# Patient Record
Sex: FEMALE | Race: WHITE
[De-identification: names, ages, dates, MRNs, and addresses within clinical notes are randomized per-mention and may not be internally consistent; named-entity substitution may affect disease eponyms.]

---

## 2017-03-31 ENCOUNTER — HOSPITAL ENCOUNTER (OUTPATIENT)
Dept: HOSPITAL 8 - CFH | Age: 72
Discharge: HOME | End: 2017-03-31
Attending: NURSE PRACTITIONER
Payer: MEDICARE

## 2017-03-31 DIAGNOSIS — Z13.820: ICD-10-CM

## 2017-03-31 DIAGNOSIS — Z12.31: Primary | ICD-10-CM

## 2017-03-31 PROCEDURE — G0202 SCR MAMMO BI INCL CAD: HCPCS

## 2017-04-07 ENCOUNTER — HOSPITAL ENCOUNTER (OUTPATIENT)
Dept: HOSPITAL 8 - CFH | Age: 72
Discharge: HOME | End: 2017-04-07
Attending: NURSE PRACTITIONER
Payer: MEDICARE

## 2017-04-07 DIAGNOSIS — Z13.820: Primary | ICD-10-CM

## 2017-04-07 DIAGNOSIS — M81.0: ICD-10-CM

## 2017-04-07 PROCEDURE — 77080 DXA BONE DENSITY AXIAL: CPT

## 2018-02-27 ENCOUNTER — HOSPITAL ENCOUNTER (OUTPATIENT)
Dept: HOSPITAL 8 - CFH | Age: 73
Discharge: HOME | End: 2018-02-27
Attending: NURSE PRACTITIONER
Payer: MEDICARE

## 2018-02-27 DIAGNOSIS — F34.1: ICD-10-CM

## 2018-02-27 DIAGNOSIS — M81.0: ICD-10-CM

## 2018-02-27 DIAGNOSIS — E78.00: ICD-10-CM

## 2018-02-27 DIAGNOSIS — G44.301: Primary | ICD-10-CM

## 2018-02-27 DIAGNOSIS — M25.511: ICD-10-CM

## 2018-02-27 DIAGNOSIS — N28.9: ICD-10-CM

## 2018-02-27 LAB
ALBUMIN SERPL-MCNC: 3.6 G/DL (ref 3.4–5)
ALP SERPL-CCNC: 65 U/L (ref 45–117)
ALT SERPL-CCNC: 16 U/L (ref 12–78)
ANION GAP SERPL CALC-SCNC: 7 MMOL/L (ref 5–15)
BASOPHILS # BLD AUTO: 0.04 X10^3/UL (ref 0–0.1)
BASOPHILS NFR BLD AUTO: 1 % (ref 0–1)
BILIRUB SERPL-MCNC: 0.7 MG/DL (ref 0.2–1)
CALCIUM SERPL-MCNC: 9 MG/DL (ref 8.5–10.1)
CHLORIDE SERPL-SCNC: 108 MMOL/L (ref 98–107)
CHOL/HDL RATIO: 2.9
CREAT SERPL-MCNC: 0.97 MG/DL (ref 0.55–1.02)
CULTURE INDICATED?: YES
EOSINOPHIL # BLD AUTO: 0.24 X10^3/UL (ref 0–0.4)
EOSINOPHIL NFR BLD AUTO: 3 % (ref 1–7)
ERYTHROCYTE [DISTWIDTH] IN BLOOD BY AUTOMATED COUNT: 14.3 % (ref 9.6–15.2)
HDL CHOL %: 35 % (ref 28–40)
HDL CHOLESTEROL (DIRECT): 70 MG/DL (ref 40–60)
LDL CHOLESTEROL,CALCULATED: 114 MG/DL (ref 54–169)
LDLC/HDLC SERPL: 1.6 {RATIO} (ref 0.5–3)
LYMPHOCYTES # BLD AUTO: 0.9 X10^3/UL (ref 1–3.4)
LYMPHOCYTES NFR BLD AUTO: 12 % (ref 22–44)
MCH RBC QN AUTO: 30.7 PG (ref 27–34.8)
MCHC RBC AUTO-ENTMCNC: 33.5 G/DL (ref 32.4–35.8)
MCV RBC AUTO: 91.6 FL (ref 80–100)
MD: NO
MICROSCOPIC: (no result)
MONOCYTES # BLD AUTO: 0.47 X10^3/UL (ref 0.2–0.8)
MONOCYTES NFR BLD AUTO: 6 % (ref 2–9)
NEUTROPHILS # BLD AUTO: 5.71 X10^3/UL (ref 1.8–6.8)
NEUTROPHILS NFR BLD AUTO: 78 % (ref 42–75)
PLATELET # BLD AUTO: 236 X10^3/UL (ref 130–400)
PMV BLD AUTO: 8.9 FL (ref 7.4–10.4)
PROT SERPL-MCNC: 6.9 G/DL (ref 6.4–8.2)
RBC # BLD AUTO: 4.62 X10^6/UL (ref 3.82–5.3)
TRIGL SERPL-MCNC: 87 MG/DL (ref 50–200)
TSH SERPL-ACNC: 1.51 MIU/L (ref 0.36–3.74)
VLDLC SERPL CALC-MCNC: 17 MG/DL (ref 0–25)

## 2018-02-27 PROCEDURE — 85025 COMPLETE CBC W/AUTO DIFF WBC: CPT

## 2018-02-27 PROCEDURE — 87086 URINE CULTURE/COLONY COUNT: CPT

## 2018-02-27 PROCEDURE — 36415 COLL VENOUS BLD VENIPUNCTURE: CPT

## 2018-02-27 PROCEDURE — 80061 LIPID PANEL: CPT

## 2018-02-27 PROCEDURE — 84443 ASSAY THYROID STIM HORMONE: CPT

## 2018-02-27 PROCEDURE — 80053 COMPREHEN METABOLIC PANEL: CPT

## 2018-02-27 PROCEDURE — 81001 URINALYSIS AUTO W/SCOPE: CPT

## 2018-02-27 PROCEDURE — 70450 CT HEAD/BRAIN W/O DYE: CPT

## 2018-02-27 PROCEDURE — 83721 ASSAY OF BLOOD LIPOPROTEIN: CPT

## 2018-08-06 ENCOUNTER — HOSPITAL ENCOUNTER (OUTPATIENT)
Dept: HOSPITAL 8 - LAB | Age: 73
Discharge: HOME | End: 2018-08-06
Attending: NURSE PRACTITIONER
Payer: MEDICARE

## 2018-08-06 DIAGNOSIS — E78.00: ICD-10-CM

## 2018-08-06 DIAGNOSIS — R53.83: ICD-10-CM

## 2018-08-06 DIAGNOSIS — R26.9: ICD-10-CM

## 2018-08-06 DIAGNOSIS — G20: Primary | ICD-10-CM

## 2018-08-06 DIAGNOSIS — M81.0: ICD-10-CM

## 2018-08-06 LAB
ALBUMIN SERPL-MCNC: 3.7 G/DL (ref 3.4–5)
ALP SERPL-CCNC: 88 U/L (ref 45–117)
ALT SERPL-CCNC: 10 U/L (ref 12–78)
ANION GAP SERPL CALC-SCNC: 9 MMOL/L (ref 5–15)
BASOPHILS # BLD AUTO: 0.01 X10^3/UL (ref 0–0.1)
BASOPHILS NFR BLD AUTO: 0 % (ref 0–1)
BILIRUB SERPL-MCNC: 0.9 MG/DL (ref 0.2–1)
CALCIUM SERPL-MCNC: 8.7 MG/DL (ref 8.5–10.1)
CHLORIDE SERPL-SCNC: 105 MMOL/L (ref 98–107)
CHOL/HDL RATIO: 2.5
CREAT SERPL-MCNC: 1.11 MG/DL (ref 0.55–1.02)
EOSINOPHIL # BLD AUTO: 0.15 X10^3/UL (ref 0–0.4)
EOSINOPHIL NFR BLD AUTO: 2 % (ref 1–7)
ERYTHROCYTE [DISTWIDTH] IN BLOOD BY AUTOMATED COUNT: 14 % (ref 9.6–15.2)
FOLATE SERPL-MCNC: 13.3 NG/ML (ref 3.1–17.5)
HDL CHOL %: 40 % (ref 28–40)
HDL CHOLESTEROL (DIRECT): 75 MG/DL (ref 40–60)
LDL CHOLESTEROL,CALCULATED: 91 MG/DL (ref 54–169)
LDLC/HDLC SERPL: 1.2 {RATIO} (ref 0.5–3)
LYMPHOCYTES # BLD AUTO: 0.63 X10^3/UL (ref 1–3.4)
LYMPHOCYTES NFR BLD AUTO: 7 % (ref 22–44)
MCH RBC QN AUTO: 31.2 PG (ref 27–34.8)
MCHC RBC AUTO-ENTMCNC: 33.8 G/DL (ref 32.4–35.8)
MCV RBC AUTO: 92.3 FL (ref 80–100)
MD: NO
MONOCYTES # BLD AUTO: 0.6 X10^3/UL (ref 0.2–0.8)
MONOCYTES NFR BLD AUTO: 6 % (ref 2–9)
NEUTROPHILS # BLD AUTO: 7.98 X10^3/UL (ref 1.8–6.8)
NEUTROPHILS NFR BLD AUTO: 85 % (ref 42–75)
PLATELET # BLD AUTO: 308 X10^3/UL (ref 130–400)
PMV BLD AUTO: 7.8 FL (ref 7.4–10.4)
PROT SERPL-MCNC: 7.1 G/DL (ref 6.4–8.2)
RBC # BLD AUTO: 4.71 X10^6/UL (ref 3.82–5.3)
TRIGL SERPL-MCNC: 99 MG/DL (ref 50–200)
TSH SERPL-ACNC: 0.69 MIU/L (ref 0.36–3.74)
VLDLC SERPL CALC-MCNC: 20 MG/DL (ref 0–25)

## 2018-08-06 PROCEDURE — 80053 COMPREHEN METABOLIC PANEL: CPT

## 2018-08-06 PROCEDURE — 85025 COMPLETE CBC W/AUTO DIFF WBC: CPT

## 2018-08-06 PROCEDURE — 82607 VITAMIN B-12: CPT

## 2018-08-06 PROCEDURE — 80061 LIPID PANEL: CPT

## 2018-08-06 PROCEDURE — 84443 ASSAY THYROID STIM HORMONE: CPT

## 2018-08-06 PROCEDURE — 82746 ASSAY OF FOLIC ACID SERUM: CPT

## 2018-08-06 PROCEDURE — 36415 COLL VENOUS BLD VENIPUNCTURE: CPT

## 2018-10-15 ENCOUNTER — HOSPITAL ENCOUNTER (INPATIENT)
Dept: HOSPITAL 8 - ED | Age: 73
LOS: 2 days | Discharge: HOME HEALTH | DRG: 185 | End: 2018-10-17
Attending: INTERNAL MEDICINE | Admitting: INTERNAL MEDICINE
Payer: MEDICARE

## 2018-10-15 VITALS — HEIGHT: 67 IN | BODY MASS INDEX: 22.01 KG/M2 | WEIGHT: 140.21 LBS

## 2018-10-15 VITALS — DIASTOLIC BLOOD PRESSURE: 73 MMHG | SYSTOLIC BLOOD PRESSURE: 120 MMHG
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
## 2018-10-15 DIAGNOSIS — S22.42XA: Primary | ICD-10-CM

## 2018-10-15 DIAGNOSIS — F03.90: ICD-10-CM
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
## 2018-10-15 DIAGNOSIS — G20: ICD-10-CM

## 2018-10-15 DIAGNOSIS — J44.9: ICD-10-CM
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
## 2018-10-15 LAB
ALBUMIN SERPL-MCNC: 3.4 G/DL (ref 3.4–5)
ALP SERPL-CCNC: 80 U/L (ref 45–117)
ALT SERPL-CCNC: 16 U/L (ref 12–78)
ANION GAP SERPL CALC-SCNC: 10 MMOL/L (ref 5–15)
BASOPHILS # BLD AUTO: 0.01 X10^3/UL (ref 0–0.1)
BASOPHILS NFR BLD AUTO: 0 % (ref 0–1)
BILIRUB SERPL-MCNC: 0.4 MG/DL (ref 0.2–1)
CALCIUM SERPL-MCNC: 8.6 MG/DL (ref 8.5–10.1)
CHLORIDE SERPL-SCNC: 109 MMOL/L (ref 98–107)
CREAT SERPL-MCNC: 0.95 MG/DL (ref 0.55–1.02)
EOSINOPHIL # BLD AUTO: 0.04 X10^3/UL (ref 0–0.4)
EOSINOPHIL NFR BLD AUTO: 0 % (ref 1–7)
ERYTHROCYTE [DISTWIDTH] IN BLOOD BY AUTOMATED COUNT: 14.1 % (ref 9.6–15.2)
LYMPHOCYTES # BLD AUTO: 0.55 X10^3/UL (ref 1–3.4)
LYMPHOCYTES NFR BLD AUTO: 4 % (ref 22–44)
MCH RBC QN AUTO: 31 PG (ref 27–34.8)
MCHC RBC AUTO-ENTMCNC: 33.6 G/DL (ref 32.4–35.8)
MCV RBC AUTO: 92.2 FL (ref 80–100)
MD: NO
MONOCYTES # BLD AUTO: 0.34 X10^3/UL (ref 0.2–0.8)
MONOCYTES NFR BLD AUTO: 3 % (ref 2–9)
NEUTROPHILS # BLD AUTO: 11.56 X10^3/UL (ref 1.8–6.8)
NEUTROPHILS NFR BLD AUTO: 93 % (ref 42–75)
PLATELET # BLD AUTO: 234 X10^3/UL (ref 130–400)
PMV BLD AUTO: 7.9 FL (ref 7.4–10.4)
PROT SERPL-MCNC: 6.6 G/DL (ref 6.4–8.2)
RBC # BLD AUTO: 4.35 X10^6/UL (ref 3.82–5.3)
TROPONIN I SERPL-MCNC: < 0.015 NG/ML (ref 0–0.04)

## 2018-10-15 PROCEDURE — 96376 TX/PRO/DX INJ SAME DRUG ADON: CPT

## 2018-10-15 PROCEDURE — 70450 CT HEAD/BRAIN W/O DYE: CPT

## 2018-10-15 PROCEDURE — 96375 TX/PRO/DX INJ NEW DRUG ADDON: CPT

## 2018-10-15 PROCEDURE — 80053 COMPREHEN METABOLIC PANEL: CPT

## 2018-10-15 PROCEDURE — 81003 URINALYSIS AUTO W/O SCOPE: CPT

## 2018-10-15 PROCEDURE — 93005 ELECTROCARDIOGRAM TRACING: CPT

## 2018-10-15 PROCEDURE — 36415 COLL VENOUS BLD VENIPUNCTURE: CPT

## 2018-10-15 PROCEDURE — 84484 ASSAY OF TROPONIN QUANT: CPT

## 2018-10-15 PROCEDURE — 96374 THER/PROPH/DIAG INJ IV PUSH: CPT

## 2018-10-15 PROCEDURE — 85025 COMPLETE CBC W/AUTO DIFF WBC: CPT

## 2018-10-15 RX ADMIN — MORPHINE SULFATE PRN MG: 4 INJECTION INTRAVENOUS at 21:30

## 2018-10-15 RX ADMIN — SODIUM CHLORIDE SCH MLS/HR: 0.9 INJECTION, SOLUTION INTRAVENOUS at 22:25

## 2018-10-15 RX ADMIN — MORPHINE SULFATE PRN MG: 4 INJECTION INTRAVENOUS at 20:29

## 2018-10-15 RX ADMIN — KETOROLAC TROMETHAMINE PRN MG: 30 INJECTION, SOLUTION INTRAMUSCULAR at 22:43

## 2018-10-16 VITALS — SYSTOLIC BLOOD PRESSURE: 103 MMHG | DIASTOLIC BLOOD PRESSURE: 65 MMHG

## 2018-10-16 VITALS — SYSTOLIC BLOOD PRESSURE: 111 MMHG | DIASTOLIC BLOOD PRESSURE: 67 MMHG

## 2018-10-16 VITALS — SYSTOLIC BLOOD PRESSURE: 114 MMHG | DIASTOLIC BLOOD PRESSURE: 65 MMHG

## 2018-10-16 VITALS — DIASTOLIC BLOOD PRESSURE: 72 MMHG | SYSTOLIC BLOOD PRESSURE: 129 MMHG

## 2018-10-16 LAB
ALBUMIN SERPL-MCNC: 3.2 G/DL (ref 3.4–5)
ALP SERPL-CCNC: 77 U/L (ref 45–117)
ALT SERPL-CCNC: 25 U/L (ref 12–78)
ANION GAP SERPL CALC-SCNC: 5 MMOL/L (ref 5–15)
BASOPHILS # BLD AUTO: 0.07 X10^3/UL (ref 0–0.1)
BASOPHILS NFR BLD AUTO: 1 % (ref 0–1)
BILIRUB SERPL-MCNC: 0.7 MG/DL (ref 0.2–1)
CALCIUM SERPL-MCNC: 8.5 MG/DL (ref 8.5–10.1)
CHLORIDE SERPL-SCNC: 109 MMOL/L (ref 98–107)
CREAT SERPL-MCNC: 1.03 MG/DL (ref 0.55–1.02)
CULTURE INDICATED?: NO
EOSINOPHIL # BLD AUTO: 0.04 X10^3/UL (ref 0–0.4)
EOSINOPHIL NFR BLD AUTO: 1 % (ref 1–7)
ERYTHROCYTE [DISTWIDTH] IN BLOOD BY AUTOMATED COUNT: 13.9 % (ref 9.6–15.2)
LYMPHOCYTES # BLD AUTO: 0.72 X10^3/UL (ref 1–3.4)
LYMPHOCYTES NFR BLD AUTO: 9 % (ref 22–44)
MCH RBC QN AUTO: 30.6 PG (ref 27–34.8)
MCHC RBC AUTO-ENTMCNC: 33.1 G/DL (ref 32.4–35.8)
MCV RBC AUTO: 92.3 FL (ref 80–100)
MD: NO
MICROSCOPIC: (no result)
MONOCYTES # BLD AUTO: 0.63 X10^3/UL (ref 0.2–0.8)
MONOCYTES NFR BLD AUTO: 8 % (ref 2–9)
NEUTROPHILS # BLD AUTO: 6.7 X10^3/UL (ref 1.8–6.8)
NEUTROPHILS NFR BLD AUTO: 82 % (ref 42–75)
PLATELET # BLD AUTO: 243 X10^3/UL (ref 130–400)
PMV BLD AUTO: 8.1 FL (ref 7.4–10.4)
PROT SERPL-MCNC: 6.4 G/DL (ref 6.4–8.2)
RBC # BLD AUTO: 4.28 X10^6/UL (ref 3.82–5.3)
TROPONIN I SERPL-MCNC: < 0.015 NG/ML (ref 0–0.04)
TROPONIN I SERPL-MCNC: < 0.015 NG/ML (ref 0–0.04)

## 2018-10-16 PROCEDURE — 0T9B70Z DRAINAGE OF BLADDER WITH DRAINAGE DEVICE, VIA NATURAL OR ARTIFICIAL OPENING: ICD-10-PCS | Performed by: INTERNAL MEDICINE

## 2018-10-16 RX ADMIN — SODIUM CHLORIDE SCH MLS/HR: 0.9 INJECTION, SOLUTION INTRAVENOUS at 23:55

## 2018-10-16 RX ADMIN — DOCUSATE SODIUM 50MG AND SENNOSIDES 8.6MG SCH TAB: 8.6; 5 TABLET, FILM COATED ORAL at 09:45

## 2018-10-16 RX ADMIN — KETOROLAC TROMETHAMINE PRN MG: 30 INJECTION, SOLUTION INTRAMUSCULAR at 09:51

## 2018-10-16 RX ADMIN — SODIUM CHLORIDE SCH MLS/HR: 0.9 INJECTION, SOLUTION INTRAVENOUS at 12:48

## 2018-10-16 RX ADMIN — KETOROLAC TROMETHAMINE PRN MG: 30 INJECTION, SOLUTION INTRAMUSCULAR at 17:58

## 2018-10-16 RX ADMIN — KETOROLAC TROMETHAMINE PRN MG: 30 INJECTION, SOLUTION INTRAMUSCULAR at 23:55

## 2018-10-17 VITALS — SYSTOLIC BLOOD PRESSURE: 119 MMHG | DIASTOLIC BLOOD PRESSURE: 74 MMHG

## 2018-10-17 VITALS — SYSTOLIC BLOOD PRESSURE: 123 MMHG | DIASTOLIC BLOOD PRESSURE: 70 MMHG

## 2018-10-17 VITALS — SYSTOLIC BLOOD PRESSURE: 145 MMHG | DIASTOLIC BLOOD PRESSURE: 82 MMHG

## 2018-10-17 RX ADMIN — DOCUSATE SODIUM 50MG AND SENNOSIDES 8.6MG SCH TAB: 8.6; 5 TABLET, FILM COATED ORAL at 07:50

## 2018-10-17 RX ADMIN — KETOROLAC TROMETHAMINE PRN MG: 30 INJECTION, SOLUTION INTRAMUSCULAR at 07:50

## 2019-12-04 ENCOUNTER — APPOINTMENT (OUTPATIENT)
Dept: RADIOLOGY | Facility: IMAGING CENTER | Age: 74
End: 2019-12-04
Attending: PHYSICIAN ASSISTANT
Payer: MEDICARE

## 2019-12-04 ENCOUNTER — OFFICE VISIT (OUTPATIENT)
Dept: URGENT CARE | Facility: PHYSICIAN GROUP | Age: 74
End: 2019-12-04
Payer: MEDICARE

## 2019-12-04 VITALS
TEMPERATURE: 98.1 F | HEIGHT: 68 IN | BODY MASS INDEX: 25.76 KG/M2 | OXYGEN SATURATION: 92 % | SYSTOLIC BLOOD PRESSURE: 110 MMHG | DIASTOLIC BLOOD PRESSURE: 78 MMHG | WEIGHT: 170 LBS | RESPIRATION RATE: 16 BRPM | HEART RATE: 83 BPM

## 2019-12-04 DIAGNOSIS — J06.9 URI WITH COUGH AND CONGESTION: ICD-10-CM

## 2019-12-04 DIAGNOSIS — J40 BRONCHITIS: Primary | ICD-10-CM

## 2019-12-04 PROCEDURE — 99204 OFFICE O/P NEW MOD 45 MIN: CPT | Performed by: PHYSICIAN ASSISTANT

## 2019-12-04 PROCEDURE — 71046 X-RAY EXAM CHEST 2 VIEWS: CPT | Mod: TC | Performed by: PHYSICIAN ASSISTANT

## 2019-12-04 RX ORDER — FLUOXETINE HYDROCHLORIDE 40 MG/1
CAPSULE ORAL
Refills: 2 | COMMUNITY
Start: 2019-11-08

## 2019-12-04 RX ORDER — AZITHROMYCIN 250 MG/1
TABLET, FILM COATED ORAL
Qty: 6 TAB | Refills: 0 | Status: SHIPPED
Start: 2019-12-04 | End: 2020-01-01

## 2019-12-04 RX ORDER — METHYLPREDNISOLONE 4 MG/1
TABLET ORAL
Qty: 21 TAB | Refills: 0 | Status: SHIPPED
Start: 2019-12-04 | End: 2020-01-01

## 2019-12-04 RX ORDER — CHOLECALCIFEROL (VITAMIN D3) 25 MCG
1 CAPSULE ORAL DAILY
Refills: 2 | COMMUNITY
Start: 2019-11-08

## 2019-12-05 NOTE — PROGRESS NOTES
Subjective:      Pt is a 74 y.o. female who presents with Cough (congestion, sore throat, chills, feverish, x3 days )            HPI  This is a new problem. PT presents to  clinic today complaining of sore throat,  pressure in ears, cough, fatigue, runny nose, wheezing and SOB. PT denies CP, NVD, abdominal pain, joint pain. PT states these symptoms began around 3 days ago. PT states the pain is a 7/10 with coughing fits, aching in nature and worse at night. Pt has not taken any RX medications for this condition. The pt's medication list, problem list, and allergies have been evaluated and reviewed during today's visit.    PMH:  Past Medical History:   Diagnosis Date   • Arthritis    • Personal history of venous thrombosis and embolism 2006    left calf       PSH:  Past Surgical History:   Procedure Laterality Date   • SHOULDER DECOMPRESSION ARTHROSCOPIC  7/24/08    Performed by TOAN ROMERO at [facility]   • CLAVICLE DISTAL EXCISION  7/24/08    Performed by TOAN ROMERO at [facility]   • OTHER ORTHOPEDIC SURGERY      bilateral knee menisectomies       Fam Hx:  the patient's family history is not pertinent to their current complaint      Soc HX:  Social History     Socioeconomic History   • Marital status:      Spouse name: Not on file   • Number of children: Not on file   • Years of education: Not on file   • Highest education level: Not on file   Occupational History   • Not on file   Social Needs   • Financial resource strain: Not on file   • Food insecurity:     Worry: Not on file     Inability: Not on file   • Transportation needs:     Medical: Not on file     Non-medical: Not on file   Tobacco Use   • Smoking status: Never Smoker   • Smokeless tobacco: Never Used   Substance and Sexual Activity   • Alcohol use: Not on file   • Drug use: Not on file   • Sexual activity: Not on file   Lifestyle   • Physical activity:     Days per week: Not on file     Minutes per  session: Not on file   • Stress: Not on file   Relationships   • Social connections:     Talks on phone: Not on file     Gets together: Not on file     Attends Protestant service: Not on file     Active member of club or organization: Not on file     Attends meetings of clubs or organizations: Not on file     Relationship status: Not on file   • Intimate partner violence:     Fear of current or ex partner: Not on file     Emotionally abused: Not on file     Physically abused: Not on file     Forced sexual activity: Not on file   Other Topics Concern   • Not on file   Social History Narrative   • Not on file         Medications:    Current Outpatient Medications:   •  fluoxetine (PROZAC) 40 MG capsule, TAKE 1 CAPSULE BY MOUTH ONCE DAILY IN THE MORNING, Disp: , Rfl: 2  •  Cholecalciferol (VITAMIN D-3) 1000 units Cap, Take 1 Cap by mouth., Disp: , Rfl: 2  •  carbidopa-levodopa (SINEMET)  MG Tab, Take 1 Tab by mouth., Disp: , Rfl: 5  •  TYLENOL 325 MG PO TABS, , Disp: , Rfl:   •  CYMBALTA PO, QDAY. , Disp: , Rfl:   •  IBUPROFEN 200 MG PO CAPS, QDAY. Instructed to stop antiinflammatory pre-op, Disp: , Rfl:       Allergies:  Patient has no known allergies.    ROS  Review of Systems   Constitutional: Positive for malaise/fatigue. Negative for fever and diaphoresis.   HENT: Positive for congestion and sore throat. Negative for ear discharge, hearing loss, nosebleeds and tinnitus.    Eyes: Negative for blurred vision, double vision and photophobia.   Respiratory: Positive for cough, sputum production, shortness of breath and wheezing. Negative for hemoptysis.    Cardiovascular: Negative for chest pain and palpitations.   Gastrointestinal: Negative for nausea, vomiting, abdominal pain, diarrhea and constipation.   Genitourinary: Negative for dysuria and flank pain.   Musculoskeletal: Negative for joint pain and myalgias.   Skin: Negative for itching and rash.   Neurological:  Negative for dizziness, tingling and  "weakness.   Endo/Heme/Allergies: Does not bruise/bleed easily.   Psychiatric/Behavioral: Negative for depression. The patient is not nervous/anxious.             Objective:     /78 (BP Location: Left arm, Patient Position: Sitting, BP Cuff Size: Adult)   Pulse 83   Temp 36.7 °C (98.1 °F) (Temporal)   Resp 16   Ht 1.727 m (5' 8\")   Wt 77.1 kg (170 lb)   SpO2 92%   BMI 25.85 kg/m²      Physical Exam      Physical Exam   Constitutional: PT is oriented to person, place, and time. PT appears well-developed and well-nourished. No distress.   HENT:   Head: Normocephalic and atraumatic.   Right Ear: Hearing, tympanic membrane, external ear and ear canal normal.   Left Ear: Hearing, tympanic membrane, external ear and ear canal normal.   Nose: Mucosal edema, rhinorrhea and sinus tenderness present. Right sinus exhibits frontal sinus tenderness. Left sinus exhibits frontal sinus tenderness.   Mouth/Throat: Uvula is midline. Mucous membranes are pale. Posterior oropharyngeal edema and posterior oropharyngeal erythema present. No oropharyngeal exudate.   Eyes: Conjunctivae normal and EOM are normal. Pupils are equal, round, and reactive to light. Right eye exhibits no discharge. Left eye exhibits no discharge.   Neck: Normal range of motion. Neck supple. No thyromegaly present.   Cardiovascular: Normal rate, regular rhythm, normal heart sounds and intact distal pulses.  Exam reveals no gallop and no friction rub.    No murmur heard.  Pulmonary/Chest: Effort normal. No respiratory distress. PT has wheezes. PT has no rales. PT exhibits tenderness.   Abdominal: Soft. Bowel sounds are normal. PT exhibits no distension and no mass. There is no tenderness. There is no rebound and no guarding.   Musculoskeletal: Normal range of motion. PT exhibits no edema and no tenderness.   Lymphadenopathy:     PT has no cervical adenopathy.   Neurological: Pt is alert and oriented to person, place, and time. Pt has normal reflexes. No " cranial nerve deficit.   Skin: Skin is warm and dry. No rash noted. No erythema.   Psychiatric: PT has a normal mood and affect. Pt behavior is normal. Judgment and thought content normal.     RADS:  DX-CHEST-2 VIEWS   Order: [id number]   Status:  Final result   Visible to patient:  No (Not Released)   Next appt:  None   Dx:  URI with cough and congestion   Details     Reading Physician Reading Date Result Priority   Akhil Maritn M.D. 12/4/2019 Urgent Care      Narrative & Impression        12/4/2019 6:27 PM     HISTORY/REASON FOR EXAM:  Cough.        TECHNIQUE/EXAM DESCRIPTION AND NUMBER OF VIEWS:  Two views of the chest.     COMPARISON:  7/17/2008     FINDINGS:     Cardiomediastinal silhouette is normal.     No focal consolidation, pleural effusion, pulmonary edema or pneumothorax. Lungs appear mildly hyperinflated which could represent emphysematous changes versus  Inspiratory effort.     No acute osseous abnormality.     IMPRESSION:     No acute cardiopulmonary abnormality.            Last Resulted: 12/04/19  6:50 PM                    Assessment/Plan:       1. Bronchitis      2. URI with cough and congestion    - DX-CHEST-2 VIEWS; Future    Azithromycin  Medrol pack  Concern for worsening symptoms of URI which shortly could transition to pneumonia and worsening sinus congestion and infection with powerful cough keeping pt up at night as they must sleep upright to avoid coughing fits.  Diff DX: Bronchitis, Sinusitis, Pneumonia, Influenza, Viral URI, Allergies  Rest, fluids encouraged.  OTC decongestant for congestion/cough  AVS with medical info given.  Pt was in full understanding and agreement with the plan.  Differential diagnosis, natural history, supportive care, and indications for immediate follow-up discussed. All questions answered. Patient agrees with the plan of care.  Follow-up as needed if symptoms worsen or fail to improve to PCP, Urgent care or Emergency Room.

## 2019-12-05 NOTE — PATIENT INSTRUCTIONS
Pleurisy  Pleurisy is irritation and swelling (inflammation) of the linings of your lungs (pleura). This can cause pain in your chest, back, or shoulder. It can also cause trouble breathing.  Follow these instructions at home:  Medicines  · Take over-the-counter and prescription medicines only as told by your doctor.  · If you were prescribed antibiotic medicine, take it as told by your doctor. Do not stop taking the antibiotic even if you start to feel better.  Activity  · Rest and return to your normal activities as told by your doctor. Ask your doctor what activities are safe for you.  · Do not drive or use heavy machinery while taking prescription pain medicine.  General instructions  · Watch for any changes in your condition.  · Take deep breaths often, even if it is painful. This can help prevent lung problems.  · When lying down, lie on your painful side. This may help you feel less pain.  · Do not smoke. If you need help quitting, ask your doctor.  · Keep all follow-up visits as told by your doctor. This is important.  Contact a doctor if:  · You have pain that:  ¨ Gets worse.  ¨ Does not get better with medicine.  ¨ Lasts for more than 1 week.  · You have a fever or chills.  · You have a cough that does not get better at home.  · You have trouble breathing that does not get better at home.  · You cough up liquid that looks like pus (purulent secretions).  Get help right away if:  · Your lips, fingernails, or toenails turn dark or turn blue.  · You cough up blood.  · You have trouble breathing that gets worse.  · You are making loud noises when you breathe (wheezing) and this gets worse.  · You have pain that spreads to your neck, arms, or jaw.  · You get a rash.  · You throw up (vomit).  · You pass out (faint).  Summary  · Pleurisy is irritation and swelling (inflammation) of the linings of your lungs (pleura).  · Pleurisy can cause pain and trouble breathing.  · If you have a cough that does not get better  at home, contact your doctor.  · Get help right away if you are having trouble breathing and it is getting worse.  This information is not intended to replace advice given to you by your health care provider. Make sure you discuss any questions you have with your health care provider.  Document Released: 11/30/2009 Document Revised: 09/11/2017 Document Reviewed: 09/11/2017  [employer] Interactive Patient Education © 2017 [employer]

## 2020-01-01 ENCOUNTER — APPOINTMENT (OUTPATIENT)
Dept: RADIOLOGY | Facility: MEDICAL CENTER | Age: 75
DRG: 202 | End: 2020-01-01
Attending: EMERGENCY MEDICINE
Payer: MEDICARE

## 2020-01-01 ENCOUNTER — HOSPITAL ENCOUNTER (INPATIENT)
Facility: MEDICAL CENTER | Age: 75
LOS: 245 days | DRG: 202 | End: 2021-01-26
Attending: EMERGENCY MEDICINE | Admitting: HOSPITALIST
Payer: MEDICARE

## 2020-01-01 ENCOUNTER — APPOINTMENT (OUTPATIENT)
Dept: CARDIOLOGY | Facility: MEDICAL CENTER | Age: 75
DRG: 202 | End: 2020-01-01
Attending: HOSPITALIST
Payer: MEDICARE

## 2020-01-01 ENCOUNTER — APPOINTMENT (OUTPATIENT)
Dept: RADIOLOGY | Facility: MEDICAL CENTER | Age: 75
DRG: 202 | End: 2020-01-01
Attending: INTERNAL MEDICINE
Payer: MEDICARE

## 2020-01-01 ENCOUNTER — OFFICE VISIT (OUTPATIENT)
Dept: URGENT CARE | Facility: PHYSICIAN GROUP | Age: 75
End: 2020-01-01
Payer: MEDICARE

## 2020-01-01 ENCOUNTER — APPOINTMENT (OUTPATIENT)
Dept: RADIOLOGY | Facility: IMAGING CENTER | Age: 75
End: 2020-01-01
Attending: NURSE PRACTITIONER
Payer: MEDICARE

## 2020-01-01 VITALS
DIASTOLIC BLOOD PRESSURE: 60 MMHG | BODY MASS INDEX: 21.22 KG/M2 | HEIGHT: 68 IN | WEIGHT: 140 LBS | OXYGEN SATURATION: 93 % | SYSTOLIC BLOOD PRESSURE: 110 MMHG | RESPIRATION RATE: 14 BRPM | TEMPERATURE: 98.1 F | HEART RATE: 83 BPM

## 2020-01-01 DIAGNOSIS — R07.1 CHEST PAIN ON BREATHING: ICD-10-CM
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
## 2020-01-01 DIAGNOSIS — J20.8 VIRAL BRONCHITIS: ICD-10-CM

## 2020-01-01 DIAGNOSIS — Z51.5 COMFORT MEASURES ONLY STATUS: ICD-10-CM

## 2020-01-01 DIAGNOSIS — F03.90 DEMENTIA WITHOUT BEHAVIORAL DISTURBANCE, UNSPECIFIED DEMENTIA TYPE: ICD-10-CM

## 2020-01-01 DIAGNOSIS — J96.01 ACUTE RESPIRATORY FAILURE WITH HYPOXIA (HCC): ICD-10-CM

## 2020-01-01 DIAGNOSIS — I50.9 ACUTE CONGESTIVE HEART FAILURE, UNSPECIFIED HEART FAILURE TYPE (HCC): ICD-10-CM

## 2020-01-01 DIAGNOSIS — R62.7 FTT (FAILURE TO THRIVE) IN ADULT: ICD-10-CM

## 2020-01-01 LAB
25(OH)D3 SERPL-MCNC: 35 NG/ML (ref 30–100)
ALBUMIN SERPL BCP-MCNC: 2.6 G/DL (ref 3.2–4.9)
ALBUMIN SERPL BCP-MCNC: 3.5 G/DL (ref 3.2–4.9)
ALBUMIN SERPL BCP-MCNC: 3.9 G/DL (ref 3.2–4.9)
ALBUMIN/GLOB SERPL: 1.3 G/DL
ALP SERPL-CCNC: 103 U/L (ref 30–99)
ALP SERPL-CCNC: 111 U/L (ref 30–99)
ALP SERPL-CCNC: 77 U/L (ref 30–99)
ALT SERPL-CCNC: 12 U/L (ref 2–50)
ALT SERPL-CCNC: 12 U/L (ref 2–50)
ALT SERPL-CCNC: 8 U/L (ref 2–50)
ANION GAP SERPL CALC-SCNC: 11 MMOL/L (ref 7–16)
ANION GAP SERPL CALC-SCNC: 12 MMOL/L (ref 7–16)
ANION GAP SERPL CALC-SCNC: 12 MMOL/L (ref 7–16)
ANION GAP SERPL CALC-SCNC: 13 MMOL/L (ref 7–16)
ANION GAP SERPL CALC-SCNC: 13 MMOL/L (ref 7–16)
ANION GAP SERPL CALC-SCNC: 14 MMOL/L (ref 7–16)
ANION GAP SERPL CALC-SCNC: 7 MMOL/L (ref 7–16)
ANION GAP SERPL CALC-SCNC: 9 MMOL/L (ref 7–16)
ANION GAP SERPL CALC-SCNC: 9 MMOL/L (ref 7–16)
APPEARANCE UR: ABNORMAL
APPEARANCE UR: ABNORMAL
APPEARANCE UR: CLEAR
AST SERPL-CCNC: 22 U/L (ref 12–45)
AST SERPL-CCNC: 28 U/L (ref 12–45)
AST SERPL-CCNC: 30 U/L (ref 12–45)
BACTERIA #/AREA URNS HPF: ABNORMAL /HPF
BACTERIA BLD CULT: NORMAL
BACTERIA BLD CULT: NORMAL
BACTERIA UR CULT: ABNORMAL
BACTERIA UR CULT: ABNORMAL
BASOPHILS # BLD AUTO: 0.3 % (ref 0–1.8)
BASOPHILS # BLD AUTO: 0.4 % (ref 0–1.8)
BASOPHILS # BLD AUTO: 0.4 % (ref 0–1.8)
BASOPHILS # BLD AUTO: 0.5 % (ref 0–1.8)
BASOPHILS # BLD AUTO: 0.7 % (ref 0–1.8)
BASOPHILS # BLD AUTO: 0.7 % (ref 0–1.8)
BASOPHILS # BLD: 0.03 K/UL (ref 0–0.12)
BASOPHILS # BLD: 0.04 K/UL (ref 0–0.12)
BASOPHILS # BLD: 0.05 K/UL (ref 0–0.12)
BASOPHILS # BLD: 0.06 K/UL (ref 0–0.12)
BILIRUB SERPL-MCNC: 0.7 MG/DL (ref 0.1–1.5)
BILIRUB SERPL-MCNC: 0.8 MG/DL (ref 0.1–1.5)
BILIRUB SERPL-MCNC: 1 MG/DL (ref 0.1–1.5)
BILIRUB UR QL STRIP.AUTO: ABNORMAL
BILIRUB UR QL STRIP.AUTO: NEGATIVE
BILIRUB UR QL STRIP.AUTO: NEGATIVE
BUN SERPL-MCNC: 14 MG/DL (ref 8–22)
BUN SERPL-MCNC: 15 MG/DL (ref 8–22)
BUN SERPL-MCNC: 16 MG/DL (ref 8–22)
BUN SERPL-MCNC: 17 MG/DL (ref 8–22)
BUN SERPL-MCNC: 18 MG/DL (ref 8–22)
BUN SERPL-MCNC: 19 MG/DL (ref 8–22)
BUN SERPL-MCNC: 22 MG/DL (ref 8–22)
CALCIUM SERPL-MCNC: 6.9 MG/DL (ref 8.5–10.5)
CALCIUM SERPL-MCNC: 8.3 MG/DL (ref 8.5–10.5)
CALCIUM SERPL-MCNC: 8.9 MG/DL (ref 8.5–10.5)
CALCIUM SERPL-MCNC: 9 MG/DL (ref 8.5–10.5)
CALCIUM SERPL-MCNC: 9.2 MG/DL (ref 8.5–10.5)
CALCIUM SERPL-MCNC: 9.3 MG/DL (ref 8.5–10.5)
CALCIUM SERPL-MCNC: 9.6 MG/DL (ref 8.5–10.5)
CALCIUM SERPL-MCNC: 9.6 MG/DL (ref 8.5–10.5)
CALCIUM SERPL-MCNC: 9.9 MG/DL (ref 8.5–10.5)
CHLORIDE SERPL-SCNC: 100 MMOL/L (ref 96–112)
CHLORIDE SERPL-SCNC: 101 MMOL/L (ref 96–112)
CHLORIDE SERPL-SCNC: 102 MMOL/L (ref 96–112)
CHLORIDE SERPL-SCNC: 103 MMOL/L (ref 96–112)
CHLORIDE SERPL-SCNC: 104 MMOL/L (ref 96–112)
CHLORIDE SERPL-SCNC: 110 MMOL/L (ref 96–112)
CHLORIDE SERPL-SCNC: 113 MMOL/L (ref 96–112)
CO2 SERPL-SCNC: 17 MMOL/L (ref 20–33)
CO2 SERPL-SCNC: 19 MMOL/L (ref 20–33)
CO2 SERPL-SCNC: 22 MMOL/L (ref 20–33)
CO2 SERPL-SCNC: 22 MMOL/L (ref 20–33)
CO2 SERPL-SCNC: 23 MMOL/L (ref 20–33)
CO2 SERPL-SCNC: 26 MMOL/L (ref 20–33)
CO2 SERPL-SCNC: 26 MMOL/L (ref 20–33)
COLOR UR: ABNORMAL
COLOR UR: ABNORMAL
COLOR UR: YELLOW
COVID ORDER STATUS COVID19: NORMAL
COVID ORDER STATUS COVID19: NORMAL
CREAT SERPL-MCNC: 0.43 MG/DL (ref 0.5–1.4)
CREAT SERPL-MCNC: 0.67 MG/DL (ref 0.5–1.4)
CREAT SERPL-MCNC: 0.67 MG/DL (ref 0.5–1.4)
CREAT SERPL-MCNC: 0.68 MG/DL (ref 0.5–1.4)
CREAT SERPL-MCNC: 0.69 MG/DL (ref 0.5–1.4)
CREAT SERPL-MCNC: 0.73 MG/DL (ref 0.5–1.4)
CREAT SERPL-MCNC: 0.82 MG/DL (ref 0.5–1.4)
CREAT SERPL-MCNC: 0.83 MG/DL (ref 0.5–1.4)
CREAT SERPL-MCNC: 0.84 MG/DL (ref 0.5–1.4)
D DIMER PPP IA.FEU-MCNC: 1.78 UG/ML (FEU) (ref 0–0.5)
EKG IMPRESSION: NORMAL
EKG IMPRESSION: NORMAL
EOSINOPHIL # BLD AUTO: 0 K/UL (ref 0–0.51)
EOSINOPHIL # BLD AUTO: 0.12 K/UL (ref 0–0.51)
EOSINOPHIL # BLD AUTO: 0.13 K/UL (ref 0–0.51)
EOSINOPHIL # BLD AUTO: 0.17 K/UL (ref 0–0.51)
EOSINOPHIL # BLD AUTO: 0.2 K/UL (ref 0–0.51)
EOSINOPHIL # BLD AUTO: 0.23 K/UL (ref 0–0.51)
EOSINOPHIL NFR BLD: 0 % (ref 0–6.9)
EOSINOPHIL NFR BLD: 1.5 % (ref 0–6.9)
EOSINOPHIL NFR BLD: 1.7 % (ref 0–6.9)
EOSINOPHIL NFR BLD: 2.5 % (ref 0–6.9)
EOSINOPHIL NFR BLD: 2.7 % (ref 0–6.9)
EOSINOPHIL NFR BLD: 4.1 % (ref 0–6.9)
EPI CELLS #/AREA URNS HPF: ABNORMAL /HPF
EPI CELLS #/AREA URNS HPF: ABNORMAL /HPF
EPI CELLS #/AREA URNS HPF: NEGATIVE /HPF
ERYTHROCYTE [DISTWIDTH] IN BLOOD BY AUTOMATED COUNT: 45.7 FL (ref 35.9–50)
ERYTHROCYTE [DISTWIDTH] IN BLOOD BY AUTOMATED COUNT: 45.8 FL (ref 35.9–50)
ERYTHROCYTE [DISTWIDTH] IN BLOOD BY AUTOMATED COUNT: 46.2 FL (ref 35.9–50)
ERYTHROCYTE [DISTWIDTH] IN BLOOD BY AUTOMATED COUNT: 46.5 FL (ref 35.9–50)
ERYTHROCYTE [DISTWIDTH] IN BLOOD BY AUTOMATED COUNT: 47.3 FL (ref 35.9–50)
ERYTHROCYTE [DISTWIDTH] IN BLOOD BY AUTOMATED COUNT: 48.9 FL (ref 35.9–50)
FERRITIN SERPL-MCNC: 172 NG/ML (ref 10–291)
GLOBULIN SER CALC-MCNC: 2 G/DL (ref 1.9–3.5)
GLOBULIN SER CALC-MCNC: 2.7 G/DL (ref 1.9–3.5)
GLOBULIN SER CALC-MCNC: 3.1 G/DL (ref 1.9–3.5)
GLUCOSE BLD-MCNC: 164 MG/DL (ref 65–99)
GLUCOSE BLD-MCNC: 87 MG/DL (ref 65–99)
GLUCOSE SERPL-MCNC: 104 MG/DL (ref 65–99)
GLUCOSE SERPL-MCNC: 114 MG/DL (ref 65–99)
GLUCOSE SERPL-MCNC: 73 MG/DL (ref 65–99)
GLUCOSE SERPL-MCNC: 84 MG/DL (ref 65–99)
GLUCOSE SERPL-MCNC: 91 MG/DL (ref 65–99)
GLUCOSE SERPL-MCNC: 93 MG/DL (ref 65–99)
GLUCOSE SERPL-MCNC: 95 MG/DL (ref 65–99)
GLUCOSE SERPL-MCNC: 96 MG/DL (ref 65–99)
GLUCOSE SERPL-MCNC: 99 MG/DL (ref 65–99)
GLUCOSE UR STRIP.AUTO-MCNC: NEGATIVE MG/DL
HCT VFR BLD AUTO: 40.2 % (ref 37–47)
HCT VFR BLD AUTO: 41.5 % (ref 37–47)
HCT VFR BLD AUTO: 42.4 % (ref 37–47)
HCT VFR BLD AUTO: 42.6 % (ref 37–47)
HCT VFR BLD AUTO: 44.1 % (ref 37–47)
HCT VFR BLD AUTO: 46.2 % (ref 37–47)
HGB BLD-MCNC: 12.5 G/DL (ref 12–16)
HGB BLD-MCNC: 13.4 G/DL (ref 12–16)
HGB BLD-MCNC: 13.8 G/DL (ref 12–16)
HGB BLD-MCNC: 13.9 G/DL (ref 12–16)
HGB BLD-MCNC: 14.4 G/DL (ref 12–16)
HGB BLD-MCNC: 14.6 G/DL (ref 12–16)
HYALINE CASTS #/AREA URNS LPF: ABNORMAL /LPF
HYALINE CASTS #/AREA URNS LPF: ABNORMAL /LPF
IMM GRANULOCYTES # BLD AUTO: 0.01 K/UL (ref 0–0.11)
IMM GRANULOCYTES # BLD AUTO: 0.02 K/UL (ref 0–0.11)
IMM GRANULOCYTES # BLD AUTO: 0.02 K/UL (ref 0–0.11)
IMM GRANULOCYTES # BLD AUTO: 0.03 K/UL (ref 0–0.11)
IMM GRANULOCYTES # BLD AUTO: 0.03 K/UL (ref 0–0.11)
IMM GRANULOCYTES # BLD AUTO: 0.04 K/UL (ref 0–0.11)
IMM GRANULOCYTES NFR BLD AUTO: 0.2 % (ref 0–0.9)
IMM GRANULOCYTES NFR BLD AUTO: 0.3 % (ref 0–0.9)
IMM GRANULOCYTES NFR BLD AUTO: 0.4 % (ref 0–0.9)
IMM GRANULOCYTES NFR BLD AUTO: 0.4 % (ref 0–0.9)
KETONES UR STRIP.AUTO-MCNC: ABNORMAL MG/DL
KETONES UR STRIP.AUTO-MCNC: ABNORMAL MG/DL
KETONES UR STRIP.AUTO-MCNC: NEGATIVE MG/DL
L PNEUMO AG UR QL IA: NEGATIVE
LEUKOCYTE ESTERASE UR QL STRIP.AUTO: ABNORMAL
LV EJECT FRACT MOD 2C 99903: 64.58
LV EJECT FRACT MOD 4C 99902: 76.27
LV EJECT FRACT MOD BP 99901: 71.39
LYMPHOCYTES # BLD AUTO: 0.42 K/UL (ref 1–4.8)
LYMPHOCYTES # BLD AUTO: 0.9 K/UL (ref 1–4.8)
LYMPHOCYTES # BLD AUTO: 0.9 K/UL (ref 1–4.8)
LYMPHOCYTES # BLD AUTO: 0.97 K/UL (ref 1–4.8)
LYMPHOCYTES # BLD AUTO: 1.05 K/UL (ref 1–4.8)
LYMPHOCYTES # BLD AUTO: 1.37 K/UL (ref 1–4.8)
LYMPHOCYTES NFR BLD: 11.4 % (ref 22–41)
LYMPHOCYTES NFR BLD: 13.1 % (ref 22–41)
LYMPHOCYTES NFR BLD: 14.7 % (ref 22–41)
LYMPHOCYTES NFR BLD: 16.2 % (ref 22–41)
LYMPHOCYTES NFR BLD: 18.8 % (ref 22–41)
LYMPHOCYTES NFR BLD: 3.6 % (ref 22–41)
MAGNESIUM SERPL-MCNC: 1.8 MG/DL (ref 1.5–2.5)
MAGNESIUM SERPL-MCNC: 1.9 MG/DL (ref 1.5–2.5)
MCH RBC QN AUTO: 29.6 PG (ref 27–33)
MCH RBC QN AUTO: 29.8 PG (ref 27–33)
MCH RBC QN AUTO: 29.9 PG (ref 27–33)
MCH RBC QN AUTO: 30 PG (ref 27–33)
MCH RBC QN AUTO: 30.2 PG (ref 27–33)
MCH RBC QN AUTO: 30.3 PG (ref 27–33)
MCHC RBC AUTO-ENTMCNC: 31.1 G/DL (ref 33.6–35)
MCHC RBC AUTO-ENTMCNC: 31.6 G/DL (ref 33.6–35)
MCHC RBC AUTO-ENTMCNC: 32.3 G/DL (ref 33.6–35)
MCHC RBC AUTO-ENTMCNC: 32.4 G/DL (ref 33.6–35)
MCHC RBC AUTO-ENTMCNC: 32.7 G/DL (ref 33.6–35)
MCHC RBC AUTO-ENTMCNC: 32.8 G/DL (ref 33.6–35)
MCV RBC AUTO: 92.4 FL (ref 81.4–97.8)
MCV RBC AUTO: 92.5 FL (ref 81.4–97.8)
MCV RBC AUTO: 92.6 FL (ref 81.4–97.8)
MCV RBC AUTO: 92.6 FL (ref 81.4–97.8)
MCV RBC AUTO: 93.7 FL (ref 81.4–97.8)
MCV RBC AUTO: 96.2 FL (ref 81.4–97.8)
MICRO URNS: ABNORMAL
MONOCYTES # BLD AUTO: 0.5 K/UL (ref 0–0.85)
MONOCYTES # BLD AUTO: 0.54 K/UL (ref 0–0.85)
MONOCYTES # BLD AUTO: 0.67 K/UL (ref 0–0.85)
MONOCYTES # BLD AUTO: 0.69 K/UL (ref 0–0.85)
MONOCYTES # BLD AUTO: 0.69 K/UL (ref 0–0.85)
MONOCYTES # BLD AUTO: 0.78 K/UL (ref 0–0.85)
MONOCYTES NFR BLD AUTO: 5.8 % (ref 0–13.4)
MONOCYTES NFR BLD AUTO: 7.9 % (ref 0–13.4)
MONOCYTES NFR BLD AUTO: 9 % (ref 0–13.4)
MONOCYTES NFR BLD AUTO: 9.2 % (ref 0–13.4)
MONOCYTES NFR BLD AUTO: 9.5 % (ref 0–13.4)
MONOCYTES NFR BLD AUTO: 9.7 % (ref 0–13.4)
NEUTROPHILS # BLD AUTO: 10.42 K/UL (ref 2–7.15)
NEUTROPHILS # BLD AUTO: 3.9 K/UL (ref 2–7.15)
NEUTROPHILS # BLD AUTO: 4.97 K/UL (ref 2–7.15)
NEUTROPHILS # BLD AUTO: 5.2 K/UL (ref 2–7.15)
NEUTROPHILS # BLD AUTO: 5.22 K/UL (ref 2–7.15)
NEUTROPHILS # BLD AUTO: 6.53 K/UL (ref 2–7.15)
NEUTROPHILS NFR BLD: 68.2 % (ref 44–72)
NEUTROPHILS NFR BLD: 70 % (ref 44–72)
NEUTROPHILS NFR BLD: 72.9 % (ref 44–72)
NEUTROPHILS NFR BLD: 75.8 % (ref 44–72)
NEUTROPHILS NFR BLD: 76.8 % (ref 44–72)
NEUTROPHILS NFR BLD: 90 % (ref 44–72)
NITRITE UR QL STRIP.AUTO: NEGATIVE
NITRITE UR QL STRIP.AUTO: POSITIVE
NITRITE UR QL STRIP.AUTO: POSITIVE
NRBC # BLD AUTO: 0 K/UL
NRBC BLD-RTO: 0 /100 WBC
NT-PROBNP SERPL IA-MCNC: 168 PG/ML (ref 0–125)
NT-PROBNP SERPL IA-MCNC: 219 PG/ML (ref 0–125)
PARASITE SPEC INSPECT: NORMAL
PH UR STRIP.AUTO: 6 [PH] (ref 5–8)
PH UR STRIP.AUTO: 6 [PH] (ref 5–8)
PH UR STRIP.AUTO: 7.5 [PH] (ref 5–8)
PHOSPHATE SERPL-MCNC: 2.5 MG/DL (ref 2.5–4.5)
PLATELET # BLD AUTO: 217 K/UL (ref 164–446)
PLATELET # BLD AUTO: 274 K/UL (ref 164–446)
PLATELET # BLD AUTO: 318 K/UL (ref 164–446)
PLATELET # BLD AUTO: 320 K/UL (ref 164–446)
PLATELET # BLD AUTO: 337 K/UL (ref 164–446)
PLATELET # BLD AUTO: 346 K/UL (ref 164–446)
PMV BLD AUTO: 10.1 FL (ref 9–12.9)
PMV BLD AUTO: 9.1 FL (ref 9–12.9)
PMV BLD AUTO: 9.1 FL (ref 9–12.9)
PMV BLD AUTO: 9.4 FL (ref 9–12.9)
PMV BLD AUTO: 9.5 FL (ref 9–12.9)
PMV BLD AUTO: 9.7 FL (ref 9–12.9)
POTASSIUM SERPL-SCNC: 3.2 MMOL/L (ref 3.6–5.5)
POTASSIUM SERPL-SCNC: 4 MMOL/L (ref 3.6–5.5)
POTASSIUM SERPL-SCNC: 4 MMOL/L (ref 3.6–5.5)
POTASSIUM SERPL-SCNC: 4.1 MMOL/L (ref 3.6–5.5)
POTASSIUM SERPL-SCNC: 4.1 MMOL/L (ref 3.6–5.5)
POTASSIUM SERPL-SCNC: 4.3 MMOL/L (ref 3.6–5.5)
POTASSIUM SERPL-SCNC: 4.3 MMOL/L (ref 3.6–5.5)
POTASSIUM SERPL-SCNC: 4.4 MMOL/L (ref 3.6–5.5)
POTASSIUM SERPL-SCNC: 4.7 MMOL/L (ref 3.6–5.5)
PROCALCITONIN SERPL-MCNC: <0.05 NG/ML
PROT SERPL-MCNC: 4.6 G/DL (ref 6–8.2)
PROT SERPL-MCNC: 6.2 G/DL (ref 6–8.2)
PROT SERPL-MCNC: 7 G/DL (ref 6–8.2)
PROT UR QL STRIP: 100 MG/DL
RBC # BLD AUTO: 4.18 M/UL (ref 4.2–5.4)
RBC # BLD AUTO: 4.49 M/UL (ref 4.2–5.4)
RBC # BLD AUTO: 4.58 M/UL (ref 4.2–5.4)
RBC # BLD AUTO: 4.6 M/UL (ref 4.2–5.4)
RBC # BLD AUTO: 4.77 M/UL (ref 4.2–5.4)
RBC # BLD AUTO: 4.93 M/UL (ref 4.2–5.4)
RBC # URNS HPF: ABNORMAL /HPF
RBC UR QL AUTO: ABNORMAL
SARS-COV-2 RNA RESP QL NAA+PROBE: NOTDETECTED
SARS-COV-2 RNA RESP QL NAA+PROBE: NOTDETECTED
SIGNIFICANT IND 70042: ABNORMAL
SIGNIFICANT IND 70042: NORMAL
SITE SITE: ABNORMAL
SITE SITE: NORMAL
SODIUM SERPL-SCNC: 135 MMOL/L (ref 135–145)
SODIUM SERPL-SCNC: 135 MMOL/L (ref 135–145)
SODIUM SERPL-SCNC: 136 MMOL/L (ref 135–145)
SODIUM SERPL-SCNC: 136 MMOL/L (ref 135–145)
SODIUM SERPL-SCNC: 137 MMOL/L (ref 135–145)
SODIUM SERPL-SCNC: 138 MMOL/L (ref 135–145)
SODIUM SERPL-SCNC: 138 MMOL/L (ref 135–145)
SODIUM SERPL-SCNC: 139 MMOL/L (ref 135–145)
SODIUM SERPL-SCNC: 142 MMOL/L (ref 135–145)
SOURCE SOURCE: ABNORMAL
SOURCE SOURCE: NORMAL
SP GR UR STRIP.AUTO: 1.02
SPECIMEN SOURCE: NORMAL
SPECIMEN SOURCE: NORMAL
TROPONIN T SERPL-MCNC: 9 NG/L (ref 6–19)
TSH SERPL DL<=0.005 MIU/L-ACNC: 0.88 UIU/ML (ref 0.38–5.33)
UROBILINOGEN UR STRIP.AUTO-MCNC: 0.2 MG/DL
UROBILINOGEN UR STRIP.AUTO-MCNC: 1 MG/DL
UROBILINOGEN UR STRIP.AUTO-MCNC: 4 MG/DL
VIT B12 SERPL-MCNC: 452 PG/ML (ref 211–911)
WBC # BLD AUTO: 11.6 K/UL (ref 4.8–10.8)
WBC # BLD AUTO: 5.6 K/UL (ref 4.8–10.8)
WBC # BLD AUTO: 6.9 K/UL (ref 4.8–10.8)
WBC # BLD AUTO: 7.2 K/UL (ref 4.8–10.8)
WBC # BLD AUTO: 7.3 K/UL (ref 4.8–10.8)
WBC # BLD AUTO: 8.5 K/UL (ref 4.8–10.8)
WBC #/AREA URNS HPF: ABNORMAL /HPF

## 2020-01-01 PROCEDURE — A9270 NON-COVERED ITEM OR SERVICE: HCPCS | Performed by: NURSE PRACTITIONER

## 2020-01-01 PROCEDURE — 84100 ASSAY OF PHOSPHORUS: CPT

## 2020-01-01 PROCEDURE — 97530 THERAPEUTIC ACTIVITIES: CPT

## 2020-01-01 PROCEDURE — 700102 HCHG RX REV CODE 250 W/ 637 OVERRIDE(OP): Performed by: NURSE PRACTITIONER

## 2020-01-01 PROCEDURE — 99231 SBSQ HOSP IP/OBS SF/LOW 25: CPT | Performed by: NURSE PRACTITIONER

## 2020-01-01 PROCEDURE — 700111 HCHG RX REV CODE 636 W/ 250 OVERRIDE (IP): Performed by: FAMILY MEDICINE

## 2020-01-01 PROCEDURE — 770001 HCHG ROOM/CARE - MED/SURG/GYN PRIV*

## 2020-01-01 PROCEDURE — 700111 HCHG RX REV CODE 636 W/ 250 OVERRIDE (IP): Performed by: NURSE PRACTITIONER

## 2020-01-01 PROCEDURE — 700102 HCHG RX REV CODE 250 W/ 637 OVERRIDE(OP): Performed by: HOSPITALIST

## 2020-01-01 PROCEDURE — 99231 SBSQ HOSP IP/OBS SF/LOW 25: CPT | Performed by: INTERNAL MEDICINE

## 2020-01-01 PROCEDURE — 97535 SELF CARE MNGMENT TRAINING: CPT

## 2020-01-01 PROCEDURE — 93306 TTE W/DOPPLER COMPLETE: CPT | Mod: 26 | Performed by: INTERNAL MEDICINE

## 2020-01-01 PROCEDURE — A9270 NON-COVERED ITEM OR SERVICE: HCPCS | Performed by: FAMILY MEDICINE

## 2020-01-01 PROCEDURE — A9270 NON-COVERED ITEM OR SERVICE: HCPCS | Performed by: HOSPITALIST

## 2020-01-01 PROCEDURE — 80053 COMPREHEN METABOLIC PANEL: CPT

## 2020-01-01 PROCEDURE — 92526 ORAL FUNCTION THERAPY: CPT

## 2020-01-01 PROCEDURE — 97112 NEUROMUSCULAR REEDUCATION: CPT | Mod: CQ

## 2020-01-01 PROCEDURE — A9270 NON-COVERED ITEM OR SERVICE: HCPCS | Performed by: INTERNAL MEDICINE

## 2020-01-01 PROCEDURE — 700102 HCHG RX REV CODE 250 W/ 637 OVERRIDE(OP): Performed by: FAMILY MEDICINE

## 2020-01-01 PROCEDURE — 770006 HCHG ROOM/CARE - MED/SURG/GYN SEMI*

## 2020-01-01 PROCEDURE — 97112 NEUROMUSCULAR REEDUCATION: CPT

## 2020-01-01 PROCEDURE — U0004 COV-19 TEST NON-CDC HGH THRU: HCPCS

## 2020-01-01 PROCEDURE — 85025 COMPLETE CBC W/AUTO DIFF WBC: CPT

## 2020-01-01 PROCEDURE — G0378 HOSPITAL OBSERVATION PER HR: HCPCS

## 2020-01-01 PROCEDURE — 97116 GAIT TRAINING THERAPY: CPT

## 2020-01-01 PROCEDURE — 99226 PR SUBSEQUENT OBSERVATION CARE,LEVEL III: CPT | Performed by: FAMILY MEDICINE

## 2020-01-01 PROCEDURE — 71045 X-RAY EXAM CHEST 1 VIEW: CPT

## 2020-01-01 PROCEDURE — 97530 THERAPEUTIC ACTIVITIES: CPT | Mod: CQ

## 2020-01-01 PROCEDURE — 700101 HCHG RX REV CODE 250: Performed by: INTERNAL MEDICINE

## 2020-01-01 PROCEDURE — 87086 URINE CULTURE/COLONY COUNT: CPT

## 2020-01-01 PROCEDURE — 700102 HCHG RX REV CODE 250 W/ 637 OVERRIDE(OP): Performed by: INTERNAL MEDICINE

## 2020-01-01 PROCEDURE — 99224 PR SUBSEQUENT OBSERVATION CARE,LEVEL I: CPT | Performed by: INTERNAL MEDICINE

## 2020-01-01 PROCEDURE — 96365 THER/PROPH/DIAG IV INF INIT: CPT

## 2020-01-01 PROCEDURE — 97535 SELF CARE MNGMENT TRAINING: CPT | Mod: CO

## 2020-01-01 PROCEDURE — 99232 SBSQ HOSP IP/OBS MODERATE 35: CPT | Performed by: INTERNAL MEDICINE

## 2020-01-01 PROCEDURE — 84145 PROCALCITONIN (PCT): CPT | Mod: 91

## 2020-01-01 PROCEDURE — 81001 URINALYSIS AUTO W/SCOPE: CPT

## 2020-01-01 PROCEDURE — G2023 SPECIMEN COLLECT COVID-19: HCPCS | Performed by: HOSPITALIST

## 2020-01-01 PROCEDURE — 71046 X-RAY EXAM CHEST 2 VIEWS: CPT | Mod: TC | Performed by: NURSE PRACTITIONER

## 2020-01-01 PROCEDURE — 36415 COLL VENOUS BLD VENIPUNCTURE: CPT

## 2020-01-01 PROCEDURE — 90662 IIV NO PRSV INCREASED AG IM: CPT | Performed by: NURSE PRACTITIONER

## 2020-01-01 PROCEDURE — 3E02340 INTRODUCTION OF INFLUENZA VACCINE INTO MUSCLE, PERCUTANEOUS APPROACH: ICD-10-PCS | Performed by: INTERNAL MEDICINE

## 2020-01-01 PROCEDURE — 700111 HCHG RX REV CODE 636 W/ 250 OVERRIDE (IP): Performed by: HOSPITALIST

## 2020-01-01 PROCEDURE — 92610 EVALUATE SWALLOWING FUNCTION: CPT

## 2020-01-01 PROCEDURE — 80048 BASIC METABOLIC PNL TOTAL CA: CPT

## 2020-01-01 PROCEDURE — G2023 SPECIMEN COLLECT COVID-19: HCPCS | Performed by: EMERGENCY MEDICINE

## 2020-01-01 PROCEDURE — 302146: Performed by: NURSE PRACTITIONER

## 2020-01-01 PROCEDURE — 85379 FIBRIN DEGRADATION QUANT: CPT

## 2020-01-01 PROCEDURE — 700111 HCHG RX REV CODE 636 W/ 250 OVERRIDE (IP): Performed by: INTERNAL MEDICINE

## 2020-01-01 PROCEDURE — 80048 BASIC METABOLIC PNL TOTAL CA: CPT | Mod: 91

## 2020-01-01 PROCEDURE — 700105 HCHG RX REV CODE 258: Performed by: HOSPITALIST

## 2020-01-01 PROCEDURE — 83735 ASSAY OF MAGNESIUM: CPT

## 2020-01-01 PROCEDURE — 82607 VITAMIN B-12: CPT

## 2020-01-01 PROCEDURE — 302146

## 2020-01-01 PROCEDURE — 97110 THERAPEUTIC EXERCISES: CPT

## 2020-01-01 PROCEDURE — 70450 CT HEAD/BRAIN W/O DYE: CPT

## 2020-01-01 PROCEDURE — 93005 ELECTROCARDIOGRAM TRACING: CPT

## 2020-01-01 PROCEDURE — 90471 IMMUNIZATION ADMIN: CPT

## 2020-01-01 PROCEDURE — 97112 NEUROMUSCULAR REEDUCATION: CPT | Mod: CO

## 2020-01-01 PROCEDURE — 87449 NOS EACH ORGANISM AG IA: CPT

## 2020-01-01 PROCEDURE — 84484 ASSAY OF TROPONIN QUANT: CPT

## 2020-01-01 PROCEDURE — 82306 VITAMIN D 25 HYDROXY: CPT

## 2020-01-01 PROCEDURE — 84443 ASSAY THYROID STIM HORMONE: CPT

## 2020-01-01 PROCEDURE — 700101 HCHG RX REV CODE 250: Performed by: HOSPITALIST

## 2020-01-01 PROCEDURE — 82962 GLUCOSE BLOOD TEST: CPT

## 2020-01-01 PROCEDURE — 93005 ELECTROCARDIOGRAM TRACING: CPT | Performed by: EMERGENCY MEDICINE

## 2020-01-01 PROCEDURE — 73501 X-RAY EXAM HIP UNI 1 VIEW: CPT | Mod: RT

## 2020-01-01 PROCEDURE — 97161 PT EVAL LOW COMPLEX 20 MIN: CPT

## 2020-01-01 PROCEDURE — 99285 EMERGENCY DEPT VISIT HI MDM: CPT

## 2020-01-01 PROCEDURE — C9803 HOPD COVID-19 SPEC COLLECT: HCPCS | Performed by: INTERNAL MEDICINE

## 2020-01-01 PROCEDURE — 97166 OT EVAL MOD COMPLEX 45 MIN: CPT

## 2020-01-01 PROCEDURE — 93306 TTE W/DOPPLER COMPLETE: CPT

## 2020-01-01 PROCEDURE — 93971 EXTREMITY STUDY: CPT | Mod: RT

## 2020-01-01 PROCEDURE — 99225 PR SUBSEQUENT OBSERVATION CARE,LEVEL II: CPT | Performed by: INTERNAL MEDICINE

## 2020-01-01 PROCEDURE — 82728 ASSAY OF FERRITIN: CPT

## 2020-01-01 PROCEDURE — 83880 ASSAY OF NATRIURETIC PEPTIDE: CPT

## 2020-01-01 PROCEDURE — 700105 HCHG RX REV CODE 258: Performed by: INTERNAL MEDICINE

## 2020-01-01 PROCEDURE — 87040 BLOOD CULTURE FOR BACTERIA: CPT | Mod: 91

## 2020-01-01 PROCEDURE — 87169 MACROSCOPIC EXAM PARASITE: CPT

## 2020-01-01 PROCEDURE — 84145 PROCALCITONIN (PCT): CPT

## 2020-01-01 PROCEDURE — 99220 PR INITIAL OBSERVATION CARE,LEVL III: CPT | Mod: CS | Performed by: HOSPITALIST

## 2020-01-01 PROCEDURE — 85025 COMPLETE CBC W/AUTO DIFF WBC: CPT | Mod: 91

## 2020-01-01 PROCEDURE — 93005 ELECTROCARDIOGRAM TRACING: CPT | Performed by: NURSE PRACTITIONER

## 2020-01-01 PROCEDURE — 87077 CULTURE AEROBIC IDENTIFY: CPT | Mod: 91

## 2020-01-01 PROCEDURE — 99225 PR SUBSEQUENT OBSERVATION CARE,LEVEL II: CPT | Mod: CS | Performed by: HOSPITALIST

## 2020-01-01 PROCEDURE — 93010 ELECTROCARDIOGRAM REPORT: CPT | Performed by: INTERNAL MEDICINE

## 2020-01-01 PROCEDURE — 99213 OFFICE O/P EST LOW 20 MIN: CPT | Performed by: NURSE PRACTITIONER

## 2020-01-01 PROCEDURE — 87186 SC STD MICRODIL/AGAR DIL: CPT

## 2020-01-01 RX ORDER — QUETIAPINE FUMARATE 25 MG/1
25 TABLET, FILM COATED ORAL EVERY EVENING
Status: DISCONTINUED | OUTPATIENT
Start: 2020-01-01 | End: 2020-01-01

## 2020-01-01 RX ORDER — ALENDRONATE SODIUM 70 MG/1
70 TABLET ORAL
COMMUNITY

## 2020-01-01 RX ORDER — ONDANSETRON 4 MG/1
4 TABLET, ORALLY DISINTEGRATING ORAL ONCE
Status: ACTIVE | OUTPATIENT
Start: 2020-01-01 | End: 2020-01-01

## 2020-01-01 RX ORDER — ACETAMINOPHEN 325 MG/1
650 TABLET ORAL EVERY 8 HOURS PRN
Status: DISCONTINUED | OUTPATIENT
Start: 2020-01-01 | End: 2020-01-01

## 2020-01-01 RX ORDER — LIDOCAINE 50 MG/G
1 PATCH TOPICAL EVERY 24 HOURS
Status: DISPENSED | OUTPATIENT
Start: 2020-01-01 | End: 2020-01-01

## 2020-01-01 RX ORDER — SODIUM CHLORIDE 9 MG/ML
1000 INJECTION, SOLUTION INTRAVENOUS ONCE
Status: COMPLETED | OUTPATIENT
Start: 2020-01-01 | End: 2020-01-01

## 2020-01-01 RX ORDER — QUETIAPINE FUMARATE 25 MG/1
25 TABLET, FILM COATED ORAL ONCE
Status: COMPLETED | OUTPATIENT
Start: 2020-01-01 | End: 2020-01-01

## 2020-01-01 RX ORDER — HALOPERIDOL 5 MG/ML
2-5 INJECTION INTRAMUSCULAR
Status: DISCONTINUED | OUTPATIENT
Start: 2020-01-01 | End: 2020-01-01

## 2020-01-01 RX ORDER — POTASSIUM CHLORIDE 20 MEQ/1
40 TABLET, EXTENDED RELEASE ORAL ONCE
Status: COMPLETED | OUTPATIENT
Start: 2020-01-01 | End: 2020-01-01

## 2020-01-01 RX ORDER — ACETAMINOPHEN 500 MG
500 TABLET ORAL EVERY 6 HOURS PRN
Status: DISCONTINUED | OUTPATIENT
Start: 2020-01-01 | End: 2020-01-01

## 2020-01-01 RX ORDER — DOXYCYCLINE 100 MG/1
100 TABLET ORAL EVERY 12 HOURS
Status: DISCONTINUED | OUTPATIENT
Start: 2020-01-01 | End: 2020-01-01

## 2020-01-01 RX ORDER — MIDODRINE HYDROCHLORIDE 5 MG/1
5 TABLET ORAL
Status: DISCONTINUED | OUTPATIENT
Start: 2020-01-01 | End: 2021-01-01

## 2020-01-01 RX ORDER — POLYETHYLENE GLYCOL 3350 17 G/17G
1 POWDER, FOR SOLUTION ORAL
Status: DISCONTINUED | OUTPATIENT
Start: 2020-01-01 | End: 2020-01-01

## 2020-01-01 RX ORDER — BISACODYL 10 MG
10 SUPPOSITORY, RECTAL RECTAL
Status: DISCONTINUED | OUTPATIENT
Start: 2020-01-01 | End: 2020-01-01

## 2020-01-01 RX ORDER — HALOPERIDOL 5 MG/ML
1 INJECTION INTRAMUSCULAR ONCE
Status: COMPLETED | OUTPATIENT
Start: 2020-01-01 | End: 2020-01-01

## 2020-01-01 RX ORDER — SODIUM CHLORIDE 9 MG/ML
INJECTION, SOLUTION INTRAVENOUS CONTINUOUS
Status: DISCONTINUED | OUTPATIENT
Start: 2020-01-01 | End: 2020-01-01

## 2020-01-01 RX ORDER — ACETAMINOPHEN 500 MG
1000 TABLET ORAL 2 TIMES DAILY
Status: DISCONTINUED | OUTPATIENT
Start: 2020-01-01 | End: 2020-01-01

## 2020-01-01 RX ORDER — CALCIUM CARBONATE 500 MG/1
500 TABLET, CHEWABLE ORAL DAILY
Status: DISCONTINUED | OUTPATIENT
Start: 2020-01-01 | End: 2020-01-01

## 2020-01-01 RX ORDER — QUETIAPINE FUMARATE 25 MG/1
25 TABLET, FILM COATED ORAL NIGHTLY
Status: DISCONTINUED | OUTPATIENT
Start: 2020-01-01 | End: 2021-01-01 | Stop reason: HOSPADM

## 2020-01-01 RX ORDER — AMOXICILLIN 250 MG
2 CAPSULE ORAL 2 TIMES DAILY
Status: DISCONTINUED | OUTPATIENT
Start: 2020-01-01 | End: 2021-01-01

## 2020-01-01 RX ORDER — QUETIAPINE FUMARATE 25 MG/1
25 TABLET, FILM COATED ORAL 2 TIMES DAILY
Status: DISCONTINUED | OUTPATIENT
Start: 2020-01-01 | End: 2020-01-01

## 2020-01-01 RX ORDER — HALOPERIDOL 1 MG/1
1 TABLET ORAL EVERY 6 HOURS PRN
Status: DISCONTINUED | OUTPATIENT
Start: 2020-01-01 | End: 2020-01-01

## 2020-01-01 RX ORDER — HALOPERIDOL 5 MG/ML
2-5 INJECTION INTRAMUSCULAR
Status: DISCONTINUED | OUTPATIENT
Start: 2020-01-01 | End: 2021-01-01

## 2020-01-01 RX ORDER — QUETIAPINE FUMARATE 25 MG/1
12.5 TABLET, FILM COATED ORAL EVERY MORNING
Status: DISCONTINUED | OUTPATIENT
Start: 2020-01-01 | End: 2021-01-01 | Stop reason: HOSPADM

## 2020-01-01 RX ORDER — HALOPERIDOL 5 MG/ML
2-5 INJECTION INTRAMUSCULAR EVERY 6 HOURS PRN
Status: DISCONTINUED | OUTPATIENT
Start: 2020-01-01 | End: 2020-01-01

## 2020-01-01 RX ORDER — NITROFURANTOIN 25; 75 MG/1; MG/1
100 CAPSULE ORAL 2 TIMES DAILY WITH MEALS
Status: COMPLETED | OUTPATIENT
Start: 2020-01-01 | End: 2020-01-01

## 2020-01-01 RX ORDER — ACETAMINOPHEN 325 MG/1
650 TABLET ORAL EVERY 6 HOURS PRN
Status: DISCONTINUED | OUTPATIENT
Start: 2020-01-01 | End: 2021-01-01

## 2020-01-01 RX ORDER — ACETAMINOPHEN 325 MG/1
650 TABLET ORAL EVERY 6 HOURS PRN
Status: DISCONTINUED | OUTPATIENT
Start: 2020-01-01 | End: 2020-01-01

## 2020-01-01 RX ORDER — ACETAMINOPHEN 500 MG
1000 TABLET ORAL EVERY 8 HOURS PRN
Status: DISCONTINUED | OUTPATIENT
Start: 2020-01-01 | End: 2020-01-01

## 2020-01-01 RX ORDER — FLUOXETINE HYDROCHLORIDE 20 MG/1
40 CAPSULE ORAL DAILY
Status: DISCONTINUED | OUTPATIENT
Start: 2020-01-01 | End: 2021-01-01

## 2020-01-01 RX ADMIN — DOCUSATE SODIUM 50 MG AND SENNOSIDES 8.6 MG 2 TABLET: 8.6; 5 TABLET, FILM COATED ORAL at 08:36

## 2020-01-01 RX ADMIN — QUETIAPINE FUMARATE 25 MG: 25 TABLET ORAL at 05:24

## 2020-01-01 RX ADMIN — DOCUSATE SODIUM 50 MG AND SENNOSIDES 8.6 MG 2 TABLET: 8.6; 5 TABLET, FILM COATED ORAL at 22:33

## 2020-01-01 RX ADMIN — DOCUSATE SODIUM 50 MG AND SENNOSIDES 8.6 MG 2 TABLET: 8.6; 5 TABLET, FILM COATED ORAL at 09:06

## 2020-01-01 RX ADMIN — ENOXAPARIN SODIUM 40 MG: 100 INJECTION SUBCUTANEOUS at 08:43

## 2020-01-01 RX ADMIN — ACETAMINOPHEN 1000 MG: 500 TABLET ORAL at 13:16

## 2020-01-01 RX ADMIN — FLUOXETINE HYDROCHLORIDE 40 MG: 20 CAPSULE ORAL at 08:21

## 2020-01-01 RX ADMIN — FLUOXETINE HYDROCHLORIDE 40 MG: 20 CAPSULE ORAL at 09:18

## 2020-01-01 RX ADMIN — QUETIAPINE FUMARATE 25 MG: 25 TABLET ORAL at 08:36

## 2020-01-01 RX ADMIN — DOCUSATE SODIUM 50 MG AND SENNOSIDES 8.6 MG 2 TABLET: 8.6; 5 TABLET, FILM COATED ORAL at 05:40

## 2020-01-01 RX ADMIN — FLUOXETINE HYDROCHLORIDE 40 MG: 20 CAPSULE ORAL at 05:56

## 2020-01-01 RX ADMIN — CARBIDOPA AND LEVODOPA 1 TABLET: 10; 100 TABLET ORAL at 12:47

## 2020-01-01 RX ADMIN — ACETAMINOPHEN 1000 MG: 500 TABLET ORAL at 12:30

## 2020-01-01 RX ADMIN — POLYETHYLENE GLYCOL 3350 1 PACKET: 17 POWDER, FOR SOLUTION ORAL at 05:53

## 2020-01-01 RX ADMIN — HALOPERIDOL LACTATE 5 MG: 5 INJECTION, SOLUTION INTRAMUSCULAR at 20:57

## 2020-01-01 RX ADMIN — CARBIDOPA AND LEVODOPA 1 TABLET: 10; 100 TABLET ORAL at 04:30

## 2020-01-01 RX ADMIN — ACETAMINOPHEN 1000 MG: 500 TABLET ORAL at 13:20

## 2020-01-01 RX ADMIN — HALOPERIDOL 1 MG: 1 TABLET ORAL at 06:08

## 2020-01-01 RX ADMIN — CARBIDOPA AND LEVODOPA 1 TABLET: 10; 100 TABLET ORAL at 04:40

## 2020-01-01 RX ADMIN — CARBIDOPA AND LEVODOPA 1 TABLET: 10; 100 TABLET ORAL at 19:45

## 2020-01-01 RX ADMIN — CARBIDOPA AND LEVODOPA 1 TABLET: 10; 100 TABLET ORAL at 17:20

## 2020-01-01 RX ADMIN — DOCUSATE SODIUM 50 MG AND SENNOSIDES 8.6 MG 2 TABLET: 8.6; 5 TABLET, FILM COATED ORAL at 17:28

## 2020-01-01 RX ADMIN — FLUOXETINE HYDROCHLORIDE 40 MG: 20 CAPSULE ORAL at 05:14

## 2020-01-01 RX ADMIN — CARBIDOPA AND LEVODOPA 1 TABLET: 10; 100 TABLET ORAL at 05:02

## 2020-01-01 RX ADMIN — FLUOXETINE HYDROCHLORIDE 40 MG: 20 CAPSULE ORAL at 08:38

## 2020-01-01 RX ADMIN — CARBIDOPA AND LEVODOPA 1 TABLET: 10; 100 TABLET ORAL at 15:06

## 2020-01-01 RX ADMIN — CARBIDOPA AND LEVODOPA 1 TABLET: 10; 100 TABLET ORAL at 08:34

## 2020-01-01 RX ADMIN — CARBIDOPA AND LEVODOPA 1 TABLET: 10; 100 TABLET ORAL at 17:14

## 2020-01-01 RX ADMIN — FLUOXETINE HYDROCHLORIDE 40 MG: 20 CAPSULE ORAL at 08:55

## 2020-01-01 RX ADMIN — DOCUSATE SODIUM 50 MG AND SENNOSIDES 8.6 MG 2 TABLET: 8.6; 5 TABLET, FILM COATED ORAL at 05:14

## 2020-01-01 RX ADMIN — CARBIDOPA AND LEVODOPA 1 TABLET: 10; 100 TABLET ORAL at 14:18

## 2020-01-01 RX ADMIN — CARBIDOPA AND LEVODOPA 1 TABLET: 10; 100 TABLET ORAL at 17:59

## 2020-01-01 RX ADMIN — FLUOXETINE HYDROCHLORIDE 40 MG: 20 CAPSULE ORAL at 09:48

## 2020-01-01 RX ADMIN — CARBIDOPA AND LEVODOPA 1 TABLET: 10; 100 TABLET ORAL at 00:15

## 2020-01-01 RX ADMIN — CARBIDOPA AND LEVODOPA 1 TABLET: 10; 100 TABLET ORAL at 14:54

## 2020-01-01 RX ADMIN — CARBIDOPA AND LEVODOPA 1 TABLET: 10; 100 TABLET ORAL at 04:51

## 2020-01-01 RX ADMIN — FLUOXETINE HYDROCHLORIDE 40 MG: 20 CAPSULE ORAL at 08:36

## 2020-01-01 RX ADMIN — MIDODRINE HYDROCHLORIDE 5 MG: 5 TABLET ORAL at 08:39

## 2020-01-01 RX ADMIN — CARBIDOPA AND LEVODOPA 1 TABLET: 10; 100 TABLET ORAL at 21:09

## 2020-01-01 RX ADMIN — QUETIAPINE FUMARATE 25 MG: 25 TABLET ORAL at 10:12

## 2020-01-01 RX ADMIN — QUETIAPINE FUMARATE 25 MG: 25 TABLET ORAL at 19:58

## 2020-01-01 RX ADMIN — ENOXAPARIN SODIUM 40 MG: 100 INJECTION SUBCUTANEOUS at 07:49

## 2020-01-01 RX ADMIN — QUETIAPINE FUMARATE 12.5 MG: 25 TABLET ORAL at 09:52

## 2020-01-01 RX ADMIN — ANTACID TABLETS 500 MG: 500 TABLET, CHEWABLE ORAL at 05:14

## 2020-01-01 RX ADMIN — QUETIAPINE FUMARATE 25 MG: 25 TABLET ORAL at 09:54

## 2020-01-01 RX ADMIN — HALOPERIDOL LACTATE 5 MG: 5 INJECTION, SOLUTION INTRAMUSCULAR at 14:41

## 2020-01-01 RX ADMIN — HALOPERIDOL LACTATE 5 MG: 5 INJECTION, SOLUTION INTRAMUSCULAR at 12:26

## 2020-01-01 RX ADMIN — QUETIAPINE FUMARATE 25 MG: 25 TABLET ORAL at 17:11

## 2020-01-01 RX ADMIN — CARBIDOPA AND LEVODOPA 1 TABLET: 10; 100 TABLET ORAL at 17:42

## 2020-01-01 RX ADMIN — QUETIAPINE FUMARATE 12.5 MG: 25 TABLET ORAL at 05:34

## 2020-01-01 RX ADMIN — NITROFURANTOIN MONOHYDRATE/MACROCRYSTALLINE 100 MG: 25; 75 CAPSULE ORAL at 17:11

## 2020-01-01 RX ADMIN — CARBIDOPA AND LEVODOPA 1 TABLET: 10; 100 TABLET ORAL at 20:17

## 2020-01-01 RX ADMIN — QUETIAPINE FUMARATE 25 MG: 25 TABLET ORAL at 17:14

## 2020-01-01 RX ADMIN — CARBIDOPA AND LEVODOPA 1 TABLET: 10; 100 TABLET ORAL at 22:25

## 2020-01-01 RX ADMIN — CARBIDOPA AND LEVODOPA 1 TABLET: 10; 100 TABLET ORAL at 06:22

## 2020-01-01 RX ADMIN — FLUOXETINE HYDROCHLORIDE 40 MG: 20 CAPSULE ORAL at 09:11

## 2020-01-01 RX ADMIN — FLUOXETINE HYDROCHLORIDE 40 MG: 20 CAPSULE ORAL at 08:50

## 2020-01-01 RX ADMIN — CARBIDOPA AND LEVODOPA 1 TABLET: 10; 100 TABLET ORAL at 04:57

## 2020-01-01 RX ADMIN — QUETIAPINE FUMARATE 25 MG: 25 TABLET ORAL at 08:38

## 2020-01-01 RX ADMIN — CARBIDOPA AND LEVODOPA 1 TABLET: 10; 100 TABLET ORAL at 11:15

## 2020-01-01 RX ADMIN — QUETIAPINE FUMARATE 25 MG: 25 TABLET ORAL at 05:53

## 2020-01-01 RX ADMIN — DOCUSATE SODIUM 50 MG AND SENNOSIDES 8.6 MG 2 TABLET: 8.6; 5 TABLET, FILM COATED ORAL at 09:36

## 2020-01-01 RX ADMIN — CARBIDOPA AND LEVODOPA 1 TABLET: 10; 100 TABLET ORAL at 17:09

## 2020-01-01 RX ADMIN — CARBIDOPA AND LEVODOPA 1 TABLET: 10; 100 TABLET ORAL at 13:19

## 2020-01-01 RX ADMIN — CARBIDOPA AND LEVODOPA 1 TABLET: 10; 100 TABLET ORAL at 06:14

## 2020-01-01 RX ADMIN — QUETIAPINE FUMARATE 25 MG: 25 TABLET ORAL at 17:32

## 2020-01-01 RX ADMIN — FLUOXETINE HYDROCHLORIDE 40 MG: 20 CAPSULE ORAL at 05:34

## 2020-01-01 RX ADMIN — DOCUSATE SODIUM 50 MG AND SENNOSIDES 8.6 MG 2 TABLET: 8.6; 5 TABLET, FILM COATED ORAL at 23:24

## 2020-01-01 RX ADMIN — FLUOXETINE HYDROCHLORIDE 40 MG: 20 CAPSULE ORAL at 09:25

## 2020-01-01 RX ADMIN — ENOXAPARIN SODIUM 40 MG: 100 INJECTION SUBCUTANEOUS at 10:21

## 2020-01-01 RX ADMIN — DOCUSATE SODIUM 50 MG AND SENNOSIDES 8.6 MG 2 TABLET: 8.6; 5 TABLET, FILM COATED ORAL at 09:07

## 2020-01-01 RX ADMIN — FLUOXETINE HYDROCHLORIDE 40 MG: 20 CAPSULE ORAL at 11:08

## 2020-01-01 RX ADMIN — QUETIAPINE FUMARATE 25 MG: 25 TABLET ORAL at 09:08

## 2020-01-01 RX ADMIN — QUETIAPINE FUMARATE 25 MG: 25 TABLET ORAL at 07:57

## 2020-01-01 RX ADMIN — CARBIDOPA AND LEVODOPA 1 TABLET: 10; 100 TABLET ORAL at 13:02

## 2020-01-01 RX ADMIN — CARBIDOPA AND LEVODOPA 1 TABLET: 10; 100 TABLET ORAL at 09:33

## 2020-01-01 RX ADMIN — FLUOXETINE HYDROCHLORIDE 40 MG: 20 CAPSULE ORAL at 09:44

## 2020-01-01 RX ADMIN — DOCUSATE SODIUM 50 MG AND SENNOSIDES 8.6 MG 2 TABLET: 8.6; 5 TABLET, FILM COATED ORAL at 08:39

## 2020-01-01 RX ADMIN — MIDODRINE HYDROCHLORIDE 5 MG: 5 TABLET ORAL at 18:01

## 2020-01-01 RX ADMIN — ENOXAPARIN SODIUM 40 MG: 100 INJECTION SUBCUTANEOUS at 05:42

## 2020-01-01 RX ADMIN — DOCUSATE SODIUM 50 MG AND SENNOSIDES 8.6 MG 2 TABLET: 8.6; 5 TABLET, FILM COATED ORAL at 17:17

## 2020-01-01 RX ADMIN — FLUOXETINE HYDROCHLORIDE 40 MG: 20 CAPSULE ORAL at 09:42

## 2020-01-01 RX ADMIN — FLUOXETINE HYDROCHLORIDE 40 MG: 20 CAPSULE ORAL at 09:34

## 2020-01-01 RX ADMIN — QUETIAPINE FUMARATE 25 MG: 25 TABLET ORAL at 17:07

## 2020-01-01 RX ADMIN — CARBIDOPA AND LEVODOPA 1 TABLET: 10; 100 TABLET ORAL at 22:09

## 2020-01-01 RX ADMIN — DOCUSATE SODIUM 50 MG AND SENNOSIDES 8.6 MG 2 TABLET: 8.6; 5 TABLET, FILM COATED ORAL at 21:08

## 2020-01-01 RX ADMIN — LIDOCAINE 1 PATCH: 50 PATCH TOPICAL at 08:58

## 2020-01-01 RX ADMIN — CARBIDOPA AND LEVODOPA 1 TABLET: 10; 100 TABLET ORAL at 22:38

## 2020-01-01 RX ADMIN — ACETAMINOPHEN 1000 MG: 500 TABLET ORAL at 12:37

## 2020-01-01 RX ADMIN — DOCUSATE SODIUM 50 MG AND SENNOSIDES 8.6 MG 2 TABLET: 8.6; 5 TABLET, FILM COATED ORAL at 05:00

## 2020-01-01 RX ADMIN — DOCUSATE SODIUM 50 MG AND SENNOSIDES 8.6 MG 2 TABLET: 8.6; 5 TABLET, FILM COATED ORAL at 17:55

## 2020-01-01 RX ADMIN — QUETIAPINE FUMARATE 12.5 MG: 25 TABLET ORAL at 05:23

## 2020-01-01 RX ADMIN — CARBIDOPA AND LEVODOPA 1 TABLET: 10; 100 TABLET ORAL at 12:39

## 2020-01-01 RX ADMIN — CARBIDOPA AND LEVODOPA 1 TABLET: 10; 100 TABLET ORAL at 04:39

## 2020-01-01 RX ADMIN — ENOXAPARIN SODIUM 40 MG: 100 INJECTION SUBCUTANEOUS at 05:07

## 2020-01-01 RX ADMIN — CARBIDOPA AND LEVODOPA 1 TABLET: 10; 100 TABLET ORAL at 06:15

## 2020-01-01 RX ADMIN — CARBIDOPA AND LEVODOPA 1 TABLET: 10; 100 TABLET ORAL at 16:43

## 2020-01-01 RX ADMIN — CARBIDOPA AND LEVODOPA 1 TABLET: 10; 100 TABLET ORAL at 19:41

## 2020-01-01 RX ADMIN — FLUOXETINE HYDROCHLORIDE 40 MG: 20 CAPSULE ORAL at 10:01

## 2020-01-01 RX ADMIN — FLUOXETINE HYDROCHLORIDE 40 MG: 20 CAPSULE ORAL at 05:25

## 2020-01-01 RX ADMIN — FLUOXETINE HYDROCHLORIDE 40 MG: 20 CAPSULE ORAL at 05:18

## 2020-01-01 RX ADMIN — CARBIDOPA AND LEVODOPA 1 TABLET: 10; 100 TABLET ORAL at 01:16

## 2020-01-01 RX ADMIN — ENOXAPARIN SODIUM 40 MG: 100 INJECTION SUBCUTANEOUS at 08:38

## 2020-01-01 RX ADMIN — CARBIDOPA AND LEVODOPA 1 TABLET: 10; 100 TABLET ORAL at 12:30

## 2020-01-01 RX ADMIN — CARBIDOPA AND LEVODOPA 1 TABLET: 10; 100 TABLET ORAL at 14:39

## 2020-01-01 RX ADMIN — CARBIDOPA AND LEVODOPA 1 TABLET: 10; 100 TABLET ORAL at 05:43

## 2020-01-01 RX ADMIN — QUETIAPINE FUMARATE 12.5 MG: 25 TABLET ORAL at 08:36

## 2020-01-01 RX ADMIN — QUETIAPINE FUMARATE 25 MG: 25 TABLET ORAL at 16:22

## 2020-01-01 RX ADMIN — DOCUSATE SODIUM 50 MG AND SENNOSIDES 8.6 MG 2 TABLET: 8.6; 5 TABLET, FILM COATED ORAL at 18:15

## 2020-01-01 RX ADMIN — CARBIDOPA AND LEVODOPA 1 TABLET: 10; 100 TABLET ORAL at 13:25

## 2020-01-01 RX ADMIN — ENOXAPARIN SODIUM 40 MG: 100 INJECTION SUBCUTANEOUS at 06:01

## 2020-01-01 RX ADMIN — CARBIDOPA AND LEVODOPA 1 TABLET: 10; 100 TABLET ORAL at 04:41

## 2020-01-01 RX ADMIN — DOCUSATE SODIUM 50 MG AND SENNOSIDES 8.6 MG 2 TABLET: 8.6; 5 TABLET, FILM COATED ORAL at 09:50

## 2020-01-01 RX ADMIN — CARBIDOPA AND LEVODOPA 1 TABLET: 10; 100 TABLET ORAL at 22:04

## 2020-01-01 RX ADMIN — ENOXAPARIN SODIUM 40 MG: 100 INJECTION SUBCUTANEOUS at 05:40

## 2020-01-01 RX ADMIN — CARBIDOPA AND LEVODOPA 1 TABLET: 10; 100 TABLET ORAL at 05:34

## 2020-01-01 RX ADMIN — CARBIDOPA AND LEVODOPA 1 TABLET: 10; 100 TABLET ORAL at 10:20

## 2020-01-01 RX ADMIN — CARBIDOPA AND LEVODOPA 1 TABLET: 10; 100 TABLET ORAL at 13:28

## 2020-01-01 RX ADMIN — ACETAMINOPHEN 1000 MG: 500 TABLET ORAL at 01:14

## 2020-01-01 RX ADMIN — MIDODRINE HYDROCHLORIDE 5 MG: 5 TABLET ORAL at 12:33

## 2020-01-01 RX ADMIN — DOCUSATE SODIUM 50 MG AND SENNOSIDES 8.6 MG 2 TABLET: 8.6; 5 TABLET, FILM COATED ORAL at 17:29

## 2020-01-01 RX ADMIN — Medication 400 MG: at 17:06

## 2020-01-01 RX ADMIN — CARBIDOPA AND LEVODOPA 1 TABLET: 10; 100 TABLET ORAL at 06:01

## 2020-01-01 RX ADMIN — FLUOXETINE HYDROCHLORIDE 40 MG: 20 CAPSULE ORAL at 05:53

## 2020-01-01 RX ADMIN — FLUOXETINE HYDROCHLORIDE 40 MG: 20 CAPSULE ORAL at 09:07

## 2020-01-01 RX ADMIN — ENOXAPARIN SODIUM 40 MG: 40 INJECTION SUBCUTANEOUS at 08:21

## 2020-01-01 RX ADMIN — CARBIDOPA AND LEVODOPA 1 TABLET: 10; 100 TABLET ORAL at 10:54

## 2020-01-01 RX ADMIN — FLUOXETINE HYDROCHLORIDE 40 MG: 20 CAPSULE ORAL at 04:56

## 2020-01-01 RX ADMIN — QUETIAPINE FUMARATE 25 MG: 25 TABLET ORAL at 18:15

## 2020-01-01 RX ADMIN — QUETIAPINE FUMARATE 12.5 MG: 25 TABLET ORAL at 09:36

## 2020-01-01 RX ADMIN — CARBIDOPA AND LEVODOPA 1 TABLET: 10; 100 TABLET ORAL at 12:38

## 2020-01-01 RX ADMIN — POLYETHYLENE GLYCOL 3350 1 PACKET: 17 POWDER, FOR SOLUTION ORAL at 05:04

## 2020-01-01 RX ADMIN — CARBIDOPA AND LEVODOPA 1 TABLET: 10; 100 TABLET ORAL at 23:05

## 2020-01-01 RX ADMIN — HALOPERIDOL LACTATE 5 MG: 5 INJECTION, SOLUTION INTRAMUSCULAR at 19:51

## 2020-01-01 RX ADMIN — ENOXAPARIN SODIUM 40 MG: 40 INJECTION SUBCUTANEOUS at 10:49

## 2020-01-01 RX ADMIN — CARBIDOPA AND LEVODOPA 1 TABLET: 10; 100 TABLET ORAL at 04:14

## 2020-01-01 RX ADMIN — ENOXAPARIN SODIUM 40 MG: 40 INJECTION SUBCUTANEOUS at 10:11

## 2020-01-01 RX ADMIN — CARBIDOPA AND LEVODOPA 1 TABLET: 10; 100 TABLET ORAL at 23:56

## 2020-01-01 RX ADMIN — ENOXAPARIN SODIUM 40 MG: 100 INJECTION SUBCUTANEOUS at 10:29

## 2020-01-01 RX ADMIN — CARBIDOPA AND LEVODOPA 1 TABLET: 10; 100 TABLET ORAL at 13:57

## 2020-01-01 RX ADMIN — CARBIDOPA AND LEVODOPA 1 TABLET: 10; 100 TABLET ORAL at 23:17

## 2020-01-01 RX ADMIN — ENOXAPARIN SODIUM 40 MG: 100 INJECTION SUBCUTANEOUS at 08:31

## 2020-01-01 RX ADMIN — DOCUSATE SODIUM 50 MG AND SENNOSIDES 8.6 MG 2 TABLET: 8.6; 5 TABLET, FILM COATED ORAL at 10:38

## 2020-01-01 RX ADMIN — CARBIDOPA AND LEVODOPA 1 TABLET: 10; 100 TABLET ORAL at 17:44

## 2020-01-01 RX ADMIN — CARBIDOPA AND LEVODOPA 1 TABLET: 10; 100 TABLET ORAL at 09:19

## 2020-01-01 RX ADMIN — FLUOXETINE HYDROCHLORIDE 40 MG: 20 CAPSULE ORAL at 08:56

## 2020-01-01 RX ADMIN — CARBIDOPA AND LEVODOPA 1 TABLET: 10; 100 TABLET ORAL at 05:55

## 2020-01-01 RX ADMIN — DOCUSATE SODIUM 50 MG AND SENNOSIDES 8.6 MG 2 TABLET: 8.6; 5 TABLET, FILM COATED ORAL at 17:24

## 2020-01-01 RX ADMIN — FLUOXETINE HYDROCHLORIDE 40 MG: 20 CAPSULE ORAL at 08:39

## 2020-01-01 RX ADMIN — HALOPERIDOL LACTATE 5 MG: 5 INJECTION, SOLUTION INTRAMUSCULAR at 04:12

## 2020-01-01 RX ADMIN — QUETIAPINE FUMARATE 25 MG: 25 TABLET ORAL at 17:34

## 2020-01-01 RX ADMIN — FLUOXETINE HYDROCHLORIDE 40 MG: 20 CAPSULE ORAL at 08:51

## 2020-01-01 RX ADMIN — QUETIAPINE FUMARATE 25 MG: 25 TABLET ORAL at 21:10

## 2020-01-01 RX ADMIN — CARBIDOPA AND LEVODOPA 1 TABLET: 10; 100 TABLET ORAL at 12:11

## 2020-01-01 RX ADMIN — CARBIDOPA AND LEVODOPA 1 TABLET: 10; 100 TABLET ORAL at 17:13

## 2020-01-01 RX ADMIN — DOCUSATE SODIUM 50 MG AND SENNOSIDES 8.6 MG 2 TABLET: 8.6; 5 TABLET, FILM COATED ORAL at 08:50

## 2020-01-01 RX ADMIN — ENOXAPARIN SODIUM 40 MG: 100 INJECTION SUBCUTANEOUS at 10:12

## 2020-01-01 RX ADMIN — QUETIAPINE FUMARATE 25 MG: 25 TABLET ORAL at 17:24

## 2020-01-01 RX ADMIN — ACETAMINOPHEN 1000 MG: 500 TABLET ORAL at 23:45

## 2020-01-01 RX ADMIN — FLUOXETINE HYDROCHLORIDE 40 MG: 20 CAPSULE ORAL at 04:51

## 2020-01-01 RX ADMIN — CARBIDOPA AND LEVODOPA 1 TABLET: 10; 100 TABLET ORAL at 20:25

## 2020-01-01 RX ADMIN — CARBIDOPA AND LEVODOPA 1 TABLET: 10; 100 TABLET ORAL at 06:47

## 2020-01-01 RX ADMIN — CARBIDOPA AND LEVODOPA 1 TABLET: 10; 100 TABLET ORAL at 17:15

## 2020-01-01 RX ADMIN — CARBIDOPA AND LEVODOPA 1 TABLET: 10; 100 TABLET ORAL at 12:33

## 2020-01-01 RX ADMIN — QUETIAPINE FUMARATE 25 MG: 25 TABLET ORAL at 21:59

## 2020-01-01 RX ADMIN — CARBIDOPA AND LEVODOPA 1 TABLET: 10; 100 TABLET ORAL at 05:19

## 2020-01-01 RX ADMIN — CARBIDOPA AND LEVODOPA 1 TABLET: 10; 100 TABLET ORAL at 16:44

## 2020-01-01 RX ADMIN — DOCUSATE SODIUM 50 MG AND SENNOSIDES 8.6 MG 2 TABLET: 8.6; 5 TABLET, FILM COATED ORAL at 17:07

## 2020-01-01 RX ADMIN — ENOXAPARIN SODIUM 40 MG: 40 INJECTION SUBCUTANEOUS at 08:56

## 2020-01-01 RX ADMIN — CARBIDOPA AND LEVODOPA 1 TABLET: 10; 100 TABLET ORAL at 04:50

## 2020-01-01 RX ADMIN — DOCUSATE SODIUM 50 MG AND SENNOSIDES 8.6 MG 2 TABLET: 8.6; 5 TABLET, FILM COATED ORAL at 09:22

## 2020-01-01 RX ADMIN — CARBIDOPA AND LEVODOPA 1 TABLET: 10; 100 TABLET ORAL at 06:54

## 2020-01-01 RX ADMIN — CARBIDOPA AND LEVODOPA 1 TABLET: 10; 100 TABLET ORAL at 04:59

## 2020-01-01 RX ADMIN — FLUOXETINE HYDROCHLORIDE 40 MG: 20 CAPSULE ORAL at 10:29

## 2020-01-01 RX ADMIN — FLUOXETINE HYDROCHLORIDE 40 MG: 20 CAPSULE ORAL at 04:59

## 2020-01-01 RX ADMIN — CARBIDOPA AND LEVODOPA 1 TABLET: 10; 100 TABLET ORAL at 17:29

## 2020-01-01 RX ADMIN — FLUOXETINE HYDROCHLORIDE 40 MG: 20 CAPSULE ORAL at 04:50

## 2020-01-01 RX ADMIN — QUETIAPINE FUMARATE 25 MG: 25 TABLET ORAL at 21:25

## 2020-01-01 RX ADMIN — CARBIDOPA AND LEVODOPA 1 TABLET: 10; 100 TABLET ORAL at 22:00

## 2020-01-01 RX ADMIN — QUETIAPINE FUMARATE 25 MG: 25 TABLET ORAL at 17:46

## 2020-01-01 RX ADMIN — CARBIDOPA AND LEVODOPA 1 TABLET: 10; 100 TABLET ORAL at 13:01

## 2020-01-01 RX ADMIN — QUETIAPINE FUMARATE 25 MG: 25 TABLET ORAL at 17:13

## 2020-01-01 RX ADMIN — CARBIDOPA AND LEVODOPA 1 TABLET: 10; 100 TABLET ORAL at 19:29

## 2020-01-01 RX ADMIN — CARBIDOPA AND LEVODOPA 1 TABLET: 10; 100 TABLET ORAL at 23:14

## 2020-01-01 RX ADMIN — FLUOXETINE HYDROCHLORIDE 40 MG: 20 CAPSULE ORAL at 11:25

## 2020-01-01 RX ADMIN — QUETIAPINE FUMARATE 25 MG: 25 TABLET ORAL at 17:16

## 2020-01-01 RX ADMIN — CARBIDOPA AND LEVODOPA 1 TABLET: 10; 100 TABLET ORAL at 13:55

## 2020-01-01 RX ADMIN — MIDODRINE HYDROCHLORIDE 5 MG: 5 TABLET ORAL at 11:42

## 2020-01-01 RX ADMIN — ENOXAPARIN SODIUM 40 MG: 100 INJECTION SUBCUTANEOUS at 09:57

## 2020-01-01 RX ADMIN — DOCUSATE SODIUM 50 MG AND SENNOSIDES 8.6 MG 2 TABLET: 8.6; 5 TABLET, FILM COATED ORAL at 09:11

## 2020-01-01 RX ADMIN — CARBIDOPA AND LEVODOPA 1 TABLET: 10; 100 TABLET ORAL at 08:54

## 2020-01-01 RX ADMIN — ACETAMINOPHEN 650 MG: 325 TABLET, FILM COATED ORAL at 12:31

## 2020-01-01 RX ADMIN — CARBIDOPA AND LEVODOPA 1 TABLET: 10; 100 TABLET ORAL at 09:45

## 2020-01-01 RX ADMIN — CARBIDOPA AND LEVODOPA 1 TABLET: 10; 100 TABLET ORAL at 16:59

## 2020-01-01 RX ADMIN — DOCUSATE SODIUM 50 MG AND SENNOSIDES 8.6 MG 2 TABLET: 8.6; 5 TABLET, FILM COATED ORAL at 09:18

## 2020-01-01 RX ADMIN — QUETIAPINE FUMARATE 25 MG: 25 TABLET ORAL at 08:21

## 2020-01-01 RX ADMIN — QUETIAPINE FUMARATE 25 MG: 25 TABLET ORAL at 20:04

## 2020-01-01 RX ADMIN — DOCUSATE SODIUM 50 MG AND SENNOSIDES 8.6 MG 2 TABLET: 8.6; 5 TABLET, FILM COATED ORAL at 09:19

## 2020-01-01 RX ADMIN — QUETIAPINE FUMARATE 25 MG: 25 TABLET ORAL at 17:29

## 2020-01-01 RX ADMIN — CARBIDOPA AND LEVODOPA 1 TABLET: 10; 100 TABLET ORAL at 19:47

## 2020-01-01 RX ADMIN — ENOXAPARIN SODIUM 40 MG: 100 INJECTION SUBCUTANEOUS at 09:15

## 2020-01-01 RX ADMIN — CARBIDOPA AND LEVODOPA 1 TABLET: 10; 100 TABLET ORAL at 21:27

## 2020-01-01 RX ADMIN — FLUOXETINE HYDROCHLORIDE 40 MG: 20 CAPSULE ORAL at 10:39

## 2020-01-01 RX ADMIN — QUETIAPINE FUMARATE 25 MG: 25 TABLET ORAL at 09:45

## 2020-01-01 RX ADMIN — QUETIAPINE FUMARATE 25 MG: 25 TABLET ORAL at 09:53

## 2020-01-01 RX ADMIN — QUETIAPINE FUMARATE 25 MG: 25 TABLET ORAL at 18:06

## 2020-01-01 RX ADMIN — ACETAMINOPHEN 1000 MG: 500 TABLET ORAL at 00:45

## 2020-01-01 RX ADMIN — FLUOXETINE HYDROCHLORIDE 40 MG: 20 CAPSULE ORAL at 07:47

## 2020-01-01 RX ADMIN — FLUOXETINE HYDROCHLORIDE 40 MG: 20 CAPSULE ORAL at 09:16

## 2020-01-01 RX ADMIN — CARBIDOPA AND LEVODOPA 1 TABLET: 10; 100 TABLET ORAL at 11:48

## 2020-01-01 RX ADMIN — CARBIDOPA AND LEVODOPA 1 TABLET: 10; 100 TABLET ORAL at 05:20

## 2020-01-01 RX ADMIN — CARBIDOPA AND LEVODOPA 1 TABLET: 10; 100 TABLET ORAL at 21:07

## 2020-01-01 RX ADMIN — CARBIDOPA AND LEVODOPA 1 TABLET: 10; 100 TABLET ORAL at 17:10

## 2020-01-01 RX ADMIN — DOCUSATE SODIUM 50 MG AND SENNOSIDES 8.6 MG 2 TABLET: 8.6; 5 TABLET, FILM COATED ORAL at 21:48

## 2020-01-01 RX ADMIN — ENOXAPARIN SODIUM 40 MG: 40 INJECTION SUBCUTANEOUS at 08:39

## 2020-01-01 RX ADMIN — CARBIDOPA AND LEVODOPA 1 TABLET: 10; 100 TABLET ORAL at 12:09

## 2020-01-01 RX ADMIN — DOCUSATE SODIUM 50 MG AND SENNOSIDES 8.6 MG 2 TABLET: 8.6; 5 TABLET, FILM COATED ORAL at 19:46

## 2020-01-01 RX ADMIN — ACETAMINOPHEN 1000 MG: 500 TABLET ORAL at 13:28

## 2020-01-01 RX ADMIN — DOCUSATE SODIUM 50 MG AND SENNOSIDES 8.6 MG 2 TABLET: 8.6; 5 TABLET, FILM COATED ORAL at 16:18

## 2020-01-01 RX ADMIN — MIDODRINE HYDROCHLORIDE 5 MG: 5 TABLET ORAL at 12:36

## 2020-01-01 RX ADMIN — CARBIDOPA AND LEVODOPA 1 TABLET: 10; 100 TABLET ORAL at 17:39

## 2020-01-01 RX ADMIN — ENOXAPARIN SODIUM 40 MG: 100 INJECTION SUBCUTANEOUS at 05:04

## 2020-01-01 RX ADMIN — CARBIDOPA AND LEVODOPA 1 TABLET: 10; 100 TABLET ORAL at 20:52

## 2020-01-01 RX ADMIN — QUETIAPINE FUMARATE 25 MG: 25 TABLET ORAL at 08:31

## 2020-01-01 RX ADMIN — ENOXAPARIN SODIUM 40 MG: 100 INJECTION SUBCUTANEOUS at 09:24

## 2020-01-01 RX ADMIN — QUETIAPINE FUMARATE 25 MG: 25 TABLET ORAL at 17:01

## 2020-01-01 RX ADMIN — MIDODRINE HYDROCHLORIDE 5 MG: 5 TABLET ORAL at 16:56

## 2020-01-01 RX ADMIN — DOCUSATE SODIUM 50 MG AND SENNOSIDES 8.6 MG 2 TABLET: 8.6; 5 TABLET, FILM COATED ORAL at 04:57

## 2020-01-01 RX ADMIN — CARBIDOPA AND LEVODOPA 1 TABLET: 10; 100 TABLET ORAL at 20:37

## 2020-01-01 RX ADMIN — CARBIDOPA AND LEVODOPA 1 TABLET: 10; 100 TABLET ORAL at 05:23

## 2020-01-01 RX ADMIN — CARBIDOPA AND LEVODOPA 1 TABLET: 10; 100 TABLET ORAL at 18:06

## 2020-01-01 RX ADMIN — CARBIDOPA AND LEVODOPA 1 TABLET: 10; 100 TABLET ORAL at 04:48

## 2020-01-01 RX ADMIN — ACETAMINOPHEN 1000 MG: 500 TABLET ORAL at 13:10

## 2020-01-01 RX ADMIN — DOCUSATE SODIUM 50 MG AND SENNOSIDES 8.6 MG 2 TABLET: 8.6; 5 TABLET, FILM COATED ORAL at 10:29

## 2020-01-01 RX ADMIN — CARBIDOPA AND LEVODOPA 1 TABLET: 10; 100 TABLET ORAL at 09:17

## 2020-01-01 RX ADMIN — DOCUSATE SODIUM 50 MG AND SENNOSIDES 8.6 MG 2 TABLET: 8.6; 5 TABLET, FILM COATED ORAL at 20:11

## 2020-01-01 RX ADMIN — DOCUSATE SODIUM 50 MG AND SENNOSIDES 8.6 MG 2 TABLET: 8.6; 5 TABLET, FILM COATED ORAL at 17:25

## 2020-01-01 RX ADMIN — ENOXAPARIN SODIUM 40 MG: 100 INJECTION SUBCUTANEOUS at 05:55

## 2020-01-01 RX ADMIN — DOCUSATE SODIUM 50 MG AND SENNOSIDES 8.6 MG 2 TABLET: 8.6; 5 TABLET, FILM COATED ORAL at 20:49

## 2020-01-01 RX ADMIN — QUETIAPINE FUMARATE 12.5 MG: 25 TABLET ORAL at 09:12

## 2020-01-01 RX ADMIN — CARBIDOPA AND LEVODOPA 1 TABLET: 10; 100 TABLET ORAL at 10:07

## 2020-01-01 RX ADMIN — DOCUSATE SODIUM 50 MG AND SENNOSIDES 8.6 MG 2 TABLET: 8.6; 5 TABLET, FILM COATED ORAL at 10:07

## 2020-01-01 RX ADMIN — CARBIDOPA AND LEVODOPA 1 TABLET: 10; 100 TABLET ORAL at 09:42

## 2020-01-01 RX ADMIN — QUETIAPINE FUMARATE 25 MG: 25 TABLET ORAL at 17:18

## 2020-01-01 RX ADMIN — CARBIDOPA AND LEVODOPA 1 TABLET: 10; 100 TABLET ORAL at 21:39

## 2020-01-01 RX ADMIN — CARBIDOPA AND LEVODOPA 1 TABLET: 10; 100 TABLET ORAL at 09:22

## 2020-01-01 RX ADMIN — CARBIDOPA AND LEVODOPA 1 TABLET: 10; 100 TABLET ORAL at 13:21

## 2020-01-01 RX ADMIN — QUETIAPINE FUMARATE 25 MG: 25 TABLET ORAL at 20:52

## 2020-01-01 RX ADMIN — CARBIDOPA AND LEVODOPA 1 TABLET: 10; 100 TABLET ORAL at 22:34

## 2020-01-01 RX ADMIN — QUETIAPINE FUMARATE 25 MG: 25 TABLET ORAL at 05:33

## 2020-01-01 RX ADMIN — FLUOXETINE HYDROCHLORIDE 40 MG: 20 CAPSULE ORAL at 10:19

## 2020-01-01 RX ADMIN — CARBIDOPA AND LEVODOPA 1 TABLET: 10; 100 TABLET ORAL at 11:46

## 2020-01-01 RX ADMIN — CARBIDOPA AND LEVODOPA 1 TABLET: 10; 100 TABLET ORAL at 18:05

## 2020-01-01 RX ADMIN — FLUOXETINE HYDROCHLORIDE 40 MG: 20 CAPSULE ORAL at 08:20

## 2020-01-01 RX ADMIN — CARBIDOPA AND LEVODOPA 1 TABLET: 10; 100 TABLET ORAL at 09:57

## 2020-01-01 RX ADMIN — CARBIDOPA AND LEVODOPA 1 TABLET: 10; 100 TABLET ORAL at 13:48

## 2020-01-01 RX ADMIN — CARBIDOPA AND LEVODOPA 1 TABLET: 10; 100 TABLET ORAL at 21:05

## 2020-01-01 RX ADMIN — DOCUSATE SODIUM 50 MG AND SENNOSIDES 8.6 MG 2 TABLET: 8.6; 5 TABLET, FILM COATED ORAL at 17:14

## 2020-01-01 RX ADMIN — CARBIDOPA AND LEVODOPA 1 TABLET: 10; 100 TABLET ORAL at 14:29

## 2020-01-01 RX ADMIN — FLUOXETINE HYDROCHLORIDE 40 MG: 20 CAPSULE ORAL at 09:17

## 2020-01-01 RX ADMIN — NITROFURANTOIN MONOHYDRATE/MACROCRYSTALLINE 100 MG: 25; 75 CAPSULE ORAL at 17:32

## 2020-01-01 RX ADMIN — DOCUSATE SODIUM 50 MG AND SENNOSIDES 8.6 MG 2 TABLET: 8.6; 5 TABLET, FILM COATED ORAL at 21:06

## 2020-01-01 RX ADMIN — CARBIDOPA AND LEVODOPA 1 TABLET: 10; 100 TABLET ORAL at 11:29

## 2020-01-01 RX ADMIN — CARBIDOPA AND LEVODOPA 1 TABLET: 10; 100 TABLET ORAL at 01:11

## 2020-01-01 RX ADMIN — MIDODRINE HYDROCHLORIDE 5 MG: 5 TABLET ORAL at 10:30

## 2020-01-01 RX ADMIN — ENOXAPARIN SODIUM 40 MG: 100 INJECTION SUBCUTANEOUS at 09:07

## 2020-01-01 RX ADMIN — CARBIDOPA AND LEVODOPA 1 TABLET: 10; 100 TABLET ORAL at 10:24

## 2020-01-01 RX ADMIN — QUETIAPINE FUMARATE 25 MG: 25 TABLET ORAL at 09:22

## 2020-01-01 RX ADMIN — FLUOXETINE HYDROCHLORIDE 40 MG: 20 CAPSULE ORAL at 09:19

## 2020-01-01 RX ADMIN — QUETIAPINE FUMARATE 25 MG: 25 TABLET ORAL at 17:19

## 2020-01-01 RX ADMIN — DOCUSATE SODIUM 50 MG AND SENNOSIDES 8.6 MG 2 TABLET: 8.6; 5 TABLET, FILM COATED ORAL at 04:45

## 2020-01-01 RX ADMIN — DOCUSATE SODIUM 50 MG AND SENNOSIDES 8.6 MG 2 TABLET: 8.6; 5 TABLET, FILM COATED ORAL at 07:57

## 2020-01-01 RX ADMIN — CARBIDOPA AND LEVODOPA 1 TABLET: 10; 100 TABLET ORAL at 05:08

## 2020-01-01 RX ADMIN — FLUOXETINE HYDROCHLORIDE 40 MG: 20 CAPSULE ORAL at 05:10

## 2020-01-01 RX ADMIN — FLUOXETINE HYDROCHLORIDE 40 MG: 20 CAPSULE ORAL at 10:07

## 2020-01-01 RX ADMIN — CARBIDOPA AND LEVODOPA 1 TABLET: 10; 100 TABLET ORAL at 05:01

## 2020-01-01 RX ADMIN — QUETIAPINE FUMARATE 12.5 MG: 25 TABLET ORAL at 09:25

## 2020-01-01 RX ADMIN — DOCUSATE SODIUM 50 MG AND SENNOSIDES 8.6 MG 2 TABLET: 8.6; 5 TABLET, FILM COATED ORAL at 20:51

## 2020-01-01 RX ADMIN — QUETIAPINE FUMARATE 12.5 MG: 25 TABLET ORAL at 08:04

## 2020-01-01 RX ADMIN — CARBIDOPA AND LEVODOPA 1 TABLET: 10; 100 TABLET ORAL at 17:17

## 2020-01-01 RX ADMIN — CARBIDOPA AND LEVODOPA 1 TABLET: 10; 100 TABLET ORAL at 17:06

## 2020-01-01 RX ADMIN — DOCUSATE SODIUM 50 MG AND SENNOSIDES 8.6 MG 2 TABLET: 8.6; 5 TABLET, FILM COATED ORAL at 09:51

## 2020-01-01 RX ADMIN — FLUOXETINE HYDROCHLORIDE 40 MG: 20 CAPSULE ORAL at 09:41

## 2020-01-01 RX ADMIN — CARBIDOPA AND LEVODOPA 1 TABLET: 10; 100 TABLET ORAL at 20:53

## 2020-01-01 RX ADMIN — QUETIAPINE FUMARATE 12.5 MG: 25 TABLET ORAL at 05:43

## 2020-01-01 RX ADMIN — CARBIDOPA AND LEVODOPA 1 TABLET: 10; 100 TABLET ORAL at 20:14

## 2020-01-01 RX ADMIN — CARBIDOPA AND LEVODOPA 1 TABLET: 10; 100 TABLET ORAL at 21:38

## 2020-01-01 RX ADMIN — FLUOXETINE HYDROCHLORIDE 40 MG: 20 CAPSULE ORAL at 10:24

## 2020-01-01 RX ADMIN — QUETIAPINE FUMARATE 25 MG: 25 TABLET ORAL at 17:33

## 2020-01-01 RX ADMIN — QUETIAPINE FUMARATE 25 MG: 25 TABLET ORAL at 17:30

## 2020-01-01 RX ADMIN — CARBIDOPA AND LEVODOPA 1 TABLET: 10; 100 TABLET ORAL at 21:23

## 2020-01-01 RX ADMIN — CARBIDOPA AND LEVODOPA 1 TABLET: 10; 100 TABLET ORAL at 17:57

## 2020-01-01 RX ADMIN — CARBIDOPA AND LEVODOPA 1 TABLET: 10; 100 TABLET ORAL at 05:49

## 2020-01-01 RX ADMIN — QUETIAPINE FUMARATE 25 MG: 25 TABLET ORAL at 19:29

## 2020-01-01 RX ADMIN — CARBIDOPA AND LEVODOPA 1 TABLET: 10; 100 TABLET ORAL at 16:26

## 2020-01-01 RX ADMIN — QUETIAPINE FUMARATE 25 MG: 25 TABLET ORAL at 20:29

## 2020-01-01 RX ADMIN — CARBIDOPA AND LEVODOPA 1 TABLET: 10; 100 TABLET ORAL at 05:40

## 2020-01-01 RX ADMIN — FLUOXETINE HYDROCHLORIDE 40 MG: 20 CAPSULE ORAL at 04:31

## 2020-01-01 RX ADMIN — DOCUSATE SODIUM 50 MG AND SENNOSIDES 8.6 MG 2 TABLET: 8.6; 5 TABLET, FILM COATED ORAL at 10:14

## 2020-01-01 RX ADMIN — QUETIAPINE FUMARATE 25 MG: 25 TABLET ORAL at 08:06

## 2020-01-01 RX ADMIN — MIDODRINE HYDROCHLORIDE 5 MG: 5 TABLET ORAL at 11:56

## 2020-01-01 RX ADMIN — CARBIDOPA AND LEVODOPA 1 TABLET: 10; 100 TABLET ORAL at 17:54

## 2020-01-01 RX ADMIN — CARBIDOPA AND LEVODOPA 1 TABLET: 10; 100 TABLET ORAL at 12:45

## 2020-01-01 RX ADMIN — ACETAMINOPHEN 1000 MG: 500 TABLET ORAL at 12:23

## 2020-01-01 RX ADMIN — QUETIAPINE FUMARATE 25 MG: 25 TABLET ORAL at 09:18

## 2020-01-01 RX ADMIN — ACETAMINOPHEN 1000 MG: 500 TABLET ORAL at 20:01

## 2020-01-01 RX ADMIN — DOCUSATE SODIUM 50 MG AND SENNOSIDES 8.6 MG 2 TABLET: 8.6; 5 TABLET, FILM COATED ORAL at 20:21

## 2020-01-01 RX ADMIN — QUETIAPINE FUMARATE 25 MG: 25 TABLET ORAL at 20:26

## 2020-01-01 RX ADMIN — CARBIDOPA AND LEVODOPA 1 TABLET: 10; 100 TABLET ORAL at 05:45

## 2020-01-01 RX ADMIN — DOCUSATE SODIUM 50 MG AND SENNOSIDES 8.6 MG 2 TABLET: 8.6; 5 TABLET, FILM COATED ORAL at 21:27

## 2020-01-01 RX ADMIN — CARBIDOPA AND LEVODOPA 1 TABLET: 10; 100 TABLET ORAL at 00:10

## 2020-01-01 RX ADMIN — QUETIAPINE FUMARATE 25 MG: 25 TABLET ORAL at 17:35

## 2020-01-01 RX ADMIN — MIDODRINE HYDROCHLORIDE 5 MG: 5 TABLET ORAL at 05:55

## 2020-01-01 RX ADMIN — CARBIDOPA AND LEVODOPA 1 TABLET: 10; 100 TABLET ORAL at 05:41

## 2020-01-01 RX ADMIN — CARBIDOPA AND LEVODOPA 1 TABLET: 10; 100 TABLET ORAL at 01:01

## 2020-01-01 RX ADMIN — CARBIDOPA AND LEVODOPA 1 TABLET: 10; 100 TABLET ORAL at 23:24

## 2020-01-01 RX ADMIN — CARBIDOPA AND LEVODOPA 1 TABLET: 10; 100 TABLET ORAL at 21:00

## 2020-01-01 RX ADMIN — DOCUSATE SODIUM 50 MG AND SENNOSIDES 8.6 MG 2 TABLET: 8.6; 5 TABLET, FILM COATED ORAL at 05:35

## 2020-01-01 RX ADMIN — ACETAMINOPHEN 1000 MG: 500 TABLET ORAL at 12:42

## 2020-01-01 RX ADMIN — QUETIAPINE FUMARATE 25 MG: 25 TABLET ORAL at 06:14

## 2020-01-01 RX ADMIN — CARBIDOPA AND LEVODOPA 1 TABLET: 10; 100 TABLET ORAL at 21:21

## 2020-01-01 RX ADMIN — CARBIDOPA AND LEVODOPA 1 TABLET: 10; 100 TABLET ORAL at 17:46

## 2020-01-01 RX ADMIN — QUETIAPINE FUMARATE 25 MG: 25 TABLET ORAL at 08:41

## 2020-01-01 RX ADMIN — QUETIAPINE FUMARATE 25 MG: 25 TABLET ORAL at 06:22

## 2020-01-01 RX ADMIN — DOCUSATE SODIUM 50 MG AND SENNOSIDES 8.6 MG 2 TABLET: 8.6; 5 TABLET, FILM COATED ORAL at 20:16

## 2020-01-01 RX ADMIN — CARBIDOPA AND LEVODOPA 1 TABLET: 10; 100 TABLET ORAL at 23:40

## 2020-01-01 RX ADMIN — CARBIDOPA AND LEVODOPA 1 TABLET: 10; 100 TABLET ORAL at 19:58

## 2020-01-01 RX ADMIN — DOCUSATE SODIUM 50 MG AND SENNOSIDES 8.6 MG 2 TABLET: 8.6; 5 TABLET, FILM COATED ORAL at 21:53

## 2020-01-01 RX ADMIN — QUETIAPINE FUMARATE 25 MG: 25 TABLET ORAL at 22:07

## 2020-01-01 RX ADMIN — CARBIDOPA AND LEVODOPA 1 TABLET: 10; 100 TABLET ORAL at 05:03

## 2020-01-01 RX ADMIN — DOCUSATE SODIUM 50 MG AND SENNOSIDES 8.6 MG 2 TABLET: 8.6; 5 TABLET, FILM COATED ORAL at 05:58

## 2020-01-01 RX ADMIN — QUETIAPINE FUMARATE 25 MG: 25 TABLET ORAL at 09:16

## 2020-01-01 RX ADMIN — CARBIDOPA AND LEVODOPA 1 TABLET: 10; 100 TABLET ORAL at 16:12

## 2020-01-01 RX ADMIN — CARBIDOPA AND LEVODOPA 1 TABLET: 10; 100 TABLET ORAL at 05:21

## 2020-01-01 RX ADMIN — CARBIDOPA AND LEVODOPA 1 TABLET: 10; 100 TABLET ORAL at 11:00

## 2020-01-01 RX ADMIN — ENOXAPARIN SODIUM 40 MG: 100 INJECTION SUBCUTANEOUS at 05:51

## 2020-01-01 RX ADMIN — QUETIAPINE FUMARATE 25 MG: 25 TABLET ORAL at 09:19

## 2020-01-01 RX ADMIN — MIDODRINE HYDROCHLORIDE 5 MG: 5 TABLET ORAL at 17:17

## 2020-01-01 RX ADMIN — QUETIAPINE FUMARATE 25 MG: 25 TABLET ORAL at 09:44

## 2020-01-01 RX ADMIN — MIDODRINE HYDROCHLORIDE 5 MG: 5 TABLET ORAL at 09:40

## 2020-01-01 RX ADMIN — CARBIDOPA AND LEVODOPA 1 TABLET: 10; 100 TABLET ORAL at 06:23

## 2020-01-01 RX ADMIN — CARBIDOPA AND LEVODOPA 1 TABLET: 10; 100 TABLET ORAL at 16:34

## 2020-01-01 RX ADMIN — FLUOXETINE HYDROCHLORIDE 40 MG: 20 CAPSULE ORAL at 09:22

## 2020-01-01 RX ADMIN — QUETIAPINE FUMARATE 25 MG: 25 TABLET ORAL at 09:33

## 2020-01-01 RX ADMIN — CARBIDOPA AND LEVODOPA 1 TABLET: 10; 100 TABLET ORAL at 09:50

## 2020-01-01 RX ADMIN — CARBIDOPA AND LEVODOPA 1 TABLET: 10; 100 TABLET ORAL at 15:34

## 2020-01-01 RX ADMIN — DOCUSATE SODIUM 50 MG AND SENNOSIDES 8.6 MG 2 TABLET: 8.6; 5 TABLET, FILM COATED ORAL at 20:25

## 2020-01-01 RX ADMIN — HALOPERIDOL LACTATE 5 MG: 5 INJECTION, SOLUTION INTRAMUSCULAR at 11:14

## 2020-01-01 RX ADMIN — CARBIDOPA AND LEVODOPA 1 TABLET: 10; 100 TABLET ORAL at 11:14

## 2020-01-01 RX ADMIN — QUETIAPINE FUMARATE 25 MG: 25 TABLET ORAL at 08:57

## 2020-01-01 RX ADMIN — CARBIDOPA AND LEVODOPA 1 TABLET: 10; 100 TABLET ORAL at 05:18

## 2020-01-01 RX ADMIN — QUETIAPINE FUMARATE 25 MG: 25 TABLET ORAL at 20:17

## 2020-01-01 RX ADMIN — LIDOCAINE 1 PATCH: 50 PATCH TOPICAL at 09:19

## 2020-01-01 RX ADMIN — HALOPERIDOL LACTATE 5 MG: 5 INJECTION, SOLUTION INTRAMUSCULAR at 10:39

## 2020-01-01 RX ADMIN — QUETIAPINE FUMARATE 25 MG: 25 TABLET ORAL at 09:41

## 2020-01-01 RX ADMIN — MIDODRINE HYDROCHLORIDE 5 MG: 5 TABLET ORAL at 09:24

## 2020-01-01 RX ADMIN — DOCUSATE SODIUM 50 MG AND SENNOSIDES 8.6 MG 2 TABLET: 8.6; 5 TABLET, FILM COATED ORAL at 17:36

## 2020-01-01 RX ADMIN — CARBIDOPA AND LEVODOPA 1 TABLET: 10; 100 TABLET ORAL at 19:31

## 2020-01-01 RX ADMIN — CARBIDOPA AND LEVODOPA 1 TABLET: 10; 100 TABLET ORAL at 10:04

## 2020-01-01 RX ADMIN — CARBIDOPA AND LEVODOPA 1 TABLET: 10; 100 TABLET ORAL at 12:52

## 2020-01-01 RX ADMIN — CARBIDOPA AND LEVODOPA 1 TABLET: 10; 100 TABLET ORAL at 23:37

## 2020-01-01 RX ADMIN — MIDODRINE HYDROCHLORIDE 5 MG: 5 TABLET ORAL at 17:00

## 2020-01-01 RX ADMIN — QUETIAPINE FUMARATE 25 MG: 25 TABLET ORAL at 09:05

## 2020-01-01 RX ADMIN — ENOXAPARIN SODIUM 40 MG: 100 INJECTION SUBCUTANEOUS at 04:39

## 2020-01-01 RX ADMIN — FLUOXETINE HYDROCHLORIDE 40 MG: 20 CAPSULE ORAL at 09:05

## 2020-01-01 RX ADMIN — CARBIDOPA AND LEVODOPA 1 TABLET: 10; 100 TABLET ORAL at 06:08

## 2020-01-01 RX ADMIN — CARBIDOPA AND LEVODOPA 1 TABLET: 10; 100 TABLET ORAL at 09:13

## 2020-01-01 RX ADMIN — ENOXAPARIN SODIUM 40 MG: 100 INJECTION SUBCUTANEOUS at 10:17

## 2020-01-01 RX ADMIN — DOXYCYCLINE 100 MG: 100 TABLET, FILM COATED ORAL at 04:45

## 2020-01-01 RX ADMIN — CARBIDOPA AND LEVODOPA 1 TABLET: 10; 100 TABLET ORAL at 12:28

## 2020-01-01 RX ADMIN — CARBIDOPA AND LEVODOPA 1 TABLET: 10; 100 TABLET ORAL at 09:15

## 2020-01-01 RX ADMIN — CARBIDOPA AND LEVODOPA 1 TABLET: 10; 100 TABLET ORAL at 11:06

## 2020-01-01 RX ADMIN — QUETIAPINE FUMARATE 25 MG: 25 TABLET ORAL at 09:07

## 2020-01-01 RX ADMIN — CARBIDOPA AND LEVODOPA 1 TABLET: 10; 100 TABLET ORAL at 23:15

## 2020-01-01 RX ADMIN — Medication 400 MG: at 05:18

## 2020-01-01 RX ADMIN — QUETIAPINE FUMARATE 25 MG: 25 TABLET ORAL at 17:25

## 2020-01-01 RX ADMIN — CARBIDOPA AND LEVODOPA 1 TABLET: 10; 100 TABLET ORAL at 05:42

## 2020-01-01 RX ADMIN — CARBIDOPA AND LEVODOPA 1 TABLET: 10; 100 TABLET ORAL at 17:21

## 2020-01-01 RX ADMIN — QUETIAPINE FUMARATE 25 MG: 25 TABLET ORAL at 10:24

## 2020-01-01 RX ADMIN — MIDODRINE HYDROCHLORIDE 5 MG: 5 TABLET ORAL at 12:25

## 2020-01-01 RX ADMIN — DOCUSATE SODIUM 50 MG AND SENNOSIDES 8.6 MG 2 TABLET: 8.6; 5 TABLET, FILM COATED ORAL at 10:06

## 2020-01-01 RX ADMIN — CARBIDOPA AND LEVODOPA 1 TABLET: 10; 100 TABLET ORAL at 19:59

## 2020-01-01 RX ADMIN — DOCUSATE SODIUM 50 MG AND SENNOSIDES 8.6 MG 2 TABLET: 8.6; 5 TABLET, FILM COATED ORAL at 05:56

## 2020-01-01 RX ADMIN — QUETIAPINE FUMARATE 25 MG: 25 TABLET ORAL at 08:51

## 2020-01-01 RX ADMIN — FLUOXETINE HYDROCHLORIDE 40 MG: 20 CAPSULE ORAL at 05:35

## 2020-01-01 RX ADMIN — CARBIDOPA AND LEVODOPA 1 TABLET: 10; 100 TABLET ORAL at 05:32

## 2020-01-01 RX ADMIN — CARBIDOPA AND LEVODOPA 1 TABLET: 10; 100 TABLET ORAL at 12:58

## 2020-01-01 RX ADMIN — DOCUSATE SODIUM 50 MG AND SENNOSIDES 8.6 MG 2 TABLET: 8.6; 5 TABLET, FILM COATED ORAL at 10:02

## 2020-01-01 RX ADMIN — MIDODRINE HYDROCHLORIDE 5 MG: 5 TABLET ORAL at 11:16

## 2020-01-01 RX ADMIN — QUETIAPINE FUMARATE 25 MG: 25 TABLET ORAL at 20:12

## 2020-01-01 RX ADMIN — DOCUSATE SODIUM 50 MG AND SENNOSIDES 8.6 MG 2 TABLET: 8.6; 5 TABLET, FILM COATED ORAL at 22:10

## 2020-01-01 RX ADMIN — QUETIAPINE FUMARATE 25 MG: 25 TABLET ORAL at 20:10

## 2020-01-01 RX ADMIN — CARBIDOPA AND LEVODOPA 1 TABLET: 10; 100 TABLET ORAL at 21:01

## 2020-01-01 RX ADMIN — DOCUSATE SODIUM 50 MG AND SENNOSIDES 8.6 MG 2 TABLET: 8.6; 5 TABLET, FILM COATED ORAL at 19:47

## 2020-01-01 RX ADMIN — DOCUSATE SODIUM 50 MG AND SENNOSIDES 8.6 MG 2 TABLET: 8.6; 5 TABLET, FILM COATED ORAL at 10:39

## 2020-01-01 RX ADMIN — CARBIDOPA AND LEVODOPA 1 TABLET: 10; 100 TABLET ORAL at 20:49

## 2020-01-01 RX ADMIN — DOCUSATE SODIUM 50 MG AND SENNOSIDES 8.6 MG 2 TABLET: 8.6; 5 TABLET, FILM COATED ORAL at 04:32

## 2020-01-01 RX ADMIN — DOCUSATE SODIUM 50 MG AND SENNOSIDES 8.6 MG 2 TABLET: 8.6; 5 TABLET, FILM COATED ORAL at 08:56

## 2020-01-01 RX ADMIN — ENOXAPARIN SODIUM 40 MG: 100 INJECTION SUBCUTANEOUS at 05:49

## 2020-01-01 RX ADMIN — CARBIDOPA AND LEVODOPA 1 TABLET: 10; 100 TABLET ORAL at 19:46

## 2020-01-01 RX ADMIN — CARBIDOPA AND LEVODOPA 1 TABLET: 10; 100 TABLET ORAL at 05:56

## 2020-01-01 RX ADMIN — DOCUSATE SODIUM 50 MG AND SENNOSIDES 8.6 MG 2 TABLET: 8.6; 5 TABLET, FILM COATED ORAL at 21:18

## 2020-01-01 RX ADMIN — CARBIDOPA AND LEVODOPA 1 TABLET: 10; 100 TABLET ORAL at 18:00

## 2020-01-01 RX ADMIN — DOCUSATE SODIUM 50 MG AND SENNOSIDES 8.6 MG 2 TABLET: 8.6; 5 TABLET, FILM COATED ORAL at 05:10

## 2020-01-01 RX ADMIN — DOCUSATE SODIUM 50 MG AND SENNOSIDES 8.6 MG 2 TABLET: 8.6; 5 TABLET, FILM COATED ORAL at 18:09

## 2020-01-01 RX ADMIN — HALOPERIDOL LACTATE 5 MG: 5 INJECTION, SOLUTION INTRAMUSCULAR at 02:14

## 2020-01-01 RX ADMIN — FLUOXETINE HYDROCHLORIDE 40 MG: 20 CAPSULE ORAL at 09:45

## 2020-01-01 RX ADMIN — FLUOXETINE HYDROCHLORIDE 40 MG: 20 CAPSULE ORAL at 08:35

## 2020-01-01 RX ADMIN — DOCUSATE SODIUM 50 MG AND SENNOSIDES 8.6 MG 2 TABLET: 8.6; 5 TABLET, FILM COATED ORAL at 17:15

## 2020-01-01 RX ADMIN — CARBIDOPA AND LEVODOPA 1 TABLET: 10; 100 TABLET ORAL at 22:43

## 2020-01-01 RX ADMIN — CARBIDOPA AND LEVODOPA 1 TABLET: 10; 100 TABLET ORAL at 20:45

## 2020-01-01 RX ADMIN — CARBIDOPA AND LEVODOPA 1 TABLET: 10; 100 TABLET ORAL at 14:08

## 2020-01-01 RX ADMIN — QUETIAPINE FUMARATE 12.5 MG: 25 TABLET ORAL at 09:53

## 2020-01-01 RX ADMIN — CARBIDOPA AND LEVODOPA 1 TABLET: 10; 100 TABLET ORAL at 21:49

## 2020-01-01 RX ADMIN — CARBIDOPA AND LEVODOPA 1 TABLET: 10; 100 TABLET ORAL at 21:18

## 2020-01-01 RX ADMIN — CARBIDOPA AND LEVODOPA 1 TABLET: 10; 100 TABLET ORAL at 10:01

## 2020-01-01 RX ADMIN — QUETIAPINE FUMARATE 25 MG: 25 TABLET ORAL at 16:43

## 2020-01-01 RX ADMIN — DOCUSATE SODIUM 50 MG AND SENNOSIDES 8.6 MG 2 TABLET: 8.6; 5 TABLET, FILM COATED ORAL at 09:45

## 2020-01-01 RX ADMIN — ENOXAPARIN SODIUM 40 MG: 100 INJECTION SUBCUTANEOUS at 05:15

## 2020-01-01 RX ADMIN — CARBIDOPA AND LEVODOPA 1 TABLET: 10; 100 TABLET ORAL at 06:33

## 2020-01-01 RX ADMIN — ACETAMINOPHEN 1000 MG: 500 TABLET ORAL at 07:48

## 2020-01-01 RX ADMIN — QUETIAPINE FUMARATE 25 MG: 25 TABLET ORAL at 19:30

## 2020-01-01 RX ADMIN — CARBIDOPA AND LEVODOPA 1 TABLET: 10; 100 TABLET ORAL at 14:21

## 2020-01-01 RX ADMIN — DOCUSATE SODIUM 50 MG AND SENNOSIDES 8.6 MG 2 TABLET: 8.6; 5 TABLET, FILM COATED ORAL at 17:51

## 2020-01-01 RX ADMIN — ENOXAPARIN SODIUM 40 MG: 100 INJECTION SUBCUTANEOUS at 05:18

## 2020-01-01 RX ADMIN — QUETIAPINE FUMARATE 25 MG: 25 TABLET ORAL at 21:00

## 2020-01-01 RX ADMIN — DOCUSATE SODIUM 50 MG AND SENNOSIDES 8.6 MG 2 TABLET: 8.6; 5 TABLET, FILM COATED ORAL at 22:12

## 2020-01-01 RX ADMIN — QUETIAPINE FUMARATE 25 MG: 25 TABLET ORAL at 18:33

## 2020-01-01 RX ADMIN — QUETIAPINE FUMARATE 25 MG: 25 TABLET ORAL at 08:07

## 2020-01-01 RX ADMIN — QUETIAPINE FUMARATE 25 MG: 25 TABLET ORAL at 17:41

## 2020-01-01 RX ADMIN — ENOXAPARIN SODIUM 40 MG: 100 INJECTION SUBCUTANEOUS at 06:11

## 2020-01-01 RX ADMIN — DOCUSATE SODIUM 50 MG AND SENNOSIDES 8.6 MG 2 TABLET: 8.6; 5 TABLET, FILM COATED ORAL at 06:14

## 2020-01-01 RX ADMIN — CARBIDOPA AND LEVODOPA 1 TABLET: 10; 100 TABLET ORAL at 11:59

## 2020-01-01 RX ADMIN — FLUOXETINE HYDROCHLORIDE 40 MG: 20 CAPSULE ORAL at 09:53

## 2020-01-01 RX ADMIN — CARBIDOPA AND LEVODOPA 1 TABLET: 10; 100 TABLET ORAL at 12:42

## 2020-01-01 RX ADMIN — CARBIDOPA AND LEVODOPA 1 TABLET: 10; 100 TABLET ORAL at 16:17

## 2020-01-01 RX ADMIN — ACETAMINOPHEN 1000 MG: 500 TABLET ORAL at 01:20

## 2020-01-01 RX ADMIN — QUETIAPINE FUMARATE 25 MG: 25 TABLET ORAL at 20:49

## 2020-01-01 RX ADMIN — ENOXAPARIN SODIUM 40 MG: 100 INJECTION SUBCUTANEOUS at 05:45

## 2020-01-01 RX ADMIN — ACETAMINOPHEN 1000 MG: 500 TABLET ORAL at 13:03

## 2020-01-01 RX ADMIN — FLUOXETINE HYDROCHLORIDE 40 MG: 20 CAPSULE ORAL at 09:40

## 2020-01-01 RX ADMIN — QUETIAPINE FUMARATE 25 MG: 25 TABLET ORAL at 18:00

## 2020-01-01 RX ADMIN — DOCUSATE SODIUM 50 MG AND SENNOSIDES 8.6 MG 2 TABLET: 8.6; 5 TABLET, FILM COATED ORAL at 17:34

## 2020-01-01 RX ADMIN — QUETIAPINE FUMARATE 25 MG: 25 TABLET ORAL at 20:37

## 2020-01-01 RX ADMIN — HALOPERIDOL LACTATE 2 MG: 5 INJECTION, SOLUTION INTRAMUSCULAR at 14:26

## 2020-01-01 RX ADMIN — CARBIDOPA AND LEVODOPA 1 TABLET: 10; 100 TABLET ORAL at 21:25

## 2020-01-01 RX ADMIN — CARBIDOPA AND LEVODOPA 1 TABLET: 10; 100 TABLET ORAL at 00:30

## 2020-01-01 RX ADMIN — HALOPERIDOL LACTATE 5 MG: 5 INJECTION, SOLUTION INTRAMUSCULAR at 13:16

## 2020-01-01 RX ADMIN — CARBIDOPA AND LEVODOPA 1 TABLET: 10; 100 TABLET ORAL at 16:35

## 2020-01-01 RX ADMIN — DOCUSATE SODIUM 50 MG AND SENNOSIDES 8.6 MG 2 TABLET: 8.6; 5 TABLET, FILM COATED ORAL at 19:59

## 2020-01-01 RX ADMIN — QUETIAPINE FUMARATE 12.5 MG: 25 TABLET ORAL at 10:02

## 2020-01-01 RX ADMIN — ACETAMINOPHEN 1000 MG: 500 TABLET ORAL at 01:01

## 2020-01-01 RX ADMIN — ACETAMINOPHEN 1000 MG: 500 TABLET ORAL at 11:39

## 2020-01-01 RX ADMIN — QUETIAPINE FUMARATE 25 MG: 25 TABLET ORAL at 21:03

## 2020-01-01 RX ADMIN — FLUOXETINE HYDROCHLORIDE 40 MG: 20 CAPSULE ORAL at 06:23

## 2020-01-01 RX ADMIN — DOCUSATE SODIUM 50 MG AND SENNOSIDES 8.6 MG 2 TABLET: 8.6; 5 TABLET, FILM COATED ORAL at 11:07

## 2020-01-01 RX ADMIN — ENOXAPARIN SODIUM 40 MG: 100 INJECTION SUBCUTANEOUS at 10:25

## 2020-01-01 RX ADMIN — FLUOXETINE HYDROCHLORIDE 40 MG: 20 CAPSULE ORAL at 09:50

## 2020-01-01 RX ADMIN — FLUOXETINE HYDROCHLORIDE 40 MG: 20 CAPSULE ORAL at 09:12

## 2020-01-01 RX ADMIN — DOCUSATE SODIUM 50 MG AND SENNOSIDES 8.6 MG 2 TABLET: 8.6; 5 TABLET, FILM COATED ORAL at 18:18

## 2020-01-01 RX ADMIN — CARBIDOPA AND LEVODOPA 1 TABLET: 10; 100 TABLET ORAL at 04:54

## 2020-01-01 RX ADMIN — FLUOXETINE HYDROCHLORIDE 40 MG: 20 CAPSULE ORAL at 08:41

## 2020-01-01 RX ADMIN — DOCUSATE SODIUM 50 MG AND SENNOSIDES 8.6 MG 2 TABLET: 8.6; 5 TABLET, FILM COATED ORAL at 09:42

## 2020-01-01 RX ADMIN — DOCUSATE SODIUM 50 MG AND SENNOSIDES 8.6 MG 2 TABLET: 8.6; 5 TABLET, FILM COATED ORAL at 22:38

## 2020-01-01 RX ADMIN — MIDODRINE HYDROCHLORIDE 5 MG: 5 TABLET ORAL at 12:40

## 2020-01-01 RX ADMIN — FLUOXETINE HYDROCHLORIDE 40 MG: 20 CAPSULE ORAL at 06:09

## 2020-01-01 RX ADMIN — ENOXAPARIN SODIUM 40 MG: 100 INJECTION SUBCUTANEOUS at 05:00

## 2020-01-01 RX ADMIN — ENOXAPARIN SODIUM 40 MG: 40 INJECTION SUBCUTANEOUS at 09:36

## 2020-01-01 RX ADMIN — QUETIAPINE FUMARATE 25 MG: 25 TABLET ORAL at 21:30

## 2020-01-01 RX ADMIN — QUETIAPINE FUMARATE 25 MG: 25 TABLET ORAL at 10:48

## 2020-01-01 RX ADMIN — CARBIDOPA AND LEVODOPA 1 TABLET: 10; 100 TABLET ORAL at 05:53

## 2020-01-01 RX ADMIN — DOCUSATE SODIUM 50 MG AND SENNOSIDES 8.6 MG 2 TABLET: 8.6; 5 TABLET, FILM COATED ORAL at 20:06

## 2020-01-01 RX ADMIN — ENOXAPARIN SODIUM 40 MG: 100 INJECTION SUBCUTANEOUS at 05:35

## 2020-01-01 RX ADMIN — MIDODRINE HYDROCHLORIDE 5 MG: 5 TABLET ORAL at 10:11

## 2020-01-01 RX ADMIN — MIDODRINE HYDROCHLORIDE 5 MG: 5 TABLET ORAL at 08:23

## 2020-01-01 RX ADMIN — FLUOXETINE HYDROCHLORIDE 40 MG: 20 CAPSULE ORAL at 05:23

## 2020-01-01 RX ADMIN — CARBIDOPA AND LEVODOPA 1 TABLET: 10; 100 TABLET ORAL at 14:37

## 2020-01-01 RX ADMIN — QUETIAPINE FUMARATE 25 MG: 25 TABLET ORAL at 11:05

## 2020-01-01 RX ADMIN — DOCUSATE SODIUM 50 MG AND SENNOSIDES 8.6 MG 2 TABLET: 8.6; 5 TABLET, FILM COATED ORAL at 20:23

## 2020-01-01 RX ADMIN — CARBIDOPA AND LEVODOPA 1 TABLET: 10; 100 TABLET ORAL at 12:03

## 2020-01-01 RX ADMIN — CARBIDOPA AND LEVODOPA 1 TABLET: 10; 100 TABLET ORAL at 13:27

## 2020-01-01 RX ADMIN — CARBIDOPA AND LEVODOPA 1 TABLET: 10; 100 TABLET ORAL at 13:33

## 2020-01-01 RX ADMIN — CARBIDOPA AND LEVODOPA 1 TABLET: 10; 100 TABLET ORAL at 13:49

## 2020-01-01 RX ADMIN — QUETIAPINE FUMARATE 25 MG: 25 TABLET ORAL at 17:06

## 2020-01-01 RX ADMIN — QUETIAPINE FUMARATE 25 MG: 25 TABLET ORAL at 05:56

## 2020-01-01 RX ADMIN — CARBIDOPA AND LEVODOPA 1 TABLET: 10; 100 TABLET ORAL at 05:04

## 2020-01-01 RX ADMIN — CARBIDOPA AND LEVODOPA 1 TABLET: 10; 100 TABLET ORAL at 05:50

## 2020-01-01 RX ADMIN — CARBIDOPA AND LEVODOPA 1 TABLET: 10; 100 TABLET ORAL at 16:33

## 2020-01-01 RX ADMIN — QUETIAPINE FUMARATE 25 MG: 25 TABLET ORAL at 17:47

## 2020-01-01 RX ADMIN — FLUOXETINE HYDROCHLORIDE 40 MG: 20 CAPSULE ORAL at 08:12

## 2020-01-01 RX ADMIN — NITROFURANTOIN MONOHYDRATE/MACROCRYSTALLINE 100 MG: 25; 75 CAPSULE ORAL at 11:24

## 2020-01-01 RX ADMIN — FLUOXETINE HYDROCHLORIDE 40 MG: 20 CAPSULE ORAL at 09:35

## 2020-01-01 RX ADMIN — FLUOXETINE HYDROCHLORIDE 40 MG: 20 CAPSULE ORAL at 05:51

## 2020-01-01 RX ADMIN — CARBIDOPA AND LEVODOPA 1 TABLET: 10; 100 TABLET ORAL at 00:21

## 2020-01-01 RX ADMIN — CARBIDOPA AND LEVODOPA 1 TABLET: 10; 100 TABLET ORAL at 16:58

## 2020-01-01 RX ADMIN — CARBIDOPA AND LEVODOPA 1 TABLET: 10; 100 TABLET ORAL at 17:11

## 2020-01-01 RX ADMIN — ENOXAPARIN SODIUM 40 MG: 100 INJECTION SUBCUTANEOUS at 06:09

## 2020-01-01 RX ADMIN — QUETIAPINE FUMARATE 12.5 MG: 25 TABLET ORAL at 05:37

## 2020-01-01 RX ADMIN — MIDODRINE HYDROCHLORIDE 5 MG: 5 TABLET ORAL at 08:04

## 2020-01-01 RX ADMIN — CARBIDOPA AND LEVODOPA 1 TABLET: 10; 100 TABLET ORAL at 14:17

## 2020-01-01 RX ADMIN — SODIUM CHLORIDE: 9 INJECTION, SOLUTION INTRAVENOUS at 08:35

## 2020-01-01 RX ADMIN — CARBIDOPA AND LEVODOPA 1 TABLET: 10; 100 TABLET ORAL at 10:49

## 2020-01-01 RX ADMIN — QUETIAPINE FUMARATE 25 MG: 25 TABLET ORAL at 07:48

## 2020-01-01 RX ADMIN — DOCUSATE SODIUM 50 MG AND SENNOSIDES 8.6 MG 2 TABLET: 8.6; 5 TABLET, FILM COATED ORAL at 05:45

## 2020-01-01 RX ADMIN — CARBIDOPA AND LEVODOPA 1 TABLET: 10; 100 TABLET ORAL at 20:11

## 2020-01-01 RX ADMIN — HALOPERIDOL LACTATE 5 MG: 5 INJECTION, SOLUTION INTRAMUSCULAR at 18:03

## 2020-01-01 RX ADMIN — DOCUSATE SODIUM 50 MG AND SENNOSIDES 8.6 MG 2 TABLET: 8.6; 5 TABLET, FILM COATED ORAL at 20:40

## 2020-01-01 RX ADMIN — CARBIDOPA AND LEVODOPA 1 TABLET: 10; 100 TABLET ORAL at 06:18

## 2020-01-01 RX ADMIN — CARBIDOPA AND LEVODOPA 1 TABLET: 10; 100 TABLET ORAL at 20:22

## 2020-01-01 RX ADMIN — ENOXAPARIN SODIUM 40 MG: 100 INJECTION SUBCUTANEOUS at 08:41

## 2020-01-01 RX ADMIN — CARBIDOPA AND LEVODOPA 1 TABLET: 10; 100 TABLET ORAL at 21:41

## 2020-01-01 RX ADMIN — CARBIDOPA AND LEVODOPA 1 TABLET: 10; 100 TABLET ORAL at 21:08

## 2020-01-01 RX ADMIN — CARBIDOPA AND LEVODOPA 1 TABLET: 10; 100 TABLET ORAL at 05:48

## 2020-01-01 RX ADMIN — CARBIDOPA AND LEVODOPA 1 TABLET: 10; 100 TABLET ORAL at 08:40

## 2020-01-01 RX ADMIN — MIDODRINE HYDROCHLORIDE 5 MG: 5 TABLET ORAL at 08:36

## 2020-01-01 RX ADMIN — CARBIDOPA AND LEVODOPA 1 TABLET: 10; 100 TABLET ORAL at 14:52

## 2020-01-01 RX ADMIN — CARBIDOPA AND LEVODOPA 1 TABLET: 10; 100 TABLET ORAL at 00:43

## 2020-01-01 RX ADMIN — QUETIAPINE FUMARATE 25 MG: 25 TABLET ORAL at 05:15

## 2020-01-01 RX ADMIN — QUETIAPINE FUMARATE 25 MG: 25 TABLET ORAL at 17:27

## 2020-01-01 RX ADMIN — CARBIDOPA AND LEVODOPA 1 TABLET: 10; 100 TABLET ORAL at 23:30

## 2020-01-01 RX ADMIN — CARBIDOPA AND LEVODOPA 1 TABLET: 10; 100 TABLET ORAL at 13:10

## 2020-01-01 RX ADMIN — CARBIDOPA AND LEVODOPA 1 TABLET: 10; 100 TABLET ORAL at 05:27

## 2020-01-01 RX ADMIN — DOCUSATE SODIUM 50 MG AND SENNOSIDES 8.6 MG 2 TABLET: 8.6; 5 TABLET, FILM COATED ORAL at 08:54

## 2020-01-01 RX ADMIN — CARBIDOPA AND LEVODOPA 1 TABLET: 10; 100 TABLET ORAL at 23:19

## 2020-01-01 RX ADMIN — DOCUSATE SODIUM 50 MG AND SENNOSIDES 8.6 MG 2 TABLET: 8.6; 5 TABLET, FILM COATED ORAL at 20:07

## 2020-01-01 RX ADMIN — DOCUSATE SODIUM 50 MG AND SENNOSIDES 8.6 MG 2 TABLET: 8.6; 5 TABLET, FILM COATED ORAL at 09:05

## 2020-01-01 RX ADMIN — ENOXAPARIN SODIUM 40 MG: 40 INJECTION SUBCUTANEOUS at 08:31

## 2020-01-01 RX ADMIN — ACETAMINOPHEN 1000 MG: 500 TABLET ORAL at 23:40

## 2020-01-01 RX ADMIN — DOCUSATE SODIUM 50 MG AND SENNOSIDES 8.6 MG 2 TABLET: 8.6; 5 TABLET, FILM COATED ORAL at 09:41

## 2020-01-01 RX ADMIN — ENOXAPARIN SODIUM 40 MG: 100 INJECTION SUBCUTANEOUS at 05:10

## 2020-01-01 RX ADMIN — CARBIDOPA AND LEVODOPA 1 TABLET: 10; 100 TABLET ORAL at 18:09

## 2020-01-01 RX ADMIN — DOCUSATE SODIUM 50 MG AND SENNOSIDES 8.6 MG 2 TABLET: 8.6; 5 TABLET, FILM COATED ORAL at 17:35

## 2020-01-01 RX ADMIN — DOCUSATE SODIUM 50 MG AND SENNOSIDES 8.6 MG 2 TABLET: 8.6; 5 TABLET, FILM COATED ORAL at 10:42

## 2020-01-01 RX ADMIN — CARBIDOPA AND LEVODOPA 1 TABLET: 10; 100 TABLET ORAL at 08:41

## 2020-01-01 RX ADMIN — CARBIDOPA AND LEVODOPA 1 TABLET: 10; 100 TABLET ORAL at 16:37

## 2020-01-01 RX ADMIN — CARBIDOPA AND LEVODOPA 1 TABLET: 10; 100 TABLET ORAL at 20:10

## 2020-01-01 RX ADMIN — QUETIAPINE FUMARATE 25 MG: 25 TABLET ORAL at 17:17

## 2020-01-01 RX ADMIN — QUETIAPINE FUMARATE 25 MG: 25 TABLET ORAL at 06:13

## 2020-01-01 RX ADMIN — DOCUSATE SODIUM 50 MG AND SENNOSIDES 8.6 MG 2 TABLET: 8.6; 5 TABLET, FILM COATED ORAL at 19:49

## 2020-01-01 RX ADMIN — FLUOXETINE HYDROCHLORIDE 40 MG: 20 CAPSULE ORAL at 08:32

## 2020-01-01 RX ADMIN — CARBIDOPA AND LEVODOPA 1 TABLET: 10; 100 TABLET ORAL at 22:37

## 2020-01-01 RX ADMIN — QUETIAPINE FUMARATE 25 MG: 25 TABLET ORAL at 23:25

## 2020-01-01 RX ADMIN — DOCUSATE SODIUM 50 MG AND SENNOSIDES 8.6 MG 2 TABLET: 8.6; 5 TABLET, FILM COATED ORAL at 11:12

## 2020-01-01 RX ADMIN — DOCUSATE SODIUM 50 MG AND SENNOSIDES 8.6 MG 2 TABLET: 8.6; 5 TABLET, FILM COATED ORAL at 08:06

## 2020-01-01 RX ADMIN — CARBIDOPA AND LEVODOPA 1 TABLET: 10; 100 TABLET ORAL at 05:33

## 2020-01-01 RX ADMIN — FLUOXETINE HYDROCHLORIDE 40 MG: 20 CAPSULE ORAL at 05:04

## 2020-01-01 RX ADMIN — CARBIDOPA AND LEVODOPA 1 TABLET: 10; 100 TABLET ORAL at 18:42

## 2020-01-01 RX ADMIN — DOCUSATE SODIUM 50 MG AND SENNOSIDES 8.6 MG 2 TABLET: 8.6; 5 TABLET, FILM COATED ORAL at 21:57

## 2020-01-01 RX ADMIN — CARBIDOPA AND LEVODOPA 1 TABLET: 10; 100 TABLET ORAL at 12:46

## 2020-01-01 RX ADMIN — FLUOXETINE HYDROCHLORIDE 40 MG: 20 CAPSULE ORAL at 05:43

## 2020-01-01 RX ADMIN — QUETIAPINE FUMARATE 25 MG: 25 TABLET ORAL at 18:18

## 2020-01-01 RX ADMIN — QUETIAPINE FUMARATE 25 MG: 25 TABLET ORAL at 16:57

## 2020-01-01 RX ADMIN — QUETIAPINE FUMARATE 25 MG: 25 TABLET ORAL at 21:39

## 2020-01-01 RX ADMIN — QUETIAPINE FUMARATE 25 MG: 25 TABLET ORAL at 17:55

## 2020-01-01 RX ADMIN — MIDODRINE HYDROCHLORIDE 5 MG: 5 TABLET ORAL at 17:11

## 2020-01-01 RX ADMIN — MIDODRINE HYDROCHLORIDE 5 MG: 5 TABLET ORAL at 09:05

## 2020-01-01 RX ADMIN — FLUOXETINE HYDROCHLORIDE 40 MG: 20 CAPSULE ORAL at 09:10

## 2020-01-01 RX ADMIN — CARBIDOPA AND LEVODOPA 1 TABLET: 10; 100 TABLET ORAL at 17:16

## 2020-01-01 RX ADMIN — DOCUSATE SODIUM 50 MG AND SENNOSIDES 8.6 MG 2 TABLET: 8.6; 5 TABLET, FILM COATED ORAL at 09:13

## 2020-01-01 RX ADMIN — QUETIAPINE FUMARATE 25 MG: 25 TABLET ORAL at 05:43

## 2020-01-01 RX ADMIN — MIDODRINE HYDROCHLORIDE 5 MG: 5 TABLET ORAL at 12:00

## 2020-01-01 RX ADMIN — FLUOXETINE HYDROCHLORIDE 40 MG: 20 CAPSULE ORAL at 11:13

## 2020-01-01 RX ADMIN — DOCUSATE SODIUM 50 MG AND SENNOSIDES 8.6 MG 2 TABLET: 8.6; 5 TABLET, FILM COATED ORAL at 17:46

## 2020-01-01 RX ADMIN — FLUOXETINE HYDROCHLORIDE 40 MG: 20 CAPSULE ORAL at 09:02

## 2020-01-01 RX ADMIN — QUETIAPINE FUMARATE 12.5 MG: 25 TABLET ORAL at 05:29

## 2020-01-01 RX ADMIN — MIDODRINE HYDROCHLORIDE 5 MG: 5 TABLET ORAL at 18:06

## 2020-01-01 RX ADMIN — DOCUSATE SODIUM 50 MG AND SENNOSIDES 8.6 MG 2 TABLET: 8.6; 5 TABLET, FILM COATED ORAL at 08:16

## 2020-01-01 RX ADMIN — CARBIDOPA AND LEVODOPA 1 TABLET: 10; 100 TABLET ORAL at 08:04

## 2020-01-01 RX ADMIN — QUETIAPINE FUMARATE 12.5 MG: 25 TABLET ORAL at 08:55

## 2020-01-01 RX ADMIN — CARBIDOPA AND LEVODOPA 1 TABLET: 10; 100 TABLET ORAL at 14:34

## 2020-01-01 RX ADMIN — MIDODRINE HYDROCHLORIDE 5 MG: 5 TABLET ORAL at 13:30

## 2020-01-01 RX ADMIN — CARBIDOPA AND LEVODOPA 1 TABLET: 10; 100 TABLET ORAL at 16:55

## 2020-01-01 RX ADMIN — QUETIAPINE FUMARATE 25 MG: 25 TABLET ORAL at 09:48

## 2020-01-01 RX ADMIN — CARBIDOPA AND LEVODOPA 1 TABLET: 10; 100 TABLET ORAL at 13:45

## 2020-01-01 RX ADMIN — FLUOXETINE HYDROCHLORIDE 40 MG: 20 CAPSULE ORAL at 08:16

## 2020-01-01 RX ADMIN — ACETAMINOPHEN 1000 MG: 500 TABLET ORAL at 11:26

## 2020-01-01 RX ADMIN — DOCUSATE SODIUM 50 MG AND SENNOSIDES 8.6 MG 2 TABLET: 8.6; 5 TABLET, FILM COATED ORAL at 20:38

## 2020-01-01 RX ADMIN — QUETIAPINE FUMARATE 25 MG: 25 TABLET ORAL at 08:52

## 2020-01-01 RX ADMIN — CARBIDOPA AND LEVODOPA 1 TABLET: 10; 100 TABLET ORAL at 05:17

## 2020-01-01 RX ADMIN — QUETIAPINE FUMARATE 25 MG: 25 TABLET ORAL at 06:23

## 2020-01-01 RX ADMIN — ACETAMINOPHEN 1000 MG: 500 TABLET ORAL at 12:51

## 2020-01-01 RX ADMIN — CARBIDOPA AND LEVODOPA 1 TABLET: 10; 100 TABLET ORAL at 05:16

## 2020-01-01 RX ADMIN — CARBIDOPA AND LEVODOPA 1 TABLET: 10; 100 TABLET ORAL at 15:31

## 2020-01-01 RX ADMIN — QUETIAPINE FUMARATE 12.5 MG: 25 TABLET ORAL at 09:05

## 2020-01-01 RX ADMIN — CARBIDOPA AND LEVODOPA 1 TABLET: 10; 100 TABLET ORAL at 21:26

## 2020-01-01 RX ADMIN — CARBIDOPA AND LEVODOPA 1 TABLET: 10; 100 TABLET ORAL at 05:29

## 2020-01-01 RX ADMIN — FLUOXETINE HYDROCHLORIDE 40 MG: 20 CAPSULE ORAL at 04:33

## 2020-01-01 RX ADMIN — ACETAMINOPHEN 1000 MG: 500 TABLET ORAL at 12:03

## 2020-01-01 RX ADMIN — QUETIAPINE FUMARATE 25 MG: 25 TABLET ORAL at 11:13

## 2020-01-01 RX ADMIN — CARBIDOPA AND LEVODOPA 1 TABLET: 10; 100 TABLET ORAL at 18:51

## 2020-01-01 RX ADMIN — QUETIAPINE FUMARATE 25 MG: 25 TABLET ORAL at 17:28

## 2020-01-01 RX ADMIN — CARBIDOPA AND LEVODOPA 1 TABLET: 10; 100 TABLET ORAL at 06:46

## 2020-01-01 RX ADMIN — QUETIAPINE FUMARATE 25 MG: 25 TABLET ORAL at 18:05

## 2020-01-01 RX ADMIN — MIDODRINE HYDROCHLORIDE 5 MG: 5 TABLET ORAL at 10:20

## 2020-01-01 RX ADMIN — ACETAMINOPHEN 1000 MG: 500 TABLET ORAL at 12:21

## 2020-01-01 RX ADMIN — CARBIDOPA AND LEVODOPA 1 TABLET: 10; 100 TABLET ORAL at 08:52

## 2020-01-01 RX ADMIN — DOCUSATE SODIUM 50 MG AND SENNOSIDES 8.6 MG 2 TABLET: 8.6; 5 TABLET, FILM COATED ORAL at 09:15

## 2020-01-01 RX ADMIN — DOCUSATE SODIUM 50 MG AND SENNOSIDES 8.6 MG 2 TABLET: 8.6; 5 TABLET, FILM COATED ORAL at 20:58

## 2020-01-01 RX ADMIN — CARBIDOPA AND LEVODOPA 1 TABLET: 10; 100 TABLET ORAL at 05:24

## 2020-01-01 RX ADMIN — QUETIAPINE FUMARATE 25 MG: 25 TABLET ORAL at 08:43

## 2020-01-01 RX ADMIN — DOCUSATE SODIUM 50 MG AND SENNOSIDES 8.6 MG 2 TABLET: 8.6; 5 TABLET, FILM COATED ORAL at 06:22

## 2020-01-01 RX ADMIN — DOCUSATE SODIUM 50 MG AND SENNOSIDES 8.6 MG 2 TABLET: 8.6; 5 TABLET, FILM COATED ORAL at 20:45

## 2020-01-01 RX ADMIN — CARBIDOPA AND LEVODOPA 1 TABLET: 10; 100 TABLET ORAL at 10:21

## 2020-01-01 RX ADMIN — CARBIDOPA AND LEVODOPA 1 TABLET: 10; 100 TABLET ORAL at 17:30

## 2020-01-01 RX ADMIN — QUETIAPINE FUMARATE 25 MG: 25 TABLET ORAL at 20:14

## 2020-01-01 RX ADMIN — SODIUM CHLORIDE 1000 ML: 9 INJECTION, SOLUTION INTRAVENOUS at 16:10

## 2020-01-01 RX ADMIN — CARBIDOPA AND LEVODOPA 1 TABLET: 10; 100 TABLET ORAL at 16:56

## 2020-01-01 RX ADMIN — FLUOXETINE HYDROCHLORIDE 40 MG: 20 CAPSULE ORAL at 05:45

## 2020-01-01 RX ADMIN — CARBIDOPA AND LEVODOPA 1 TABLET: 10; 100 TABLET ORAL at 13:35

## 2020-01-01 RX ADMIN — DOCUSATE SODIUM 50 MG AND SENNOSIDES 8.6 MG 2 TABLET: 8.6; 5 TABLET, FILM COATED ORAL at 17:54

## 2020-01-01 RX ADMIN — CARBIDOPA AND LEVODOPA 1 TABLET: 10; 100 TABLET ORAL at 11:10

## 2020-01-01 RX ADMIN — ANTACID TABLETS 500 MG: 500 TABLET, CHEWABLE ORAL at 08:53

## 2020-01-01 RX ADMIN — CARBIDOPA AND LEVODOPA 1 TABLET: 10; 100 TABLET ORAL at 19:55

## 2020-01-01 RX ADMIN — DOCUSATE SODIUM 50 MG AND SENNOSIDES 8.6 MG 2 TABLET: 8.6; 5 TABLET, FILM COATED ORAL at 17:08

## 2020-01-01 RX ADMIN — CARBIDOPA AND LEVODOPA 1 TABLET: 10; 100 TABLET ORAL at 17:23

## 2020-01-01 RX ADMIN — DOXYCYCLINE 100 MG: 100 TABLET, FILM COATED ORAL at 20:02

## 2020-01-01 RX ADMIN — QUETIAPINE FUMARATE 25 MG: 25 TABLET ORAL at 10:02

## 2020-01-01 RX ADMIN — ACETAMINOPHEN 1000 MG: 500 TABLET ORAL at 12:26

## 2020-01-01 RX ADMIN — CARBIDOPA AND LEVODOPA 1 TABLET: 10; 100 TABLET ORAL at 13:53

## 2020-01-01 RX ADMIN — QUETIAPINE FUMARATE 25 MG: 25 TABLET ORAL at 19:50

## 2020-01-01 RX ADMIN — CARBIDOPA AND LEVODOPA 1 TABLET: 10; 100 TABLET ORAL at 20:43

## 2020-01-01 RX ADMIN — CARBIDOPA AND LEVODOPA 1 TABLET: 10; 100 TABLET ORAL at 11:52

## 2020-01-01 RX ADMIN — QUETIAPINE FUMARATE 12.5 MG: 25 TABLET ORAL at 09:27

## 2020-01-01 RX ADMIN — FLUOXETINE HYDROCHLORIDE 40 MG: 20 CAPSULE ORAL at 09:43

## 2020-01-01 RX ADMIN — QUETIAPINE FUMARATE 25 MG: 25 TABLET ORAL at 22:04

## 2020-01-01 RX ADMIN — CARBIDOPA AND LEVODOPA 1 TABLET: 10; 100 TABLET ORAL at 16:53

## 2020-01-01 RX ADMIN — CARBIDOPA AND LEVODOPA 1 TABLET: 10; 100 TABLET ORAL at 23:49

## 2020-01-01 RX ADMIN — MIDODRINE HYDROCHLORIDE 5 MG: 5 TABLET ORAL at 13:57

## 2020-01-01 RX ADMIN — DOCUSATE SODIUM 50 MG AND SENNOSIDES 8.6 MG 2 TABLET: 8.6; 5 TABLET, FILM COATED ORAL at 08:21

## 2020-01-01 RX ADMIN — DOCUSATE SODIUM 50 MG AND SENNOSIDES 8.6 MG 2 TABLET: 8.6; 5 TABLET, FILM COATED ORAL at 23:10

## 2020-01-01 RX ADMIN — DOCUSATE SODIUM 50 MG AND SENNOSIDES 8.6 MG 2 TABLET: 8.6; 5 TABLET, FILM COATED ORAL at 17:21

## 2020-01-01 RX ADMIN — CARBIDOPA AND LEVODOPA 1 TABLET: 10; 100 TABLET ORAL at 20:20

## 2020-01-01 RX ADMIN — ENOXAPARIN SODIUM 40 MG: 100 INJECTION SUBCUTANEOUS at 09:50

## 2020-01-01 RX ADMIN — DOCUSATE SODIUM 50 MG AND SENNOSIDES 8.6 MG 2 TABLET: 8.6; 5 TABLET, FILM COATED ORAL at 05:50

## 2020-01-01 RX ADMIN — DOCUSATE SODIUM 50 MG AND SENNOSIDES 8.6 MG 2 TABLET: 8.6; 5 TABLET, FILM COATED ORAL at 19:00

## 2020-01-01 RX ADMIN — MIDODRINE HYDROCHLORIDE 5 MG: 5 TABLET ORAL at 13:00

## 2020-01-01 RX ADMIN — QUETIAPINE FUMARATE 25 MG: 25 TABLET ORAL at 10:42

## 2020-01-01 RX ADMIN — CARBIDOPA AND LEVODOPA 1 TABLET: 10; 100 TABLET ORAL at 09:41

## 2020-01-01 RX ADMIN — CARBIDOPA AND LEVODOPA 1 TABLET: 10; 100 TABLET ORAL at 11:38

## 2020-01-01 RX ADMIN — CARBIDOPA AND LEVODOPA 1 TABLET: 10; 100 TABLET ORAL at 12:05

## 2020-01-01 RX ADMIN — CARBIDOPA AND LEVODOPA 1 TABLET: 10; 100 TABLET ORAL at 15:29

## 2020-01-01 RX ADMIN — CARBIDOPA AND LEVODOPA 1 TABLET: 10; 100 TABLET ORAL at 09:01

## 2020-01-01 RX ADMIN — CARBIDOPA AND LEVODOPA 1 TABLET: 10; 100 TABLET ORAL at 11:42

## 2020-01-01 RX ADMIN — DOCUSATE SODIUM 50 MG AND SENNOSIDES 8.6 MG 2 TABLET: 8.6; 5 TABLET, FILM COATED ORAL at 09:33

## 2020-01-01 RX ADMIN — CARBIDOPA AND LEVODOPA 1 TABLET: 10; 100 TABLET ORAL at 21:11

## 2020-01-01 RX ADMIN — CARBIDOPA AND LEVODOPA 1 TABLET: 10; 100 TABLET ORAL at 04:08

## 2020-01-01 RX ADMIN — DOCUSATE SODIUM 50 MG AND SENNOSIDES 8.6 MG 2 TABLET: 8.6; 5 TABLET, FILM COATED ORAL at 09:46

## 2020-01-01 RX ADMIN — DOCUSATE SODIUM 50 MG AND SENNOSIDES 8.6 MG 2 TABLET: 8.6; 5 TABLET, FILM COATED ORAL at 20:09

## 2020-01-01 RX ADMIN — QUETIAPINE FUMARATE 25 MG: 25 TABLET ORAL at 12:47

## 2020-01-01 RX ADMIN — QUETIAPINE FUMARATE 25 MG: 25 TABLET ORAL at 05:40

## 2020-01-01 RX ADMIN — DOCUSATE SODIUM 50 MG AND SENNOSIDES 8.6 MG 2 TABLET: 8.6; 5 TABLET, FILM COATED ORAL at 08:42

## 2020-01-01 RX ADMIN — CARBIDOPA AND LEVODOPA 1 TABLET: 10; 100 TABLET ORAL at 14:33

## 2020-01-01 RX ADMIN — CARBIDOPA AND LEVODOPA 1 TABLET: 10; 100 TABLET ORAL at 05:36

## 2020-01-01 RX ADMIN — ACETAMINOPHEN 1000 MG: 500 TABLET ORAL at 15:33

## 2020-01-01 RX ADMIN — CARBIDOPA AND LEVODOPA 1 TABLET: 10; 100 TABLET ORAL at 10:17

## 2020-01-01 RX ADMIN — Medication 400 MG: at 17:42

## 2020-01-01 RX ADMIN — QUETIAPINE FUMARATE 25 MG: 25 TABLET ORAL at 21:13

## 2020-01-01 RX ADMIN — CARBIDOPA AND LEVODOPA 1 TABLET: 10; 100 TABLET ORAL at 23:43

## 2020-01-01 RX ADMIN — DOCUSATE SODIUM 50 MG AND SENNOSIDES 8.6 MG 2 TABLET: 8.6; 5 TABLET, FILM COATED ORAL at 09:10

## 2020-01-01 RX ADMIN — MIDODRINE HYDROCHLORIDE 5 MG: 5 TABLET ORAL at 11:55

## 2020-01-01 RX ADMIN — CARBIDOPA AND LEVODOPA 1 TABLET: 10; 100 TABLET ORAL at 12:16

## 2020-01-01 RX ADMIN — DOCUSATE SODIUM 50 MG AND SENNOSIDES 8.6 MG 2 TABLET: 8.6; 5 TABLET, FILM COATED ORAL at 18:05

## 2020-01-01 RX ADMIN — CARBIDOPA AND LEVODOPA 1 TABLET: 10; 100 TABLET ORAL at 09:52

## 2020-01-01 RX ADMIN — FLUOXETINE HYDROCHLORIDE 40 MG: 20 CAPSULE ORAL at 05:07

## 2020-01-01 RX ADMIN — CARBIDOPA AND LEVODOPA 1 TABLET: 10; 100 TABLET ORAL at 22:49

## 2020-01-01 RX ADMIN — CARBIDOPA AND LEVODOPA 1 TABLET: 10; 100 TABLET ORAL at 17:26

## 2020-01-01 RX ADMIN — HALOPERIDOL LACTATE 5 MG: 5 INJECTION, SOLUTION INTRAMUSCULAR at 14:46

## 2020-01-01 RX ADMIN — CARBIDOPA AND LEVODOPA 1 TABLET: 10; 100 TABLET ORAL at 06:20

## 2020-01-01 RX ADMIN — DOCUSATE SODIUM 50 MG AND SENNOSIDES 8.6 MG 2 TABLET: 8.6; 5 TABLET, FILM COATED ORAL at 22:03

## 2020-01-01 RX ADMIN — CARBIDOPA AND LEVODOPA 1 TABLET: 10; 100 TABLET ORAL at 20:40

## 2020-01-01 RX ADMIN — DOCUSATE SODIUM 50 MG AND SENNOSIDES 8.6 MG 2 TABLET: 8.6; 5 TABLET, FILM COATED ORAL at 09:25

## 2020-01-01 RX ADMIN — QUETIAPINE FUMARATE 25 MG: 25 TABLET ORAL at 19:59

## 2020-01-01 RX ADMIN — CARBIDOPA AND LEVODOPA 1 TABLET: 10; 100 TABLET ORAL at 05:14

## 2020-01-01 RX ADMIN — QUETIAPINE FUMARATE 25 MG: 25 TABLET ORAL at 09:10

## 2020-01-01 RX ADMIN — MIDODRINE HYDROCHLORIDE 5 MG: 5 TABLET ORAL at 17:34

## 2020-01-01 RX ADMIN — DOCUSATE SODIUM 50 MG AND SENNOSIDES 8.6 MG 2 TABLET: 8.6; 5 TABLET, FILM COATED ORAL at 08:23

## 2020-01-01 RX ADMIN — CARBIDOPA AND LEVODOPA 1 TABLET: 10; 100 TABLET ORAL at 04:19

## 2020-01-01 RX ADMIN — CARBIDOPA AND LEVODOPA 1 TABLET: 10; 100 TABLET ORAL at 20:06

## 2020-01-01 RX ADMIN — ENOXAPARIN SODIUM 40 MG: 100 INJECTION SUBCUTANEOUS at 05:41

## 2020-01-01 RX ADMIN — FLUOXETINE HYDROCHLORIDE 40 MG: 20 CAPSULE ORAL at 05:26

## 2020-01-01 RX ADMIN — CARBIDOPA AND LEVODOPA 1 TABLET: 10; 100 TABLET ORAL at 00:09

## 2020-01-01 RX ADMIN — HALOPERIDOL 1 MG: 1 TABLET ORAL at 21:08

## 2020-01-01 RX ADMIN — FLUOXETINE HYDROCHLORIDE 40 MG: 20 CAPSULE ORAL at 08:48

## 2020-01-01 RX ADMIN — QUETIAPINE FUMARATE 25 MG: 25 TABLET ORAL at 04:56

## 2020-01-01 RX ADMIN — MIDODRINE HYDROCHLORIDE 5 MG: 5 TABLET ORAL at 09:02

## 2020-01-01 RX ADMIN — DOCUSATE SODIUM 50 MG AND SENNOSIDES 8.6 MG 2 TABLET: 8.6; 5 TABLET, FILM COATED ORAL at 21:25

## 2020-01-01 RX ADMIN — ACETAMINOPHEN 1000 MG: 500 TABLET ORAL at 08:31

## 2020-01-01 RX ADMIN — FLUOXETINE HYDROCHLORIDE 40 MG: 20 CAPSULE ORAL at 04:39

## 2020-01-01 RX ADMIN — CARBIDOPA AND LEVODOPA 1 TABLET: 10; 100 TABLET ORAL at 13:15

## 2020-01-01 RX ADMIN — ACETAMINOPHEN 1000 MG: 500 TABLET ORAL at 00:55

## 2020-01-01 RX ADMIN — DOCUSATE SODIUM 50 MG AND SENNOSIDES 8.6 MG 2 TABLET: 8.6; 5 TABLET, FILM COATED ORAL at 08:35

## 2020-01-01 RX ADMIN — DOCUSATE SODIUM 50 MG AND SENNOSIDES 8.6 MG 2 TABLET: 8.6; 5 TABLET, FILM COATED ORAL at 08:55

## 2020-01-01 RX ADMIN — ENOXAPARIN SODIUM 40 MG: 100 INJECTION SUBCUTANEOUS at 09:04

## 2020-01-01 RX ADMIN — MIDODRINE HYDROCHLORIDE 5 MG: 5 TABLET ORAL at 17:31

## 2020-01-01 RX ADMIN — QUETIAPINE FUMARATE 25 MG: 25 TABLET ORAL at 10:39

## 2020-01-01 RX ADMIN — CARBIDOPA AND LEVODOPA 1 TABLET: 10; 100 TABLET ORAL at 12:54

## 2020-01-01 RX ADMIN — ENOXAPARIN SODIUM 40 MG: 40 INJECTION SUBCUTANEOUS at 09:00

## 2020-01-01 RX ADMIN — QUETIAPINE FUMARATE 25 MG: 25 TABLET ORAL at 10:20

## 2020-01-01 RX ADMIN — CARBIDOPA AND LEVODOPA 1 TABLET: 10; 100 TABLET ORAL at 06:30

## 2020-01-01 RX ADMIN — CARBIDOPA AND LEVODOPA 1 TABLET: 10; 100 TABLET ORAL at 23:12

## 2020-01-01 RX ADMIN — QUETIAPINE FUMARATE 25 MG: 25 TABLET ORAL at 17:36

## 2020-01-01 RX ADMIN — CARBIDOPA AND LEVODOPA 1 TABLET: 10; 100 TABLET ORAL at 10:42

## 2020-01-01 RX ADMIN — CARBIDOPA AND LEVODOPA 1 TABLET: 10; 100 TABLET ORAL at 05:31

## 2020-01-01 RX ADMIN — CARBIDOPA AND LEVODOPA 1 TABLET: 10; 100 TABLET ORAL at 11:44

## 2020-01-01 RX ADMIN — DOCUSATE SODIUM 50 MG AND SENNOSIDES 8.6 MG 2 TABLET: 8.6; 5 TABLET, FILM COATED ORAL at 08:31

## 2020-01-01 RX ADMIN — CARBIDOPA AND LEVODOPA 1 TABLET: 10; 100 TABLET ORAL at 10:53

## 2020-01-01 RX ADMIN — CARBIDOPA AND LEVODOPA 1 TABLET: 10; 100 TABLET ORAL at 18:01

## 2020-01-01 RX ADMIN — QUETIAPINE FUMARATE 25 MG: 25 TABLET ORAL at 20:23

## 2020-01-01 RX ADMIN — CARBIDOPA AND LEVODOPA 1 TABLET: 10; 100 TABLET ORAL at 14:36

## 2020-01-01 RX ADMIN — CARBIDOPA AND LEVODOPA 1 TABLET: 10; 100 TABLET ORAL at 17:24

## 2020-01-01 RX ADMIN — DOCUSATE SODIUM 50 MG AND SENNOSIDES 8.6 MG 2 TABLET: 8.6; 5 TABLET, FILM COATED ORAL at 07:32

## 2020-01-01 RX ADMIN — MIDODRINE HYDROCHLORIDE 5 MG: 5 TABLET ORAL at 17:19

## 2020-01-01 RX ADMIN — FLUOXETINE HYDROCHLORIDE 40 MG: 20 CAPSULE ORAL at 10:42

## 2020-01-01 RX ADMIN — QUETIAPINE FUMARATE 12.5 MG: 25 TABLET ORAL at 04:20

## 2020-01-01 RX ADMIN — ACETAMINOPHEN 1000 MG: 500 TABLET ORAL at 11:44

## 2020-01-01 RX ADMIN — CARBIDOPA AND LEVODOPA 1 TABLET: 10; 100 TABLET ORAL at 18:02

## 2020-01-01 RX ADMIN — ENOXAPARIN SODIUM 40 MG: 100 INJECTION SUBCUTANEOUS at 08:53

## 2020-01-01 RX ADMIN — QUETIAPINE FUMARATE 12.5 MG: 25 TABLET ORAL at 08:23

## 2020-01-01 RX ADMIN — CARBIDOPA AND LEVODOPA 1 TABLET: 10; 100 TABLET ORAL at 17:18

## 2020-01-01 RX ADMIN — QUETIAPINE FUMARATE 25 MG: 25 TABLET ORAL at 18:01

## 2020-01-01 RX ADMIN — FLUOXETINE HYDROCHLORIDE 40 MG: 20 CAPSULE ORAL at 11:19

## 2020-01-01 RX ADMIN — CARBIDOPA AND LEVODOPA 1 TABLET: 10; 100 TABLET ORAL at 16:47

## 2020-01-01 RX ADMIN — CARBIDOPA AND LEVODOPA 1 TABLET: 10; 100 TABLET ORAL at 01:08

## 2020-01-01 RX ADMIN — QUETIAPINE FUMARATE 25 MG: 25 TABLET ORAL at 20:25

## 2020-01-01 RX ADMIN — QUETIAPINE FUMARATE 25 MG: 25 TABLET ORAL at 20:59

## 2020-01-01 RX ADMIN — ACETAMINOPHEN 1000 MG: 500 TABLET ORAL at 12:45

## 2020-01-01 RX ADMIN — CARBIDOPA AND LEVODOPA 1 TABLET: 10; 100 TABLET ORAL at 17:22

## 2020-01-01 RX ADMIN — ENOXAPARIN SODIUM 40 MG: 40 INJECTION SUBCUTANEOUS at 08:55

## 2020-01-01 RX ADMIN — CARBIDOPA AND LEVODOPA 1 TABLET: 10; 100 TABLET ORAL at 10:13

## 2020-01-01 RX ADMIN — ACETAMINOPHEN 1000 MG: 500 TABLET ORAL at 00:04

## 2020-01-01 RX ADMIN — QUETIAPINE FUMARATE 25 MG: 25 TABLET ORAL at 08:58

## 2020-01-01 RX ADMIN — DOCUSATE SODIUM 50 MG AND SENNOSIDES 8.6 MG 2 TABLET: 8.6; 5 TABLET, FILM COATED ORAL at 06:23

## 2020-01-01 RX ADMIN — MIDODRINE HYDROCHLORIDE 5 MG: 5 TABLET ORAL at 17:21

## 2020-01-01 RX ADMIN — CARBIDOPA AND LEVODOPA 1 TABLET: 10; 100 TABLET ORAL at 18:18

## 2020-01-01 RX ADMIN — FLUOXETINE HYDROCHLORIDE 20 MG: 20 CAPSULE ORAL at 05:51

## 2020-01-01 RX ADMIN — ENOXAPARIN SODIUM 40 MG: 100 INJECTION SUBCUTANEOUS at 04:33

## 2020-01-01 RX ADMIN — QUETIAPINE FUMARATE 25 MG: 25 TABLET ORAL at 17:20

## 2020-01-01 RX ADMIN — ENOXAPARIN SODIUM 40 MG: 100 INJECTION SUBCUTANEOUS at 06:13

## 2020-01-01 RX ADMIN — CARBIDOPA AND LEVODOPA 1 TABLET: 10; 100 TABLET ORAL at 20:42

## 2020-01-01 RX ADMIN — CARBIDOPA AND LEVODOPA 1 TABLET: 10; 100 TABLET ORAL at 04:45

## 2020-01-01 RX ADMIN — CARBIDOPA AND LEVODOPA 1 TABLET: 10; 100 TABLET ORAL at 00:22

## 2020-01-01 RX ADMIN — CARBIDOPA AND LEVODOPA 1 TABLET: 10; 100 TABLET ORAL at 16:18

## 2020-01-01 RX ADMIN — QUETIAPINE FUMARATE 25 MG: 25 TABLET ORAL at 17:26

## 2020-01-01 RX ADMIN — FLUOXETINE HYDROCHLORIDE 40 MG: 20 CAPSULE ORAL at 10:15

## 2020-01-01 RX ADMIN — CARBIDOPA AND LEVODOPA 1 TABLET: 10; 100 TABLET ORAL at 10:50

## 2020-01-01 RX ADMIN — CARBIDOPA AND LEVODOPA 1 TABLET: 10; 100 TABLET ORAL at 17:07

## 2020-01-01 RX ADMIN — DOCUSATE SODIUM 50 MG AND SENNOSIDES 8.6 MG 2 TABLET: 8.6; 5 TABLET, FILM COATED ORAL at 21:59

## 2020-01-01 RX ADMIN — DOCUSATE SODIUM 50 MG AND SENNOSIDES 8.6 MG 2 TABLET: 8.6; 5 TABLET, FILM COATED ORAL at 17:42

## 2020-01-01 RX ADMIN — QUETIAPINE FUMARATE 25 MG: 25 TABLET ORAL at 04:32

## 2020-01-01 RX ADMIN — CARBIDOPA AND LEVODOPA 1 TABLET: 10; 100 TABLET ORAL at 09:10

## 2020-01-01 RX ADMIN — MIDODRINE HYDROCHLORIDE 5 MG: 5 TABLET ORAL at 17:06

## 2020-01-01 RX ADMIN — CARBIDOPA AND LEVODOPA 1 TABLET: 10; 100 TABLET ORAL at 15:49

## 2020-01-01 RX ADMIN — CARBIDOPA AND LEVODOPA 1 TABLET: 10; 100 TABLET ORAL at 10:39

## 2020-01-01 RX ADMIN — QUETIAPINE FUMARATE 25 MG: 25 TABLET ORAL at 10:28

## 2020-01-01 RX ADMIN — QUETIAPINE FUMARATE 25 MG: 25 TABLET ORAL at 20:22

## 2020-01-01 RX ADMIN — FLUOXETINE HYDROCHLORIDE 40 MG: 20 CAPSULE ORAL at 08:25

## 2020-01-01 RX ADMIN — CARBIDOPA AND LEVODOPA 1 TABLET: 10; 100 TABLET ORAL at 06:05

## 2020-01-01 RX ADMIN — FLUOXETINE HYDROCHLORIDE 40 MG: 20 CAPSULE ORAL at 07:48

## 2020-01-01 RX ADMIN — QUETIAPINE FUMARATE 25 MG: 25 TABLET ORAL at 08:50

## 2020-01-01 RX ADMIN — MIDODRINE HYDROCHLORIDE 5 MG: 5 TABLET ORAL at 17:41

## 2020-01-01 RX ADMIN — FLUOXETINE HYDROCHLORIDE 40 MG: 20 CAPSULE ORAL at 07:45

## 2020-01-01 RX ADMIN — CARBIDOPA AND LEVODOPA 1 TABLET: 10; 100 TABLET ORAL at 15:18

## 2020-01-01 RX ADMIN — QUETIAPINE FUMARATE 25 MG: 25 TABLET ORAL at 18:08

## 2020-01-01 RX ADMIN — DOCUSATE SODIUM 50 MG AND SENNOSIDES 8.6 MG 2 TABLET: 8.6; 5 TABLET, FILM COATED ORAL at 08:05

## 2020-01-01 RX ADMIN — DOCUSATE SODIUM 50 MG AND SENNOSIDES 8.6 MG 2 TABLET: 8.6; 5 TABLET, FILM COATED ORAL at 21:00

## 2020-01-01 RX ADMIN — DOCUSATE SODIUM 50 MG AND SENNOSIDES 8.6 MG 2 TABLET: 8.6; 5 TABLET, FILM COATED ORAL at 21:29

## 2020-01-01 RX ADMIN — ENOXAPARIN SODIUM 40 MG: 100 INJECTION SUBCUTANEOUS at 04:51

## 2020-01-01 RX ADMIN — CARBIDOPA AND LEVODOPA 1 TABLET: 10; 100 TABLET ORAL at 06:09

## 2020-01-01 RX ADMIN — DOCUSATE SODIUM 50 MG AND SENNOSIDES 8.6 MG 2 TABLET: 8.6; 5 TABLET, FILM COATED ORAL at 21:10

## 2020-01-01 RX ADMIN — CARBIDOPA AND LEVODOPA 1 TABLET: 10; 100 TABLET ORAL at 15:43

## 2020-01-01 RX ADMIN — CARBIDOPA AND LEVODOPA 1 TABLET: 10; 100 TABLET ORAL at 19:49

## 2020-01-01 RX ADMIN — CARBIDOPA AND LEVODOPA 1 TABLET: 10; 100 TABLET ORAL at 09:02

## 2020-01-01 RX ADMIN — QUETIAPINE FUMARATE 25 MG: 25 TABLET ORAL at 17:57

## 2020-01-01 RX ADMIN — CARBIDOPA AND LEVODOPA 1 TABLET: 10; 100 TABLET ORAL at 17:04

## 2020-01-01 RX ADMIN — CARBIDOPA AND LEVODOPA 1 TABLET: 10; 100 TABLET ORAL at 23:59

## 2020-01-01 RX ADMIN — QUETIAPINE FUMARATE 25 MG: 25 TABLET ORAL at 09:50

## 2020-01-01 RX ADMIN — FLUOXETINE HYDROCHLORIDE 40 MG: 20 CAPSULE ORAL at 09:14

## 2020-01-01 RX ADMIN — CARBIDOPA AND LEVODOPA 1 TABLET: 10; 100 TABLET ORAL at 12:26

## 2020-01-01 RX ADMIN — ENOXAPARIN SODIUM 40 MG: 100 INJECTION SUBCUTANEOUS at 05:44

## 2020-01-01 RX ADMIN — MIDODRINE HYDROCHLORIDE 5 MG: 5 TABLET ORAL at 09:27

## 2020-01-01 RX ADMIN — CARBIDOPA AND LEVODOPA 1 TABLET: 10; 100 TABLET ORAL at 17:31

## 2020-01-01 RX ADMIN — DOCUSATE SODIUM 50 MG AND SENNOSIDES 8.6 MG 2 TABLET: 8.6; 5 TABLET, FILM COATED ORAL at 06:15

## 2020-01-01 RX ADMIN — CARBIDOPA AND LEVODOPA 1 TABLET: 10; 100 TABLET ORAL at 05:10

## 2020-01-01 RX ADMIN — QUETIAPINE FUMARATE 25 MG: 25 TABLET ORAL at 18:09

## 2020-01-01 RX ADMIN — CARBIDOPA AND LEVODOPA 1 TABLET: 10; 100 TABLET ORAL at 16:07

## 2020-01-01 RX ADMIN — CARBIDOPA AND LEVODOPA 1 TABLET: 10; 100 TABLET ORAL at 22:12

## 2020-01-01 RX ADMIN — CARBIDOPA AND LEVODOPA 1 TABLET: 10; 100 TABLET ORAL at 06:57

## 2020-01-01 RX ADMIN — QUETIAPINE FUMARATE 25 MG: 25 TABLET ORAL at 23:04

## 2020-01-01 RX ADMIN — CARBIDOPA AND LEVODOPA 1 TABLET: 10; 100 TABLET ORAL at 15:22

## 2020-01-01 RX ADMIN — QUETIAPINE FUMARATE 12.5 MG: 25 TABLET ORAL at 07:57

## 2020-01-01 RX ADMIN — MIDODRINE HYDROCHLORIDE 5 MG: 5 TABLET ORAL at 13:34

## 2020-01-01 RX ADMIN — CARBIDOPA AND LEVODOPA 1 TABLET: 10; 100 TABLET ORAL at 04:26

## 2020-01-01 RX ADMIN — QUETIAPINE FUMARATE 25 MG: 25 TABLET ORAL at 20:43

## 2020-01-01 RX ADMIN — ENOXAPARIN SODIUM 40 MG: 100 INJECTION SUBCUTANEOUS at 08:06

## 2020-01-01 RX ADMIN — QUETIAPINE FUMARATE 25 MG: 25 TABLET ORAL at 16:56

## 2020-01-01 RX ADMIN — FLUOXETINE HYDROCHLORIDE 40 MG: 20 CAPSULE ORAL at 10:38

## 2020-01-01 RX ADMIN — DOCUSATE SODIUM 50 MG AND SENNOSIDES 8.6 MG 2 TABLET: 8.6; 5 TABLET, FILM COATED ORAL at 18:41

## 2020-01-01 RX ADMIN — ENOXAPARIN SODIUM 40 MG: 40 INJECTION SUBCUTANEOUS at 09:17

## 2020-01-01 RX ADMIN — FLUOXETINE HYDROCHLORIDE 40 MG: 20 CAPSULE ORAL at 06:18

## 2020-01-01 RX ADMIN — CARBIDOPA AND LEVODOPA 1 TABLET: 10; 100 TABLET ORAL at 05:52

## 2020-01-01 RX ADMIN — HALOPERIDOL LACTATE 5 MG: 5 INJECTION, SOLUTION INTRAMUSCULAR at 14:36

## 2020-01-01 RX ADMIN — QUETIAPINE FUMARATE 25 MG: 25 TABLET ORAL at 19:32

## 2020-01-01 RX ADMIN — FLUOXETINE HYDROCHLORIDE 40 MG: 20 CAPSULE ORAL at 05:49

## 2020-01-01 RX ADMIN — CARBIDOPA AND LEVODOPA 1 TABLET: 10; 100 TABLET ORAL at 17:08

## 2020-01-01 RX ADMIN — FLUOXETINE HYDROCHLORIDE 40 MG: 20 CAPSULE ORAL at 04:14

## 2020-01-01 RX ADMIN — CARBIDOPA AND LEVODOPA 1 TABLET: 10; 100 TABLET ORAL at 20:51

## 2020-01-01 RX ADMIN — DOCUSATE SODIUM 50 MG AND SENNOSIDES 8.6 MG 2 TABLET: 8.6; 5 TABLET, FILM COATED ORAL at 17:40

## 2020-01-01 RX ADMIN — DOCUSATE SODIUM 50 MG AND SENNOSIDES 8.6 MG 2 TABLET: 8.6; 5 TABLET, FILM COATED ORAL at 06:19

## 2020-01-01 RX ADMIN — DOCUSATE SODIUM 50 MG AND SENNOSIDES 8.6 MG 2 TABLET: 8.6; 5 TABLET, FILM COATED ORAL at 10:51

## 2020-01-01 RX ADMIN — NITROFURANTOIN MONOHYDRATE/MACROCRYSTALLINE 100 MG: 25; 75 CAPSULE ORAL at 09:05

## 2020-01-01 RX ADMIN — CARBIDOPA AND LEVODOPA 1 TABLET: 10; 100 TABLET ORAL at 11:57

## 2020-01-01 RX ADMIN — CARBIDOPA AND LEVODOPA 1 TABLET: 10; 100 TABLET ORAL at 00:45

## 2020-01-01 RX ADMIN — QUETIAPINE FUMARATE 12.5 MG: 25 TABLET ORAL at 10:19

## 2020-01-01 RX ADMIN — HALOPERIDOL LACTATE 2 MG: 5 INJECTION, SOLUTION INTRAMUSCULAR at 11:09

## 2020-01-01 RX ADMIN — FLUOXETINE HYDROCHLORIDE 40 MG: 20 CAPSULE ORAL at 06:22

## 2020-01-01 RX ADMIN — FLUOXETINE HYDROCHLORIDE 40 MG: 20 CAPSULE ORAL at 09:24

## 2020-01-01 RX ADMIN — DOCUSATE SODIUM 50 MG AND SENNOSIDES 8.6 MG 2 TABLET: 8.6; 5 TABLET, FILM COATED ORAL at 04:30

## 2020-01-01 RX ADMIN — DOCUSATE SODIUM 50 MG AND SENNOSIDES 8.6 MG 2 TABLET: 8.6; 5 TABLET, FILM COATED ORAL at 09:03

## 2020-01-01 RX ADMIN — QUETIAPINE FUMARATE 25 MG: 25 TABLET ORAL at 17:22

## 2020-01-01 RX ADMIN — DOCUSATE SODIUM 50 MG AND SENNOSIDES 8.6 MG 2 TABLET: 8.6; 5 TABLET, FILM COATED ORAL at 09:17

## 2020-01-01 RX ADMIN — ENOXAPARIN SODIUM 40 MG: 100 INJECTION SUBCUTANEOUS at 09:16

## 2020-01-01 RX ADMIN — QUETIAPINE FUMARATE 25 MG: 25 TABLET ORAL at 17:15

## 2020-01-01 RX ADMIN — CARBIDOPA AND LEVODOPA 1 TABLET: 10; 100 TABLET ORAL at 23:04

## 2020-01-01 RX ADMIN — CARBIDOPA AND LEVODOPA 1 TABLET: 10; 100 TABLET ORAL at 09:35

## 2020-01-01 RX ADMIN — QUETIAPINE FUMARATE 25 MG: 25 TABLET ORAL at 16:58

## 2020-01-01 RX ADMIN — CARBIDOPA AND LEVODOPA 1 TABLET: 10; 100 TABLET ORAL at 13:22

## 2020-01-01 RX ADMIN — QUETIAPINE FUMARATE 25 MG: 25 TABLET ORAL at 16:26

## 2020-01-01 RX ADMIN — FLUOXETINE HYDROCHLORIDE 40 MG: 20 CAPSULE ORAL at 08:07

## 2020-01-01 RX ADMIN — FLUOXETINE HYDROCHLORIDE 40 MG: 20 CAPSULE ORAL at 10:20

## 2020-01-01 RX ADMIN — ENOXAPARIN SODIUM 40 MG: 100 INJECTION SUBCUTANEOUS at 04:57

## 2020-01-01 RX ADMIN — CARBIDOPA AND LEVODOPA 1 TABLET: 10; 100 TABLET ORAL at 05:26

## 2020-01-01 RX ADMIN — CARBIDOPA AND LEVODOPA 1 TABLET: 10; 100 TABLET ORAL at 12:23

## 2020-01-01 RX ADMIN — QUETIAPINE FUMARATE 25 MG: 25 TABLET ORAL at 19:47

## 2020-01-01 RX ADMIN — MIDODRINE HYDROCHLORIDE 5 MG: 5 TABLET ORAL at 12:26

## 2020-01-01 RX ADMIN — ENOXAPARIN SODIUM 40 MG: 100 INJECTION SUBCUTANEOUS at 08:52

## 2020-01-01 RX ADMIN — ACETAMINOPHEN 1000 MG: 500 TABLET ORAL at 12:28

## 2020-01-01 RX ADMIN — FLUOXETINE HYDROCHLORIDE 40 MG: 20 CAPSULE ORAL at 08:43

## 2020-01-01 RX ADMIN — CARBIDOPA AND LEVODOPA 1 TABLET: 10; 100 TABLET ORAL at 17:05

## 2020-01-01 RX ADMIN — QUETIAPINE FUMARATE 25 MG: 25 TABLET ORAL at 20:54

## 2020-01-01 RX ADMIN — QUETIAPINE FUMARATE 12.5 MG: 25 TABLET ORAL at 04:57

## 2020-01-01 RX ADMIN — DOCUSATE SODIUM 50 MG AND SENNOSIDES 8.6 MG 2 TABLET: 8.6; 5 TABLET, FILM COATED ORAL at 07:47

## 2020-01-01 RX ADMIN — CARBIDOPA AND LEVODOPA 1 TABLET: 10; 100 TABLET ORAL at 23:39

## 2020-01-01 RX ADMIN — DOCUSATE SODIUM 50 MG AND SENNOSIDES 8.6 MG 2 TABLET: 8.6; 5 TABLET, FILM COATED ORAL at 20:29

## 2020-01-01 RX ADMIN — ENOXAPARIN SODIUM 40 MG: 100 INJECTION SUBCUTANEOUS at 09:18

## 2020-01-01 RX ADMIN — CARBIDOPA AND LEVODOPA 1 TABLET: 10; 100 TABLET ORAL at 01:14

## 2020-01-01 RX ADMIN — FLUOXETINE HYDROCHLORIDE 40 MG: 20 CAPSULE ORAL at 09:36

## 2020-01-01 RX ADMIN — DOCUSATE SODIUM 50 MG AND SENNOSIDES 8.6 MG 2 TABLET: 8.6; 5 TABLET, FILM COATED ORAL at 21:01

## 2020-01-01 RX ADMIN — DOCUSATE SODIUM 50 MG AND SENNOSIDES 8.6 MG 2 TABLET: 8.6; 5 TABLET, FILM COATED ORAL at 05:04

## 2020-01-01 RX ADMIN — ACETAMINOPHEN 1000 MG: 500 TABLET ORAL at 00:09

## 2020-01-01 RX ADMIN — CARBIDOPA AND LEVODOPA 1 TABLET: 10; 100 TABLET ORAL at 17:27

## 2020-01-01 RX ADMIN — CARBIDOPA AND LEVODOPA 1 TABLET: 10; 100 TABLET ORAL at 04:35

## 2020-01-01 RX ADMIN — CARBIDOPA AND LEVODOPA 1 TABLET: 10; 100 TABLET ORAL at 10:09

## 2020-01-01 RX ADMIN — CARBIDOPA AND LEVODOPA 1 TABLET: 10; 100 TABLET ORAL at 11:17

## 2020-01-01 RX ADMIN — CARBIDOPA AND LEVODOPA 1 TABLET: 10; 100 TABLET ORAL at 12:04

## 2020-01-01 RX ADMIN — MIDODRINE HYDROCHLORIDE 5 MG: 5 TABLET ORAL at 17:28

## 2020-01-01 RX ADMIN — QUETIAPINE FUMARATE 25 MG: 25 TABLET ORAL at 08:37

## 2020-01-01 RX ADMIN — QUETIAPINE FUMARATE 25 MG: 25 TABLET ORAL at 19:57

## 2020-01-01 RX ADMIN — CARBIDOPA AND LEVODOPA 1 TABLET: 10; 100 TABLET ORAL at 00:05

## 2020-01-01 RX ADMIN — MIDODRINE HYDROCHLORIDE 5 MG: 5 TABLET ORAL at 08:40

## 2020-01-01 RX ADMIN — QUETIAPINE FUMARATE 25 MG: 25 TABLET ORAL at 08:55

## 2020-01-01 RX ADMIN — CARBIDOPA AND LEVODOPA 1 TABLET: 10; 100 TABLET ORAL at 19:32

## 2020-01-01 RX ADMIN — CARBIDOPA AND LEVODOPA 1 TABLET: 10; 100 TABLET ORAL at 17:36

## 2020-01-01 RX ADMIN — ACETAMINOPHEN 1000 MG: 500 TABLET ORAL at 00:21

## 2020-01-01 RX ADMIN — MIDODRINE HYDROCHLORIDE 5 MG: 5 TABLET ORAL at 11:32

## 2020-01-01 RX ADMIN — DOCUSATE SODIUM 50 MG AND SENNOSIDES 8.6 MG 2 TABLET: 8.6; 5 TABLET, FILM COATED ORAL at 20:14

## 2020-01-01 RX ADMIN — ENOXAPARIN SODIUM 40 MG: 100 INJECTION SUBCUTANEOUS at 10:42

## 2020-01-01 RX ADMIN — DOCUSATE SODIUM 50 MG AND SENNOSIDES 8.6 MG 2 TABLET: 8.6; 5 TABLET, FILM COATED ORAL at 09:00

## 2020-01-01 RX ADMIN — CARBIDOPA AND LEVODOPA 1 TABLET: 10; 100 TABLET ORAL at 11:08

## 2020-01-01 RX ADMIN — QUETIAPINE FUMARATE 12.5 MG: 25 TABLET ORAL at 09:09

## 2020-01-01 RX ADMIN — CARBIDOPA AND LEVODOPA 1 TABLET: 10; 100 TABLET ORAL at 09:55

## 2020-01-01 RX ADMIN — DOCUSATE SODIUM 50 MG AND SENNOSIDES 8.6 MG 2 TABLET: 8.6; 5 TABLET, FILM COATED ORAL at 08:12

## 2020-01-01 RX ADMIN — CARBIDOPA AND LEVODOPA 1 TABLET: 10; 100 TABLET ORAL at 14:12

## 2020-01-01 RX ADMIN — DOCUSATE SODIUM 50 MG AND SENNOSIDES 8.6 MG 2 TABLET: 8.6; 5 TABLET, FILM COATED ORAL at 06:09

## 2020-01-01 RX ADMIN — CARBIDOPA AND LEVODOPA 1 TABLET: 10; 100 TABLET ORAL at 14:30

## 2020-01-01 RX ADMIN — CARBIDOPA AND LEVODOPA 1 TABLET: 10; 100 TABLET ORAL at 17:12

## 2020-01-01 RX ADMIN — CARBIDOPA AND LEVODOPA 1 TABLET: 10; 100 TABLET ORAL at 00:01

## 2020-01-01 RX ADMIN — QUETIAPINE FUMARATE 25 MG: 25 TABLET ORAL at 09:42

## 2020-01-01 RX ADMIN — CARBIDOPA AND LEVODOPA 1 TABLET: 10; 100 TABLET ORAL at 06:19

## 2020-01-01 RX ADMIN — ACETAMINOPHEN 1000 MG: 500 TABLET ORAL at 10:13

## 2020-01-01 RX ADMIN — DOCUSATE SODIUM 50 MG AND SENNOSIDES 8.6 MG 2 TABLET: 8.6; 5 TABLET, FILM COATED ORAL at 20:37

## 2020-01-01 RX ADMIN — Medication 400 MG: at 05:35

## 2020-01-01 RX ADMIN — CARBIDOPA AND LEVODOPA 1 TABLET: 10; 100 TABLET ORAL at 17:53

## 2020-01-01 RX ADMIN — CARBIDOPA AND LEVODOPA 1 TABLET: 10; 100 TABLET ORAL at 17:58

## 2020-01-01 RX ADMIN — CARBIDOPA AND LEVODOPA 1 TABLET: 10; 100 TABLET ORAL at 15:41

## 2020-01-01 RX ADMIN — CARBIDOPA AND LEVODOPA 1 TABLET: 10; 100 TABLET ORAL at 12:12

## 2020-01-01 RX ADMIN — DOCUSATE SODIUM 50 MG AND SENNOSIDES 8.6 MG 2 TABLET: 8.6; 5 TABLET, FILM COATED ORAL at 20:54

## 2020-01-01 RX ADMIN — QUETIAPINE FUMARATE 25 MG: 25 TABLET ORAL at 10:08

## 2020-01-01 RX ADMIN — FLUOXETINE HYDROCHLORIDE 40 MG: 20 CAPSULE ORAL at 08:03

## 2020-01-01 RX ADMIN — CARBIDOPA AND LEVODOPA 1 TABLET: 10; 100 TABLET ORAL at 05:09

## 2020-01-01 RX ADMIN — DOCUSATE SODIUM 50 MG AND SENNOSIDES 8.6 MG 2 TABLET: 8.6; 5 TABLET, FILM COATED ORAL at 17:06

## 2020-01-01 RX ADMIN — ACETAMINOPHEN 1000 MG: 500 TABLET ORAL at 23:27

## 2020-01-01 RX ADMIN — MIDODRINE HYDROCHLORIDE 5 MG: 5 TABLET ORAL at 18:04

## 2020-01-01 RX ADMIN — ENOXAPARIN SODIUM 40 MG: 100 INJECTION SUBCUTANEOUS at 10:33

## 2020-01-01 RX ADMIN — CARBIDOPA AND LEVODOPA 1 TABLET: 10; 100 TABLET ORAL at 15:37

## 2020-01-01 RX ADMIN — CARBIDOPA AND LEVODOPA 1 TABLET: 10; 100 TABLET ORAL at 11:51

## 2020-01-01 RX ADMIN — ENOXAPARIN SODIUM 40 MG: 100 INJECTION SUBCUTANEOUS at 12:38

## 2020-01-01 RX ADMIN — FLUOXETINE HYDROCHLORIDE 40 MG: 20 CAPSULE ORAL at 08:06

## 2020-01-01 RX ADMIN — DOCUSATE SODIUM 50 MG AND SENNOSIDES 8.6 MG 2 TABLET: 8.6; 5 TABLET, FILM COATED ORAL at 19:48

## 2020-01-01 RX ADMIN — DOCUSATE SODIUM 50 MG AND SENNOSIDES 8.6 MG 2 TABLET: 8.6; 5 TABLET, FILM COATED ORAL at 06:12

## 2020-01-01 RX ADMIN — DOCUSATE SODIUM 50 MG AND SENNOSIDES 8.6 MG 2 TABLET: 8.6; 5 TABLET, FILM COATED ORAL at 08:00

## 2020-01-01 RX ADMIN — ENOXAPARIN SODIUM 40 MG: 100 INJECTION SUBCUTANEOUS at 08:20

## 2020-01-01 RX ADMIN — MIDODRINE HYDROCHLORIDE 5 MG: 5 TABLET ORAL at 12:03

## 2020-01-01 RX ADMIN — QUETIAPINE FUMARATE 25 MG: 25 TABLET ORAL at 17:51

## 2020-01-01 RX ADMIN — QUETIAPINE FUMARATE 25 MG: 25 TABLET ORAL at 16:32

## 2020-01-01 RX ADMIN — CARBIDOPA AND LEVODOPA 1 TABLET: 10; 100 TABLET ORAL at 13:41

## 2020-01-01 RX ADMIN — FLUOXETINE HYDROCHLORIDE 40 MG: 20 CAPSULE ORAL at 09:03

## 2020-01-01 RX ADMIN — CARBIDOPA AND LEVODOPA 1 TABLET: 10; 100 TABLET ORAL at 05:47

## 2020-01-01 RX ADMIN — CARBIDOPA AND LEVODOPA 1 TABLET: 10; 100 TABLET ORAL at 04:56

## 2020-01-01 RX ADMIN — QUETIAPINE FUMARATE 25 MG: 25 TABLET ORAL at 09:57

## 2020-01-01 RX ADMIN — DOCUSATE SODIUM 50 MG AND SENNOSIDES 8.6 MG 2 TABLET: 8.6; 5 TABLET, FILM COATED ORAL at 08:40

## 2020-01-01 RX ADMIN — DOCUSATE SODIUM 50 MG AND SENNOSIDES 8.6 MG 2 TABLET: 8.6; 5 TABLET, FILM COATED ORAL at 23:04

## 2020-01-01 RX ADMIN — CARBIDOPA AND LEVODOPA 1 TABLET: 10; 100 TABLET ORAL at 10:03

## 2020-01-01 RX ADMIN — DOCUSATE SODIUM 50 MG AND SENNOSIDES 8.6 MG 2 TABLET: 8.6; 5 TABLET, FILM COATED ORAL at 20:35

## 2020-01-01 RX ADMIN — DOCUSATE SODIUM 50 MG AND SENNOSIDES 8.6 MG 2 TABLET: 8.6; 5 TABLET, FILM COATED ORAL at 07:45

## 2020-01-01 RX ADMIN — QUETIAPINE FUMARATE 25 MG: 25 TABLET ORAL at 21:18

## 2020-01-01 RX ADMIN — QUETIAPINE FUMARATE 12.5 MG: 25 TABLET ORAL at 09:01

## 2020-01-01 RX ADMIN — DOCUSATE SODIUM 50 MG AND SENNOSIDES 8.6 MG 2 TABLET: 8.6; 5 TABLET, FILM COATED ORAL at 09:27

## 2020-01-01 RX ADMIN — FLUOXETINE HYDROCHLORIDE 40 MG: 20 CAPSULE ORAL at 09:54

## 2020-01-01 RX ADMIN — FLUOXETINE HYDROCHLORIDE 40 MG: 20 CAPSULE ORAL at 09:47

## 2020-01-01 RX ADMIN — FLUOXETINE HYDROCHLORIDE 40 MG: 20 CAPSULE ORAL at 09:13

## 2020-01-01 RX ADMIN — QUETIAPINE FUMARATE 25 MG: 25 TABLET ORAL at 17:21

## 2020-01-01 RX ADMIN — CARBIDOPA AND LEVODOPA 1 TABLET: 10; 100 TABLET ORAL at 20:47

## 2020-01-01 RX ADMIN — FLUOXETINE HYDROCHLORIDE 40 MG: 20 CAPSULE ORAL at 08:54

## 2020-01-01 RX ADMIN — QUETIAPINE FUMARATE 25 MG: 25 TABLET ORAL at 20:36

## 2020-01-01 RX ADMIN — CARBIDOPA AND LEVODOPA 1 TABLET: 10; 100 TABLET ORAL at 10:55

## 2020-01-01 RX ADMIN — HALOPERIDOL LACTATE 5 MG: 5 INJECTION, SOLUTION INTRAMUSCULAR at 00:47

## 2020-01-01 RX ADMIN — CARBIDOPA AND LEVODOPA 1 TABLET: 10; 100 TABLET ORAL at 21:53

## 2020-01-01 RX ADMIN — QUETIAPINE FUMARATE 25 MG: 25 TABLET ORAL at 22:43

## 2020-01-01 RX ADMIN — QUETIAPINE FUMARATE 12.5 MG: 25 TABLET ORAL at 07:47

## 2020-01-01 RX ADMIN — QUETIAPINE FUMARATE 25 MG: 25 TABLET ORAL at 21:06

## 2020-01-01 RX ADMIN — FLUOXETINE HYDROCHLORIDE 40 MG: 20 CAPSULE ORAL at 05:33

## 2020-01-01 RX ADMIN — CARBIDOPA AND LEVODOPA 1 TABLET: 10; 100 TABLET ORAL at 20:21

## 2020-01-01 RX ADMIN — CARBIDOPA AND LEVODOPA 1 TABLET: 10; 100 TABLET ORAL at 05:06

## 2020-01-01 RX ADMIN — QUETIAPINE FUMARATE 25 MG: 25 TABLET ORAL at 22:09

## 2020-01-01 RX ADMIN — FLUOXETINE HYDROCHLORIDE 40 MG: 20 CAPSULE ORAL at 10:06

## 2020-01-01 RX ADMIN — DOCUSATE SODIUM 50 MG AND SENNOSIDES 8.6 MG 2 TABLET: 8.6; 5 TABLET, FILM COATED ORAL at 07:51

## 2020-01-01 RX ADMIN — CARBIDOPA AND LEVODOPA 1 TABLET: 10; 100 TABLET ORAL at 10:48

## 2020-01-01 RX ADMIN — SODIUM CHLORIDE: 9 INJECTION, SOLUTION INTRAVENOUS at 21:36

## 2020-01-01 RX ADMIN — ACETAMINOPHEN 1000 MG: 500 TABLET ORAL at 00:43

## 2020-01-01 RX ADMIN — CARBIDOPA AND LEVODOPA 1 TABLET: 10; 100 TABLET ORAL at 05:46

## 2020-01-01 RX ADMIN — QUETIAPINE FUMARATE 25 MG: 25 TABLET ORAL at 20:40

## 2020-01-01 RX ADMIN — CARBIDOPA AND LEVODOPA 1 TABLET: 10; 100 TABLET ORAL at 09:46

## 2020-01-01 RX ADMIN — CARBIDOPA AND LEVODOPA 1 TABLET: 10; 100 TABLET ORAL at 21:58

## 2020-01-01 RX ADMIN — QUETIAPINE FUMARATE 12.5 MG: 25 TABLET ORAL at 08:40

## 2020-01-01 RX ADMIN — FLUOXETINE HYDROCHLORIDE 40 MG: 20 CAPSULE ORAL at 10:48

## 2020-01-01 RX ADMIN — QUETIAPINE FUMARATE 12.5 MG: 25 TABLET ORAL at 04:41

## 2020-01-01 RX ADMIN — ENOXAPARIN SODIUM 40 MG: 40 INJECTION SUBCUTANEOUS at 09:05

## 2020-01-01 RX ADMIN — FLUOXETINE HYDROCHLORIDE 40 MG: 20 CAPSULE ORAL at 08:37

## 2020-01-01 RX ADMIN — CARBIDOPA AND LEVODOPA 1 TABLET: 10; 100 TABLET ORAL at 00:03

## 2020-01-01 RX ADMIN — MIDODRINE HYDROCHLORIDE 5 MG: 5 TABLET ORAL at 09:25

## 2020-01-01 RX ADMIN — MIDODRINE HYDROCHLORIDE 5 MG: 5 TABLET ORAL at 07:51

## 2020-01-01 RX ADMIN — QUETIAPINE FUMARATE 12.5 MG: 25 TABLET ORAL at 09:02

## 2020-01-01 RX ADMIN — CARBIDOPA AND LEVODOPA 1 TABLET: 10; 100 TABLET ORAL at 15:20

## 2020-01-01 RX ADMIN — FLUOXETINE HYDROCHLORIDE 40 MG: 20 CAPSULE ORAL at 05:15

## 2020-01-01 RX ADMIN — QUETIAPINE FUMARATE 12.5 MG: 25 TABLET ORAL at 09:04

## 2020-01-01 RX ADMIN — CARBIDOPA AND LEVODOPA 1 TABLET: 10; 100 TABLET ORAL at 12:10

## 2020-01-01 RX ADMIN — INFLUENZA A VIRUS A/MICHIGAN/45/2015 X-275 (H1N1) ANTIGEN (FORMALDEHYDE INACTIVATED), INFLUENZA A VIRUS A/SINGAPORE/INFIMH-16-0019/2016 IVR-186 (H3N2) ANTIGEN (FORMALDEHYDE INACTIVATED), INFLUENZA B VIRUS B/PHUKET/3073/2013 ANTIGEN (FORMALDEHYDE INACTIVATED), AND INFLUENZA B VIRUS B/MARYLAND/15/2016 BX-69A ANTIGEN (FORMALDEHYDE INACTIVATED) 0.7 ML: 60; 60; 60; 60 INJECTION, SUSPENSION INTRAMUSCULAR at 17:30

## 2020-01-01 RX ADMIN — DOCUSATE SODIUM 50 MG AND SENNOSIDES 8.6 MG 2 TABLET: 8.6; 5 TABLET, FILM COATED ORAL at 23:54

## 2020-01-01 RX ADMIN — DOCUSATE SODIUM 50 MG AND SENNOSIDES 8.6 MG 2 TABLET: 8.6; 5 TABLET, FILM COATED ORAL at 20:17

## 2020-01-01 RX ADMIN — CARBIDOPA AND LEVODOPA 1 TABLET: 10; 100 TABLET ORAL at 23:45

## 2020-01-01 RX ADMIN — CARBIDOPA AND LEVODOPA 1 TABLET: 10; 100 TABLET ORAL at 11:31

## 2020-01-01 RX ADMIN — CARBIDOPA AND LEVODOPA 1 TABLET: 10; 100 TABLET ORAL at 07:32

## 2020-01-01 RX ADMIN — CARBIDOPA AND LEVODOPA 1 TABLET: 10; 100 TABLET ORAL at 17:25

## 2020-01-01 RX ADMIN — QUETIAPINE FUMARATE 25 MG: 25 TABLET ORAL at 22:49

## 2020-01-01 RX ADMIN — DOCUSATE SODIUM 50 MG AND SENNOSIDES 8.6 MG 2 TABLET: 8.6; 5 TABLET, FILM COATED ORAL at 20:50

## 2020-01-01 RX ADMIN — QUETIAPINE FUMARATE 25 MG: 25 TABLET ORAL at 16:47

## 2020-01-01 RX ADMIN — CARBIDOPA AND LEVODOPA 1 TABLET: 10; 100 TABLET ORAL at 09:16

## 2020-01-01 RX ADMIN — QUETIAPINE FUMARATE 25 MG: 25 TABLET ORAL at 10:49

## 2020-01-01 RX ADMIN — HALOPERIDOL LACTATE 5 MG: 5 INJECTION, SOLUTION INTRAMUSCULAR at 13:56

## 2020-01-01 RX ADMIN — QUETIAPINE FUMARATE 25 MG: 25 TABLET ORAL at 17:53

## 2020-01-01 RX ADMIN — MIDODRINE HYDROCHLORIDE 5 MG: 5 TABLET ORAL at 17:30

## 2020-01-01 RX ADMIN — ENOXAPARIN SODIUM 40 MG: 100 INJECTION SUBCUTANEOUS at 05:53

## 2020-01-01 RX ADMIN — FLUOXETINE HYDROCHLORIDE 40 MG: 20 CAPSULE ORAL at 11:05

## 2020-01-01 RX ADMIN — CARBIDOPA AND LEVODOPA 1 TABLET: 10; 100 TABLET ORAL at 20:08

## 2020-01-01 RX ADMIN — CARBIDOPA AND LEVODOPA 1 TABLET: 10; 100 TABLET ORAL at 17:28

## 2020-01-01 RX ADMIN — CARBIDOPA AND LEVODOPA 1 TABLET: 10; 100 TABLET ORAL at 15:35

## 2020-01-01 RX ADMIN — FLUOXETINE HYDROCHLORIDE 40 MG: 20 CAPSULE ORAL at 04:26

## 2020-01-01 RX ADMIN — DOCUSATE SODIUM 50 MG AND SENNOSIDES 8.6 MG 2 TABLET: 8.6; 5 TABLET, FILM COATED ORAL at 04:55

## 2020-01-01 RX ADMIN — CARBIDOPA AND LEVODOPA 1 TABLET: 10; 100 TABLET ORAL at 05:51

## 2020-01-01 RX ADMIN — DOCUSATE SODIUM 50 MG AND SENNOSIDES 8.6 MG 2 TABLET: 8.6; 5 TABLET, FILM COATED ORAL at 07:29

## 2020-01-01 RX ADMIN — ENOXAPARIN SODIUM 40 MG: 100 INJECTION SUBCUTANEOUS at 09:54

## 2020-01-01 RX ADMIN — CARBIDOPA AND LEVODOPA 1 TABLET: 10; 100 TABLET ORAL at 17:32

## 2020-01-01 RX ADMIN — CARBIDOPA AND LEVODOPA 1 TABLET: 10; 100 TABLET ORAL at 12:08

## 2020-01-01 RX ADMIN — QUETIAPINE FUMARATE 25 MG: 25 TABLET ORAL at 04:59

## 2020-01-01 RX ADMIN — NITROFURANTOIN MONOHYDRATE/MACROCRYSTALLINE 100 MG: 25; 75 CAPSULE ORAL at 08:25

## 2020-01-01 RX ADMIN — CARBIDOPA AND LEVODOPA 1 TABLET: 10; 100 TABLET ORAL at 06:24

## 2020-01-01 RX ADMIN — CARBIDOPA AND LEVODOPA 1 TABLET: 10; 100 TABLET ORAL at 20:01

## 2020-01-01 RX ADMIN — DOCUSATE SODIUM 50 MG AND SENNOSIDES 8.6 MG 2 TABLET: 8.6; 5 TABLET, FILM COATED ORAL at 09:08

## 2020-01-01 RX ADMIN — DOCUSATE SODIUM 50 MG AND SENNOSIDES 8.6 MG 2 TABLET: 8.6; 5 TABLET, FILM COATED ORAL at 22:09

## 2020-01-01 RX ADMIN — FLUOXETINE HYDROCHLORIDE 40 MG: 20 CAPSULE ORAL at 06:11

## 2020-01-01 RX ADMIN — NITROFURANTOIN MONOHYDRATE/MACROCRYSTALLINE 100 MG: 25; 75 CAPSULE ORAL at 08:50

## 2020-01-01 RX ADMIN — ENOXAPARIN SODIUM 40 MG: 100 INJECTION SUBCUTANEOUS at 06:22

## 2020-01-01 RX ADMIN — ENOXAPARIN SODIUM 40 MG: 40 INJECTION SUBCUTANEOUS at 08:35

## 2020-01-01 RX ADMIN — Medication 400 MG: at 17:08

## 2020-01-01 RX ADMIN — QUETIAPINE FUMARATE 25 MG: 25 TABLET ORAL at 17:00

## 2020-01-01 RX ADMIN — QUETIAPINE FUMARATE 12.5 MG: 25 TABLET ORAL at 10:11

## 2020-01-01 RX ADMIN — ENOXAPARIN SODIUM 40 MG: 100 INJECTION SUBCUTANEOUS at 10:02

## 2020-01-01 RX ADMIN — DOCUSATE SODIUM 50 MG AND SENNOSIDES 8.6 MG 2 TABLET: 8.6; 5 TABLET, FILM COATED ORAL at 04:59

## 2020-01-01 RX ADMIN — QUETIAPINE FUMARATE 25 MG: 25 TABLET ORAL at 20:08

## 2020-01-01 RX ADMIN — QUETIAPINE FUMARATE 25 MG: 25 TABLET ORAL at 16:18

## 2020-01-01 RX ADMIN — CARBIDOPA AND LEVODOPA 1 TABLET: 10; 100 TABLET ORAL at 15:13

## 2020-01-01 RX ADMIN — CARBIDOPA AND LEVODOPA 1 TABLET: 10; 100 TABLET ORAL at 05:15

## 2020-01-01 RX ADMIN — CARBIDOPA AND LEVODOPA 1 TABLET: 10; 100 TABLET ORAL at 10:28

## 2020-01-01 RX ADMIN — CARBIDOPA AND LEVODOPA 1 TABLET: 10; 100 TABLET ORAL at 05:28

## 2020-01-01 RX ADMIN — FLUOXETINE HYDROCHLORIDE 40 MG: 20 CAPSULE ORAL at 09:52

## 2020-01-01 RX ADMIN — QUETIAPINE FUMARATE 12.5 MG: 25 TABLET ORAL at 09:41

## 2020-01-01 RX ADMIN — CARBIDOPA AND LEVODOPA 1 TABLET: 10; 100 TABLET ORAL at 11:13

## 2020-01-01 RX ADMIN — CARBIDOPA AND LEVODOPA 1 TABLET: 10; 100 TABLET ORAL at 23:10

## 2020-01-01 RX ADMIN — DOCUSATE SODIUM 50 MG AND SENNOSIDES 8.6 MG 2 TABLET: 8.6; 5 TABLET, FILM COATED ORAL at 10:24

## 2020-01-01 RX ADMIN — CARBIDOPA AND LEVODOPA 1 TABLET: 10; 100 TABLET ORAL at 15:42

## 2020-01-01 RX ADMIN — MIDODRINE HYDROCHLORIDE 5 MG: 5 TABLET ORAL at 11:59

## 2020-01-01 RX ADMIN — Medication 400 MG: at 05:23

## 2020-01-01 RX ADMIN — HALOPERIDOL LACTATE 5 MG: 5 INJECTION, SOLUTION INTRAMUSCULAR at 14:35

## 2020-01-01 RX ADMIN — DOCUSATE SODIUM 50 MG AND SENNOSIDES 8.6 MG 2 TABLET: 8.6; 5 TABLET, FILM COATED ORAL at 05:39

## 2020-01-01 RX ADMIN — FLUOXETINE HYDROCHLORIDE 40 MG: 20 CAPSULE ORAL at 09:26

## 2020-01-01 RX ADMIN — HALOPERIDOL LACTATE 5 MG: 5 INJECTION, SOLUTION INTRAMUSCULAR at 14:21

## 2020-01-01 RX ADMIN — CARBIDOPA AND LEVODOPA 1 TABLET: 10; 100 TABLET ORAL at 10:27

## 2020-01-01 RX ADMIN — CARBIDOPA AND LEVODOPA 1 TABLET: 10; 100 TABLET ORAL at 09:05

## 2020-01-01 RX ADMIN — CARBIDOPA AND LEVODOPA 1 TABLET: 10; 100 TABLET ORAL at 17:33

## 2020-01-01 RX ADMIN — DOCUSATE SODIUM 50 MG AND SENNOSIDES 8.6 MG 2 TABLET: 8.6; 5 TABLET, FILM COATED ORAL at 21:16

## 2020-01-01 RX ADMIN — CARBIDOPA AND LEVODOPA 1 TABLET: 10; 100 TABLET ORAL at 22:28

## 2020-01-01 RX ADMIN — CARBIDOPA AND LEVODOPA 1 TABLET: 10; 100 TABLET ORAL at 14:02

## 2020-01-01 RX ADMIN — Medication 400 MG: at 17:34

## 2020-01-01 RX ADMIN — DOCUSATE SODIUM 50 MG AND SENNOSIDES 8.6 MG 2 TABLET: 8.6; 5 TABLET, FILM COATED ORAL at 20:03

## 2020-01-01 RX ADMIN — CARBIDOPA AND LEVODOPA 1 TABLET: 10; 100 TABLET ORAL at 12:00

## 2020-01-01 RX ADMIN — MIDODRINE HYDROCHLORIDE 5 MG: 5 TABLET ORAL at 16:57

## 2020-01-01 RX ADMIN — HALOPERIDOL LACTATE 5 MG: 5 INJECTION, SOLUTION INTRAMUSCULAR at 20:08

## 2020-01-01 RX ADMIN — ACETAMINOPHEN 1000 MG: 500 TABLET ORAL at 14:30

## 2020-01-01 RX ADMIN — CARBIDOPA AND LEVODOPA 1 TABLET: 10; 100 TABLET ORAL at 17:43

## 2020-01-01 RX ADMIN — DOCUSATE SODIUM 50 MG AND SENNOSIDES 8.6 MG 2 TABLET: 8.6; 5 TABLET, FILM COATED ORAL at 05:51

## 2020-01-01 RX ADMIN — DOCUSATE SODIUM 50 MG AND SENNOSIDES 8.6 MG 2 TABLET: 8.6; 5 TABLET, FILM COATED ORAL at 22:08

## 2020-01-01 RX ADMIN — QUETIAPINE FUMARATE 12.5 MG: 25 TABLET ORAL at 08:43

## 2020-01-01 RX ADMIN — CARBIDOPA AND LEVODOPA 1 TABLET: 10; 100 TABLET ORAL at 10:02

## 2020-01-01 RX ADMIN — DOCUSATE SODIUM 50 MG AND SENNOSIDES 8.6 MG 2 TABLET: 8.6; 5 TABLET, FILM COATED ORAL at 08:03

## 2020-01-01 RX ADMIN — DOCUSATE SODIUM 50 MG AND SENNOSIDES 8.6 MG 2 TABLET: 8.6; 5 TABLET, FILM COATED ORAL at 05:26

## 2020-01-01 RX ADMIN — DOCUSATE SODIUM 50 MG AND SENNOSIDES 8.6 MG 2 TABLET: 8.6; 5 TABLET, FILM COATED ORAL at 10:17

## 2020-01-01 RX ADMIN — FLUOXETINE HYDROCHLORIDE 40 MG: 20 CAPSULE ORAL at 10:16

## 2020-01-01 RX ADMIN — CARBIDOPA AND LEVODOPA 1 TABLET: 10; 100 TABLET ORAL at 20:38

## 2020-01-01 RX ADMIN — CARBIDOPA AND LEVODOPA 1 TABLET: 10; 100 TABLET ORAL at 13:30

## 2020-01-01 RX ADMIN — FLUOXETINE HYDROCHLORIDE 40 MG: 20 CAPSULE ORAL at 05:40

## 2020-01-01 RX ADMIN — QUETIAPINE FUMARATE 25 MG: 25 TABLET ORAL at 19:42

## 2020-01-01 RX ADMIN — CARBIDOPA AND LEVODOPA 1 TABLET: 10; 100 TABLET ORAL at 16:08

## 2020-01-01 RX ADMIN — DOCUSATE SODIUM 50 MG AND SENNOSIDES 8.6 MG 2 TABLET: 8.6; 5 TABLET, FILM COATED ORAL at 20:10

## 2020-01-01 RX ADMIN — CARBIDOPA AND LEVODOPA 1 TABLET: 10; 100 TABLET ORAL at 05:25

## 2020-01-01 RX ADMIN — ACETAMINOPHEN 1000 MG: 500 TABLET ORAL at 00:29

## 2020-01-01 RX ADMIN — QUETIAPINE FUMARATE 25 MG: 25 TABLET ORAL at 09:35

## 2020-01-01 RX ADMIN — QUETIAPINE FUMARATE 25 MG: 25 TABLET ORAL at 23:32

## 2020-01-01 RX ADMIN — SODIUM CHLORIDE: 9 INJECTION, SOLUTION INTRAVENOUS at 18:23

## 2020-01-01 RX ADMIN — CARBIDOPA AND LEVODOPA 1 TABLET: 10; 100 TABLET ORAL at 12:14

## 2020-01-01 RX ADMIN — CARBIDOPA AND LEVODOPA 1 TABLET: 10; 100 TABLET ORAL at 05:58

## 2020-01-01 RX ADMIN — CARBIDOPA AND LEVODOPA 1 TABLET: 10; 100 TABLET ORAL at 06:29

## 2020-01-01 RX ADMIN — CARBIDOPA AND LEVODOPA 1 TABLET: 10; 100 TABLET ORAL at 23:52

## 2020-01-01 RX ADMIN — ACETAMINOPHEN 1000 MG: 500 TABLET ORAL at 12:20

## 2020-01-01 RX ADMIN — QUETIAPINE FUMARATE 25 MG: 25 TABLET ORAL at 17:42

## 2020-01-01 RX ADMIN — CARBIDOPA AND LEVODOPA 1 TABLET: 10; 100 TABLET ORAL at 04:17

## 2020-01-01 RX ADMIN — FLUOXETINE HYDROCHLORIDE 40 MG: 20 CAPSULE ORAL at 06:14

## 2020-01-01 RX ADMIN — CARBIDOPA AND LEVODOPA 1 TABLET: 10; 100 TABLET ORAL at 13:51

## 2020-01-01 RX ADMIN — QUETIAPINE FUMARATE 25 MG: 25 TABLET ORAL at 04:14

## 2020-01-01 RX ADMIN — DOCUSATE SODIUM 50 MG AND SENNOSIDES 8.6 MG 2 TABLET: 8.6; 5 TABLET, FILM COATED ORAL at 17:16

## 2020-01-01 RX ADMIN — QUETIAPINE FUMARATE 12.5 MG: 25 TABLET ORAL at 10:49

## 2020-01-01 RX ADMIN — CARBIDOPA AND LEVODOPA 1 TABLET: 10; 100 TABLET ORAL at 17:49

## 2020-01-01 RX ADMIN — CARBIDOPA AND LEVODOPA 1 TABLET: 10; 100 TABLET ORAL at 21:14

## 2020-01-01 RX ADMIN — CARBIDOPA AND LEVODOPA 1 TABLET: 10; 100 TABLET ORAL at 10:14

## 2020-01-01 RX ADMIN — ENOXAPARIN SODIUM 40 MG: 100 INJECTION SUBCUTANEOUS at 09:41

## 2020-01-01 RX ADMIN — DOCUSATE SODIUM 50 MG AND SENNOSIDES 8.6 MG 2 TABLET: 8.6; 5 TABLET, FILM COATED ORAL at 08:37

## 2020-01-01 RX ADMIN — FLUOXETINE HYDROCHLORIDE 40 MG: 20 CAPSULE ORAL at 08:52

## 2020-01-01 RX ADMIN — CARBIDOPA AND LEVODOPA 1 TABLET: 10; 100 TABLET ORAL at 11:24

## 2020-01-01 RX ADMIN — NITROFURANTOIN MONOHYDRATE/MACROCRYSTALLINE 100 MG: 25; 75 CAPSULE ORAL at 18:06

## 2020-01-01 RX ADMIN — CARBIDOPA AND LEVODOPA 1 TABLET: 10; 100 TABLET ORAL at 23:38

## 2020-01-01 RX ADMIN — DOCUSATE SODIUM 50 MG AND SENNOSIDES 8.6 MG 2 TABLET: 8.6; 5 TABLET, FILM COATED ORAL at 09:52

## 2020-01-01 RX ADMIN — CARBIDOPA AND LEVODOPA 1 TABLET: 10; 100 TABLET ORAL at 06:59

## 2020-01-01 RX ADMIN — DOCUSATE SODIUM 50 MG AND SENNOSIDES 8.6 MG 2 TABLET: 8.6; 5 TABLET, FILM COATED ORAL at 09:16

## 2020-01-01 RX ADMIN — FLUOXETINE HYDROCHLORIDE 40 MG: 20 CAPSULE ORAL at 10:17

## 2020-01-01 RX ADMIN — DOCUSATE SODIUM 50 MG AND SENNOSIDES 8.6 MG 2 TABLET: 8.6; 5 TABLET, FILM COATED ORAL at 05:48

## 2020-01-01 RX ADMIN — CARBIDOPA AND LEVODOPA 1 TABLET: 10; 100 TABLET ORAL at 10:18

## 2020-01-01 RX ADMIN — QUETIAPINE FUMARATE 25 MG: 25 TABLET ORAL at 16:33

## 2020-01-01 RX ADMIN — CARBIDOPA AND LEVODOPA 1 TABLET: 10; 100 TABLET ORAL at 19:57

## 2020-01-01 RX ADMIN — QUETIAPINE FUMARATE 25 MG: 25 TABLET ORAL at 10:17

## 2020-01-01 RX ADMIN — CARBIDOPA AND LEVODOPA 1 TABLET: 10; 100 TABLET ORAL at 12:51

## 2020-01-01 RX ADMIN — CARBIDOPA AND LEVODOPA 1 TABLET: 10; 100 TABLET ORAL at 09:26

## 2020-01-01 RX ADMIN — HALOPERIDOL LACTATE 5 MG: 5 INJECTION, SOLUTION INTRAMUSCULAR at 10:13

## 2020-01-01 RX ADMIN — FLUOXETINE HYDROCHLORIDE 40 MG: 20 CAPSULE ORAL at 10:13

## 2020-01-01 RX ADMIN — QUETIAPINE FUMARATE 25 MG: 25 TABLET ORAL at 05:04

## 2020-01-01 RX ADMIN — ACETAMINOPHEN 1000 MG: 500 TABLET ORAL at 23:54

## 2020-01-01 RX ADMIN — QUETIAPINE FUMARATE 12.5 MG: 25 TABLET ORAL at 07:51

## 2020-01-01 RX ADMIN — FLUOXETINE HYDROCHLORIDE 40 MG: 20 CAPSULE ORAL at 07:37

## 2020-01-01 RX ADMIN — CARBIDOPA AND LEVODOPA 1 TABLET: 10; 100 TABLET ORAL at 04:52

## 2020-01-01 RX ADMIN — CARBIDOPA AND LEVODOPA 1 TABLET: 10; 100 TABLET ORAL at 04:46

## 2020-01-01 RX ADMIN — CARBIDOPA AND LEVODOPA 1 TABLET: 10; 100 TABLET ORAL at 12:07

## 2020-01-01 RX ADMIN — DOCUSATE SODIUM 50 MG AND SENNOSIDES 8.6 MG 2 TABLET: 8.6; 5 TABLET, FILM COATED ORAL at 08:25

## 2020-01-01 RX ADMIN — CARBIDOPA AND LEVODOPA 1 TABLET: 10; 100 TABLET ORAL at 19:42

## 2020-01-01 RX ADMIN — CARBIDOPA AND LEVODOPA 1 TABLET: 10; 100 TABLET ORAL at 06:31

## 2020-01-01 RX ADMIN — CARBIDOPA AND LEVODOPA 1 TABLET: 10; 100 TABLET ORAL at 11:56

## 2020-01-01 RX ADMIN — QUETIAPINE FUMARATE 25 MG: 25 TABLET ORAL at 09:15

## 2020-01-01 RX ADMIN — QUETIAPINE FUMARATE 25 MG: 25 TABLET ORAL at 17:54

## 2020-01-01 RX ADMIN — ENOXAPARIN SODIUM 40 MG: 100 INJECTION SUBCUTANEOUS at 08:19

## 2020-01-01 RX ADMIN — DOCUSATE SODIUM 50 MG AND SENNOSIDES 8.6 MG 2 TABLET: 8.6; 5 TABLET, FILM COATED ORAL at 17:22

## 2020-01-01 RX ADMIN — CARBIDOPA AND LEVODOPA 1 TABLET: 10; 100 TABLET ORAL at 10:37

## 2020-01-01 RX ADMIN — CARBIDOPA AND LEVODOPA 1 TABLET: 10; 100 TABLET ORAL at 11:45

## 2020-01-01 RX ADMIN — MIDODRINE HYDROCHLORIDE 5 MG: 5 TABLET ORAL at 11:05

## 2020-01-01 RX ADMIN — CARBIDOPA AND LEVODOPA 1 TABLET: 10; 100 TABLET ORAL at 10:12

## 2020-01-01 RX ADMIN — QUETIAPINE FUMARATE 25 MG: 25 TABLET ORAL at 21:08

## 2020-01-01 RX ADMIN — CARBIDOPA AND LEVODOPA 1 TABLET: 10; 100 TABLET ORAL at 11:26

## 2020-01-01 RX ADMIN — CARBIDOPA AND LEVODOPA 1 TABLET: 10; 100 TABLET ORAL at 06:11

## 2020-01-01 RX ADMIN — FLUOXETINE HYDROCHLORIDE 40 MG: 20 CAPSULE ORAL at 09:04

## 2020-01-01 RX ADMIN — NITROFURANTOIN MONOHYDRATE/MACROCRYSTALLINE 100 MG: 25; 75 CAPSULE ORAL at 08:40

## 2020-01-01 RX ADMIN — DOCUSATE SODIUM 50 MG AND SENNOSIDES 8.6 MG 1 TABLET: 8.6; 5 TABLET, FILM COATED ORAL at 05:51

## 2020-01-01 RX ADMIN — CARBIDOPA AND LEVODOPA 1 TABLET: 10; 100 TABLET ORAL at 13:29

## 2020-01-01 RX ADMIN — CARBIDOPA AND LEVODOPA 1 TABLET: 10; 100 TABLET ORAL at 12:36

## 2020-01-01 RX ADMIN — CARBIDOPA AND LEVODOPA 1 TABLET: 10; 100 TABLET ORAL at 22:02

## 2020-01-01 RX ADMIN — CARBIDOPA AND LEVODOPA 1 TABLET: 10; 100 TABLET ORAL at 10:06

## 2020-01-01 RX ADMIN — CARBIDOPA AND LEVODOPA 1 TABLET: 10; 100 TABLET ORAL at 05:07

## 2020-01-01 RX ADMIN — DOCUSATE SODIUM 50 MG AND SENNOSIDES 8.6 MG 2 TABLET: 8.6; 5 TABLET, FILM COATED ORAL at 05:25

## 2020-01-01 RX ADMIN — CARBIDOPA AND LEVODOPA 1 TABLET: 10; 100 TABLET ORAL at 05:13

## 2020-01-01 RX ADMIN — CARBIDOPA AND LEVODOPA 1 TABLET: 10; 100 TABLET ORAL at 10:38

## 2020-01-01 RX ADMIN — DOCUSATE SODIUM 50 MG AND SENNOSIDES 8.6 MG 2 TABLET: 8.6; 5 TABLET, FILM COATED ORAL at 22:43

## 2020-01-01 RX ADMIN — CARBIDOPA AND LEVODOPA 1 TABLET: 10; 100 TABLET ORAL at 18:39

## 2020-01-01 RX ADMIN — MIDODRINE HYDROCHLORIDE 5 MG: 5 TABLET ORAL at 12:18

## 2020-01-01 RX ADMIN — CARBIDOPA AND LEVODOPA 1 TABLET: 10; 100 TABLET ORAL at 05:12

## 2020-01-01 RX ADMIN — CARBIDOPA AND LEVODOPA 1 TABLET: 10; 100 TABLET ORAL at 20:50

## 2020-01-01 RX ADMIN — DOCUSATE SODIUM 50 MG AND SENNOSIDES 8.6 MG 2 TABLET: 8.6; 5 TABLET, FILM COATED ORAL at 10:16

## 2020-01-01 RX ADMIN — FLUOXETINE HYDROCHLORIDE 40 MG: 20 CAPSULE ORAL at 09:20

## 2020-01-01 RX ADMIN — CARBIDOPA AND LEVODOPA 1 TABLET: 10; 100 TABLET ORAL at 09:11

## 2020-01-01 RX ADMIN — CARBIDOPA AND LEVODOPA 1 TABLET: 10; 100 TABLET ORAL at 21:16

## 2020-01-01 RX ADMIN — FLUOXETINE HYDROCHLORIDE 40 MG: 20 CAPSULE ORAL at 07:29

## 2020-01-01 RX ADMIN — ENOXAPARIN SODIUM 40 MG: 100 INJECTION SUBCUTANEOUS at 05:39

## 2020-01-01 RX ADMIN — LIDOCAINE 1 PATCH: 50 PATCH TOPICAL at 08:35

## 2020-01-01 RX ADMIN — ENOXAPARIN SODIUM 40 MG: 100 INJECTION SUBCUTANEOUS at 06:15

## 2020-01-01 RX ADMIN — ACETAMINOPHEN 1000 MG: 500 TABLET ORAL at 12:14

## 2020-01-01 RX ADMIN — MIDODRINE HYDROCHLORIDE 5 MG: 5 TABLET ORAL at 17:01

## 2020-01-01 RX ADMIN — CARBIDOPA AND LEVODOPA 1 TABLET: 10; 100 TABLET ORAL at 06:10

## 2020-01-01 RX ADMIN — QUETIAPINE FUMARATE 25 MG: 25 TABLET ORAL at 21:41

## 2020-01-01 RX ADMIN — QUETIAPINE FUMARATE 12.5 MG: 25 TABLET ORAL at 08:35

## 2020-01-01 RX ADMIN — CARBIDOPA AND LEVODOPA 1 TABLET: 10; 100 TABLET ORAL at 08:50

## 2020-01-01 RX ADMIN — DOCUSATE SODIUM 50 MG AND SENNOSIDES 8.6 MG 2 TABLET: 8.6; 5 TABLET, FILM COATED ORAL at 17:47

## 2020-01-01 RX ADMIN — QUETIAPINE FUMARATE 12.5 MG: 25 TABLET ORAL at 10:29

## 2020-01-01 RX ADMIN — FLUOXETINE HYDROCHLORIDE 40 MG: 20 CAPSULE ORAL at 05:41

## 2020-01-01 RX ADMIN — QUETIAPINE FUMARATE 25 MG: 25 TABLET ORAL at 08:12

## 2020-01-01 RX ADMIN — FLUOXETINE HYDROCHLORIDE 40 MG: 20 CAPSULE ORAL at 10:11

## 2020-01-01 RX ADMIN — QUETIAPINE FUMARATE 25 MG: 25 TABLET ORAL at 18:04

## 2020-01-01 RX ADMIN — DOCUSATE SODIUM 50 MG AND SENNOSIDES 8.6 MG 2 TABLET: 8.6; 5 TABLET, FILM COATED ORAL at 20:53

## 2020-01-01 RX ADMIN — ACETAMINOPHEN 1000 MG: 500 TABLET ORAL at 01:26

## 2020-01-01 RX ADMIN — CARBIDOPA AND LEVODOPA 1 TABLET: 10; 100 TABLET ORAL at 11:34

## 2020-01-01 RX ADMIN — MIDODRINE HYDROCHLORIDE 5 MG: 5 TABLET ORAL at 08:55

## 2020-01-01 RX ADMIN — DOCUSATE SODIUM 50 MG AND SENNOSIDES 8.6 MG 2 TABLET: 8.6; 5 TABLET, FILM COATED ORAL at 19:31

## 2020-01-01 RX ADMIN — CARBIDOPA AND LEVODOPA 1 TABLET: 10; 100 TABLET ORAL at 17:41

## 2020-01-01 RX ADMIN — CARBIDOPA AND LEVODOPA 1 TABLET: 10; 100 TABLET ORAL at 22:14

## 2020-01-01 RX ADMIN — DOCUSATE SODIUM 50 MG AND SENNOSIDES 8.6 MG 2 TABLET: 8.6; 5 TABLET, FILM COATED ORAL at 08:57

## 2020-01-01 RX ADMIN — POLYETHYLENE GLYCOL 3350 1 PACKET: 17 POWDER, FOR SOLUTION ORAL at 06:22

## 2020-01-01 RX ADMIN — CARBIDOPA AND LEVODOPA 1 TABLET: 10; 100 TABLET ORAL at 12:06

## 2020-01-01 RX ADMIN — QUETIAPINE FUMARATE 12.5 MG: 25 TABLET ORAL at 09:26

## 2020-01-01 RX ADMIN — MIDODRINE HYDROCHLORIDE 5 MG: 5 TABLET ORAL at 09:52

## 2020-01-01 RX ADMIN — FLUOXETINE HYDROCHLORIDE 40 MG: 20 CAPSULE ORAL at 10:49

## 2020-01-01 RX ADMIN — ACETAMINOPHEN 1000 MG: 500 TABLET ORAL at 12:47

## 2020-01-01 RX ADMIN — CARBIDOPA AND LEVODOPA 1 TABLET: 10; 100 TABLET ORAL at 05:00

## 2020-01-01 RX ADMIN — FLUOXETINE HYDROCHLORIDE 40 MG: 20 CAPSULE ORAL at 08:01

## 2020-01-01 RX ADMIN — QUETIAPINE FUMARATE 25 MG: 25 TABLET ORAL at 20:47

## 2020-01-01 RX ADMIN — FLUOXETINE HYDROCHLORIDE 40 MG: 20 CAPSULE ORAL at 07:57

## 2020-01-01 RX ADMIN — ENOXAPARIN SODIUM 40 MG: 40 INJECTION SUBCUTANEOUS at 08:03

## 2020-01-01 RX ADMIN — DOCUSATE SODIUM 50 MG AND SENNOSIDES 8.6 MG 2 TABLET: 8.6; 5 TABLET, FILM COATED ORAL at 21:14

## 2020-01-01 RX ADMIN — HALOPERIDOL LACTATE 5 MG: 5 INJECTION, SOLUTION INTRAMUSCULAR at 20:01

## 2020-01-01 RX ADMIN — CARBIDOPA AND LEVODOPA 1 TABLET: 10; 100 TABLET ORAL at 16:32

## 2020-01-01 RX ADMIN — QUETIAPINE FUMARATE 25 MG: 25 TABLET ORAL at 09:46

## 2020-01-01 RX ADMIN — DOCUSATE SODIUM 50 MG AND SENNOSIDES 8.6 MG 2 TABLET: 8.6; 5 TABLET, FILM COATED ORAL at 08:48

## 2020-01-01 RX ADMIN — ENOXAPARIN SODIUM 40 MG: 100 INJECTION SUBCUTANEOUS at 05:14

## 2020-01-01 RX ADMIN — ENOXAPARIN SODIUM 40 MG: 100 INJECTION SUBCUTANEOUS at 08:51

## 2020-01-01 RX ADMIN — FLUOXETINE HYDROCHLORIDE 40 MG: 20 CAPSULE ORAL at 09:28

## 2020-01-01 RX ADMIN — QUETIAPINE FUMARATE 25 MG: 25 TABLET ORAL at 19:55

## 2020-01-01 RX ADMIN — CARBIDOPA AND LEVODOPA 1 TABLET: 10; 100 TABLET ORAL at 21:29

## 2020-01-01 RX ADMIN — QUETIAPINE FUMARATE 12.5 MG: 25 TABLET ORAL at 09:17

## 2020-01-01 RX ADMIN — DOCUSATE SODIUM 50 MG AND SENNOSIDES 8.6 MG 2 TABLET: 8.6; 5 TABLET, FILM COATED ORAL at 20:52

## 2020-01-01 RX ADMIN — DOCUSATE SODIUM 50 MG AND SENNOSIDES 8.6 MG 2 TABLET: 8.6; 5 TABLET, FILM COATED ORAL at 22:02

## 2020-01-01 RX ADMIN — QUETIAPINE FUMARATE 25 MG: 25 TABLET ORAL at 17:05

## 2020-01-01 RX ADMIN — CARBIDOPA AND LEVODOPA 1 TABLET: 10; 100 TABLET ORAL at 09:53

## 2020-01-01 RX ADMIN — CARBIDOPA AND LEVODOPA 1 TABLET: 10; 100 TABLET ORAL at 11:18

## 2020-01-01 RX ADMIN — ACETAMINOPHEN 1000 MG: 500 TABLET ORAL at 23:39

## 2020-01-01 RX ADMIN — ACETAMINOPHEN 1000 MG: 500 TABLET ORAL at 12:50

## 2020-01-01 RX ADMIN — CARBIDOPA AND LEVODOPA 1 TABLET: 10; 100 TABLET ORAL at 09:28

## 2020-01-01 RX ADMIN — CARBIDOPA AND LEVODOPA 1 TABLET: 10; 100 TABLET ORAL at 08:31

## 2020-01-01 RX ADMIN — QUETIAPINE FUMARATE 25 MG: 25 TABLET ORAL at 05:39

## 2020-01-01 RX ADMIN — DOCUSATE SODIUM 50 MG AND SENNOSIDES 8.6 MG 2 TABLET: 8.6; 5 TABLET, FILM COATED ORAL at 08:52

## 2020-01-01 RX ADMIN — QUETIAPINE FUMARATE 25 MG: 25 TABLET ORAL at 08:16

## 2020-01-01 RX ADMIN — CARBIDOPA AND LEVODOPA 1 TABLET: 10; 100 TABLET ORAL at 21:45

## 2020-01-01 RX ADMIN — QUETIAPINE FUMARATE 12.5 MG: 25 TABLET ORAL at 04:51

## 2020-01-01 RX ADMIN — MIDODRINE HYDROCHLORIDE 5 MG: 5 TABLET ORAL at 08:35

## 2020-01-01 RX ADMIN — CARBIDOPA AND LEVODOPA 1 TABLET: 10; 100 TABLET ORAL at 10:34

## 2020-01-01 RX ADMIN — CARBIDOPA AND LEVODOPA 1 TABLET: 10; 100 TABLET ORAL at 23:31

## 2020-01-01 RX ADMIN — FLUOXETINE HYDROCHLORIDE 40 MG: 20 CAPSULE ORAL at 08:42

## 2020-01-01 RX ADMIN — FLUOXETINE HYDROCHLORIDE 40 MG: 20 CAPSULE ORAL at 05:39

## 2020-01-01 RX ADMIN — DOCUSATE SODIUM 50 MG AND SENNOSIDES 8.6 MG 2 TABLET: 8.6; 5 TABLET, FILM COATED ORAL at 20:20

## 2020-01-01 RX ADMIN — DOCUSATE SODIUM 50 MG AND SENNOSIDES 8.6 MG 2 TABLET: 8.6; 5 TABLET, FILM COATED ORAL at 21:30

## 2020-01-01 RX ADMIN — DOCUSATE SODIUM 50 MG AND SENNOSIDES 8.6 MG 2 TABLET: 8.6; 5 TABLET, FILM COATED ORAL at 17:30

## 2020-01-01 RX ADMIN — ACETAMINOPHEN 1000 MG: 500 TABLET ORAL at 00:01

## 2020-01-01 RX ADMIN — DOCUSATE SODIUM 50 MG AND SENNOSIDES 8.6 MG 2 TABLET: 8.6; 5 TABLET, FILM COATED ORAL at 10:28

## 2020-01-01 RX ADMIN — QUETIAPINE FUMARATE 25 MG: 25 TABLET ORAL at 21:57

## 2020-01-01 RX ADMIN — HALOPERIDOL LACTATE 3 MG: 5 INJECTION, SOLUTION INTRAMUSCULAR at 00:56

## 2020-01-01 RX ADMIN — QUETIAPINE FUMARATE 25 MG: 25 TABLET ORAL at 17:37

## 2020-01-01 RX ADMIN — CARBIDOPA AND LEVODOPA 1 TABLET: 10; 100 TABLET ORAL at 12:56

## 2020-01-01 RX ADMIN — DOCUSATE SODIUM 50 MG AND SENNOSIDES 8.6 MG 2 TABLET: 8.6; 5 TABLET, FILM COATED ORAL at 09:57

## 2020-01-01 RX ADMIN — DOCUSATE SODIUM 50 MG AND SENNOSIDES 8.6 MG 2 TABLET: 8.6; 5 TABLET, FILM COATED ORAL at 21:45

## 2020-01-01 RX ADMIN — DOCUSATE SODIUM 50 MG AND SENNOSIDES 8.6 MG 2 TABLET: 8.6; 5 TABLET, FILM COATED ORAL at 07:48

## 2020-01-01 RX ADMIN — CARBIDOPA AND LEVODOPA 1 TABLET: 10; 100 TABLET ORAL at 23:51

## 2020-01-01 RX ADMIN — QUETIAPINE FUMARATE 12.5 MG: 25 TABLET ORAL at 07:32

## 2020-01-01 RX ADMIN — ENOXAPARIN SODIUM 40 MG: 40 INJECTION SUBCUTANEOUS at 09:09

## 2020-01-01 RX ADMIN — CARBIDOPA AND LEVODOPA 1 TABLET: 10; 100 TABLET ORAL at 13:04

## 2020-01-01 RX ADMIN — QUETIAPINE FUMARATE 25 MG: 25 TABLET ORAL at 09:26

## 2020-01-01 RX ADMIN — DOCUSATE SODIUM 50 MG AND SENNOSIDES 8.6 MG 2 TABLET: 8.6; 5 TABLET, FILM COATED ORAL at 21:05

## 2020-01-01 RX ADMIN — DOCUSATE SODIUM 50 MG AND SENNOSIDES 8.6 MG 2 TABLET: 8.6; 5 TABLET, FILM COATED ORAL at 09:02

## 2020-01-01 RX ADMIN — CARBIDOPA AND LEVODOPA 1 TABLET: 10; 100 TABLET ORAL at 18:07

## 2020-01-01 RX ADMIN — DOCUSATE SODIUM 50 MG AND SENNOSIDES 8.6 MG 2 TABLET: 8.6; 5 TABLET, FILM COATED ORAL at 19:41

## 2020-01-01 RX ADMIN — QUETIAPINE FUMARATE 25 MG: 25 TABLET ORAL at 12:04

## 2020-01-01 RX ADMIN — CARBIDOPA AND LEVODOPA 1 TABLET: 10; 100 TABLET ORAL at 17:03

## 2020-01-01 RX ADMIN — QUETIAPINE FUMARATE 25 MG: 25 TABLET ORAL at 19:41

## 2020-01-01 RX ADMIN — CARBIDOPA AND LEVODOPA 1 TABLET: 10; 100 TABLET ORAL at 19:50

## 2020-01-01 RX ADMIN — CARBIDOPA AND LEVODOPA 1 TABLET: 10; 100 TABLET ORAL at 17:34

## 2020-01-01 RX ADMIN — ACETAMINOPHEN 1000 MG: 500 TABLET ORAL at 00:10

## 2020-01-01 RX ADMIN — CARBIDOPA AND LEVODOPA 1 TABLET: 10; 100 TABLET ORAL at 04:21

## 2020-01-01 RX ADMIN — CARBIDOPA AND LEVODOPA 1 TABLET: 10; 100 TABLET ORAL at 22:07

## 2020-01-01 RX ADMIN — QUETIAPINE FUMARATE 25 MG: 25 TABLET ORAL at 05:26

## 2020-01-01 RX ADMIN — MIDODRINE HYDROCHLORIDE 5 MG: 5 TABLET ORAL at 11:33

## 2020-01-01 RX ADMIN — LIDOCAINE 1 PATCH: 50 PATCH TOPICAL at 08:22

## 2020-01-01 RX ADMIN — ENOXAPARIN SODIUM 40 MG: 100 INJECTION SUBCUTANEOUS at 10:38

## 2020-01-01 RX ADMIN — QUETIAPINE FUMARATE 12.5 MG: 25 TABLET ORAL at 09:13

## 2020-01-01 RX ADMIN — DOCUSATE SODIUM 50 MG AND SENNOSIDES 8.6 MG 2 TABLET: 8.6; 5 TABLET, FILM COATED ORAL at 08:41

## 2020-01-01 RX ADMIN — POTASSIUM CHLORIDE 40 MEQ: 1500 TABLET, EXTENDED RELEASE ORAL at 08:53

## 2020-01-01 RX ADMIN — CARBIDOPA AND LEVODOPA 1 TABLET: 10; 100 TABLET ORAL at 20:34

## 2020-01-01 RX ADMIN — CARBIDOPA AND LEVODOPA 1 TABLET: 10; 100 TABLET ORAL at 07:16

## 2020-01-01 RX ADMIN — CARBIDOPA AND LEVODOPA 1 TABLET: 10; 100 TABLET ORAL at 16:22

## 2020-01-01 RX ADMIN — QUETIAPINE FUMARATE 25 MG: 25 TABLET ORAL at 21:27

## 2020-01-01 RX ADMIN — CARBIDOPA AND LEVODOPA 1 TABLET: 10; 100 TABLET ORAL at 17:01

## 2020-01-01 RX ADMIN — QUETIAPINE FUMARATE 12.5 MG: 25 TABLET ORAL at 09:47

## 2020-01-01 RX ADMIN — DOCUSATE SODIUM 50 MG AND SENNOSIDES 8.6 MG 2 TABLET: 8.6; 5 TABLET, FILM COATED ORAL at 20:33

## 2020-01-01 RX ADMIN — DOCUSATE SODIUM 50 MG AND SENNOSIDES 8.6 MG 2 TABLET: 8.6; 5 TABLET, FILM COATED ORAL at 05:33

## 2020-01-01 RX ADMIN — DOCUSATE SODIUM 50 MG AND SENNOSIDES 8.6 MG 2 TABLET: 8.6; 5 TABLET, FILM COATED ORAL at 09:48

## 2020-01-01 RX ADMIN — CARBIDOPA AND LEVODOPA 1 TABLET: 10; 100 TABLET ORAL at 09:07

## 2020-01-01 RX ADMIN — CARBIDOPA AND LEVODOPA 1 TABLET: 10; 100 TABLET ORAL at 05:22

## 2020-01-01 RX ADMIN — CARBIDOPA AND LEVODOPA 1 TABLET: 10; 100 TABLET ORAL at 20:59

## 2020-01-01 RX ADMIN — CARBIDOPA AND LEVODOPA 1 TABLET: 10; 100 TABLET ORAL at 08:35

## 2020-01-01 RX ADMIN — CARBIDOPA AND LEVODOPA 1 TABLET: 10; 100 TABLET ORAL at 20:23

## 2020-01-01 RX ADMIN — CARBIDOPA AND LEVODOPA 1 TABLET: 10; 100 TABLET ORAL at 05:30

## 2020-01-01 RX ADMIN — MIDODRINE HYDROCHLORIDE 5 MG: 5 TABLET ORAL at 12:04

## 2020-01-01 RX ADMIN — CARBIDOPA AND LEVODOPA 1 TABLET: 10; 100 TABLET ORAL at 15:10

## 2020-01-01 RX ADMIN — ENOXAPARIN SODIUM 40 MG: 40 INJECTION SUBCUTANEOUS at 10:02

## 2020-01-01 RX ADMIN — CARBIDOPA AND LEVODOPA 1 TABLET: 10; 100 TABLET ORAL at 21:59

## 2020-01-01 RX ADMIN — CARBIDOPA AND LEVODOPA 1 TABLET: 10; 100 TABLET ORAL at 09:03

## 2020-01-01 RX ADMIN — ENOXAPARIN SODIUM 40 MG: 100 INJECTION SUBCUTANEOUS at 07:48

## 2020-01-01 RX ADMIN — QUETIAPINE FUMARATE 25 MG: 25 TABLET ORAL at 16:53

## 2020-01-01 RX ADMIN — QUETIAPINE FUMARATE 25 MG: 25 TABLET ORAL at 11:18

## 2020-01-01 RX ADMIN — QUETIAPINE FUMARATE 12.5 MG: 25 TABLET ORAL at 08:44

## 2020-01-01 RX ADMIN — CARBIDOPA AND LEVODOPA 1 TABLET: 10; 100 TABLET ORAL at 06:13

## 2020-01-01 RX ADMIN — QUETIAPINE FUMARATE 25 MG: 25 TABLET ORAL at 19:45

## 2020-01-01 RX ADMIN — DOCUSATE SODIUM 50 MG AND SENNOSIDES 8.6 MG 2 TABLET: 8.6; 5 TABLET, FILM COATED ORAL at 07:37

## 2020-01-01 RX ADMIN — DOCUSATE SODIUM 50 MG AND SENNOSIDES 8.6 MG 2 TABLET: 8.6; 5 TABLET, FILM COATED ORAL at 04:50

## 2020-01-01 RX ADMIN — DOCUSATE SODIUM 50 MG AND SENNOSIDES 8.6 MG 2 TABLET: 8.6; 5 TABLET, FILM COATED ORAL at 08:07

## 2020-01-01 RX ADMIN — CARBIDOPA AND LEVODOPA 1 TABLET: 10; 100 TABLET ORAL at 10:00

## 2020-01-01 RX ADMIN — DOCUSATE SODIUM 50 MG AND SENNOSIDES 8.6 MG 2 TABLET: 8.6; 5 TABLET, FILM COATED ORAL at 10:27

## 2020-01-01 RX ADMIN — QUETIAPINE FUMARATE 12.5 MG: 25 TABLET ORAL at 10:50

## 2020-01-01 RX ADMIN — QUETIAPINE FUMARATE 25 MG: 25 TABLET ORAL at 19:49

## 2020-01-01 RX ADMIN — FLUOXETINE HYDROCHLORIDE 40 MG: 20 CAPSULE ORAL at 08:31

## 2020-01-01 RX ADMIN — QUETIAPINE FUMARATE 25 MG: 25 TABLET ORAL at 10:37

## 2020-01-01 RX ADMIN — HALOPERIDOL LACTATE 5 MG: 5 INJECTION, SOLUTION INTRAMUSCULAR at 16:18

## 2020-01-01 RX ADMIN — CARBIDOPA AND LEVODOPA 1 TABLET: 10; 100 TABLET ORAL at 06:06

## 2020-01-01 RX ADMIN — MIDODRINE HYDROCHLORIDE 5 MG: 5 TABLET ORAL at 08:03

## 2020-01-01 RX ADMIN — MIDODRINE HYDROCHLORIDE 5 MG: 5 TABLET ORAL at 08:32

## 2020-01-01 RX ADMIN — CARBIDOPA AND LEVODOPA 1 TABLET: 10; 100 TABLET ORAL at 12:37

## 2020-01-01 RX ADMIN — CARBIDOPA AND LEVODOPA 1 TABLET: 10; 100 TABLET ORAL at 11:54

## 2020-01-01 RX ADMIN — DOCUSATE SODIUM 50 MG AND SENNOSIDES 8.6 MG 2 TABLET: 8.6; 5 TABLET, FILM COATED ORAL at 09:54

## 2020-01-01 RX ADMIN — CARBIDOPA AND LEVODOPA 1 TABLET: 10; 100 TABLET ORAL at 10:19

## 2020-01-01 RX ADMIN — CARBIDOPA AND LEVODOPA 1 TABLET: 10; 100 TABLET ORAL at 17:50

## 2020-01-01 RX ADMIN — CARBIDOPA AND LEVODOPA 1 TABLET: 10; 100 TABLET ORAL at 21:12

## 2020-01-01 RX ADMIN — DOCUSATE SODIUM 50 MG AND SENNOSIDES 8.6 MG 2 TABLET: 8.6; 5 TABLET, FILM COATED ORAL at 18:32

## 2020-01-01 RX ADMIN — QUETIAPINE FUMARATE 25 MG: 25 TABLET ORAL at 07:29

## 2020-01-01 RX ADMIN — QUETIAPINE FUMARATE 12.5 MG: 25 TABLET ORAL at 10:01

## 2020-01-01 RX ADMIN — CARBIDOPA AND LEVODOPA 1 TABLET: 10; 100 TABLET ORAL at 09:56

## 2020-01-01 RX ADMIN — HALOPERIDOL LACTATE 5 MG: 5 INJECTION, SOLUTION INTRAMUSCULAR at 13:08

## 2020-01-01 RX ADMIN — CARBIDOPA AND LEVODOPA 1 TABLET: 10; 100 TABLET ORAL at 11:03

## 2020-01-01 RX ADMIN — FLUOXETINE HYDROCHLORIDE 40 MG: 20 CAPSULE ORAL at 09:01

## 2020-01-01 RX ADMIN — QUETIAPINE FUMARATE 25 MG: 25 TABLET ORAL at 21:02

## 2020-01-01 RX ADMIN — FLUOXETINE HYDROCHLORIDE 40 MG: 20 CAPSULE ORAL at 09:15

## 2020-01-01 RX ADMIN — ACETAMINOPHEN 1000 MG: 500 TABLET ORAL at 11:17

## 2020-01-01 RX ADMIN — ACETAMINOPHEN 1000 MG: 500 TABLET ORAL at 23:37

## 2020-01-01 RX ADMIN — ACETAMINOPHEN 1000 MG: 500 TABLET ORAL at 23:49

## 2020-01-01 RX ADMIN — FLUOXETINE HYDROCHLORIDE 40 MG: 20 CAPSULE ORAL at 11:14

## 2020-01-01 RX ADMIN — FLUOXETINE HYDROCHLORIDE 40 MG: 20 CAPSULE ORAL at 04:57

## 2020-01-01 RX ADMIN — DOCUSATE SODIUM 50 MG AND SENNOSIDES 8.6 MG 2 TABLET: 8.6; 5 TABLET, FILM COATED ORAL at 22:49

## 2020-01-01 RX ADMIN — DOCUSATE SODIUM 50 MG AND SENNOSIDES 8.6 MG 2 TABLET: 8.6; 5 TABLET, FILM COATED ORAL at 20:43

## 2020-01-01 RX ADMIN — CARBIDOPA AND LEVODOPA 1 TABLET: 10; 100 TABLET ORAL at 05:38

## 2020-01-01 RX ADMIN — DOCUSATE SODIUM 50 MG AND SENNOSIDES 8.6 MG 2 TABLET: 8.6; 5 TABLET, FILM COATED ORAL at 21:41

## 2020-01-01 RX ADMIN — QUETIAPINE FUMARATE 25 MG: 25 TABLET ORAL at 09:43

## 2020-01-01 RX ADMIN — MIDODRINE HYDROCHLORIDE 5 MG: 5 TABLET ORAL at 12:06

## 2020-01-01 RX ADMIN — DOCUSATE SODIUM 50 MG AND SENNOSIDES 8.6 MG 2 TABLET: 8.6; 5 TABLET, FILM COATED ORAL at 10:48

## 2020-01-01 RX ADMIN — QUETIAPINE FUMARATE 25 MG: 25 TABLET ORAL at 16:11

## 2020-01-01 RX ADMIN — QUETIAPINE FUMARATE 25 MG: 25 TABLET ORAL at 16:17

## 2020-01-01 RX ADMIN — QUETIAPINE FUMARATE 25 MG: 25 TABLET ORAL at 06:18

## 2020-01-01 RX ADMIN — FLUOXETINE HYDROCHLORIDE 40 MG: 20 CAPSULE ORAL at 05:08

## 2020-01-01 RX ADMIN — CARBIDOPA AND LEVODOPA 1 TABLET: 10; 100 TABLET ORAL at 08:07

## 2020-01-01 RX ADMIN — DOCUSATE SODIUM 50 MG AND SENNOSIDES 8.6 MG 2 TABLET: 8.6; 5 TABLET, FILM COATED ORAL at 19:29

## 2020-01-01 RX ADMIN — QUETIAPINE FUMARATE 12.5 MG: 25 TABLET ORAL at 08:56

## 2020-01-01 RX ADMIN — MIDODRINE HYDROCHLORIDE 5 MG: 5 TABLET ORAL at 11:18

## 2020-01-01 RX ADMIN — FLUOXETINE HYDROCHLORIDE 40 MG: 20 CAPSULE ORAL at 07:32

## 2020-01-01 RX ADMIN — CARBIDOPA AND LEVODOPA 1 TABLET: 10; 100 TABLET ORAL at 17:35

## 2020-01-01 RX ADMIN — QUETIAPINE FUMARATE 25 MG: 25 TABLET ORAL at 09:55

## 2020-01-01 RX ADMIN — QUETIAPINE FUMARATE 25 MG: 25 TABLET ORAL at 20:00

## 2020-01-01 RX ADMIN — CARBIDOPA AND LEVODOPA 1 TABLET: 10; 100 TABLET ORAL at 05:35

## 2020-01-01 RX ADMIN — FLUOXETINE HYDROCHLORIDE 40 MG: 20 CAPSULE ORAL at 09:55

## 2020-01-01 RX ADMIN — FLUOXETINE HYDROCHLORIDE 40 MG: 20 CAPSULE ORAL at 10:27

## 2020-01-01 RX ADMIN — MIDODRINE HYDROCHLORIDE 5 MG: 5 TABLET ORAL at 17:26

## 2020-01-01 RX ADMIN — ENOXAPARIN SODIUM 40 MG: 100 INJECTION SUBCUTANEOUS at 06:14

## 2020-01-01 RX ADMIN — FLUOXETINE HYDROCHLORIDE 40 MG: 20 CAPSULE ORAL at 10:12

## 2020-01-01 RX ADMIN — DOCUSATE SODIUM 50 MG AND SENNOSIDES 8.6 MG 2 TABLET: 8.6; 5 TABLET, FILM COATED ORAL at 05:37

## 2020-01-01 RX ADMIN — FLUOXETINE HYDROCHLORIDE 40 MG: 20 CAPSULE ORAL at 10:51

## 2020-01-01 RX ADMIN — FLUOXETINE HYDROCHLORIDE 40 MG: 20 CAPSULE ORAL at 07:51

## 2020-01-01 RX ADMIN — CARBIDOPA AND LEVODOPA 1 TABLET: 10; 100 TABLET ORAL at 17:56

## 2020-01-01 RX ADMIN — ENOXAPARIN SODIUM 40 MG: 100 INJECTION SUBCUTANEOUS at 09:45

## 2020-01-01 RX ADMIN — DOCUSATE SODIUM 50 MG AND SENNOSIDES 8.6 MG 2 TABLET: 8.6; 5 TABLET, FILM COATED ORAL at 20:26

## 2020-01-01 RX ADMIN — DOCUSATE SODIUM 50 MG AND SENNOSIDES 8.6 MG 2 TABLET: 8.6; 5 TABLET, FILM COATED ORAL at 09:43

## 2020-01-01 RX ADMIN — DOCUSATE SODIUM 50 MG AND SENNOSIDES 8.6 MG 2 TABLET: 8.6; 5 TABLET, FILM COATED ORAL at 19:32

## 2020-01-01 RX ADMIN — CARBIDOPA AND LEVODOPA 1 TABLET: 10; 100 TABLET ORAL at 01:48

## 2020-01-01 RX ADMIN — ACETAMINOPHEN 1000 MG: 500 TABLET ORAL at 11:30

## 2020-01-01 RX ADMIN — HALOPERIDOL LACTATE 5 MG: 5 INJECTION, SOLUTION INTRAMUSCULAR at 13:03

## 2020-01-01 RX ADMIN — CARBIDOPA AND LEVODOPA 1 TABLET: 10; 100 TABLET ORAL at 09:44

## 2020-01-01 RX ADMIN — DOCUSATE SODIUM 50 MG AND SENNOSIDES 8.6 MG 2 TABLET: 8.6; 5 TABLET, FILM COATED ORAL at 09:40

## 2020-01-01 RX ADMIN — HALOPERIDOL 1 MG: 1 TABLET ORAL at 15:41

## 2020-01-01 RX ADMIN — CARBIDOPA AND LEVODOPA 1 TABLET: 10; 100 TABLET ORAL at 06:04

## 2020-01-01 RX ADMIN — Medication 400 MG: at 05:10

## 2020-01-01 RX ADMIN — QUETIAPINE FUMARATE 25 MG: 25 TABLET ORAL at 17:23

## 2020-01-01 RX ADMIN — ENOXAPARIN SODIUM 40 MG: 40 INJECTION SUBCUTANEOUS at 08:36

## 2020-01-01 RX ADMIN — CARBIDOPA AND LEVODOPA 1 TABLET: 10; 100 TABLET ORAL at 00:02

## 2020-01-01 RX ADMIN — HALOPERIDOL LACTATE 1 MG: 5 INJECTION, SOLUTION INTRAMUSCULAR at 11:28

## 2020-01-01 RX ADMIN — MIDODRINE HYDROCHLORIDE 5 MG: 5 TABLET ORAL at 16:47

## 2020-01-01 RX ADMIN — CARBIDOPA AND LEVODOPA 1 TABLET: 10; 100 TABLET ORAL at 17:55

## 2020-01-01 RX ADMIN — CARBIDOPA AND LEVODOPA 1 TABLET: 10; 100 TABLET ORAL at 09:18

## 2020-01-01 RX ADMIN — QUETIAPINE FUMARATE 12.5 MG: 25 TABLET ORAL at 08:32

## 2020-01-01 RX ADMIN — CARBIDOPA AND LEVODOPA 1 TABLET: 10; 100 TABLET ORAL at 17:51

## 2020-01-01 RX ADMIN — MIDODRINE HYDROCHLORIDE 5 MG: 5 TABLET ORAL at 11:11

## 2020-01-01 RX ADMIN — QUETIAPINE FUMARATE 12.5 MG: 25 TABLET ORAL at 07:45

## 2020-01-01 RX ADMIN — QUETIAPINE FUMARATE 25 MG: 25 TABLET ORAL at 16:35

## 2020-01-01 RX ADMIN — QUETIAPINE FUMARATE 25 MG: 25 TABLET ORAL at 17:31

## 2020-01-01 RX ADMIN — CARBIDOPA AND LEVODOPA 1 TABLET: 10; 100 TABLET ORAL at 16:06

## 2020-01-01 RX ADMIN — DOCUSATE SODIUM 50 MG AND SENNOSIDES 8.6 MG 2 TABLET: 8.6; 5 TABLET, FILM COATED ORAL at 05:53

## 2020-01-01 RX ADMIN — MIDODRINE HYDROCHLORIDE 5 MG: 5 TABLET ORAL at 17:05

## 2020-01-01 RX ADMIN — CARBIDOPA AND LEVODOPA 1 TABLET: 10; 100 TABLET ORAL at 15:09

## 2020-01-01 RX ADMIN — FLUOXETINE HYDROCHLORIDE 40 MG: 20 CAPSULE ORAL at 04:55

## 2020-01-01 RX ADMIN — ENOXAPARIN SODIUM 40 MG: 100 INJECTION SUBCUTANEOUS at 05:33

## 2020-01-01 RX ADMIN — QUETIAPINE FUMARATE 12.5 MG: 25 TABLET ORAL at 09:19

## 2020-01-01 RX ADMIN — HALOPERIDOL LACTATE 5 MG: 5 INJECTION, SOLUTION INTRAMUSCULAR at 14:19

## 2020-01-01 RX ADMIN — FLUOXETINE HYDROCHLORIDE 40 MG: 20 CAPSULE ORAL at 06:13

## 2020-01-01 RX ADMIN — CARBIDOPA AND LEVODOPA 1 TABLET: 10; 100 TABLET ORAL at 16:57

## 2020-01-01 RX ADMIN — FLUOXETINE HYDROCHLORIDE 40 MG: 20 CAPSULE ORAL at 06:15

## 2020-01-01 RX ADMIN — QUETIAPINE FUMARATE 25 MG: 25 TABLET ORAL at 09:25

## 2020-01-01 RX ADMIN — QUETIAPINE FUMARATE 25 MG: 25 TABLET ORAL at 20:06

## 2020-01-01 RX ADMIN — DOCUSATE SODIUM 50 MG AND SENNOSIDES 8.6 MG 2 TABLET: 8.6; 5 TABLET, FILM COATED ORAL at 17:13

## 2020-01-01 RX ADMIN — CARBIDOPA AND LEVODOPA 1 TABLET: 10; 100 TABLET ORAL at 11:32

## 2020-01-01 RX ADMIN — DOCUSATE SODIUM 50 MG AND SENNOSIDES 8.6 MG 2 TABLET: 8.6; 5 TABLET, FILM COATED ORAL at 08:43

## 2020-01-01 RX ADMIN — CARBIDOPA AND LEVODOPA 1 TABLET: 10; 100 TABLET ORAL at 17:37

## 2020-01-01 RX ADMIN — CARBIDOPA AND LEVODOPA 1 TABLET: 10; 100 TABLET ORAL at 06:28

## 2020-01-01 RX ADMIN — MIDODRINE HYDROCHLORIDE 5 MG: 5 TABLET ORAL at 09:38

## 2020-01-01 RX ADMIN — QUETIAPINE FUMARATE 12.5 MG: 25 TABLET ORAL at 08:41

## 2020-01-01 RX ADMIN — CEFTRIAXONE SODIUM 2 G: 2 INJECTION, POWDER, FOR SOLUTION INTRAMUSCULAR; INTRAVENOUS at 19:45

## 2020-01-01 RX ADMIN — FLUOXETINE HYDROCHLORIDE 40 MG: 20 CAPSULE ORAL at 08:53

## 2020-01-01 RX ADMIN — CARBIDOPA AND LEVODOPA 1 TABLET: 10; 100 TABLET ORAL at 12:50

## 2020-01-01 RX ADMIN — DOCUSATE SODIUM 50 MG AND SENNOSIDES 8.6 MG 2 TABLET: 8.6; 5 TABLET, FILM COATED ORAL at 21:13

## 2020-01-01 RX ADMIN — CARBIDOPA AND LEVODOPA 1 TABLET: 10; 100 TABLET ORAL at 15:28

## 2020-01-01 RX ADMIN — DOCUSATE SODIUM 50 MG AND SENNOSIDES 8.6 MG 2 TABLET: 8.6; 5 TABLET, FILM COATED ORAL at 10:49

## 2020-01-01 RX ADMIN — CARBIDOPA AND LEVODOPA 1 TABLET: 10; 100 TABLET ORAL at 01:13

## 2020-01-01 RX ADMIN — NITROFURANTOIN MONOHYDRATE/MACROCRYSTALLINE 100 MG: 25; 75 CAPSULE ORAL at 17:07

## 2020-01-01 RX ADMIN — QUETIAPINE FUMARATE 12.5 MG: 25 TABLET ORAL at 08:05

## 2020-01-01 RX ADMIN — CARBIDOPA AND LEVODOPA 1 TABLET: 10; 100 TABLET ORAL at 21:51

## 2020-01-01 RX ADMIN — CARBIDOPA AND LEVODOPA 1 TABLET: 10; 100 TABLET ORAL at 00:18

## 2020-01-01 RX ADMIN — QUETIAPINE FUMARATE 25 MG: 25 TABLET ORAL at 11:08

## 2020-01-01 RX ADMIN — FLUOXETINE HYDROCHLORIDE 40 MG: 20 CAPSULE ORAL at 08:57

## 2020-01-01 RX ADMIN — CARBIDOPA AND LEVODOPA 1 TABLET: 10; 100 TABLET ORAL at 10:29

## 2020-01-01 RX ADMIN — CARBIDOPA AND LEVODOPA 1 TABLET: 10; 100 TABLET ORAL at 06:00

## 2020-01-01 RX ADMIN — Medication 400 MG: at 04:57

## 2020-01-01 RX ADMIN — CARBIDOPA AND LEVODOPA 1 TABLET: 10; 100 TABLET ORAL at 05:57

## 2020-01-01 RX ADMIN — CARBIDOPA AND LEVODOPA 1 TABLET: 10; 100 TABLET ORAL at 22:10

## 2020-01-01 RX ADMIN — CARBIDOPA AND LEVODOPA 1 TABLET: 10; 100 TABLET ORAL at 09:48

## 2020-01-01 RX ADMIN — CARBIDOPA AND LEVODOPA 1 TABLET: 10; 100 TABLET ORAL at 15:12

## 2020-01-01 RX ADMIN — CARBIDOPA AND LEVODOPA 1 TABLET: 10; 100 TABLET ORAL at 01:05

## 2020-01-01 RX ADMIN — FLUOXETINE HYDROCHLORIDE 40 MG: 20 CAPSULE ORAL at 08:04

## 2020-01-01 RX ADMIN — CARBIDOPA AND LEVODOPA 1 TABLET: 10; 100 TABLET ORAL at 08:51

## 2020-01-01 RX ADMIN — CARBIDOPA AND LEVODOPA 1 TABLET: 10; 100 TABLET ORAL at 18:36

## 2020-01-01 RX ADMIN — ENOXAPARIN SODIUM 40 MG: 40 INJECTION SUBCUTANEOUS at 09:12

## 2020-01-01 RX ADMIN — QUETIAPINE FUMARATE 25 MG: 25 TABLET ORAL at 22:28

## 2020-01-01 RX ADMIN — DOCUSATE SODIUM 50 MG AND SENNOSIDES 8.6 MG 2 TABLET: 8.6; 5 TABLET, FILM COATED ORAL at 20:22

## 2020-01-01 RX ADMIN — DOCUSATE SODIUM 50 MG AND SENNOSIDES 8.6 MG 2 TABLET: 8.6; 5 TABLET, FILM COATED ORAL at 10:12

## 2020-01-01 RX ADMIN — MIDODRINE HYDROCHLORIDE 5 MG: 5 TABLET ORAL at 12:56

## 2020-01-01 RX ADMIN — CARBIDOPA AND LEVODOPA 1 TABLET: 10; 100 TABLET ORAL at 04:34

## 2020-01-01 RX ADMIN — MIDODRINE HYDROCHLORIDE 5 MG: 5 TABLET ORAL at 17:51

## 2020-01-01 RX ADMIN — QUETIAPINE FUMARATE 25 MG: 25 TABLET ORAL at 22:12

## 2020-01-01 RX ADMIN — FLUOXETINE HYDROCHLORIDE 40 MG: 20 CAPSULE ORAL at 08:44

## 2020-01-01 RX ADMIN — QUETIAPINE FUMARATE 12.5 MG: 25 TABLET ORAL at 11:14

## 2020-01-01 RX ADMIN — CARBIDOPA AND LEVODOPA 1 TABLET: 10; 100 TABLET ORAL at 20:07

## 2020-01-01 RX ADMIN — CARBIDOPA AND LEVODOPA 1 TABLET: 10; 100 TABLET ORAL at 07:51

## 2020-01-01 RX ADMIN — CARBIDOPA AND LEVODOPA 1 TABLET: 10; 100 TABLET ORAL at 15:23

## 2020-01-01 RX ADMIN — CARBIDOPA AND LEVODOPA 1 TABLET: 10; 100 TABLET ORAL at 12:24

## 2020-01-01 RX ADMIN — CARBIDOPA AND LEVODOPA 1 TABLET: 10; 100 TABLET ORAL at 13:17

## 2020-01-01 RX ADMIN — CARBIDOPA AND LEVODOPA 1 TABLET: 10; 100 TABLET ORAL at 17:40

## 2020-01-01 RX ADMIN — CARBIDOPA AND LEVODOPA 1 TABLET: 10; 100 TABLET ORAL at 10:36

## 2020-01-01 RX ADMIN — HALOPERIDOL LACTATE 5 MG: 5 INJECTION, SOLUTION INTRAMUSCULAR at 14:08

## 2020-01-01 RX ADMIN — CARBIDOPA AND LEVODOPA 1 TABLET: 10; 100 TABLET ORAL at 09:23

## 2020-01-01 RX ADMIN — CARBIDOPA AND LEVODOPA 1 TABLET: 10; 100 TABLET ORAL at 21:48

## 2020-01-01 RX ADMIN — CARBIDOPA AND LEVODOPA 1 TABLET: 10; 100 TABLET ORAL at 01:20

## 2020-01-01 RX ADMIN — DOCUSATE SODIUM 50 MG AND SENNOSIDES 8.6 MG 2 TABLET: 8.6; 5 TABLET, FILM COATED ORAL at 09:01

## 2020-01-01 RX ADMIN — ACETAMINOPHEN 1000 MG: 500 TABLET ORAL at 08:52

## 2020-01-01 RX ADMIN — ACETAMINOPHEN 1000 MG: 500 TABLET ORAL at 12:33

## 2020-01-01 RX ADMIN — QUETIAPINE FUMARATE 25 MG: 25 TABLET ORAL at 08:54

## 2020-01-01 RX ADMIN — ENOXAPARIN SODIUM 40 MG: 40 INJECTION SUBCUTANEOUS at 09:01

## 2020-01-01 RX ADMIN — CARBIDOPA AND LEVODOPA 1 TABLET: 10; 100 TABLET ORAL at 16:27

## 2020-01-01 RX ADMIN — ACETAMINOPHEN 1000 MG: 500 TABLET ORAL at 23:55

## 2020-01-01 RX ADMIN — DOCUSATE SODIUM 50 MG AND SENNOSIDES 8.6 MG 2 TABLET: 8.6; 5 TABLET, FILM COATED ORAL at 17:57

## 2020-01-01 RX ADMIN — CARBIDOPA AND LEVODOPA 1 TABLET: 10; 100 TABLET ORAL at 17:45

## 2020-01-01 RX ADMIN — QUETIAPINE FUMARATE 25 MG: 25 TABLET ORAL at 20:16

## 2020-01-01 RX ADMIN — CARBIDOPA AND LEVODOPA 1 TABLET: 10; 100 TABLET ORAL at 14:16

## 2020-01-01 RX ADMIN — CARBIDOPA AND LEVODOPA 1 TABLET: 10; 100 TABLET ORAL at 21:30

## 2020-01-01 RX ADMIN — DOCUSATE SODIUM 50 MG AND SENNOSIDES 8.6 MG 2 TABLET: 8.6; 5 TABLET, FILM COATED ORAL at 05:34

## 2020-01-01 RX ADMIN — DOCUSATE SODIUM 50 MG AND SENNOSIDES 8.6 MG 2 TABLET: 8.6; 5 TABLET, FILM COATED ORAL at 20:59

## 2020-01-01 RX ADMIN — CARBIDOPA AND LEVODOPA 1 TABLET: 10; 100 TABLET ORAL at 11:39

## 2020-01-01 RX ADMIN — MIDODRINE HYDROCHLORIDE 5 MG: 5 TABLET ORAL at 17:16

## 2020-01-01 RX ADMIN — CARBIDOPA AND LEVODOPA 1 TABLET: 10; 100 TABLET ORAL at 11:05

## 2020-01-01 RX ADMIN — QUETIAPINE FUMARATE 25 MG: 25 TABLET ORAL at 17:39

## 2020-01-01 RX ADMIN — FLUOXETINE HYDROCHLORIDE 40 MG: 20 CAPSULE ORAL at 08:05

## 2020-01-01 RX ADMIN — MIDODRINE HYDROCHLORIDE 5 MG: 5 TABLET ORAL at 07:30

## 2020-01-01 RX ADMIN — ACETAMINOPHEN 1000 MG: 500 TABLET ORAL at 12:00

## 2020-01-01 RX ADMIN — CARBIDOPA AND LEVODOPA 1 TABLET: 10; 100 TABLET ORAL at 13:39

## 2020-01-01 RX ADMIN — QUETIAPINE FUMARATE 12.5 MG: 25 TABLET ORAL at 08:20

## 2020-01-01 RX ADMIN — MIDODRINE HYDROCHLORIDE 5 MG: 5 TABLET ORAL at 12:30

## 2020-01-01 RX ADMIN — ENOXAPARIN SODIUM 40 MG: 100 INJECTION SUBCUTANEOUS at 05:24

## 2020-01-01 RX ADMIN — DOCUSATE SODIUM 50 MG AND SENNOSIDES 8.6 MG 2 TABLET: 8.6; 5 TABLET, FILM COATED ORAL at 09:24

## 2020-01-01 RX ADMIN — QUETIAPINE FUMARATE 12.5 MG: 25 TABLET ORAL at 06:19

## 2020-01-01 RX ADMIN — CARBIDOPA AND LEVODOPA 1 TABLET: 10; 100 TABLET ORAL at 05:44

## 2020-01-01 RX ADMIN — MIDODRINE HYDROCHLORIDE 5 MG: 5 TABLET ORAL at 17:10

## 2020-01-01 RX ADMIN — QUETIAPINE FUMARATE 12.5 MG: 25 TABLET ORAL at 09:18

## 2020-01-01 RX ADMIN — ENOXAPARIN SODIUM 40 MG: 100 INJECTION SUBCUTANEOUS at 04:50

## 2020-01-01 RX ADMIN — ENOXAPARIN SODIUM 40 MG: 40 INJECTION SUBCUTANEOUS at 08:20

## 2020-01-01 RX ADMIN — DOCUSATE SODIUM 50 MG AND SENNOSIDES 8.6 MG 2 TABLET: 8.6; 5 TABLET, FILM COATED ORAL at 21:17

## 2020-01-01 RX ADMIN — ACETAMINOPHEN 1000 MG: 500 TABLET ORAL at 13:02

## 2020-01-01 RX ADMIN — FLUOXETINE HYDROCHLORIDE 40 MG: 20 CAPSULE ORAL at 09:27

## 2020-01-01 RX ADMIN — QUETIAPINE FUMARATE 25 MG: 25 TABLET ORAL at 22:14

## 2020-01-01 RX ADMIN — QUETIAPINE FUMARATE 12.5 MG: 25 TABLET ORAL at 10:15

## 2020-01-01 RX ADMIN — DOCUSATE SODIUM 50 MG AND SENNOSIDES 8.6 MG 2 TABLET: 8.6; 5 TABLET, FILM COATED ORAL at 21:09

## 2020-01-01 RX ADMIN — QUETIAPINE FUMARATE 25 MG: 25 TABLET ORAL at 17:04

## 2020-01-01 RX ADMIN — CARBIDOPA AND LEVODOPA 1 TABLET: 10; 100 TABLET ORAL at 22:40

## 2020-01-01 RX ADMIN — MIDODRINE HYDROCHLORIDE 5 MG: 5 TABLET ORAL at 05:16

## 2020-01-01 RX ADMIN — QUETIAPINE FUMARATE 25 MG: 25 TABLET ORAL at 20:45

## 2020-01-01 RX ADMIN — CARBIDOPA AND LEVODOPA 1 TABLET: 10; 100 TABLET ORAL at 11:11

## 2020-01-01 RX ADMIN — CARBIDOPA AND LEVODOPA 1 TABLET: 10; 100 TABLET ORAL at 11:58

## 2020-01-01 RX ADMIN — DOCUSATE SODIUM 50 MG AND SENNOSIDES 8.6 MG 2 TABLET: 8.6; 5 TABLET, FILM COATED ORAL at 08:20

## 2020-01-01 RX ADMIN — QUETIAPINE FUMARATE 25 MG: 25 TABLET ORAL at 10:18

## 2020-01-01 RX ADMIN — ACETAMINOPHEN 1000 MG: 500 TABLET ORAL at 23:14

## 2020-01-01 RX ADMIN — DOCUSATE SODIUM 50 MG AND SENNOSIDES 8.6 MG 2 TABLET: 8.6; 5 TABLET, FILM COATED ORAL at 05:07

## 2020-01-01 RX ADMIN — CARBIDOPA AND LEVODOPA 1 TABLET: 10; 100 TABLET ORAL at 12:20

## 2020-01-01 RX ADMIN — FLUOXETINE HYDROCHLORIDE 40 MG: 20 CAPSULE ORAL at 10:02

## 2020-01-01 RX ADMIN — CARBIDOPA AND LEVODOPA 1 TABLET: 10; 100 TABLET ORAL at 10:52

## 2020-01-01 RX ADMIN — DOCUSATE SODIUM 50 MG AND SENNOSIDES 8.6 MG 2 TABLET: 8.6; 5 TABLET, FILM COATED ORAL at 09:39

## 2020-01-01 RX ADMIN — DOCUSATE SODIUM 50 MG AND SENNOSIDES 8.6 MG 2 TABLET: 8.6; 5 TABLET, FILM COATED ORAL at 10:13

## 2020-01-01 RX ADMIN — CARBIDOPA AND LEVODOPA 1 TABLET: 10; 100 TABLET ORAL at 13:14

## 2020-01-01 RX ADMIN — CARBIDOPA AND LEVODOPA 1 TABLET: 10; 100 TABLET ORAL at 09:21

## 2020-01-01 RX ADMIN — CARBIDOPA AND LEVODOPA 1 TABLET: 10; 100 TABLET ORAL at 21:02

## 2020-01-01 RX ADMIN — FLUOXETINE HYDROCHLORIDE 40 MG: 20 CAPSULE ORAL at 10:08

## 2020-01-01 RX ADMIN — FLUOXETINE HYDROCHLORIDE 40 MG: 20 CAPSULE ORAL at 05:24

## 2020-01-01 RX ADMIN — DOCUSATE SODIUM 50 MG AND SENNOSIDES 8.6 MG 2 TABLET: 8.6; 5 TABLET, FILM COATED ORAL at 21:03

## 2020-01-01 RX ADMIN — HALOPERIDOL LACTATE 5 MG: 5 INJECTION, SOLUTION INTRAMUSCULAR at 23:54

## 2020-01-01 RX ADMIN — ACETAMINOPHEN 1000 MG: 500 TABLET ORAL at 13:19

## 2020-01-01 RX ADMIN — CARBIDOPA AND LEVODOPA 1 TABLET: 10; 100 TABLET ORAL at 05:39

## 2020-01-01 RX ADMIN — QUETIAPINE FUMARATE 12.5 MG: 25 TABLET ORAL at 08:53

## 2020-01-01 RX ADMIN — QUETIAPINE FUMARATE 25 MG: 25 TABLET ORAL at 09:11

## 2020-01-01 RX ADMIN — DOCUSATE SODIUM 50 MG AND SENNOSIDES 8.6 MG 2 TABLET: 8.6; 5 TABLET, FILM COATED ORAL at 09:44

## 2020-01-01 RX ADMIN — FLUOXETINE HYDROCHLORIDE 40 MG: 20 CAPSULE ORAL at 05:58

## 2020-01-01 RX ADMIN — QUETIAPINE FUMARATE 12.5 MG: 25 TABLET ORAL at 08:48

## 2020-01-01 RX ADMIN — ENOXAPARIN SODIUM 40 MG: 40 INJECTION SUBCUTANEOUS at 08:32

## 2020-01-01 RX ADMIN — MIDODRINE HYDROCHLORIDE 5 MG: 5 TABLET ORAL at 17:20

## 2020-01-01 RX ADMIN — ACETAMINOPHEN 1000 MG: 500 TABLET ORAL at 23:12

## 2020-01-01 RX ADMIN — QUETIAPINE FUMARATE 12.5 MG: 25 TABLET ORAL at 09:15

## 2020-01-01 RX ADMIN — QUETIAPINE FUMARATE 25 MG: 25 TABLET ORAL at 20:02

## 2020-01-01 RX ADMIN — DOCUSATE SODIUM 50 MG AND SENNOSIDES 8.6 MG 2 TABLET: 8.6; 5 TABLET, FILM COATED ORAL at 21:49

## 2020-01-01 RX ADMIN — CARBIDOPA AND LEVODOPA 1 TABLET: 10; 100 TABLET ORAL at 18:15

## 2020-01-01 RX ADMIN — QUETIAPINE FUMARATE 25 MG: 25 TABLET ORAL at 11:25

## 2020-01-01 RX ADMIN — CARBIDOPA AND LEVODOPA 1 TABLET: 10; 100 TABLET ORAL at 11:16

## 2020-01-01 RX ADMIN — CARBIDOPA AND LEVODOPA 1 TABLET: 10; 100 TABLET ORAL at 00:55

## 2020-01-01 RX ADMIN — SODIUM CHLORIDE 1000 ML: 9 INJECTION, SOLUTION INTRAVENOUS at 20:27

## 2020-01-01 RX ADMIN — NITROFURANTOIN MONOHYDRATE/MACROCRYSTALLINE 100 MG: 25; 75 CAPSULE ORAL at 17:16

## 2020-01-01 RX ADMIN — DOCUSATE SODIUM 50 MG AND SENNOSIDES 8.6 MG 2 TABLET: 8.6; 5 TABLET, FILM COATED ORAL at 08:32

## 2020-01-01 RX ADMIN — DOCUSATE SODIUM 50 MG AND SENNOSIDES 8.6 MG 2 TABLET: 8.6; 5 TABLET, FILM COATED ORAL at 21:02

## 2020-01-01 RX ADMIN — ENOXAPARIN SODIUM 40 MG: 40 INJECTION SUBCUTANEOUS at 09:14

## 2020-01-01 RX ADMIN — CARBIDOPA AND LEVODOPA 1 TABLET: 10; 100 TABLET ORAL at 11:33

## 2020-01-01 RX ADMIN — MIDODRINE HYDROCHLORIDE 5 MG: 5 TABLET ORAL at 17:15

## 2020-01-01 RX ADMIN — QUETIAPINE FUMARATE 25 MG: 25 TABLET ORAL at 09:00

## 2020-01-01 RX ADMIN — DOCUSATE SODIUM 50 MG AND SENNOSIDES 8.6 MG 2 TABLET: 8.6; 5 TABLET, FILM COATED ORAL at 20:47

## 2020-01-01 RX ADMIN — HALOPERIDOL LACTATE 5 MG: 5 INJECTION, SOLUTION INTRAMUSCULAR at 03:30

## 2020-01-01 RX ADMIN — DOCUSATE SODIUM 50 MG AND SENNOSIDES 8.6 MG 2 TABLET: 8.6; 5 TABLET, FILM COATED ORAL at 10:10

## 2020-01-01 RX ADMIN — CARBIDOPA AND LEVODOPA 1 TABLET: 10; 100 TABLET ORAL at 03:10

## 2020-01-01 RX ADMIN — ACETAMINOPHEN 1000 MG: 500 TABLET ORAL at 23:24

## 2020-01-01 RX ADMIN — CARBIDOPA AND LEVODOPA 1 TABLET: 10; 100 TABLET ORAL at 18:41

## 2020-01-01 RX ADMIN — ENOXAPARIN SODIUM 40 MG: 40 INJECTION SUBCUTANEOUS at 08:59

## 2020-01-01 RX ADMIN — ACETAMINOPHEN 1000 MG: 500 TABLET ORAL at 01:16

## 2020-01-01 RX ADMIN — CARBIDOPA AND LEVODOPA 1 TABLET: 10; 100 TABLET ORAL at 17:47

## 2020-01-01 RX ADMIN — QUETIAPINE FUMARATE 25 MG: 25 TABLET ORAL at 05:50

## 2020-01-01 RX ADMIN — ENOXAPARIN SODIUM 40 MG: 100 INJECTION SUBCUTANEOUS at 09:53

## 2020-01-01 RX ADMIN — DOCUSATE SODIUM 50 MG AND SENNOSIDES 8.6 MG 2 TABLET: 8.6; 5 TABLET, FILM COATED ORAL at 09:09

## 2020-01-01 RX ADMIN — CARBIDOPA AND LEVODOPA 1 TABLET: 10; 100 TABLET ORAL at 23:11

## 2020-01-01 RX ADMIN — FLUOXETINE HYDROCHLORIDE 40 MG: 20 CAPSULE ORAL at 09:33

## 2020-01-01 RX ADMIN — ENOXAPARIN SODIUM 40 MG: 100 INJECTION SUBCUTANEOUS at 11:11

## 2020-01-01 RX ADMIN — ACETAMINOPHEN 1000 MG: 500 TABLET ORAL at 12:16

## 2020-01-01 RX ADMIN — CARBIDOPA AND LEVODOPA 1 TABLET: 10; 100 TABLET ORAL at 00:14

## 2020-01-01 RX ADMIN — QUETIAPINE FUMARATE 25 MG: 25 TABLET ORAL at 09:24

## 2020-01-01 RX ADMIN — DOCUSATE SODIUM 50 MG AND SENNOSIDES 8.6 MG 2 TABLET: 8.6; 5 TABLET, FILM COATED ORAL at 04:33

## 2020-01-01 RX ADMIN — QUETIAPINE FUMARATE 25 MG: 25 TABLET ORAL at 05:08

## 2020-01-01 RX ADMIN — QUETIAPINE FUMARATE 25 MG: 25 TABLET ORAL at 21:53

## 2020-01-01 RX ADMIN — HALOPERIDOL LACTATE 5 MG: 5 INJECTION, SOLUTION INTRAMUSCULAR at 09:51

## 2020-01-01 RX ADMIN — CARBIDOPA AND LEVODOPA 1 TABLET: 10; 100 TABLET ORAL at 16:09

## 2020-01-01 RX ADMIN — MIDODRINE HYDROCHLORIDE 5 MG: 5 TABLET ORAL at 08:48

## 2020-01-01 RX ADMIN — FLUOXETINE HYDROCHLORIDE 40 MG: 20 CAPSULE ORAL at 09:08

## 2020-01-01 RX ADMIN — HALOPERIDOL LACTATE 5 MG: 5 INJECTION, SOLUTION INTRAMUSCULAR at 15:07

## 2020-01-01 RX ADMIN — CARBIDOPA AND LEVODOPA 1 TABLET: 10; 100 TABLET ORAL at 11:21

## 2020-01-01 RX ADMIN — QUETIAPINE FUMARATE 12.5 MG: 25 TABLET ORAL at 10:08

## 2020-01-01 RX ADMIN — DOCUSATE SODIUM 50 MG AND SENNOSIDES 8.6 MG 2 TABLET: 8.6; 5 TABLET, FILM COATED ORAL at 08:44

## 2020-01-01 RX ADMIN — QUETIAPINE FUMARATE 25 MG: 25 TABLET ORAL at 05:58

## 2020-01-01 RX ADMIN — QUETIAPINE FUMARATE 25 MG: 25 TABLET ORAL at 09:21

## 2020-01-01 RX ADMIN — ACETAMINOPHEN 1000 MG: 500 TABLET ORAL at 12:31

## 2020-01-01 RX ADMIN — DOCUSATE SODIUM 50 MG AND SENNOSIDES 8.6 MG 2 TABLET: 8.6; 5 TABLET, FILM COATED ORAL at 09:35

## 2020-01-01 RX ADMIN — CARBIDOPA AND LEVODOPA 1 TABLET: 10; 100 TABLET ORAL at 13:00

## 2020-01-01 RX ADMIN — HALOPERIDOL LACTATE 5 MG: 5 INJECTION, SOLUTION INTRAMUSCULAR at 19:59

## 2020-01-01 RX ADMIN — MIDODRINE HYDROCHLORIDE 5 MG: 5 TABLET ORAL at 16:58

## 2020-01-01 RX ADMIN — MIDODRINE HYDROCHLORIDE 5 MG: 5 TABLET ORAL at 16:52

## 2020-01-01 RX ADMIN — QUETIAPINE FUMARATE 25 MG: 25 TABLET ORAL at 20:20

## 2020-01-01 RX ADMIN — FLUOXETINE HYDROCHLORIDE 40 MG: 20 CAPSULE ORAL at 04:45

## 2020-01-01 RX ADMIN — FLUOXETINE HYDROCHLORIDE 40 MG: 20 CAPSULE ORAL at 09:09

## 2020-01-01 RX ADMIN — QUETIAPINE FUMARATE 25 MG: 25 TABLET ORAL at 21:23

## 2020-01-01 RX ADMIN — ENOXAPARIN SODIUM 40 MG: 40 INJECTION SUBCUTANEOUS at 09:52

## 2020-01-01 RX ADMIN — ACETAMINOPHEN 1000 MG: 500 TABLET ORAL at 13:21

## 2020-01-01 RX ADMIN — FLUOXETINE HYDROCHLORIDE 40 MG: 20 CAPSULE ORAL at 12:47

## 2020-01-01 RX ADMIN — QUETIAPINE FUMARATE 25 MG: 25 TABLET ORAL at 08:01

## 2020-01-01 RX ADMIN — CARBIDOPA AND LEVODOPA 1 TABLET: 10; 100 TABLET ORAL at 20:33

## 2020-01-01 RX ADMIN — QUETIAPINE FUMARATE 25 MG: 25 TABLET ORAL at 21:26

## 2020-01-01 RX ADMIN — FLUOXETINE HYDROCHLORIDE 40 MG: 20 CAPSULE ORAL at 09:57

## 2020-01-01 RX ADMIN — ACETAMINOPHEN 650 MG: 325 TABLET, FILM COATED ORAL at 10:54

## 2020-01-01 RX ADMIN — CARBIDOPA AND LEVODOPA 1 TABLET: 10; 100 TABLET ORAL at 04:55

## 2020-01-01 RX ADMIN — CARBIDOPA AND LEVODOPA 1 TABLET: 10; 100 TABLET ORAL at 23:27

## 2020-01-01 RX ADMIN — DOCUSATE SODIUM 50 MG AND SENNOSIDES 8.6 MG 2 TABLET: 8.6; 5 TABLET, FILM COATED ORAL at 04:51

## 2020-01-01 RX ADMIN — QUETIAPINE FUMARATE 25 MG: 25 TABLET ORAL at 20:58

## 2020-01-01 RX ADMIN — CARBIDOPA AND LEVODOPA 1 TABLET: 10; 100 TABLET ORAL at 18:32

## 2020-01-01 RX ADMIN — QUETIAPINE FUMARATE 25 MG: 25 TABLET ORAL at 10:27

## 2020-01-01 RX ADMIN — QUETIAPINE FUMARATE 25 MG: 25 TABLET ORAL at 21:17

## 2020-01-01 RX ADMIN — CARBIDOPA AND LEVODOPA 1 TABLET: 10; 100 TABLET ORAL at 12:21

## 2020-01-01 RX ADMIN — DOCUSATE SODIUM 50 MG AND SENNOSIDES 8.6 MG 2 TABLET: 8.6; 5 TABLET, FILM COATED ORAL at 21:39

## 2020-01-01 RX ADMIN — DOCUSATE SODIUM 50 MG AND SENNOSIDES 8.6 MG 2 TABLET: 8.6; 5 TABLET, FILM COATED ORAL at 10:01

## 2020-01-01 RX ADMIN — ENOXAPARIN SODIUM 40 MG: 40 INJECTION SUBCUTANEOUS at 09:26

## 2020-01-01 RX ADMIN — CARBIDOPA AND LEVODOPA 1 TABLET: 10; 100 TABLET ORAL at 15:33

## 2020-01-01 RX ADMIN — CARBIDOPA AND LEVODOPA 1 TABLET: 10; 100 TABLET ORAL at 23:54

## 2020-01-01 RX ADMIN — CARBIDOPA AND LEVODOPA 1 TABLET: 10; 100 TABLET ORAL at 05:59

## 2020-01-01 RX ADMIN — ACETAMINOPHEN 1000 MG: 500 TABLET ORAL at 10:25

## 2020-01-01 RX ADMIN — CARBIDOPA AND LEVODOPA 1 TABLET: 10; 100 TABLET ORAL at 09:43

## 2020-01-01 RX ADMIN — QUETIAPINE FUMARATE 12.5 MG: 25 TABLET ORAL at 08:39

## 2020-01-01 RX ADMIN — QUETIAPINE FUMARATE 25 MG: 25 TABLET ORAL at 20:21

## 2020-01-01 RX ADMIN — QUETIAPINE FUMARATE 25 MG: 25 TABLET ORAL at 17:43

## 2020-01-01 RX ADMIN — ENOXAPARIN SODIUM 40 MG: 100 INJECTION SUBCUTANEOUS at 05:34

## 2020-01-01 RX ADMIN — DOCUSATE SODIUM 50 MG AND SENNOSIDES 8.6 MG 2 TABLET: 8.6; 5 TABLET, FILM COATED ORAL at 19:55

## 2020-01-01 RX ADMIN — HALOPERIDOL LACTATE 5 MG: 5 INJECTION, SOLUTION INTRAMUSCULAR at 15:27

## 2020-01-01 RX ADMIN — QUETIAPINE FUMARATE 25 MG: 25 TABLET ORAL at 05:49

## 2020-01-01 RX ADMIN — Medication 400 MG: at 05:34

## 2020-01-01 RX ADMIN — DOCUSATE SODIUM 50 MG AND SENNOSIDES 8.6 MG 2 TABLET: 8.6; 5 TABLET, FILM COATED ORAL at 21:23

## 2020-01-01 RX ADMIN — ENOXAPARIN SODIUM 40 MG: 100 INJECTION SUBCUTANEOUS at 08:57

## 2020-01-01 RX ADMIN — CARBIDOPA AND LEVODOPA 1 TABLET: 10; 100 TABLET ORAL at 11:55

## 2020-01-01 RX ADMIN — ENOXAPARIN SODIUM 40 MG: 40 INJECTION SUBCUTANEOUS at 07:31

## 2020-01-01 RX ADMIN — DOCUSATE SODIUM 50 MG AND SENNOSIDES 8.6 MG 2 TABLET: 8.6; 5 TABLET, FILM COATED ORAL at 22:41

## 2020-01-01 RX ADMIN — QUETIAPINE FUMARATE 25 MG: 25 TABLET ORAL at 20:51

## 2020-01-01 RX ADMIN — QUETIAPINE FUMARATE 12.5 MG: 25 TABLET ORAL at 04:39

## 2020-01-01 RX ADMIN — QUETIAPINE FUMARATE 25 MG: 25 TABLET ORAL at 17:40

## 2020-01-01 RX ADMIN — DOCUSATE SODIUM 50 MG AND SENNOSIDES 8.6 MG 2 TABLET: 8.6; 5 TABLET, FILM COATED ORAL at 21:38

## 2020-01-01 RX ADMIN — CARBIDOPA AND LEVODOPA 1 TABLET: 10; 100 TABLET ORAL at 09:47

## 2020-01-01 RX ADMIN — CARBIDOPA AND LEVODOPA 1 TABLET: 10; 100 TABLET ORAL at 15:25

## 2020-01-01 RX ADMIN — CARBIDOPA AND LEVODOPA 1 TABLET: 10; 100 TABLET ORAL at 09:25

## 2020-01-01 RX ADMIN — Medication 400 MG: at 17:44

## 2020-01-01 RX ADMIN — HALOPERIDOL LACTATE 5 MG: 5 INJECTION, SOLUTION INTRAMUSCULAR at 21:46

## 2020-01-01 RX ADMIN — CARBIDOPA AND LEVODOPA 1 TABLET: 10; 100 TABLET ORAL at 04:13

## 2020-01-01 RX ADMIN — CARBIDOPA AND LEVODOPA 1 TABLET: 10; 100 TABLET ORAL at 21:06

## 2020-01-01 RX ADMIN — QUETIAPINE FUMARATE 25 MG: 25 TABLET ORAL at 21:51

## 2020-01-01 RX ADMIN — CARBIDOPA AND LEVODOPA 1 TABLET: 10; 100 TABLET ORAL at 07:48

## 2020-01-01 SDOH — HEALTH STABILITY: MENTAL HEALTH: HOW OFTEN DO YOU HAVE A DRINK CONTAINING ALCOHOL?: MONTHLY OR LESS

## 2020-01-01 SDOH — HEALTH STABILITY: MENTAL HEALTH: HOW OFTEN DO YOU HAVE 6 OR MORE DRINKS ON ONE OCCASION?: NEVER

## 2020-01-01 SDOH — HEALTH STABILITY: MENTAL HEALTH: HOW MANY STANDARD DRINKS CONTAINING ALCOHOL DO YOU HAVE ON A TYPICAL DAY?: 1 OR 2

## 2020-01-01 ASSESSMENT — PAIN SCALES - PAIN ASSESSMENT IN ADVANCED DEMENTIA (PAINAD)
BREATHING: NORMAL
CONSOLABILITY: NO NEED TO CONSOLE
TOTALSCORE: 0
BREATHING: NORMAL
FACIALEXPRESSION: SMILING OR INEXPRESSIVE
BODYLANGUAGE: RELAXED
FACIALEXPRESSION: SMILING OR INEXPRESSIVE
BODYLANGUAGE: RELAXED
CONSOLABILITY: NO NEED TO CONSOLE
FACIALEXPRESSION: SMILING OR INEXPRESSIVE
TOTALSCORE: 0
BODYLANGUAGE: RELAXED
BREATHING: NORMAL
FACIALEXPRESSION: SMILING OR INEXPRESSIVE
FACIALEXPRESSION: SMILING OR INEXPRESSIVE
CONSOLABILITY: NO NEED TO CONSOLE
BREATHING: NORMAL
BODYLANGUAGE: RELAXED
FACIALEXPRESSION: SMILING OR INEXPRESSIVE
CONSOLABILITY: NO NEED TO CONSOLE
BREATHING: NORMAL
FACIALEXPRESSION: SMILING OR INEXPRESSIVE
BREATHING: NORMAL
BREATHING: NORMAL
CONSOLABILITY: NO NEED TO CONSOLE
BODYLANGUAGE: RELAXED
BODYLANGUAGE: RELAXED
TOTALSCORE: 0
FACIALEXPRESSION: SMILING OR INEXPRESSIVE
FACIALEXPRESSION: SMILING OR INEXPRESSIVE
BREATHING: NORMAL
BREATHING: NORMAL
CONSOLABILITY: NO NEED TO CONSOLE
CONSOLABILITY: DISTRACTED OR REASSURED BY VOICE/TOUCH
CONSOLABILITY: NO NEED TO CONSOLE
FACIALEXPRESSION: SMILING OR INEXPRESSIVE
BODYLANGUAGE: RELAXED
BODYLANGUAGE: RELAXED
BREATHING: NORMAL
BODYLANGUAGE: RELAXED
CONSOLABILITY: NO NEED TO CONSOLE
BREATHING: NORMAL
CONSOLABILITY: NO NEED TO CONSOLE
BREATHING: NORMAL
BODYLANGUAGE: RELAXED
FACIALEXPRESSION: SMILING OR INEXPRESSIVE
BREATHING: NORMAL
CONSOLABILITY: NO NEED TO CONSOLE
TOTALSCORE: 0
CONSOLABILITY: NO NEED TO CONSOLE
CONSOLABILITY: NO NEED TO CONSOLE
TOTALSCORE: 0
CONSOLABILITY: NO NEED TO CONSOLE
FACIALEXPRESSION: SMILING OR INEXPRESSIVE
TOTALSCORE: 0
FACIALEXPRESSION: SMILING OR INEXPRESSIVE
FACIALEXPRESSION: SMILING OR INEXPRESSIVE
BODYLANGUAGE: RELAXED
BODYLANGUAGE: RELAXED
TOTALSCORE: 0
TOTALSCORE: 0
CONSOLABILITY: NO NEED TO CONSOLE
CONSOLABILITY: NO NEED TO CONSOLE
BREATHING: NORMAL
BODYLANGUAGE: RELAXED
CONSOLABILITY: NO NEED TO CONSOLE
BREATHING: NORMAL
FACIALEXPRESSION: SMILING OR INEXPRESSIVE
BREATHING: NORMAL
TOTALSCORE: 0
FACIALEXPRESSION: SMILING OR INEXPRESSIVE
FACIALEXPRESSION: SMILING OR INEXPRESSIVE
BODYLANGUAGE: RELAXED
BREATHING: NORMAL
CONSOLABILITY: NO NEED TO CONSOLE
FACIALEXPRESSION: SMILING OR INEXPRESSIVE
TOTALSCORE: 0
BREATHING: NORMAL
BODYLANGUAGE: RELAXED
BREATHING: NORMAL
FACIALEXPRESSION: SMILING OR INEXPRESSIVE
CONSOLABILITY: NO NEED TO CONSOLE
BREATHING: NORMAL
BREATHING: NORMAL
BODYLANGUAGE: RELAXED
BODYLANGUAGE: RELAXED
BREATHING: NORMAL
TOTALSCORE: 0
BREATHING: NORMAL
BODYLANGUAGE: RELAXED
CONSOLABILITY: NO NEED TO CONSOLE
BREATHING: NORMAL
BODYLANGUAGE: RELAXED
CONSOLABILITY: NO NEED TO CONSOLE
BODYLANGUAGE: RELAXED
CONSOLABILITY: NO NEED TO CONSOLE
FACIALEXPRESSION: SMILING OR INEXPRESSIVE
BODYLANGUAGE: RELAXED
FACIALEXPRESSION: SMILING OR INEXPRESSIVE
FACIALEXPRESSION: SMILING OR INEXPRESSIVE
TOTALSCORE: 0
BODYLANGUAGE: RELAXED
FACIALEXPRESSION: SMILING OR INEXPRESSIVE
BREATHING: NORMAL
CONSOLABILITY: NO NEED TO CONSOLE
TOTALSCORE: 0
CONSOLABILITY: NO NEED TO CONSOLE
TOTALSCORE: 0
TOTALSCORE: 0
FACIALEXPRESSION: SMILING OR INEXPRESSIVE
TOTALSCORE: 0
CONSOLABILITY: NO NEED TO CONSOLE
TOTALSCORE: 0
TOTALSCORE: 0
BODYLANGUAGE: RELAXED
BREATHING: NORMAL
FACIALEXPRESSION: SMILING OR INEXPRESSIVE
BODYLANGUAGE: RELAXED
CONSOLABILITY: NO NEED TO CONSOLE
BREATHING: NORMAL
TOTALSCORE: 0
BODYLANGUAGE: RELAXED
TOTALSCORE: 0
BREATHING: NORMAL
BODYLANGUAGE: RELAXED
CONSOLABILITY: NO NEED TO CONSOLE
BREATHING: NORMAL
TOTALSCORE: 0
FACIALEXPRESSION: SMILING OR INEXPRESSIVE
FACIALEXPRESSION: SMILING OR INEXPRESSIVE
TOTALSCORE: 0
CONSOLABILITY: NO NEED TO CONSOLE
FACIALEXPRESSION: SMILING OR INEXPRESSIVE
TOTALSCORE: 0
BODYLANGUAGE: RELAXED
TOTALSCORE: 1
FACIALEXPRESSION: SMILING OR INEXPRESSIVE
TOTALSCORE: 0
BODYLANGUAGE: RELAXED
BREATHING: NORMAL
CONSOLABILITY: NO NEED TO CONSOLE
FACIALEXPRESSION: SMILING OR INEXPRESSIVE
BODYLANGUAGE: RELAXED
TOTALSCORE: 2
CONSOLABILITY: NO NEED TO CONSOLE
TOTALSCORE: 0
TOTALSCORE: 0
FACIALEXPRESSION: SMILING OR INEXPRESSIVE
TOTALSCORE: 0
FACIALEXPRESSION: SMILING OR INEXPRESSIVE
BREATHING: NORMAL
FACIALEXPRESSION: SMILING OR INEXPRESSIVE
TOTALSCORE: 0
CONSOLABILITY: NO NEED TO CONSOLE
BREATHING: NORMAL
FACIALEXPRESSION: SAD, FRIGHTENED, FROWN
BODYLANGUAGE: RELAXED
CONSOLABILITY: NO NEED TO CONSOLE
FACIALEXPRESSION: SMILING OR INEXPRESSIVE
TOTALSCORE: 0
CONSOLABILITY: NO NEED TO CONSOLE
BREATHING: NORMAL
BREATHING: NORMAL
BODYLANGUAGE: RELAXED
BODYLANGUAGE: RELAXED
BREATHING: NORMAL
BODYLANGUAGE: RELAXED
TOTALSCORE: 0
BODYLANGUAGE: RELAXED
CONSOLABILITY: NO NEED TO CONSOLE
BODYLANGUAGE: RELAXED
FACIALEXPRESSION: SMILING OR INEXPRESSIVE
TOTALSCORE: 0
BREATHING: NORMAL
BODYLANGUAGE: RELAXED
TOTALSCORE: 0
CONSOLABILITY: NO NEED TO CONSOLE
TOTALSCORE: 0
BREATHING: NORMAL
BODYLANGUAGE: RELAXED
CONSOLABILITY: NO NEED TO CONSOLE
TOTALSCORE: 0
FACIALEXPRESSION: SMILING OR INEXPRESSIVE
TOTALSCORE: 0
CONSOLABILITY: NO NEED TO CONSOLE
TOTALSCORE: 0
CONSOLABILITY: NO NEED TO CONSOLE
TOTALSCORE: 0
BODYLANGUAGE: RELAXED
FACIALEXPRESSION: SMILING OR INEXPRESSIVE
FACIALEXPRESSION: SMILING OR INEXPRESSIVE
BREATHING: NORMAL
FACIALEXPRESSION: SMILING OR INEXPRESSIVE
BREATHING: NORMAL
TOTALSCORE: 0
CONSOLABILITY: NO NEED TO CONSOLE
CONSOLABILITY: NO NEED TO CONSOLE
BODYLANGUAGE: RELAXED
TOTALSCORE: 0
CONSOLABILITY: NO NEED TO CONSOLE
BODYLANGUAGE: RELAXED
TOTALSCORE: 0
BODYLANGUAGE: RELAXED
FACIALEXPRESSION: SMILING OR INEXPRESSIVE
CONSOLABILITY: DISTRACTED OR REASSURED BY VOICE/TOUCH
BREATHING: NORMAL
CONSOLABILITY: NO NEED TO CONSOLE
FACIALEXPRESSION: SMILING OR INEXPRESSIVE
CONSOLABILITY: NO NEED TO CONSOLE
TOTALSCORE: 0
FACIALEXPRESSION: SMILING OR INEXPRESSIVE
FACIALEXPRESSION: SMILING OR INEXPRESSIVE
CONSOLABILITY: NO NEED TO CONSOLE
BODYLANGUAGE: RELAXED
BREATHING: NORMAL
BODYLANGUAGE: RELAXED
CONSOLABILITY: NO NEED TO CONSOLE
FACIALEXPRESSION: SMILING OR INEXPRESSIVE
CONSOLABILITY: NO NEED TO CONSOLE
FACIALEXPRESSION: SMILING OR INEXPRESSIVE
BREATHING: NORMAL
CONSOLABILITY: NO NEED TO CONSOLE
BODYLANGUAGE: RELAXED
TOTALSCORE: 0
TOTALSCORE: 0
CONSOLABILITY: NO NEED TO CONSOLE
BREATHING: NORMAL
FACIALEXPRESSION: SMILING OR INEXPRESSIVE
TOTALSCORE: 0
CONSOLABILITY: NO NEED TO CONSOLE
BODYLANGUAGE: RELAXED
CONSOLABILITY: NO NEED TO CONSOLE
TOTALSCORE: 0
CONSOLABILITY: NO NEED TO CONSOLE
BODYLANGUAGE: RELAXED
FACIALEXPRESSION: SMILING OR INEXPRESSIVE
BREATHING: NORMAL
BREATHING: NORMAL
CONSOLABILITY: NO NEED TO CONSOLE
BREATHING: NORMAL
BREATHING: NORMAL
BODYLANGUAGE: RELAXED
BREATHING: NORMAL
BREATHING: NORMAL
TOTALSCORE: 0
BREATHING: NORMAL
BREATHING: NORMAL
TOTALSCORE: 0
BODYLANGUAGE: RELAXED
CONSOLABILITY: NO NEED TO CONSOLE
BREATHING: NORMAL
FACIALEXPRESSION: SMILING OR INEXPRESSIVE
BODYLANGUAGE: RELAXED

## 2020-01-01 ASSESSMENT — ENCOUNTER SYMPTOMS
BLURRED VISION: 0
NAUSEA: 0
SHORTNESS OF BREATH: 0
SPUTUM PRODUCTION: 0
DIARRHEA: 0
FOCAL WEAKNESS: 0
FALLS: 0
CONSTIPATION: 0
DIAPHORESIS: 0
NAUSEA: 0
NERVOUS/ANXIOUS: 0
MYALGIAS: 0
BACK PAIN: 0
SHORTNESS OF BREATH: 0
NECK PAIN: 0
BLURRED VISION: 0
HEARTBURN: 0
NERVOUS/ANXIOUS: 1
VOMITING: 0
DIARRHEA: 0
HEMOPTYSIS: 0
MEMORY LOSS: 1
DIZZINESS: 0
EYES NEGATIVE: 1
COUGH: 0
CONSTIPATION: 0
ABDOMINAL PAIN: 0
CONSTIPATION: 0
NERVOUS/ANXIOUS: 1
COUGH: 0
VOMITING: 0
SENSORY CHANGE: 0
DIARRHEA: 0
BACK PAIN: 0
BACK PAIN: 0
SHORTNESS OF BREATH: 0
WEAKNESS: 0
MYALGIAS: 0
COUGH: 0
NAUSEA: 0
DEPRESSION: 0
TINGLING: 0
SHORTNESS OF BREATH: 0
CARDIOVASCULAR NEGATIVE: 1
VOMITING: 0
NAUSEA: 0
NAUSEA: 0
NERVOUS/ANXIOUS: 0
DEPRESSION: 0
COUGH: 0
COUGH: 0
FLANK PAIN: 0
BACK PAIN: 0
SENSORY CHANGE: 0
WEAKNESS: 1
NAUSEA: 0
NAUSEA: 0
BACK PAIN: 0
DIZZINESS: 0
PHOTOPHOBIA: 0
VOMITING: 0
SHORTNESS OF BREATH: 0
NECK PAIN: 0
MYALGIAS: 0
DEPRESSION: 0
RESPIRATORY NEGATIVE: 1
HEADACHES: 0
ABDOMINAL PAIN: 0
SENSORY CHANGE: 0
ABDOMINAL PAIN: 0
DIZZINESS: 0
ABDOMINAL PAIN: 0
DIARRHEA: 0
ABDOMINAL PAIN: 0
ABDOMINAL PAIN: 0
HEADACHES: 0
COUGH: 0
NAUSEA: 0
VOMITING: 0
NAUSEA: 0
ABDOMINAL PAIN: 0
DIZZINESS: 0
NAUSEA: 0
BACK PAIN: 0
NERVOUS/ANXIOUS: 1
VOMITING: 0
FEVER: 0
WHEEZING: 0
VOMITING: 0
HEADACHES: 0
GASTROINTESTINAL NEGATIVE: 1
DIZZINESS: 0
SHORTNESS OF BREATH: 0
DIZZINESS: 0
WHEEZING: 0
HEMOPTYSIS: 0
INSOMNIA: 0
DIZZINESS: 0
DOUBLE VISION: 0
FEVER: 0
MEMORY LOSS: 1
MEMORY LOSS: 1
ABDOMINAL PAIN: 0
WHEEZING: 0
SORE THROAT: 0
NAUSEA: 0
CHILLS: 0
TINGLING: 0
COUGH: 0
HALLUCINATIONS: 0
SPEECH CHANGE: 0
HEADACHES: 0
SHORTNESS OF BREATH: 0
HEARTBURN: 0
HEARTBURN: 0
NAUSEA: 0
PALPITATIONS: 0
DIZZINESS: 0
SHORTNESS OF BREATH: 0
DIARRHEA: 0
EYE PAIN: 0
NAUSEA: 0
COUGH: 0
PALPITATIONS: 0
COUGH: 0
MEMORY LOSS: 1
PALPITATIONS: 0
FEVER: 0
WHEEZING: 0
CHILLS: 0
VOMITING: 0
COUGH: 0
NEUROLOGICAL NEGATIVE: 1
NECK PAIN: 0
FOCAL WEAKNESS: 0
SHORTNESS OF BREATH: 0
MUSCULOSKELETAL NEGATIVE: 1
FLANK PAIN: 0
FEVER: 0
STRIDOR: 0
WEAKNESS: 1
ORTHOPNEA: 0
COUGH: 0
SPEECH CHANGE: 0
FEVER: 0
INSOMNIA: 0
SPEECH CHANGE: 0
WEAKNESS: 1
PALPITATIONS: 0
BLURRED VISION: 0
CHILLS: 0
CONSTIPATION: 0
BACK PAIN: 0
DIZZINESS: 0
FOCAL WEAKNESS: 0
MEMORY LOSS: 1
FOCAL WEAKNESS: 0
ORTHOPNEA: 0
NAUSEA: 0
VOMITING: 0
COUGH: 0
SHORTNESS OF BREATH: 0
FEVER: 0
HEADACHES: 0
PALPITATIONS: 0
WEAKNESS: 1
COUGH: 0
ABDOMINAL PAIN: 0
NAUSEA: 0
NAUSEA: 0
BLOOD IN STOOL: 0
NERVOUS/ANXIOUS: 0
FLANK PAIN: 0
HEMOPTYSIS: 0
VOMITING: 0
NAUSEA: 0
MYALGIAS: 1
SHORTNESS OF BREATH: 0
SENSORY CHANGE: 0
ABDOMINAL PAIN: 0
HEADACHES: 0
DIAPHORESIS: 0
MYALGIAS: 0
DIZZINESS: 0
VOMITING: 0
ABDOMINAL PAIN: 0
MYALGIAS: 0
VOMITING: 0
BLURRED VISION: 0
COUGH: 0
BACK PAIN: 0
COUGH: 0
MUSCULOSKELETAL NEGATIVE: 1
FOCAL WEAKNESS: 1
DIARRHEA: 0
VOMITING: 0
HEARTBURN: 0
VOMITING: 0
SPUTUM PRODUCTION: 0
TREMORS: 0
FEVER: 0
CONSTIPATION: 0
SENSORY CHANGE: 0
PALPITATIONS: 0
SHORTNESS OF BREATH: 1
POLYDIPSIA: 0
VOMITING: 0
ABDOMINAL PAIN: 0
FLANK PAIN: 0
SORE THROAT: 0
PALPITATIONS: 0
SPEECH CHANGE: 0
NERVOUS/ANXIOUS: 1
COUGH: 0
PALPITATIONS: 0
SHORTNESS OF BREATH: 0
DIARRHEA: 0
CHILLS: 0
DIARRHEA: 0
BACK PAIN: 0
WEAKNESS: 1
MEMORY LOSS: 1
MUSCULOSKELETAL NEGATIVE: 1
NAUSEA: 0
NERVOUS/ANXIOUS: 1
FOCAL WEAKNESS: 0
DIZZINESS: 0
MYALGIAS: 0
SPEECH CHANGE: 0
ABDOMINAL PAIN: 0
DIZZINESS: 0
CHILLS: 0
COUGH: 0
COUGH: 0
BRUISES/BLEEDS EASILY: 0
INSOMNIA: 0
DIZZINESS: 0
COUGH: 0
FOCAL WEAKNESS: 0
WEAKNESS: 0
HEARTBURN: 0
NAUSEA: 0
NAUSEA: 0
MUSCULOSKELETAL NEGATIVE: 1
SINUS PAIN: 0
SHORTNESS OF BREATH: 0
COUGH: 0
PSYCHIATRIC NEGATIVE: 1
MEMORY LOSS: 1
NECK PAIN: 0
DIAPHORESIS: 0
CLAUDICATION: 0
FOCAL WEAKNESS: 0
NECK PAIN: 0
ABDOMINAL PAIN: 0
ABDOMINAL PAIN: 0
MEMORY LOSS: 1
HEADACHES: 0
WHEEZING: 0
SENSORY CHANGE: 0
HEARTBURN: 0
SORE THROAT: 0
HEADACHES: 0
SHORTNESS OF BREATH: 0
SHORTNESS OF BREATH: 0
WEAKNESS: 1
ABDOMINAL PAIN: 0
PND: 0
ABDOMINAL PAIN: 0
DIAPHORESIS: 0
DEPRESSION: 0
HEMOPTYSIS: 0
CONSTITUTIONAL NEGATIVE: 1
FEVER: 0
SORE THROAT: 0
ABDOMINAL PAIN: 0
MYALGIAS: 0
MEMORY LOSS: 1
FOCAL WEAKNESS: 1
FLANK PAIN: 0
VOMITING: 0

## 2020-01-01 ASSESSMENT — PATIENT HEALTH QUESTIONNAIRE - PHQ9
1. LITTLE INTEREST OR PLEASURE IN DOING THINGS: NOT AT ALL
SUM OF ALL RESPONSES TO PHQ9 QUESTIONS 1 AND 2: 0
1. LITTLE INTEREST OR PLEASURE IN DOING THINGS: NOT AT ALL
1. LITTLE INTEREST OR PLEASURE IN DOING THINGS: NOT AT ALL
2. FEELING DOWN, DEPRESSED, IRRITABLE, OR HOPELESS: NOT AT ALL
2. FEELING DOWN, DEPRESSED, IRRITABLE, OR HOPELESS: NOT AT ALL
1. LITTLE INTEREST OR PLEASURE IN DOING THINGS: NOT AT ALL
SUM OF ALL RESPONSES TO PHQ9 QUESTIONS 1 AND 2: 0
2. FEELING DOWN, DEPRESSED, IRRITABLE, OR HOPELESS: NOT AT ALL
SUM OF ALL RESPONSES TO PHQ9 QUESTIONS 1 AND 2: 0
2. FEELING DOWN, DEPRESSED, IRRITABLE, OR HOPELESS: NOT AT ALL
SUM OF ALL RESPONSES TO PHQ9 QUESTIONS 1 AND 2: 0
2. FEELING DOWN, DEPRESSED, IRRITABLE, OR HOPELESS: NOT AT ALL
1. LITTLE INTEREST OR PLEASURE IN DOING THINGS: NOT AT ALL
SUM OF ALL RESPONSES TO PHQ9 QUESTIONS 1 AND 2: 0
SUM OF ALL RESPONSES TO PHQ9 QUESTIONS 1 AND 2: 0
1. LITTLE INTEREST OR PLEASURE IN DOING THINGS: NOT AT ALL
SUM OF ALL RESPONSES TO PHQ9 QUESTIONS 1 AND 2: 0
1. LITTLE INTEREST OR PLEASURE IN DOING THINGS: NOT AT ALL

## 2020-01-01 ASSESSMENT — COGNITIVE AND FUNCTIONAL STATUS - GENERAL
EATING MEALS: TOTAL
TURNING FROM BACK TO SIDE WHILE IN FLAT BAD: A LOT
SUGGESTED CMS G CODE MODIFIER MOBILITY: CM
SUGGESTED CMS G CODE MODIFIER MOBILITY: CM
MOVING TO AND FROM BED TO CHAIR: UNABLE
MOVING TO AND FROM BED TO CHAIR: UNABLE
STANDING UP FROM CHAIR USING ARMS: A LOT
STANDING UP FROM CHAIR USING ARMS: A LOT
DRESSING REGULAR LOWER BODY CLOTHING: A LOT
MOVING FROM LYING ON BACK TO SITTING ON SIDE OF FLAT BED: UNABLE
PERSONAL GROOMING: A LITTLE
DRESSING REGULAR UPPER BODY CLOTHING: A LOT
MOVING TO AND FROM BED TO CHAIR: UNABLE
WALKING IN HOSPITAL ROOM: TOTAL
SUGGESTED CMS G CODE MODIFIER DAILY ACTIVITY: CL
CLIMB 3 TO 5 STEPS WITH RAILING: TOTAL
MOBILITY SCORE: 8
EATING MEALS: A LOT
MOVING TO AND FROM BED TO CHAIR: UNABLE
TOILETING: A LOT
SUGGESTED CMS G CODE MODIFIER MOBILITY: CM
SUGGESTED CMS G CODE MODIFIER DAILY ACTIVITY: CK
DRESSING REGULAR UPPER BODY CLOTHING: A LITTLE
DRESSING REGULAR UPPER BODY CLOTHING: A LITTLE
TURNING FROM BACK TO SIDE WHILE IN FLAT BAD: UNABLE
MOBILITY SCORE: 8
CLIMB 3 TO 5 STEPS WITH RAILING: TOTAL
DAILY ACTIVITIY SCORE: 12
EATING MEALS: A LITTLE
SUGGESTED CMS G CODE MODIFIER DAILY ACTIVITY: CK
DRESSING REGULAR LOWER BODY CLOTHING: A LOT
CLIMB 3 TO 5 STEPS WITH RAILING: TOTAL
MOVING FROM LYING ON BACK TO SITTING ON SIDE OF FLAT BED: UNABLE
PERSONAL GROOMING: A LOT
MOVING TO AND FROM BED TO CHAIR: UNABLE
STANDING UP FROM CHAIR USING ARMS: A LOT
STANDING UP FROM CHAIR USING ARMS: A LOT
MOVING FROM LYING ON BACK TO SITTING ON SIDE OF FLAT BED: UNABLE
TURNING FROM BACK TO SIDE WHILE IN FLAT BAD: UNABLE
SUGGESTED CMS G CODE MODIFIER MOBILITY: CL
MOVING FROM LYING ON BACK TO SITTING ON SIDE OF FLAT BED: UNABLE
MOVING FROM LYING ON BACK TO SITTING ON SIDE OF FLAT BED: UNABLE
TURNING FROM BACK TO SIDE WHILE IN FLAT BAD: UNABLE
SUGGESTED CMS G CODE MODIFIER MOBILITY: CM
CLIMB 3 TO 5 STEPS WITH RAILING: A LOT
EATING MEALS: A LITTLE
SUGGESTED CMS G CODE MODIFIER MOBILITY: CM
MOVING FROM LYING ON BACK TO SITTING ON SIDE OF FLAT BED: A LITTLE
CLIMB 3 TO 5 STEPS WITH RAILING: TOTAL
STANDING UP FROM CHAIR USING ARMS: A LOT
MOVING TO AND FROM BED TO CHAIR: UNABLE
DAILY ACTIVITIY SCORE: 12
HELP NEEDED FOR BATHING: A LOT
MOVING TO AND FROM BED TO CHAIR: UNABLE
MOVING TO AND FROM BED TO CHAIR: UNABLE
DAILY ACTIVITIY SCORE: 14
DRESSING REGULAR UPPER BODY CLOTHING: A LOT
DRESSING REGULAR UPPER BODY CLOTHING: A LOT
DRESSING REGULAR LOWER BODY CLOTHING: A LOT
TURNING FROM BACK TO SIDE WHILE IN FLAT BAD: UNABLE
HELP NEEDED FOR BATHING: A LOT
MOVING FROM LYING ON BACK TO SITTING ON SIDE OF FLAT BED: UNABLE
SUGGESTED CMS G CODE MODIFIER DAILY ACTIVITY: CK
SUGGESTED CMS G CODE MODIFIER MOBILITY: CM
SUGGESTED CMS G CODE MODIFIER DAILY ACTIVITY: CL
WALKING IN HOSPITAL ROOM: A LOT
SUGGESTED CMS G CODE MODIFIER MOBILITY: CN
EATING MEALS: A LITTLE
TOILETING: A LOT
MOVING FROM LYING ON BACK TO SITTING ON SIDE OF FLAT BED: UNABLE
PERSONAL GROOMING: A LOT
DAILY ACTIVITIY SCORE: 15
DRESSING REGULAR LOWER BODY CLOTHING: A LOT
PERSONAL GROOMING: A LITTLE
MOVING FROM LYING ON BACK TO SITTING ON SIDE OF FLAT BED: UNABLE
STANDING UP FROM CHAIR USING ARMS: TOTAL
MOBILITY SCORE: 7
TOILETING: A LOT
CLIMB 3 TO 5 STEPS WITH RAILING: TOTAL
CLIMB 3 TO 5 STEPS WITH RAILING: TOTAL
SUGGESTED CMS G CODE MODIFIER MOBILITY: CM
DAILY ACTIVITIY SCORE: 14
DRESSING REGULAR LOWER BODY CLOTHING: A LOT
STANDING UP FROM CHAIR USING ARMS: A LOT
HELP NEEDED FOR BATHING: A LOT
CLIMB 3 TO 5 STEPS WITH RAILING: TOTAL
MOBILITY SCORE: 8
MOVING FROM LYING ON BACK TO SITTING ON SIDE OF FLAT BED: UNABLE
DAILY ACTIVITIY SCORE: 15
TURNING FROM BACK TO SIDE WHILE IN FLAT BAD: UNABLE
STANDING UP FROM CHAIR USING ARMS: A LOT
WALKING IN HOSPITAL ROOM: A LOT
MOVING FROM LYING ON BACK TO SITTING ON SIDE OF FLAT BED: UNABLE
DRESSING REGULAR UPPER BODY CLOTHING: A LOT
DRESSING REGULAR UPPER BODY CLOTHING: A LOT
DAILY ACTIVITIY SCORE: 6
MOBILITY SCORE: 7
PERSONAL GROOMING: A LITTLE
MOVING TO AND FROM BED TO CHAIR: A LITTLE
PERSONAL GROOMING: A LITTLE
SUGGESTED CMS G CODE MODIFIER DAILY ACTIVITY: CL
SUGGESTED CMS G CODE MODIFIER DAILY ACTIVITY: CK
TOILETING: TOTAL
CLIMB 3 TO 5 STEPS WITH RAILING: TOTAL
MOBILITY SCORE: 7
HELP NEEDED FOR BATHING: A LOT
STANDING UP FROM CHAIR USING ARMS: TOTAL
STANDING UP FROM CHAIR USING ARMS: TOTAL
WALKING IN HOSPITAL ROOM: A LOT
CLIMB 3 TO 5 STEPS WITH RAILING: TOTAL
TURNING FROM BACK TO SIDE WHILE IN FLAT BAD: UNABLE
MOVING TO AND FROM BED TO CHAIR: UNABLE
MOVING FROM LYING ON BACK TO SITTING ON SIDE OF FLAT BED: UNABLE
SUGGESTED CMS G CODE MODIFIER DAILY ACTIVITY: CL
DAILY ACTIVITIY SCORE: 11
MOVING TO AND FROM BED TO CHAIR: UNABLE
WALKING IN HOSPITAL ROOM: TOTAL
DRESSING REGULAR LOWER BODY CLOTHING: A LOT
DAILY ACTIVITIY SCORE: 14
CLIMB 3 TO 5 STEPS WITH RAILING: TOTAL
TURNING FROM BACK TO SIDE WHILE IN FLAT BAD: UNABLE
HELP NEEDED FOR BATHING: A LOT
EATING MEALS: A LITTLE
WALKING IN HOSPITAL ROOM: TOTAL
MOBILITY SCORE: 7
MOVING FROM LYING ON BACK TO SITTING ON SIDE OF FLAT BED: UNABLE
MOVING TO AND FROM BED TO CHAIR: UNABLE
MOVING FROM LYING ON BACK TO SITTING ON SIDE OF FLAT BED: UNABLE
CLIMB 3 TO 5 STEPS WITH RAILING: TOTAL
STANDING UP FROM CHAIR USING ARMS: A LOT
PERSONAL GROOMING: A LITTLE
TOILETING: TOTAL
MOBILITY SCORE: 7
WALKING IN HOSPITAL ROOM: A LOT
TURNING FROM BACK TO SIDE WHILE IN FLAT BAD: UNABLE
SUGGESTED CMS G CODE MODIFIER DAILY ACTIVITY: CK
WALKING IN HOSPITAL ROOM: TOTAL
SUGGESTED CMS G CODE MODIFIER DAILY ACTIVITY: CK
TOILETING: A LOT
MOVING FROM LYING ON BACK TO SITTING ON SIDE OF FLAT BED: UNABLE
MOVING TO AND FROM BED TO CHAIR: UNABLE
MOVING FROM LYING ON BACK TO SITTING ON SIDE OF FLAT BED: UNABLE
TURNING FROM BACK TO SIDE WHILE IN FLAT BAD: UNABLE
MOVING TO AND FROM BED TO CHAIR: UNABLE
SUGGESTED CMS G CODE MODIFIER MOBILITY: CM
CLIMB 3 TO 5 STEPS WITH RAILING: TOTAL
SUGGESTED CMS G CODE MODIFIER DAILY ACTIVITY: CK
DRESSING REGULAR UPPER BODY CLOTHING: A LOT
STANDING UP FROM CHAIR USING ARMS: A LOT
WALKING IN HOSPITAL ROOM: TOTAL
HELP NEEDED FOR BATHING: A LOT
TOILETING: A LITTLE
MOBILITY SCORE: 10
WALKING IN HOSPITAL ROOM: A LOT
WALKING IN HOSPITAL ROOM: TOTAL
SUGGESTED CMS G CODE MODIFIER MOBILITY: CM
SUGGESTED CMS G CODE MODIFIER MOBILITY: CM
TURNING FROM BACK TO SIDE WHILE IN FLAT BAD: UNABLE
EATING MEALS: A LOT
SUGGESTED CMS G CODE MODIFIER MOBILITY: CM
EATING MEALS: A LOT
TOILETING: A LOT
MOVING TO AND FROM BED TO CHAIR: UNABLE
MOVING TO AND FROM BED TO CHAIR: UNABLE
MOBILITY SCORE: 6
MOBILITY SCORE: 8
WALKING IN HOSPITAL ROOM: TOTAL
STANDING UP FROM CHAIR USING ARMS: A LOT
DRESSING REGULAR UPPER BODY CLOTHING: TOTAL
TURNING FROM BACK TO SIDE WHILE IN FLAT BAD: UNABLE
WALKING IN HOSPITAL ROOM: A LOT
MOBILITY SCORE: 8
WALKING IN HOSPITAL ROOM: A LOT
DRESSING REGULAR LOWER BODY CLOTHING: A LOT
SUGGESTED CMS G CODE MODIFIER DAILY ACTIVITY: CK
DRESSING REGULAR LOWER BODY CLOTHING: A LOT
SUGGESTED CMS G CODE MODIFIER MOBILITY: CN
STANDING UP FROM CHAIR USING ARMS: A LOT
HELP NEEDED FOR BATHING: A LITTLE
DRESSING REGULAR LOWER BODY CLOTHING: A LOT
TURNING FROM BACK TO SIDE WHILE IN FLAT BAD: UNABLE
MOBILITY SCORE: 7
DRESSING REGULAR UPPER BODY CLOTHING: A LOT
TURNING FROM BACK TO SIDE WHILE IN FLAT BAD: A LITTLE
TOILETING: A LOT
PERSONAL GROOMING: A LOT
DAILY ACTIVITIY SCORE: 14
TURNING FROM BACK TO SIDE WHILE IN FLAT BAD: UNABLE
MOVING FROM LYING ON BACK TO SITTING ON SIDE OF FLAT BED: A LOT
MOVING FROM LYING ON BACK TO SITTING ON SIDE OF FLAT BED: UNABLE
PERSONAL GROOMING: A LITTLE
WALKING IN HOSPITAL ROOM: TOTAL
DAILY ACTIVITIY SCORE: 14
STANDING UP FROM CHAIR USING ARMS: A LOT
TURNING FROM BACK TO SIDE WHILE IN FLAT BAD: UNABLE
TURNING FROM BACK TO SIDE WHILE IN FLAT BAD: UNABLE
PERSONAL GROOMING: A LITTLE
SUGGESTED CMS G CODE MODIFIER DAILY ACTIVITY: CK
WALKING IN HOSPITAL ROOM: A LOT
SUGGESTED CMS G CODE MODIFIER DAILY ACTIVITY: CN
PERSONAL GROOMING: A LOT
CLIMB 3 TO 5 STEPS WITH RAILING: TOTAL
MOBILITY SCORE: 16
STANDING UP FROM CHAIR USING ARMS: A LITTLE
STANDING UP FROM CHAIR USING ARMS: A LOT
MOBILITY SCORE: 6
MOVING TO AND FROM BED TO CHAIR: UNABLE
WALKING IN HOSPITAL ROOM: TOTAL
DRESSING REGULAR UPPER BODY CLOTHING: A LOT
TOILETING: A LOT
MOBILITY SCORE: 12
SUGGESTED CMS G CODE MODIFIER MOBILITY: CM
HELP NEEDED FOR BATHING: A LOT
EATING MEALS: A LITTLE
MOBILITY SCORE: 8
DRESSING REGULAR LOWER BODY CLOTHING: TOTAL
WALKING IN HOSPITAL ROOM: TOTAL
EATING MEALS: A LOT
DAILY ACTIVITIY SCORE: 11
SUGGESTED CMS G CODE MODIFIER MOBILITY: CK
EATING MEALS: A LITTLE
TOILETING: TOTAL
DRESSING REGULAR LOWER BODY CLOTHING: A LOT
STANDING UP FROM CHAIR USING ARMS: A LOT
MOVING FROM LYING ON BACK TO SITTING ON SIDE OF FLAT BED: UNABLE
DRESSING REGULAR UPPER BODY CLOTHING: A LITTLE
HELP NEEDED FOR BATHING: A LOT
DAILY ACTIVITIY SCORE: 14
SUGGESTED CMS G CODE MODIFIER MOBILITY: CL
TURNING FROM BACK TO SIDE WHILE IN FLAT BAD: UNABLE
CLIMB 3 TO 5 STEPS WITH RAILING: TOTAL
EATING MEALS: A LOT
CLIMB 3 TO 5 STEPS WITH RAILING: TOTAL
DRESSING REGULAR LOWER BODY CLOTHING: A LOT
TOILETING: A LOT
PERSONAL GROOMING: TOTAL
SUGGESTED CMS G CODE MODIFIER MOBILITY: CM
CLIMB 3 TO 5 STEPS WITH RAILING: TOTAL
MOVING TO AND FROM BED TO CHAIR: UNABLE
DRESSING REGULAR LOWER BODY CLOTHING: A LOT
MOVING FROM LYING ON BACK TO SITTING ON SIDE OF FLAT BED: UNABLE
TOILETING: TOTAL
WALKING IN HOSPITAL ROOM: TOTAL
PERSONAL GROOMING: A LOT
DAILY ACTIVITIY SCORE: 14
STANDING UP FROM CHAIR USING ARMS: A LOT
MOBILITY SCORE: 9
MOBILITY SCORE: 7
CLIMB 3 TO 5 STEPS WITH RAILING: TOTAL
HELP NEEDED FOR BATHING: TOTAL
WALKING IN HOSPITAL ROOM: TOTAL
HELP NEEDED FOR BATHING: A LOT
PERSONAL GROOMING: A LITTLE
EATING MEALS: A LITTLE
STANDING UP FROM CHAIR USING ARMS: A LOT
WALKING IN HOSPITAL ROOM: TOTAL
SUGGESTED CMS G CODE MODIFIER MOBILITY: CM
SUGGESTED CMS G CODE MODIFIER MOBILITY: CN
HELP NEEDED FOR BATHING: A LOT
DRESSING REGULAR LOWER BODY CLOTHING: A LOT
SUGGESTED CMS G CODE MODIFIER MOBILITY: CM
HELP NEEDED FOR BATHING: A LOT
MOBILITY SCORE: 8
CLIMB 3 TO 5 STEPS WITH RAILING: TOTAL
MOVING TO AND FROM BED TO CHAIR: UNABLE
DRESSING REGULAR UPPER BODY CLOTHING: A LOT
EATING MEALS: A LITTLE
CLIMB 3 TO 5 STEPS WITH RAILING: A LOT
TOILETING: TOTAL
MOVING FROM LYING ON BACK TO SITTING ON SIDE OF FLAT BED: UNABLE
TURNING FROM BACK TO SIDE WHILE IN FLAT BAD: UNABLE
DRESSING REGULAR UPPER BODY CLOTHING: A LITTLE
CLIMB 3 TO 5 STEPS WITH RAILING: TOTAL
MOVING TO AND FROM BED TO CHAIR: UNABLE
STANDING UP FROM CHAIR USING ARMS: A LOT
TURNING FROM BACK TO SIDE WHILE IN FLAT BAD: A LOT
HELP NEEDED FOR BATHING: A LOT
MOBILITY SCORE: 6

## 2020-01-01 ASSESSMENT — LIFESTYLE VARIABLES
EVER_SMOKED: NEVER
HOW MANY TIMES IN THE PAST YEAR HAVE YOU HAD 5 OR MORE DRINKS IN A DAY: 0
CONSUMPTION TOTAL: NEGATIVE
SUBSTANCE_ABUSE: 0
EVER FELT BAD OR GUILTY ABOUT YOUR DRINKING: NO
TOTAL SCORE: 0
AVERAGE NUMBER OF DAYS PER WEEK YOU HAVE A DRINK CONTAINING ALCOHOL: 0
EVER HAD A DRINK FIRST THING IN THE MORNING TO STEADY YOUR NERVES TO GET RID OF A HANGOVER: NO
HAVE PEOPLE ANNOYED YOU BY CRITICIZING YOUR DRINKING: NO
ON A TYPICAL DAY WHEN YOU DRINK ALCOHOL HOW MANY DRINKS DO YOU HAVE: 0
HAVE YOU EVER FELT YOU SHOULD CUT DOWN ON YOUR DRINKING: NO
ALCOHOL_USE: YES
DOES PATIENT WANT TO STOP DRINKING: NO
EVER_SMOKED: NEVER
TOTAL SCORE: 0
TOTAL SCORE: 0

## 2020-01-01 ASSESSMENT — PAIN DESCRIPTION - PAIN TYPE
TYPE: ACUTE PAIN
TYPE: CHRONIC PAIN
TYPE: ACUTE PAIN
TYPE: CHRONIC PAIN
TYPE: CHRONIC PAIN
TYPE: ACUTE PAIN
TYPE: CHRONIC PAIN
TYPE: ACUTE PAIN
TYPE: CHRONIC PAIN
TYPE: CHRONIC PAIN
TYPE: ACUTE PAIN
TYPE: CHRONIC PAIN
TYPE: ACUTE PAIN
TYPE: CHRONIC PAIN
TYPE: ACUTE PAIN
TYPE: CHRONIC PAIN

## 2020-01-01 ASSESSMENT — COPD QUESTIONNAIRES
COPD SCREENING SCORE: 2
COPD SCREENING SCORE: 2
DURING THE PAST 4 WEEKS HOW MUCH DID YOU FEEL SHORT OF BREATH: NONE/LITTLE OF THE TIME
IN THE PAST 12 MONTHS DO YOU DO LESS THAN YOU USED TO BECAUSE OF YOUR BREATHING PROBLEMS: DISAGREE/UNSURE
DO YOU EVER COUGH UP ANY MUCUS OR PHLEGM?: NO/ONLY WITH OCCASIONAL COLDS OR INFECTIONS
HAVE YOU SMOKED AT LEAST 100 CIGARETTES IN YOUR ENTIRE LIFE: NO/DON'T KNOW
DURING THE PAST 4 WEEKS HOW MUCH DID YOU FEEL SHORT OF BREATH: NONE/LITTLE OF THE TIME
HAVE YOU SMOKED AT LEAST 100 CIGARETTES IN YOUR ENTIRE LIFE: NO/DON'T KNOW
DO YOU EVER COUGH UP ANY MUCUS OR PHLEGM?: NO/ONLY WITH OCCASIONAL COLDS OR INFECTIONS

## 2020-01-01 ASSESSMENT — GAIT ASSESSMENTS
GAIT LEVEL OF ASSIST: UNABLE TO PARTICIPATE
GAIT LEVEL OF ASSIST: UNABLE TO PARTICIPATE
GAIT LEVEL OF ASSIST: MAXIMAL ASSIST
DISTANCE (FEET): 5
ASSISTIVE DEVICE: FRONT WHEEL WALKER
DISTANCE (FEET): 5
GAIT LEVEL OF ASSIST: UNABLE TO PARTICIPATE
GAIT LEVEL OF ASSIST: UNABLE TO PARTICIPATE
GAIT LEVEL OF ASSIST: MODERATE ASSIST
DISTANCE (FEET): 20
GAIT LEVEL OF ASSIST: UNABLE TO PARTICIPATE
ASSISTIVE DEVICE: FRONT WHEEL WALKER
GAIT LEVEL OF ASSIST: UNABLE TO PARTICIPATE
ASSISTIVE DEVICE: FRONT WHEEL WALKER
DEVIATION: OTHER (COMMENT)
GAIT LEVEL OF ASSIST: MAXIMAL ASSIST
ASSISTIVE DEVICE: WHEELCHAIR PUSH
GAIT LEVEL OF ASSIST: UNABLE TO PARTICIPATE
ASSISTIVE DEVICE: FRONT WHEEL WALKER

## 2020-01-01 ASSESSMENT — FIBROSIS 4 INDEX
FIB4 SCORE: 2.9
FIB4 SCORE: 1.38
FIB4 SCORE: 2.79
FIB4 SCORE: 2.9
FIB4 SCORE: 3.67
FIB4 SCORE: 2.9
FIB4 SCORE: 2.9

## 2020-01-01 ASSESSMENT — PAIN SCALES - WONG BAKER
WONGBAKER_NUMERICALRESPONSE: HURTS JUST A LITTLE BIT

## 2020-01-01 ASSESSMENT — ACTIVITIES OF DAILY LIVING (ADL): TOILETING: UNABLE TO DETERMINE AT THIS TIME

## 2020-02-28 NOTE — PROGRESS NOTES
Subjective:      Carmenza Bennett is a 74 y.o. female who presents with Other (she states that she is having pain whe she breaths, very persistnat pain, x1)            HPI  C/o acute pain with deep breathing at her epigastric/base of sternum.  present. Denies fever, cough, recent illness, fall, SOB or chest tightness, denies cardiac chest pain/pressure or palpitations. Denies GERD. Admits to being sedentary most of the day either sitting in a chair or laying down. H/o Parkinson's. Decreased mobility with extremity weakness, fear of falling as she has done this in the past. Seen by PCP in the last 2 months. No abnormal findings at visit. Does not participate in PT for strength training or prevention of falling. Denies n/v/d/c or dysuria.     PMH:  has a past medical history of Arthritis and Personal history of venous thrombosis and embolism (2006).  MEDS:   Current Outpatient Medications:   •  fluoxetine (PROZAC) 40 MG capsule, TAKE 1 CAPSULE BY MOUTH ONCE DAILY IN THE MORNING, Disp: , Rfl: 2  •  Cholecalciferol (VITAMIN D-3) 1000 units Cap, Take 1 Cap by mouth., Disp: , Rfl: 2  •  carbidopa-levodopa (SINEMET)  MG Tab, Take 1 Tab by mouth., Disp: , Rfl: 5  •  azithromycin (ZITHROMAX) 250 MG Tab, Z-pack: use as directed, Disp: 6 Tab, Rfl: 0  •  methylPREDNISolone (MEDROL DOSEPAK) 4 MG Tablet Therapy Pack, Follow schedule on package instructions., Disp: 21 Tab, Rfl: 0  •  TYLENOL 325 MG PO TABS, , Disp: , Rfl:   •  CYMBALTA PO, QDAY. , Disp: , Rfl:   •  IBUPROFEN 200 MG PO CAPS, QDAY. Instructed to stop antiinflammatory pre-op, Disp: , Rfl:   ALLERGIES: No Known Allergies  SURGHX:   Past Surgical History:   Procedure Laterality Date   • SHOULDER DECOMPRESSION ARTHROSCOPIC  7/24/08    Performed by TOAN ROMERO at [facility]   • CLAVICLE DISTAL EXCISION  7/24/08    Performed by TOAN ROMERO at [facility]   • OTHER ORTHOPEDIC SURGERY      bilateral knee menisectomies     SOCHX:   "reports that she has never smoked. She has never used smokeless tobacco.  FH: Family history was reviewed, no pertinent findings to report    Review of Systems   Constitutional: Negative for chills, fever and malaise/fatigue.   HENT: Negative for congestion.    Respiratory: Negative for cough, sputum production, shortness of breath and wheezing.    Cardiovascular: Positive for chest pain. Negative for palpitations, orthopnea and leg swelling.   Gastrointestinal: Negative for abdominal pain, constipation, diarrhea, nausea and vomiting.   Genitourinary: Negative for dysuria, flank pain, frequency, hematuria and urgency.   Musculoskeletal: Negative for myalgias.   Neurological: Negative for dizziness, tingling, sensory change, focal weakness, weakness and headaches.   Psychiatric/Behavioral: The patient is nervous/anxious.    All other systems reviewed and are negative.         Objective:     /60 (BP Location: Right arm, Patient Position: Sitting, BP Cuff Size: Adult)   Pulse 83   Temp 36.7 °C (98.1 °F) (Temporal)   Resp 14   Ht 1.727 m (5' 8\")   Wt 63.5 kg (140 lb)   SpO2 93%   BMI 21.29 kg/m²      Physical Exam  Vitals signs reviewed.   Constitutional:       General: She is awake. She is not in acute distress.     Appearance: Normal appearance. She is well-developed. She is not ill-appearing, toxic-appearing or diaphoretic.      Comments: Wheel Chair bound due to Parkinson's/ weakness from this condition.   HENT:      Head: Normocephalic.      Right Ear: Tympanic membrane, ear canal and external ear normal.      Left Ear: Tympanic membrane, ear canal and external ear normal.      Nose: Mucosal edema and rhinorrhea present.      Mouth/Throat:      Pharynx: Posterior oropharyngeal erythema present.   Eyes:      General:         Right eye: No discharge.         Left eye: No discharge.      Conjunctiva/sclera: Conjunctivae normal.      Pupils: Pupils are equal, round, and reactive to light.   Neck:      " Musculoskeletal: Normal range of motion.   Cardiovascular:      Rate and Rhythm: Normal rate and regular rhythm.      Pulses: Normal pulses.           Radial pulses are 2+ on the right side and 2+ on the left side.        Posterior tibial pulses are 2+ on the right side and 2+ on the left side.      Heart sounds: Normal heart sounds, S1 normal and S2 normal.   Pulmonary:      Effort: Pulmonary effort is normal. No accessory muscle usage or respiratory distress.      Breath sounds: Normal breath sounds and air entry. No stridor, decreased air movement or transmitted upper airway sounds. No decreased breath sounds, wheezing, rhonchi or rales.   Abdominal:      General: Bowel sounds are normal. There is no distension.      Palpations: Abdomen is soft.      Tenderness: There is no abdominal tenderness. There is no guarding or rebound.   Musculoskeletal: Normal range of motion.   Lymphadenopathy:      Cervical: No cervical adenopathy.   Skin:     General: Skin is warm and dry.   Neurological:      Mental Status: She is alert and oriented to person, place, and time.      GCS: GCS eye subscore is 4. GCS verbal subscore is 5. GCS motor subscore is 6.      Cranial Nerves: Cranial nerves are intact.      Sensory: Sensation is intact.      Motor: Weakness and tremor present.   Psychiatric:         Attention and Perception: Attention and perception normal.         Mood and Affect: Affect normal. Mood is anxious.         Speech: Speech normal.         Behavior: Behavior normal. Behavior is cooperative.         Thought Content: Thought content normal.         Cognition and Memory: Cognition and memory normal.         Judgment: Judgment normal.      Comments: Patient expresses frustration with weakness, increased immobility.             CXR:The cardiomediastinal silhouette is stable. No focal consolidation, pleural effusion or pneumothorax is identified. Costophrenic angles are clear. Remote left-sided rib fractures are seen.  There is linear left basilar atelectasis. Degenerative changes   are seen in the spine. Surgical clips are seen in the upper abdomen.   -spoke to Radiologist regarding any new findings in regard to remote left-sided rib fractures- states this is not new     Assessment/Plan:       1. Chest pain on breathing    - EKG - Clinic Performed: NSR, HR 86  -CXR- 2 view (patient able to stand for this with assistance)    -Discussed not cardiac or lung abnormalities seen at this time  -Discussed unable to determine if discomfort is cardiac related, would need to go to ER for this,  and patient understand this  -Recommend to f/u PCP regarding possibility for PT for strengthing and fall prevention that may help with immobility, both agree to contact PCP regarding this  -Monitor for continued pain, any changes in symptoms- must go to ER

## 2020-05-14 NOTE — ED NOTES
Med rec complete per pharmacy. Pt or emergency contact unreachable by phone. Allergies reviewed. No ABX filled in last 14 days per pharmacy.

## 2020-05-14 NOTE — ED NOTES
covid swab collected and sent to lab.   Pt cleaned and linen changed as pt is incontinent of urine.

## 2020-05-14 NOTE — ED PROVIDER NOTES
ED Provider Note    CHIEF COMPLAINT  Chief Complaint   Patient presents with   • Fall   • Shortness of Breath   • Chest Pain       HPI  Carmenza Bennett is a 75 y.o. female who presents with 5 days of shortness of breath.  She fell yesterday as well.  Her  is her caregiver and is overwhelmed.  He told paramedics he cannot take her home without assistance.  Patient has history of dementia oriented to self at baseline.  Patient denies everything now except for cough onset today.  She denies fever shortness of breath asthma heart failure tobacco use diabetes immune compromise, asthma, rhinorrhea, sore throat, headache or body aches.  History is unreliable.    REVIEW OF SYSTEMS  Pertinent positives include: Cough, shortness of breath, fall.  Pertinent negatives include: Head injury according to patient, vomiting, diarrhea, bleeding, dysuria.  Review of systems not reliable given apparent severe dementia    PAST MEDICAL HISTORY  Past Medical History:   Diagnosis Date   • Arthritis    • Personal history of venous thrombosis and embolism 2006    left calf   Dementia    SOCIAL HISTORY  Social History     Tobacco Use   • Smoking status: Never Smoker   • Smokeless tobacco: Never Used   Substance Use Topics   • Alcohol use: Not on file   • Drug use: Not on file     Social History     Substance and Sexual Activity   Drug Use Not on file       SURGICAL HISTORY  Past Surgical History:   Procedure Laterality Date   • SHOULDER DECOMPRESSION ARTHROSCOPIC  7/24/08    Performed by TOAN ROMERO at SURGERY [facility] ORS   • CLAVICLE DISTAL EXCISION  7/24/08    Performed by TOAN ROMERO at [facility] ORS   • OTHER ORTHOPEDIC SURGERY      bilateral knee menisectomies       CURRENT MEDICATIONS  No current facility-administered medications for this encounter.      Current Outpatient Medications   Medication Sig Dispense Refill   • fluoxetine (PROZAC) 40 MG capsule TAKE 1 CAPSULE BY MOUTH ONCE DAILY IN THE MORNING  2  "  • Cholecalciferol (VITAMIN D-3) 1000 units Cap Take 1 Cap by mouth.  2   • carbidopa-levodopa (SINEMET)  MG Tab Take 1 Tab by mouth.  5   • azithromycin (ZITHROMAX) 250 MG Tab Z-pack: use as directed 6 Tab 0   • methylPREDNISolone (MEDROL DOSEPAK) 4 MG Tablet Therapy Pack Follow schedule on package instructions. 21 Tab 0   • TYLENOL 325 MG PO TABS      • CYMBALTA PO QDAY.      • IBUPROFEN 200 MG PO CAPS QDAY. Instructed to stop antiinflammatory pre-op         ALLERGIES  No Known Allergies    PHYSICAL EXAM  VITAL SIGNS: /84   Pulse 98   Temp 36.8 °C (98.2 °F) (Tympanic)   Resp 20   Ht 1.676 m (5' 6\")   Wt 68 kg (150 lb)   SpO2 96%   BMI 24.21 kg/m²   Reviewed and hypertensive, no hypoxia room air  Constitutional: Well developed, Well nourished, well-appearing.  HENT: Normocephalic, atraumatic, bilateral external ears normal, oropharynx moist, No exudates or erythema.   Eyes: PERRLA, conjunctiva pink, no scleral icterus.   Cardiovascular: Regular pulse, no tachycardia, bipedal edema no calf tenderness  Respiratory: Unlabored respirations.  Gastrointestinal: Left, nontender, nondistended.  Skin: No erythema, no rash.   Genitourinary:  No costovertebral angle tenderness.   Neurologic: Alert & oriented to self, cranial nerves 2-12 intact by passive exam.  No focal deficit noted.  Psychiatric: Affect normal, Judgment normal, Mood normal.     DIFFERENTIAL DIAGNOSIS:  Pneumonia, pneumothorax, PE, heart failure, COVID pneumonia, COPD failure to thrive.      RADIOLOGY/PROCEDURES  EC-ECHOCARDIOGRAM COMPLETE W/O CONT   Final Result      CT-HEAD W/O   Final Result      1.  No acute intracranial findings.      2.  Diffuse atrophy and periventricular white matter changes, consistent with chronic small vessel disease.         DX-CHEST-PORTABLE (1 VIEW)   Final Result      Perihilar interstitial markings are increased and suggestive of mild pulmonary edema.          LABORATORY:  CBC with Differential   Result " Value Ref Range    WBC 11.6 (H) 4.8 - 10.8 K/uL    RBC 4.58 4.20 - 5.40 M/uL    Hemoglobin 13.9 12.0 - 16.0 g/dL    Hematocrit 42.4 37.0 - 47.0 %    MCV 92.6 81.4 - 97.8 fL    MCH 30.3 27.0 - 33.0 pg    MCHC 32.8 (L) 33.6 - 35.0 g/dL    RDW 45.7 35.9 - 50.0 fL    Platelet Count 346 164 - 446 K/uL    MPV 9.7 9.0 - 12.9 fL    Neutrophils-Polys 90.00 (H) 44.00 - 72.00 %    Lymphocytes 3.60 (L) 22.00 - 41.00 %    Monocytes 5.80 0.00 - 13.40 %    Eosinophils 0.00 0.00 - 6.90 %    Basophils 0.30 0.00 - 1.80 %    Immature Granulocytes 0.30 0.00 - 0.90 %    Nucleated RBC 0.00 /100 WBC    Neutrophils (Absolute) 10.42 (H) 2.00 - 7.15 K/uL    Lymphs (Absolute) 0.42 (L) 1.00 - 4.80 K/uL    Monos (Absolute) 0.67 0.00 - 0.85 K/uL    Eos (Absolute) 0.00 0.00 - 0.51 K/uL    Baso (Absolute) 0.04 0.00 - 0.12 K/uL    Immature Granulocytes (abs) 0.04 0.00 - 0.11 K/uL    NRBC (Absolute) 0.00 K/uL   Complete Metabolic Panel (CMP)   Result Value Ref Range    Sodium 138 135 - 145 mmol/L    Potassium 4.7 3.6 - 5.5 mmol/L    Chloride 101 96 - 112 mmol/L    Co2 23 20 - 33 mmol/L    Anion Gap 14.0 7.0 - 16.0    Glucose 114 (H) 65 - 99 mg/dL    Bun 22 8 - 22 mg/dL    Creatinine 0.83 0.50 - 1.40 mg/dL    Calcium 9.9 8.5 - 10.5 mg/dL    AST(SGOT) 22 12 - 45 U/L    ALT(SGPT) 12 2 - 50 U/L    Alkaline Phosphatase 111 (H) 30 - 99 U/L    Total Bilirubin 0.8 0.1 - 1.5 mg/dL    Albumin 3.9 3.2 - 4.9 g/dL    Total Protein 7.0 6.0 - 8.2 g/dL    Globulin 3.1 1.9 - 3.5 g/dL    A-G Ratio 1.3 g/dL   Troponin   Result Value Ref Range    Troponin T 9 6 - 19 ng/L   D-DIMER   Result Value Ref Range    D-Dimer Screen 1.78 (H) 0.00 - 0.50 ug/mL (FEU)   FERRITIN   Result Value Ref Range    Ferritin 172.0 10.0 - 291.0 ng/mL   SARS-CoV-2, PCR (In-House)   Result Value Ref Range    SARS-CoV-2 Source NP Swab     SARS-CoV-2 by PCR NotDetected NotDetected   proBrain Natriuretic Peptide, NT   Result Value Ref Range    NT-proBNP 219 (H) 0 - 125 pg/mL        INTERVENTIONS:  Case discussed with the hospitalist Dr. Molina who will admit the patient    COURSE & MEDICAL DECISION MAKING  This patient presents with reported shortness of breath, falls and failure to thrive.  I suspected she had CHF based on chest x-ray findings but brain natruretic peptide which resulted after admission suggested she does not have pulmonary edema.  There is no pneumonia or pneumothorax.  COVID testing is negative but it still possible the patient has COVID pneumonia with a false negative test result.  Since her  cannot care for her she will be admitted for supportive care and placement if needed.  Patient has a history of falling with unknown injury to her head.  There is no evidence of skull fracture or intracranial hemorrhage.    This patient has borderline or elevated blood pressure as recorded above and was instructed to followup with primary physician for comprehensive blood pressure evaluation and yearly fasting cholesterol assessment according to to CMS protocol.    PLAN:  As above    CONDITION: Fair.    FINAL IMPRESSION  1. Shortness of breath    2. Fall, initial encounter    3. Dementia without behavioral disturbance, unspecified dementia type (HCC)          Electronically signed by: Ernst Ames M.D., 5/14/2020 2:18 PM

## 2020-05-14 NOTE — ED NOTES
Pt arrived via ems for sob that started 5 days ago and pt fell out of wheel chair last night. Per ems pts has dementia and alert to baseline, pt fell out of wheel chair last night , no loc, pt denies hitting head. Started having a  Dry cough and sob 5 days ago, unsure about fever,  states he takes care of her and needs assistance taking care of her. U/a pt sitting up caox3 speaking in full sentences no distress, +sob, + cp, no abd pain,. Pt states started having substernal cp this am.

## 2020-05-15 PROBLEM — E83.51 HYPOCALCEMIA: Status: ACTIVE | Noted: 2020-01-01

## 2020-05-15 PROBLEM — N39.0 UTI (URINARY TRACT INFECTION): Status: ACTIVE | Noted: 2020-01-01

## 2020-05-15 PROBLEM — J20.8 VIRAL BRONCHITIS: Status: ACTIVE | Noted: 2020-01-01

## 2020-05-15 PROBLEM — J96.01 ACUTE RESPIRATORY FAILURE WITH HYPOXIA (HCC): Status: ACTIVE | Noted: 2020-01-01

## 2020-05-15 PROBLEM — F03.90 DEMENTIA (HCC): Status: ACTIVE | Noted: 2020-01-01

## 2020-05-15 PROBLEM — E87.6 HYPOKALEMIA: Status: ACTIVE | Noted: 2020-01-01

## 2020-05-15 NOTE — PROGRESS NOTES
2 RN skin check complete with Valente.   Devices in place: PIV.  Skin assessed under devices : yes.  Confirmed pressure ulcers found on NA.  New potential pressure ulcers noted on left buttock. Wound consult placed : yes.    The following skin issues noted: buttocks and sacrum are red and slow blanching. Barrier cream applied, mepilex applied. Groin area is red. Kristy care provided. Barrier cream applied. Right upper thigh has red and slow blanching  jessica due to the brief. Brief removed. Barrier cream applied. Elbows are red and blanching. Mepilex applied. All the toes are purplish red and slow blanching. Bilateral heels are red and blanching. Mepilex applied.   The following interventions in place: Q 2 turn, jass cream applied. Mepilex in place Frequent check for incontinence. Keep pt dry and clean. 2 RN skin check in place.     Picture taken. However, picture cannot be uploaded due to camera issues.

## 2020-05-15 NOTE — PROGRESS NOTES
Pt transported off unit with Talya 6 RN's. All belongings accounted for. Vitals stable. Pt recently cleaned due to incontinence. Care handed off.

## 2020-05-15 NOTE — DISCHARGE PLANNING
Care Transition Team Assessment    Attempted to call spouse but [phone] not working, [phone] not working and [phone] is patients and goes to . Chart reviewed. Lives with spouse in [facility]. PCP Dr. Pond. Uses W/C. Unknown needs @ present. Spoke with Mahesh ELIAS and when  calls will get a good number and update CM.     Information Source  Orientation : Disoriented to Person  Information Given By: Other (Comments)(Chart reviewed)    Readmission Evaluation  Is this a readmission?: No    Interdisciplinary Discharge Planning  Does Admitting Nurse Feel This Could be a Complex Discharge?: No  Primary Care Physician: OLGA POND  Lives with - Patient's Self Care Capacity: Spouse  Patient or legal guardian wants to designate a caregiver (see row info): No  Support Systems: Spouse / Significant Other  Housing / Facility: 1 Story House  Able to Return to Previous ADL's: No  Mobility Issues: Yes  Assistance Needed: Unknown at this Time  Durable Medical Equipment: Other - Specify(W/C.)    Discharge Preparedness  What are your discharge supports?: Spouse  Prior Functional Level: Uses Wheelchair    Functional Assesment  Prior Functional Level: Uses Wheelchair    Finances  Prescription Coverage: Yes    Discharge Risks or Barriers  Patient risk factors: Vulnerable adult    Anticipated Discharge Information  Anticipated discharge disposition: Home  Discharge Contact Phone Number: (Unknown at present time.)

## 2020-05-15 NOTE — PROGRESS NOTES
Assumed care of pt at shift change. A/Ox1. Reoriented to place, time and situation, discussed plan of care. Pt on room air. Denied pain.      All needs met at this time. Bed in lowest position, treaded socks on, personal belongings and call light within reach, instructed to call for any assistance, hourly rounding in place.

## 2020-05-15 NOTE — PROGRESS NOTES
Called report to Jonathon Holt at [place] [phone]. All questions answered. Pt will be transported via nursing assistants. Will monitor.

## 2020-05-15 NOTE — PROGRESS NOTES
Pt is refusing secondary Covid swab. Spoke with MD Morales regarding this. No new orders at this time. Per MD, pt ok with x1 negative Covid test. Will monitor.

## 2020-05-15 NOTE — DISCHARGE PLANNING
Spoke with patients spouse Gee. Patient has been unable to do anything for self for about a year. Confined to a W/C. Spouse has been caregiver and is unable to further carefore patient due to his own health issues. Spouse states that they have been denied Medicaid because they make too much. Spouse unable to continue care of patient. Mahesh ELIAS update.

## 2020-05-15 NOTE — PROGRESS NOTES
Spoke with MD Morales. Orders placed for oral potassium replacement and oral calcium replacement.

## 2020-05-15 NOTE — PROGRESS NOTES
MD Morales paged regarding critical calcium level and low potassium level. Awaiting call back from MD.

## 2020-05-15 NOTE — PROGRESS NOTES
[facility] Medicine Daily Progress Note    Date of Service  5/15/2020    Chief Complaint  75 y.o. female admitted 5/14/2020 with shortness of breath and failure to thrive    Hospital Course    Patient is a 75 year old female who presented 5/14/2020 with past medical history of arthritis, DVT, dementia comes into the emergency room with complaints of shortness of breath for 5 days.  Patient reported a nonproductive cough, fevers and chills.  She is confused at baseline and was sent by her  since he was unable to care for her.       Interval Problem Update  Her  is currently hospitalized here on telemetry, he is unable to care for her currently  She is oriented to person and place only, very poor memory  Calm but refusing any treatment  No cough  Now on room air  Ros otherwise negative    Consultants/Specialty      Code Status  Full code    Disposition  tbd    Review of Systems  Review of Systems   Unable to perform ROS: Dementia        Physical Exam  Temp:  [36.4 °C (97.6 °F)-37.3 °C (99.2 °F)] 37.3 °C (99.2 °F)  Pulse:  [] 88  Resp:  [16-23] 18  BP: (110-140)/(62-71) 140/71  SpO2:  [91 %-98 %] 94 %    Physical Exam  Vitals signs and nursing note reviewed. Exam conducted with a chaperone present.   Constitutional:       General: She is not in acute distress.     Appearance: Normal appearance. She is not diaphoretic.   HENT:      Head: Normocephalic.      Nose: Nose normal.      Mouth/Throat:      Mouth: Mucous membranes are moist.   Eyes:      Pupils: Pupils are equal, round, and reactive to light.   Cardiovascular:      Rate and Rhythm: Normal rate and regular rhythm.      Pulses: Normal pulses.      Heart sounds: Normal heart sounds.   Pulmonary:      Effort: Pulmonary effort is normal.      Breath sounds: Normal breath sounds.   Abdominal:      General: Abdomen is flat. Bowel sounds are normal.      Palpations: Abdomen is soft.   Musculoskeletal: Normal range of motion.         General: No  swelling or deformity.   Skin:     General: Skin is warm and dry.      Capillary Refill: Capillary refill takes less than 2 seconds.   Neurological:      General: No focal deficit present.      Mental Status: She is alert.      Cranial Nerves: No cranial nerve deficit.   Psychiatric:         Mood and Affect: Mood normal.         Behavior: Behavior normal.         Cognition and Memory: Cognition is impaired. Memory is impaired. She exhibits impaired recent memory and impaired remote memory.         Fluids  No intake or output data in the 24 hours ending 05/15/20 1347    Laboratory  Recent Labs     05/14/20  1330 05/15/20  0500   WBC 11.6* 7.2   RBC 4.58 4.49   HEMOGLOBIN 13.9 13.4   HEMATOCRIT 42.4 41.5   MCV 92.6 92.4   MCH 30.3 29.8   MCHC 32.8* 32.3*   RDW 45.7 46.5   PLATELETCT 346 217   MPV 9.7 10.1     Recent Labs     05/14/20  1330 05/15/20  0500   SODIUM 138 142   POTASSIUM 4.7 3.2*   CHLORIDE 101 113*   CO2 23 17*   GLUCOSE 114* 73   BUN 22 14   CREATININE 0.83 0.43*   CALCIUM 9.9 6.9*                   Imaging  EC-ECHOCARDIOGRAM COMPLETE W/O CONT   Final Result      CT-HEAD W/O   Final Result      1.  No acute intracranial findings.      2.  Diffuse atrophy and periventricular white matter changes, consistent with chronic small vessel disease.         DX-CHEST-PORTABLE (1 VIEW)   Final Result      Perihilar interstitial markings are increased and suggestive of mild pulmonary edema.           Assessment/Plan  * Viral bronchitis  Assessment & Plan  Presented with bilateral interstitial infiltrates, lymphopenia, negative procalcitonin and symptoms of viral bronchitis.   See above  Repeat COVID pending        Hypocalcemia  Assessment & Plan  Replacing  following    UTI (urinary tract infection)  Assessment & Plan  On ceftriaxone  following    Hypokalemia  Assessment & Plan  Replacing  Will check mag  Following    Acute respiratory failure with hypoxia (HCC)  Assessment & Plan  Remains 2 L O2 above  baseline  Continue to wean off as necessary  Was on empiric doxy and ceftriaxone, query atypical pneumonia?  No respiratory symptoms at this time will stop abx and monitor  COVID test negative, no known contact with recent positive individual - no need to stay in isolation  Continue supportive care  Echo without significant changes      Dementia (HCC)  Assessment & Plan  Continue frequent reorientation  Continue home Sinemet and Prozac       VTE prophylaxis: lovenox

## 2020-05-15 NOTE — PROGRESS NOTES
Pt confused and stating she needs to leave. Pt has removed multiple pulse oximetry monitors. Pt refusing vitals and will not let anyone touch her to replace the pulse oximetry monitor. Bed alarm on. Mahesh ELIAS notified.

## 2020-05-15 NOTE — H&P
Hospital Medicine History & Physical Note    Date of Service  5/14/2020    Primary Care Physician  Paige Charles M.D.    Code Status  Full Code    Chief Complaint  Chief Complaint   Patient presents with   • Fall   • Shortness of Breath   • Chest Pain       History of Presenting Illness  75 y.o. female who presented 5/14/2020 with past medical history of arthritis, DVT, dementia comes into the emergency room with complaints of shortness of breath of the 5 days.  Patient states associated with nonproductive cough, fevers and chills.  Patient denies any abdominal pain, nausea, vomiting, changes in urination, diarrhea.  Patient is confused at baseline and was sent by her  since he was unable to care for her.  Patient arrived to the hospital with normal vital signs  X-ray interpreted by me found bilateral interstitial infiltrates  EKG interpreted by me found normal sinus rhythm, low voltage  COVID was ruled out in the ER, based on patient's presentation, infiltrates, lymphopenia, negative procalcitonin suggest to reduce chest the patient  Review of Systems  Review of Systems   Unable to perform ROS: Dementia   Constitutional: Negative for chills, diaphoresis, fever and malaise/fatigue.   HENT: Negative for congestion, ear discharge, ear pain, hearing loss, nosebleeds, sinus pain, sore throat and tinnitus.    Eyes: Negative for blurred vision, double vision, photophobia and pain.   Respiratory: Negative for cough, hemoptysis, sputum production, shortness of breath, wheezing and stridor.    Cardiovascular: Negative for chest pain, palpitations, orthopnea, claudication, leg swelling and PND.   Gastrointestinal: Negative for abdominal pain, blood in stool, constipation, diarrhea, heartburn, melena, nausea and vomiting.   Genitourinary: Negative for dysuria, flank pain, frequency, hematuria and urgency.   Musculoskeletal: Negative for back pain, falls, joint pain, myalgias and neck pain.   Skin: Negative for  itching and rash.   Neurological: Negative for dizziness, tingling, tremors, weakness and headaches.   Endo/Heme/Allergies: Negative for environmental allergies and polydipsia. Does not bruise/bleed easily.   Psychiatric/Behavioral: Negative for depression, hallucinations, substance abuse and suicidal ideas.       Past Medical History   has a past medical history of Arthritis and Personal history of venous thrombosis and embolism (2006).    Surgical History   has a past surgical history that includes other orthopedic surgery; shoulder decompression arthroscopic (7/24/08); and clavicle distal excision (7/24/08).     Family History  Family history reviewed and not pertinent    Social History   reports that she has never smoked. She has never used smokeless tobacco. She reports current alcohol use.    Allergies  No Known Allergies    Medications  Prior to Admission Medications   Prescriptions Last Dose Informant Patient Reported? Taking?   Cholecalciferol (VITAMIN D-3) 1000 units Cap UNK at [facility] Patient's Home Pharmacy Yes No   Sig: Take 1 Cap by mouth every day.   alendronate (FOSAMAX) 70 MG Tab UNK at [facility] Patient's Home Pharmacy Yes Yes   Sig: Take 70 mg by mouth every 7 days.   carbidopa-levodopa (SINEMET)  MG Tab UNK at [facility] Patient's Home Pharmacy Yes No   Sig: Take 1 Tab by mouth 4 times a day.   fluoxetine (PROZAC) 40 MG capsule UNK at [facility] Patient's Home Pharmacy Yes No   Sig: TAKE 1 CAPSULE BY MOUTH ONCE DAILY IN THE MORNING      Facility-Administered Medications: None       Physical Exam  Temp:  [36.8 °C (98.2 °F)-37.2 °C (99 °F)] 37.2 °C (99 °F)  Pulse:  [] 83  Resp:  [16-23] 16  BP: (110-145)/(62-84) 110/64  SpO2:  [92 %-98 %] 95 %    Physical Exam  Vitals signs and nursing note reviewed.   Constitutional:       General: She is not in acute distress.     Appearance: Normal appearance. She is not ill-appearing, toxic-appearing or diaphoretic.   HENT:      Head: Normocephalic and atraumatic.       Nose: No congestion or rhinorrhea.      Mouth/Throat:      Pharynx: No oropharyngeal exudate or posterior oropharyngeal erythema.   Eyes:      General: No scleral icterus.  Neck:      Musculoskeletal: No neck rigidity or muscular tenderness.      Vascular: No carotid bruit.   Cardiovascular:      Rate and Rhythm: Normal rate and regular rhythm.      Pulses: Normal pulses.      Heart sounds: Normal heart sounds. No murmur. No friction rub. No gallop.    Pulmonary:      Effort: Pulmonary effort is normal. No respiratory distress.      Breath sounds: No stridor. Rales present. No wheezing or rhonchi.   Abdominal:      General: Abdomen is flat. There is no distension.      Palpations: There is no mass.      Tenderness: There is no abdominal tenderness. There is no left CVA tenderness, guarding or rebound.      Hernia: No hernia is present.   Musculoskeletal: Normal range of motion.         General: No swelling.      Right lower leg: Edema present.      Left lower leg: Edema present.   Lymphadenopathy:      Cervical: No cervical adenopathy.   Skin:     General: Skin is warm and dry.      Capillary Refill: Capillary refill takes more than 3 seconds.      Coloration: Skin is not jaundiced or pale.      Findings: No bruising or erythema.   Neurological:      Mental Status: She is alert.         Laboratory:  Recent Labs     05/14/20  1330   WBC 11.6*   RBC 4.58   HEMOGLOBIN 13.9   HEMATOCRIT 42.4   MCV 92.6   MCH 30.3   MCHC 32.8*   RDW 45.7   PLATELETCT 346   MPV 9.7     Recent Labs     05/14/20  1330   SODIUM 138   POTASSIUM 4.7   CHLORIDE 101   CO2 23   GLUCOSE 114*   BUN 22   CREATININE 0.83   CALCIUM 9.9     Recent Labs     05/14/20  1330   ALTSGPT 12   ASTSGOT 22   ALKPHOSPHAT 111*   TBILIRUBIN 0.8   GLUCOSE 114*         Recent Labs     05/14/20  1330   NTPROBNP 219*         Recent Labs     05/14/20  1330   TROPONINT 9       Imaging:  CT-HEAD W/O   Final Result      1.  No acute intracranial findings.      2.  Diffuse  atrophy and periventricular white matter changes, consistent with chronic small vessel disease.         DX-CHEST-PORTABLE (1 VIEW)   Final Result      Perihilar interstitial markings are increased and suggestive of mild pulmonary edema.      EC-ECHOCARDIOGRAM COMPLETE W/ CONT    (Results Pending)         Assessment/Plan:    * Viral bronchitis  Assessment & Plan  Presented with bilateral interstitial infiltrates, lymphopenia, negative procalcitonin and symptoms of viral bronchitis.  COVID was ruled out in ER and I suggested retest this patient  Started on ceftriaxone and doxycycline de-escalate as necessary  Follow-up sputum cultures      Acute respiratory failure with hypoxia (HCC)  Assessment & Plan  2 L O2 above baseline  Continue to wean off as necessary  Follow-up cardiac echo since her BNP was elevated    Dementia (HCC)  Assessment & Plan  Frequent reorientation  Continue home Sinemet and Prozac

## 2020-05-15 NOTE — CARE PLAN
Problem: Safety  Goal: Will remain free from falls  Outcome: PROGRESSING AS EXPECTED  Intervention: Implement fall precautions  Note: Bed alarm in place. Bed in lowest position, treaded socks on, personal belongings and call light within reach, instructed to call for any assistance, hourly rounding in place.      Problem: Venous Thromboembolism (VTW)/Deep Vein Thrombosis (DVT) Prevention:  Goal: Patient will participate in Venous Thrombosis (VTE)/Deep Vein Thrombosis (DVT)Prevention Measures  Outcome: PROGRESSING AS EXPECTED  Intervention: Ensure patient wears graduated elastic stockings (HOLA hose) and/or SCDs, if ordered, when in bed or chair (Remove at least once per shift for skin check)  Note: Educated pt about SCDS. SCDS applied

## 2020-05-16 PROBLEM — E83.42 HYPOMAGNESEMIA: Status: ACTIVE | Noted: 2020-01-01

## 2020-05-16 PROBLEM — G20.A1 PARKINSON'S DISEASE (HCC): Status: ACTIVE | Noted: 2020-01-01

## 2020-05-16 PROBLEM — M79.605 LOWER EXTREMITY PAIN, BILATERAL: Status: ACTIVE | Noted: 2020-01-01

## 2020-05-16 PROBLEM — M79.604 LOWER EXTREMITY PAIN, BILATERAL: Status: ACTIVE | Noted: 2020-01-01

## 2020-05-16 NOTE — PROGRESS NOTES
Pt is A&Ox1 to self; room air. Reorientated pt. Pt denies any pain at moment and shows no signs of distress. Pt bed bound; incontinent. All needs met at this moment. POC discussed with pt; all questions answered at this time. Fall precautions in place.  Call light within reach. Pt educated regarding fall prevention and states understanding. Hourly rounding in place. Assessment complete.

## 2020-05-16 NOTE — PROGRESS NOTES
Hospital Medicine Daily Progress Note    Date of Service  5/16/2020    Chief Complaint  75 y.o. female admitted 5/14/2020 with Bronchitis    Hospital Course  Admitted with Bronchitis    Interval Problem Update  Bronchitis - cough has resolved  Resp failure - off O2  Leg pain - c/o today  Parkinson's - records shows wheelchair bound  Low Magnesium    Consultants/Specialty  Palliative care    Code Status  Full    Disposition  TBD    Review of Systems  Review of Systems   Constitutional: Positive for malaise/fatigue. Negative for chills, diaphoresis and fever.   HENT: Negative for congestion, hearing loss and sore throat.    Eyes: Negative for blurred vision.   Respiratory: Positive for shortness of breath. Negative for cough and wheezing.    Cardiovascular: Negative for chest pain, palpitations and leg swelling.   Gastrointestinal: Negative for abdominal pain, diarrhea, heartburn, nausea and vomiting.   Genitourinary: Negative for dysuria, flank pain and hematuria.   Musculoskeletal: Negative for back pain, joint pain, myalgias and neck pain.        Leg pains   Skin: Negative for rash.   Neurological: Positive for weakness. Negative for dizziness, sensory change, speech change, focal weakness and headaches.   Psychiatric/Behavioral: The patient is nervous/anxious.         Physical Exam  Temp:  [36.3 °C (97.4 °F)-37.3 °C (99.2 °F)] 36.7 °C (98 °F)  Pulse:  [77-96] 81  Resp:  [16-18] 18  BP: (101-140)/(67-88) 121/88  SpO2:  [91 %-94 %] 93 %    Physical Exam  HENT:      Head: Normocephalic and atraumatic.      Nose: No congestion.      Mouth/Throat:      Mouth: Mucous membranes are moist.   Eyes:      Conjunctiva/sclera: Conjunctivae normal.      Pupils: Pupils are equal, round, and reactive to light.   Neck:      Musculoskeletal: No muscular tenderness.   Cardiovascular:      Rate and Rhythm: Normal rate and regular rhythm.   Pulmonary:      Effort: Pulmonary effort is normal.      Breath sounds: Normal breath sounds.    Abdominal:      General: Bowel sounds are normal. There is no distension.      Palpations: Abdomen is soft.      Tenderness: There is no abdominal tenderness. There is no guarding or rebound.   Musculoskeletal:      Right lower leg: No edema.      Left lower leg: No edema.   Lymphadenopathy:      Cervical: No cervical adenopathy.   Skin:     General: Skin is warm and dry.   Neurological:      General: No focal deficit present.      Mental Status: She is alert.      Cranial Nerves: No cranial nerve deficit.      Comments: Oriented to person   Psychiatric:         Attention and Perception: Attention normal.         Mood and Affect: Mood is anxious.         Speech: Speech is tangential.         Cognition and Memory: Cognition is impaired. Memory is impaired.         Fluids    Intake/Output Summary (Last 24 hours) at 5/16/2020 1217  Last data filed at 5/15/2020 2000  Gross per 24 hour   Intake 320 ml   Output --   Net 320 ml       Laboratory  Recent Labs     05/14/20  1330 05/15/20  0500 05/16/20  0944   WBC 11.6* 7.2 8.5   RBC 4.58 4.49 4.77   HEMOGLOBIN 13.9 13.4 14.4   HEMATOCRIT 42.4 41.5 44.1   MCV 92.6 92.4 92.5   MCH 30.3 29.8 30.2   MCHC 32.8* 32.3* 32.7*   RDW 45.7 46.5 45.8   PLATELETCT 346 217 318   MPV 9.7 10.1 9.4     Recent Labs     05/14/20  1330 05/15/20  0500 05/16/20  0231   SODIUM 138 142 136   POTASSIUM 4.7 3.2* 4.0   CHLORIDE 101 113* 101   CO2 23 17* 22   GLUCOSE 114* 73 93   BUN 22 14 16   CREATININE 0.83 0.43* 0.69   CALCIUM 9.9 6.9* 8.9                   Imaging  EC-ECHOCARDIOGRAM COMPLETE W/O CONT   Final Result      CT-HEAD W/O   Final Result      1.  No acute intracranial findings.      2.  Diffuse atrophy and periventricular white matter changes, consistent with chronic small vessel disease.         DX-CHEST-PORTABLE (1 VIEW)   Final Result      Perihilar interstitial markings are increased and suggestive of mild pulmonary edema.           Assessment/Plan  * Viral bronchitis- (present on  admission)  Assessment & Plan  COVID-19 negative    UTI (urinary tract infection)- (present on admission)  Assessment & Plan  Finished course of IV Ceftriaxone    Dementia (HCC)- (present on admission)  Assessment & Plan  Avoid benzodiazepines and anticholinergics  Frequent orientation  Avoid early morning labs  Avoid vital signs during sleep  Ambulate if possible  Check b12 and TSH  Consult Palliative care    Hypomagnesemia- (present on admission)  Assessment & Plan  Start oral Mg    Lower extremity pain, bilateral- (present on admission)  Assessment & Plan  Scheduled Tylenol    Parkinson's disease (HCC)- (present on admission)  Assessment & Plan  Sinemet    Hypocalcemia- (present on admission)  Assessment & Plan  Stop oral Ca, follow bmp    Hypokalemia- (present on admission)  Assessment & Plan  Follow bmp    Acute respiratory failure with hypoxia (HCC)- (present on admission)  Assessment & Plan  RT protocol         VTE prophylaxis: Lovenox

## 2020-05-16 NOTE — CARE PLAN
Problem: Safety  Goal: Will remain free from injury  Outcome: PROGRESSING AS EXPECTED  Note: Fall precautions in place. Bed in lowest position. Non-skid socks in place. Personal possessions within reach. Mobility sign on door. Bed-alarm on. Call light within reach. Pt educated regarding fall prevention and states understanding.      Problem: Knowledge Deficit  Goal: Knowledge of disease process/condition, treatment plan, diagnostic tests, and medications will improve  Outcome: PROGRESSING AS EXPECTED  Note: Pt educated regarding plan of care and medications. All questions answered.

## 2020-05-16 NOTE — WOUND TEAM
Wound consult received for left buttock. Patient seen in S615-1. Pt initially turned to right side. Pt noted to be incontinent of urine. Cleansed sacrococcygea area w/ warm wash cloth. New pads changed. Left buttock blanching when palpated. No open wounds noted. Pt then rolled over to left side. Pulled old pads out. Cleansed sacrococcygeal area w/ warm wash cloth. Right buttock assessed, pt blanching when palpated. No open wounds noted. Barrier paste applied. No advanced wound care needs at this time.

## 2020-05-16 NOTE — PROGRESS NOTES
Report received by maite RN. Assumed care of pt. Assessment complete. Pt A&Ox1 to self, VSS and on RA. Pt in no apparent signs of distress, denies pain. Pt bed bound, and incontinent. No other needs at this time. Call light within reach, bed locked and low, and hourly rounding in place.

## 2020-05-16 NOTE — CARE PLAN
Problem: Safety  Goal: Will remain free from injury  Outcome: PROGRESSING AS EXPECTED   Fall precautions in place. Treaded socks on pt. Bedrails up. Bed in lowest position and locked.  Call light and phone within reach. Bed alarm on.    Problem: Knowledge Deficit  Goal: Knowledge of disease process/condition, treatment plan, diagnostic tests, and medications will improve  Outcome: PROGRESSING AS EXPECTED   Patient educated about POC.  All questions answered in regards to disease process, treatment, medications and diet.  Patient verbalized understanding and voiced no further questions at this time.

## 2020-05-16 NOTE — PROGRESS NOTES
Pt complain of 9/10 left leg pain. Currently has only tylenol for pain; will give to pt.     Deep breathing, hot pack, blanket, emotional support, stretching leg was done to help assist with managing pain. Therapeutic communication used.      MD Kiran was made aware of pain and that only pain med is tylenol at this moment.

## 2020-05-16 NOTE — PROGRESS NOTES
2 RN skin check complete with CURT Appiah.  Devices in place: PIV.  Skin assessed under devices : yes, skin intact.  Confirmed pressure ulcers found on: NA.  New potential pressure ulcers noted on: L buttock. Wound consult placed: yes.   Interventions in place: [id number], pillows for repositioning, waffle overlay, heel float boots, barrier cream, and incontinence care provided with linen changes.       Skin Assessment:  -buttocks and sacrum are red and slow to obed. Barrier cream applied.- Groin/ kristy area is red and excoriated. Kristy care provided, along with barrier cream.   -Elbows red and blanching. Protective mepilex dressing on.   -Toes/feet are purplish, red and slow to obed.   -Bilateral heels are red and blanching with proetective mepilex dressing on.

## 2020-05-16 NOTE — RESPIRATORY CARE
COPD EDUCATION by COPD CLINICAL EDUCATOR  5/16/2020 at 7:04 AM by Gisele Ham, RRT     Patient reviewed by COPD education team. Patient does not have a history or diagnosis of COPD and is a non-smoker, therefore does not qualify for the COPD program.

## 2020-05-17 NOTE — PROGRESS NOTES
"Hospital Medicine Daily Progress Note    Date of Service  5/17/2020    Chief Complaint  75 y.o. female admitted 5/14/2020 with Bronchitis    Hospital Course  Admitted with Bronchitis    Interval Problem Update  Bronchitis - resolved  Resp failure - off O2  Leg pain - pain better controlled  Parkinson's - records shows wheelchair bound, but pt insists \"I can walk.\"    Consultants/Specialty  Palliative care    Code Status  Full    Disposition  TBD    Review of Systems  Review of Systems   Constitutional: Positive for malaise/fatigue. Negative for chills, diaphoresis and fever.   HENT: Negative for congestion, hearing loss and sore throat.    Eyes: Negative for blurred vision.   Respiratory: Negative for cough, shortness of breath and wheezing.    Cardiovascular: Negative for chest pain, palpitations and leg swelling.   Gastrointestinal: Negative for abdominal pain, diarrhea, heartburn, nausea and vomiting.   Genitourinary: Negative for dysuria, flank pain and hematuria.   Musculoskeletal: Negative for back pain, joint pain, myalgias and neck pain.        Leg pains   Skin: Negative for rash.   Neurological: Positive for weakness. Negative for dizziness, sensory change, speech change, focal weakness and headaches.   Psychiatric/Behavioral: The patient is nervous/anxious.         Physical Exam  Temp:  [36.3 °C (97.3 °F)-36.8 °C (98.3 °F)] 36.8 °C (98.3 °F)  Pulse:  [77-88] 81  Resp:  [16-18] 17  BP: (100-108)/(55-66) 104/63  SpO2:  [91 %-93 %] 91 %    Physical Exam  HENT:      Head: Normocephalic and atraumatic.      Nose: No congestion.      Mouth/Throat:      Mouth: Mucous membranes are moist.   Eyes:      Conjunctiva/sclera: Conjunctivae normal.      Pupils: Pupils are equal, round, and reactive to light.   Neck:      Musculoskeletal: No muscular tenderness.   Cardiovascular:      Rate and Rhythm: Normal rate and regular rhythm.   Pulmonary:      Effort: Pulmonary effort is normal.      Breath sounds: Normal breath " sounds.   Abdominal:      General: Bowel sounds are normal. There is no distension.      Palpations: Abdomen is soft.      Tenderness: There is no abdominal tenderness. There is no guarding or rebound.   Musculoskeletal:      Right lower leg: Edema present.      Left lower leg: Edema present.   Lymphadenopathy:      Cervical: No cervical adenopathy.   Skin:     General: Skin is warm and dry.   Neurological:      General: No focal deficit present.      Mental Status: She is alert.      Cranial Nerves: No cranial nerve deficit.      Comments: Oriented to person   Psychiatric:         Attention and Perception: Attention normal.         Mood and Affect: Mood is anxious.         Speech: Speech is tangential.         Cognition and Memory: Cognition is impaired. Memory is impaired.         Fluids    Intake/Output Summary (Last 24 hours) at 5/17/2020 1136  Last data filed at 5/16/2020 2000  Gross per 24 hour   Intake 610 ml   Output --   Net 610 ml       Laboratory  Recent Labs     05/14/20  1330 05/15/20  0500 05/16/20  0944   WBC 11.6* 7.2 8.5   RBC 4.58 4.49 4.77   HEMOGLOBIN 13.9 13.4 14.4   HEMATOCRIT 42.4 41.5 44.1   MCV 92.6 92.4 92.5   MCH 30.3 29.8 30.2   MCHC 32.8* 32.3* 32.7*   RDW 45.7 46.5 45.8   PLATELETCT 346 217 318   MPV 9.7 10.1 9.4     Recent Labs     05/14/20  1330 05/15/20  0500 05/16/20  0231   SODIUM 138 142 136   POTASSIUM 4.7 3.2* 4.0   CHLORIDE 101 113* 101   CO2 23 17* 22   GLUCOSE 114* 73 93   BUN 22 14 16   CREATININE 0.83 0.43* 0.69   CALCIUM 9.9 6.9* 8.9                   Imaging  EC-ECHOCARDIOGRAM COMPLETE W/O CONT   Final Result      CT-HEAD W/O   Final Result      1.  No acute intracranial findings.      2.  Diffuse atrophy and periventricular white matter changes, consistent with chronic small vessel disease.         DX-CHEST-PORTABLE (1 VIEW)   Final Result      Perihilar interstitial markings are increased and suggestive of mild pulmonary edema.           Assessment/Plan  * Viral  bronchitis- (present on admission)  Assessment & Plan  COVID-19 negative    UTI (urinary tract infection)- (present on admission)  Assessment & Plan  Finished course of IV Ceftriaxone    Dementia ([facility])- (present on admission)  Assessment & Plan  Avoid benzodiazepines and anticholinergics  Frequent orientation  Avoid early morning labs  Avoid vital signs during sleep  Ambulate if possible  Check b12 and TSH on next lab draw  Consulted Palliative care    Hypomagnesemia- (present on admission)  Assessment & Plan  oral Mg    Lower extremity pain, bilateral- (present on admission)  Assessment & Plan  Scheduled Tylenol    Parkinson's disease (HCC)- (present on admission)  Assessment & Plan  Sinemet  PT and OT evaluations    Hypocalcemia- (present on admission)  Assessment & Plan  follow bmp    Hypokalemia- (present on admission)  Assessment & Plan  Follow bmp    Acute respiratory failure with hypoxia ([facility])- (present on admission)  Assessment & Plan  RT protocol         VTE prophylaxis: Lovenox

## 2020-05-17 NOTE — PROGRESS NOTES
2 RN skin check complete with CURT Mcdonnell.  Devices in place: PIV.  Skin assessed under devices : yes.  Confirmed pressure ulcers found on: n/a  New potential pressure ulcers noted on: n/a  Wound consult placed:n/a    Interventions in place: [id number], pillows for repositioning, heel float boots, barrier cream, and incontinence care provided as needed.         Skin Assessment:  -buttocks and sacrum are red/obed; barrier cream applied.  - Groin/ orlin area is red; orlin care provided  -Elbows red/blanching; protective mepilex dressing on.   -Bilateral heels are red/blanching; proetective mepilex dressing on.

## 2020-05-17 NOTE — PROGRESS NOTES
Bedside report received, pt care assumed. Pt A&Ox1 to self, VSS. No complaints of pain at this time. Pt's linens changed as pt was incontinent of urine. No other needs at this time. POC discussed. Bed locked and in lowest position, bed alarm on, pt educated on fall risk and verbalized understanding, call light within reach. Hourly rounding in place.

## 2020-05-17 NOTE — PROGRESS NOTES
Assessment complete. Pt A&O 1. Room air. Pt denied any pain . Pt denied any SOB. Pt refused evening medication. Pt refused help with dinner from CNA. Pt kick CNA out of room.Regular diet. Call bell within reach. Bed low and locked.

## 2020-05-17 NOTE — CARE PLAN
Problem: Safety  Goal: Will remain free from injury  Outcome: PROGRESSING AS EXPECTED  Note: Pt remains injury free

## 2020-05-17 NOTE — PROGRESS NOTES
2 RN skin check complete with CURT Ling.  Devices in place: PIV.  Skin assessed under devices : yes, skin intact.  Confirmed pressure ulcers found on: NA.  New potential pressure ulcers noted on: L buttock. Wound consult placed: Wound is currently following.   Interventions in place: [id number], pillows for repositioning, waffle overlay, heel float boots, barrier cream, and incontinence care provided with linen changes.         Skin Assessment:  -buttocks and sacrum are red and slow to obed. Barrier cream applied.  - Groin/ kristy area is red and excoriated. Kristy care provided, along with barrier cream.   -Elbows red and blanching. Protective mepilex dressing on.   -Toes/feet are purplish, red and slow to obed.   -Bilateral heels are red and blanching with proetective mepilex dressing on.

## 2020-05-17 NOTE — CARE PLAN
Problem: Safety  Goal: Will remain free from injury  Outcome: PROGRESSING AS EXPECTED   Fall precautions in place. Treaded socks on pt. Bedrails up. Bed in lowest position and locked.  Call light and phone within reach. Bed alarm on.     Problem: Venous Thromboembolism (VTW)/Deep Vein Thrombosis (DVT) Prevention:  Goal: Patient will participate in Venous Thrombosis (VTE)/Deep Vein Thrombosis (DVT)Prevention Measures  Outcome: PROGRESSING AS EXPECTED   Lovenox administered for DVT prophylaxis.

## 2020-05-18 NOTE — PROGRESS NOTES
Report received by dayshift RN. Assumed care of pt. Assessment complete. Pt A&Ox1 to self, confusion at baseline and agitated, refusing care. VSS and on RA. Pt removed IV and is refusing for another insertion. Pt denies any pain at this time. Pt bed bound and incontinent. No other needs at this time. Safety precautions in place. Call light within reach, bed locked and in lowest position. Hourly rounding in place.

## 2020-05-18 NOTE — CONSULTS
Reason for PC Consult: Advance Care Planning    Consulted by: Dr Morales    Assessment:  General: 75 yr old female admitted on 5/14/2020 for Pneumonia.     Dyspnea: No- resp rate 20, 92% room air   Last BM: 05/18/20-    Pain: No- currently denies  Depression: No-    Dementia: Yes;  6, D    Spiritual:  Is Baptism or spirituality important for coping with this illness? Yes-    Has a  or spiritual provider visit been requested? No    Palliative Performance Scale: 50%    Advance Directive: None on file.   DPOA: No, NOK Gee Bennett [phone], spouse  POLST: None on file.     Code Status:Full, pts spouse stated that her wishes are to be a  DNR/DNI. MD and bedside RN updated.     Outcome:  Met with the pt at the bedside, pt oriented to self only. Resting comfortably in bed, declined television, asked for table to be repositioned.     Attempted to call spouse,  Gee, number that was listed disconnected. Called their son, Jasbir, who called back. Jasbir lives in [place] even though he has a [employer]-phone number. Jasbir provided cell phone number for his father, [phone], face sheet updated. Provided Jasbir with brief update on his mother. Jasbir provided update that his father is older than his mother and in his 90s.     Called and spoke with pts spouse, Gee. Introduced self and spoke about his wife's current admission. Gee verbalized feeling guilty that he felt unable to care for his wife any longer. Gee spoke about having to go to his neighbors several times in the past to get assistance to life his wife when she has fallen in the past and he feels unsafe any longer. Validated Gee's thoughts and feelings, support him in his desire to his wife safely placed in a GH if possible.   Asked Gee if he and his wife had ever discussed End of Life wishes or completed AD. Gee stated that they have both completed ADs and that he is his wife's DPOA. Asked Gee what their wishes for end of  life are. Gee stated that they both feel they would not want to be kept alive on machines.    Explained Full code, CPR, Intubation vs DNR and DNI. Gee stated that he really felt that they would wish for her to be a DNR/DNI. Let him know I will update the MD and the RN.     Spoke about goal of  for support for Carmenza. Spoke about how difficult caring for her has been on him and that physically he feels he is unable to safely take care of her any longer. Updated that the [facility] was working with his wife's County worker from [facility] Disability. Let Gee know that [facility] may be calling him to discuss particulars for placement.     Discussed POLST for DC to . Gee provided e-mail in order to attempt to coordinate POLST for DC later. Agreed to work on POLST and provided Gee with Palliative Team contact number for any needs.   [email or web address]    Updated: Dr Kiran, BS RN Brian, [facility].     Plan: Hope for DC to      Thank you for allowing Palliative Care to participate in this patient's care. Please feel free to call [phone] with any questions or concerns.

## 2020-05-18 NOTE — PROGRESS NOTES
Hospital Medicine Daily Progress Note    Date of Service  5/18/2020    Chief Complaint  75 y.o. female admitted 5/14/2020 with Bronchitis    Hospital Course  Admitted with Bronchitis    Interval Problem Update  Bronchitis - resolved  Resp failure - off O2  Leg pain - pain seems controlled  Parkinson's - SW confirmed with spouse pt is wheelchair-bound, needs help with transfers    Discussed case with Palliative care, spouse clarified CODE status - DNR/DNI    Consultants/Specialty  Palliative care    Code Status  Full    Disposition  TBD    Review of Systems  Review of Systems   Constitutional: Positive for malaise/fatigue. Negative for chills, diaphoresis and fever.   HENT: Negative for congestion, hearing loss and sore throat.    Eyes: Negative for blurred vision.   Respiratory: Negative for cough, shortness of breath and wheezing.    Cardiovascular: Negative for chest pain, palpitations and leg swelling.   Gastrointestinal: Negative for abdominal pain, diarrhea, heartburn, nausea and vomiting.   Genitourinary: Negative for dysuria, flank pain and hematuria.   Musculoskeletal: Negative for back pain, joint pain, myalgias and neck pain.        Leg pains   Skin: Negative for rash.   Neurological: Positive for weakness. Negative for dizziness, sensory change, speech change, focal weakness and headaches.   Psychiatric/Behavioral: The patient is nervous/anxious.         Physical Exam  Temp:  [36.4 °C (97.5 °F)-37.1 °C (98.8 °F)] 36.4 °C (97.5 °F)  Pulse:  [] 77  Resp:  [16-20] 20  BP: (104-133)/(69-80) 104/69  SpO2:  [91 %-95 %] 92 %    Physical Exam  HENT:      Head: Normocephalic and atraumatic.      Nose: No congestion.      Mouth/Throat:      Mouth: Mucous membranes are moist.   Eyes:      Conjunctiva/sclera: Conjunctivae normal.      Pupils: Pupils are equal, round, and reactive to light.   Neck:      Musculoskeletal: No muscular tenderness.   Cardiovascular:      Rate and Rhythm: Normal rate and regular  rhythm.   Pulmonary:      Effort: Pulmonary effort is normal.      Breath sounds: Normal breath sounds.   Abdominal:      General: Bowel sounds are normal. There is no distension.      Palpations: Abdomen is soft.      Tenderness: There is no abdominal tenderness. There is no guarding or rebound.   Musculoskeletal:      Right lower leg: Edema present.      Left lower leg: Edema present.   Lymphadenopathy:      Cervical: No cervical adenopathy.   Skin:     General: Skin is warm and dry.   Neurological:      General: No focal deficit present.      Mental Status: She is alert.      Cranial Nerves: No cranial nerve deficit.      Comments: Oriented to person   Psychiatric:         Attention and Perception: She is inattentive.         Mood and Affect: Mood is anxious.         Speech: Speech is rapid and pressured and tangential.         Behavior: Behavior is agitated and aggressive.         Cognition and Memory: Cognition is impaired. Memory is impaired.         Fluids    Intake/Output Summary (Last 24 hours) at 5/18/2020 1331  Last data filed at 5/18/2020 0941  Gross per 24 hour   Intake 620 ml   Output --   Net 620 ml       Laboratory  Recent Labs     05/16/20  0944   WBC 8.5   RBC 4.77   HEMOGLOBIN 14.4   HEMATOCRIT 44.1   MCV 92.5   MCH 30.2   MCHC 32.7*   RDW 45.8   PLATELETCT 318   MPV 9.4     Recent Labs     05/16/20  0231 05/18/20  0803   SODIUM 136 135   POTASSIUM 4.0 4.3   CHLORIDE 101 101   CO2 22 23   GLUCOSE 93 96   BUN 16 19   CREATININE 0.69 0.67   CALCIUM 8.9 9.6                   Imaging  EC-ECHOCARDIOGRAM COMPLETE W/O CONT   Final Result      CT-HEAD W/O   Final Result      1.  No acute intracranial findings.      2.  Diffuse atrophy and periventricular white matter changes, consistent with chronic small vessel disease.         DX-CHEST-PORTABLE (1 VIEW)   Final Result      Perihilar interstitial markings are increased and suggestive of mild pulmonary edema.           Assessment/Plan  * Viral bronchitis-  (present on admission)  Assessment & Plan  COVID-19 negative    UTI (urinary tract infection)- (present on admission)  Assessment & Plan  Finished course of IV Ceftriaxone    Dementia (HCC)- (present on admission)  Assessment & Plan  Avoid benzodiazepines and anticholinergics  Frequent orientation  Avoid early morning labs  Avoid vital signs during sleep  Ambulate if possible  Check b12 and TSH   Palliative care    Hypomagnesemia- (present on admission)  Assessment & Plan  oral Mg    Lower extremity pain, bilateral- (present on admission)  Assessment & Plan  Scheduled Tylenol    Parkinson's disease (HCC)- (present on admission)  Assessment & Plan  Sinemet  PT and OT evaluations    Hypocalcemia- (present on admission)  Assessment & Plan  follow bmp    Hypokalemia- (present on admission)  Assessment & Plan  Follow bmp    Acute respiratory failure with hypoxia (HCC)- (present on admission)  Assessment & Plan  RT protocol         VTE prophylaxis: Lovenox

## 2020-05-18 NOTE — THERAPY
Physical Therapy   Initial Evaluation     Patient Name: Carmenza Bennett  Age:  75 y.o., Sex:  female  Medical Record #: [id number]  Today's Date: 5/18/2020     Precautions  Precautions: Fall Risk  Comments: hx parkinsons and dementia    Subjective    Pt found sitting at EOB trying to get up on her own. Pt agreeable to PT. Oriented to self and place.     Objective       05/18/20 1557   Prior Living Situation   Prior Services Unable To Determine At This Time   Housing / Facility Unable To Determine At This Time   Lives with - Patient's Self Care Capacity Spouse   Comments pt unreliable historian   Prior Level of Functional Mobility   Bed Mobility Unable To Determine At This Time   Transfer Status Unable To Determine At This Time   Ambulation Unable To Determine At This Time   Assistive Devices Used Unable to Determine At This Time   Stairs Unable To Determine At This Time   History of Falls   History of Falls   (unable to determine)   Cognition    Speech/ Communication Delayed Responses;Word Finding Impairment   Orientation Level Not Oriented to Time;Not Oriented to Day;Not Oriented to Month;Not Oriented to Year   Level of Consciousness Alert   Ability To Follow Commands Unable to Follow 1 Step Commands   Safety Awareness Impaired;Impulsive   New Learning Impaired   Attention Impaired   Sequencing Impaired   Comments pt pleasantly confused   Passive ROM Lower Body   Passive ROM Lower Body WDL   Active ROM Lower Body    Active ROM Lower Body  WDL   Strength Lower Body   Comments B hip flexion 2/5 rest of B LEs grossly 4-/5 t/o.   Neurological Concerns   Neurological Concerns Yes   Comments cognition, tremors   Coordination Lower Body    Coordination Lower Body  X   Comments cues needed for simple sequencing   Balance Assessment   Standing Balance (Static) Poor   Standing Balance (Dynamic) Poor   Weight Shift Sitting Fair   Comments with FWW   Gait Analysis   Gait Level Of Assist Unable to Participate   Bed Mobility    Supine  to Sit Supervised   Sit to Supine Minimal Assist  (for B LEs)   Scooting Moderate Assist   Skilled Intervention Verbal Cuing;Tactile Cuing;Sequencing;Compensatory Strategies   Functional Mobility   Sit to Stand Moderate Assist   Bed, Chair, Wheelchair Transfer Moderate Assist   Transfer Method Stand Pivot   Mobility bed<>chair with FWW   Skilled Intervention Verbal Cuing;Tactile Cuing;Sequencing   Patient / Family Goals    Patient / Family Goal #1 to get her legs stronger   Short Term Goals    Short Term Goal # 1 Pt will be SPV for supine<>sit in 6 txs to improve bed mobility.   Short Term Goal # 2 Pt will be Lashonda for transfers with FWW in 6 txs to improve OOB activity.   Short Term Goal # 3 Pt will be Lashonda for gait with FWW x 50' to improve mobility.   Education Group   Education Provided Role of Physical Therapist   Role of Physical Therapist Patient Response Patient;Acceptance;Demonstration;Explanation;Reinforcement Needed   Problem List    Problems Impaired Bed Mobility;Impaired Transfers;Impaired Ambulation;Functional Strength Deficit;Impaired Balance;Impaired Coordination;Decreased Activity Tolerance;Safety Awareness Deficits / Cognition;Motor Planning / Sequencing   Anticipated Discharge Equipment   DC Equipment Unable To Determine At This Time       Assessment  Patient is 75 y.o. female with a diagnosis of PNA hx of PD and dementia. Per EMR, spouse admits unable to care for his spouse anymore and pt needs higher level of care at discharge.  Pt is pleasantly confused and able to follow simple commands 50% of the time. Pt requires ModA for STS from bed and chair with cues for B UE/LE placement and poor standing balance with posterior lean. Pt able to correct posterior lean with cues to lean onto the walker. Pt is ModA for stand-pivot transfers bed<>chair with FWW with extra time needed and cues for weight shifting and LE sequencing. Anticipate pt's transfers will improve with PT tx focusing on repetition and  simple cues. PT will see pt during acute stay. Recommend group home for 24/7 CG assist with home health PT services to assist in establishing routine and for CG training.     Plan    Recommend Physical Therapy 2 times per week until therapy goals are met for the following treatments:  Bed Mobility, Equipment, Gait Training, Neuro Re-Education / Balance, Therapeutic Activities and Therapeutic Exercises    Discharge recommendations:  Recommend discharge to group home with 24/7 CG assist. Recommend home health transitional care for continued physical therapy services.

## 2020-05-18 NOTE — PROGRESS NOTES
2 RN skin check complete with CURT Ling.  Devices in place: N/A.  Skin assessed under devices : yes, skin intact.  Confirmed pressure ulcers found on: NA.  New potential pressure ulcers noted on: L buttock. Wound consult placed: Wound is currently following.   Interventions in place: [id number], pillows for repositioning, waffle overlay, heel float boots, barrier cream, and incontinence care provided with linen changes.         Skin Assessment:  -buttocks and sacrum are red and slow to obed. Barrier cream applied.  - Groin/ kristy area is red and excoriated. Kristy care provided, along with barrier cream.   -Elbows red and blanching. Protective mepilex dressing on.   -Toes/feet are purplish, red and slow to obed.   -Bilateral heels are red and blanching with proetective mepilex dressing on.

## 2020-05-18 NOTE — THERAPY
"Occupational Therapy   Initial Evaluation     Patient Name: Carmenza Bennett  Age:  75 y.o., Sex:  female  Medical Record #: [id number]  Today's Date: 5/18/2020     Precautions  Precautions: Fall Risk  Comments: hx parkinsons and dementia    Subjective    \"I smell.\" (pt was provided wash cloths to cleanse)     Objective       05/18/20 1631   Prior Living Situation   Prior Services Unable To Determine At This Time   Housing / Facility Unable To Determine At This Time   Lives with - Patient's Self Care Capacity Spouse  (per chart)   Comments pt unable to provide reliable prior living   Prior Level of ADL Function   Comments pt unreliable historian, unable to determine   Precautions   Precautions Fall Risk   Comments hx parkinsons and dementia   Cognition    Cognition / Consciousness X   Level of Consciousness Confused   Ability To Follow Commands 1 Step   Safety Awareness Impaired;Impulsive   New Learning Impaired   Attention Impaired   Initiation Impaired   Balance Assessment   Weight Shift Sitting Fair   Weight Shift Standing Poor   Comments posterior lean in standing   ADL Assessment   Eating Supervision   Grooming Seated;Minimal Assist   Bathing Minimal Assist  (light sponge bath with cloth)   Upper Body Dressing Minimal Assist   Lower Body Dressing Moderate Assist   Toileting   (incontinent of urine in bed)   Functional Mobility   Sit to Stand Moderate Assist   Bed, Chair, Wheelchair Transfer Moderate Assist   Transfer Method Stand Pivot   Mobility with direct cues and ModA    Comments limited by posterior lean   Short Term Goals   Short Term Goal # 1 pt will demo functional transfer with Lashonda   Short Term Goal # 2 pt will sequence seated oral care without cues   Problem List   Problem List Decreased Active Daily Living Skills;Decreased Homemaking Skills;Decreased Functional Mobility;Decreased Activity Tolerance;Safety Awareness Deficits / Cognition;Impaired Cognitive Function;Impaired Postural Control / Balance "       Assessment  Patient is 75 y.o. female with a diagnosis of PNA, pmhx includes PD and dementia.  Additional factors influencing patient status / progress: chronic conditions that impact her cognition and independence in ADLs. Pt receptive to direct cues for sit>stand and stand-pivot transfer to bedside chair, requires Min-ModA for functional transfers. Pt requires assist for initiating, sequencing and terminating self-care tasks and transitional movements. Poor carryover of taught strategies due to baseline memory impairment.     Plan    Recommend Occupational Therapy 2 times per week until therapy goals are met for the following treatments:  Cognitive Skill Development, Self Care/Activities of Daily Living, Therapeutic Activities and Therapeutic Exercises.    Discharge recommendations:  24hr supervision/assist due to baseline cognitive deficits.
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
## 2020-05-18 NOTE — THERAPY
Missed Therapy     Patient Name: Carmenza Bennett  Age:  75 y.o., Sex:  female  Medical Record #: [id number]  Today's Date: 5/18/2020    Discussed pt with RN, per RN pt is on hold for PT eval, as pt was given haldol and is too tired to participate with PT today.  PT will attempt to see pt tomorrow as able.

## 2020-05-19 PROBLEM — E83.42 HYPOMAGNESEMIA: Status: RESOLVED | Noted: 2020-01-01 | Resolved: 2020-01-01

## 2020-05-19 PROBLEM — E87.6 HYPOKALEMIA: Status: RESOLVED | Noted: 2020-01-01 | Resolved: 2020-01-01

## 2020-05-19 PROBLEM — E83.51 HYPOCALCEMIA: Status: RESOLVED | Noted: 2020-01-01 | Resolved: 2020-01-01

## 2020-05-19 PROBLEM — J96.01 ACUTE RESPIRATORY FAILURE WITH HYPOXIA (HCC): Status: RESOLVED | Noted: 2020-01-01 | Resolved: 2020-01-01

## 2020-05-19 NOTE — DISCHARGE PLANNING
This  spoke with Satnam from [facility], [phone], she asked about the discharge plan for patient.     Patient was evaluated by both OT/PT yesterday, the recommendation is for a group home with 24/7 care and home health services for PT/OT therapy.     CM following for group home placement and home health services    Toma Reinoso RN,

## 2020-05-19 NOTE — PROGRESS NOTES
Pt is A&Ox3-4; disoriented to situation at times. Pt is resting in bed, no signs of labored breathing or pain. Pt on RA. Call light & personal belongings within reach, bed in lowest position & locked, and bed alarm is on. Pt updated on plan of care for the day. Pt declines any additional needs at this time. Will continue to monitor.

## 2020-05-19 NOTE — PROGRESS NOTES
Hospital Medicine Daily Progress Note    Date of Service  5/19/2020    Chief Complaint  75 y.o. female admitted 5/14/2020 with SOB.    Hospital Course    PMH dementia, Parkinson's, DVT who presented with SOB and cough. CXR showed perihilar interstitial markings and she was admitted for viral bronchitis with hypoxia. COVID PCR was negative. She improved and weaned off O2. PTOT recommended 24/7 supervision.        Interval Problem Update  She denies SOB or cough, says she feels well.    Consultants/Specialty  Pall care    Code Status  DNAR/DNI      Disposition  Needs 24/7 supervision  Group home and Medicaid pending    Review of Systems  Review of Systems   Unable to perform ROS: Dementia   Constitutional: Negative for malaise/fatigue.   Respiratory: Negative for cough and shortness of breath.    Cardiovascular: Negative for chest pain.   Gastrointestinal: Negative for abdominal pain and nausea.   Neurological: Negative for dizziness.        Physical Exam  Temp:  [36.2 °C (97.2 °F)-36.7 °C (98.1 °F)] 36.7 °C (98.1 °F)  Pulse:  [73-88] 76  Resp:  [14-18] 14  BP: (107-136)/(62-70) 107/70  SpO2:  [93 %-96 %] 96 %    Physical Exam  Vitals signs and nursing note reviewed.   Constitutional:       General: She is not in acute distress.     Appearance: She is not toxic-appearing or diaphoretic.      Comments: frail   HENT:      Head: Normocephalic.      Mouth/Throat:      Mouth: Mucous membranes are moist.   Eyes:      General:         Right eye: No discharge.         Left eye: No discharge.   Neck:      Musculoskeletal: Neck supple.   Cardiovascular:      Rate and Rhythm: Normal rate and regular rhythm.   Pulmonary:      Effort: Pulmonary effort is normal.      Breath sounds: No wheezing or rales.   Abdominal:      Palpations: Abdomen is soft.      Tenderness: There is no abdominal tenderness. There is no guarding or rebound.   Musculoskeletal:      Right lower leg: Edema present.      Left lower leg: Edema present.    Skin:     General: Skin is warm and dry.   Neurological:      Mental Status: She is alert.      Comments: Oriented to self, disoriented to hospital/city/year. Follows commands   Psychiatric:      Comments: pleasant         Fluids    Intake/Output Summary (Last 24 hours) at 5/19/2020 1340  Last data filed at 5/19/2020 1000  Gross per 24 hour   Intake 400 ml   Output --   Net 400 ml       Laboratory      Recent Labs     05/18/20  0803   SODIUM 135   POTASSIUM 4.3   CHLORIDE 101   CO2 23   GLUCOSE 96   BUN 19   CREATININE 0.67   CALCIUM 9.6                   Imaging  EC-ECHOCARDIOGRAM COMPLETE W/O CONT   Final Result      CT-HEAD W/O   Final Result      1.  No acute intracranial findings.      2.  Diffuse atrophy and periventricular white matter changes, consistent with chronic small vessel disease.         DX-CHEST-PORTABLE (1 VIEW)   Final Result      Perihilar interstitial markings are increased and suggestive of mild pulmonary edema.           Assessment/Plan  * Viral bronchitis- (present on admission)  Assessment & Plan  COVID-19 negative  Resolved with supportive care    UTI (urinary tract infection)- (present on admission)  Assessment & Plan  Finished course of IV Ceftriaxone    Dementia (HCC)- (present on admission)  Assessment & Plan  Avoid benzodiazepines and anticholinergics  Frequent orientation  Avoid early morning labs  Avoid vital signs during sleep  Ambulate if possible  TSH and vit B12 normal  Palliative care  Needs 24/7 supervision, placement pending    Lower extremity pain, bilateral- (present on admission)  Assessment & Plan  Scheduled Tylenol    Parkinson's disease (HCC)- (present on admission)  Assessment & Plan  Sinemet  PT and OT evaluations       VTE prophylaxis: lovenox
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
## 2020-05-19 NOTE — PROGRESS NOTES
Patient is calmed but irritable,room air,pain med given with relief,call light and personal belongings within reach and bed in low position,bed alarm on.

## 2020-05-20 NOTE — PROGRESS NOTES
Pt is A&Ox4. Pt is resting in bed, no signs of labored breathing or pain. Pt on RA. Call light & personal belongings within reach, bed in lowest position & locked, and bed alarm is on. Pt updated on plan of care for the day. Pt declines any additional needs at this time. Will continue to monitor. Pt continues to refuse 2RN skin check, education provided.

## 2020-05-20 NOTE — CARE PLAN
Problem: Communication  Goal: The ability to communicate needs accurately and effectively will improve  Outcome: PROGRESSING AS EXPECTED  Intervention: [place] patient and significant other/support system to call light to alert staff of needs  Flowsheets (Taken 5/20/2020 1029)  Oriented to:: All of the Following : Location of Bathroom, Visiting Policy, Unit Routine, Call Light and Bedside Controls, Bedside Rail Policy, Smoking Policy, Rights and Responsibilities, Bedside Report, and Patient Education Notebook     Problem: Safety  Goal: Will remain free from injury  Outcome: PROGRESSING AS EXPECTED  Intervention: Provide assistance with mobility  Flowsheets (Taken 5/20/2020 0600 by Audra Sethi, C.N.A.)  Assistance: Assistance of One  Ambulation Tolerance: General Weakness  Goal: Will remain free from falls  Outcome: PROGRESSING AS EXPECTED  Intervention: Implement fall precautions  Flowsheets (Taken 5/20/2020 0815)  Environmental Precautions:   Treaded Slipper Socks on Patient   Personal Belongings, Wastebasket, Call Bell etc. in Easy Reach   Transferred to Stronger Side   Report Given to Other Health Care Providers Regarding Fall Risk   Bed in Low Position   Communication Sign for Patients & Families   Mobility Assessed & Appropriate Sign Placed

## 2020-05-20 NOTE — PROGRESS NOTES
Hospital Medicine Daily Progress Note    Date of Service  5/20/2020    Chief Complaint  75 y.o. female admitted 5/14/2020 with SOB.    Hospital Course    PMH dementia, Parkinson's, DVT who presented with SOB and cough. CXR showed perihilar interstitial markings and she was admitted for viral bronchitis with hypoxia. COVID PCR was negative. She improved and weaned off O2. PTOT recommended 24/7 supervision.        Interval Problem Update  No acute overnight events. She feels well, denies cough or SOB.    Consultants/Specialty  Pall care    Code Status  DNAR/DNI      Disposition  Needs 24/7 supervision  Group home and Medicaid pending    Review of Systems  Review of Systems   Unable to perform ROS: Dementia   Constitutional: Negative for malaise/fatigue.   Respiratory: Negative for cough and shortness of breath.    Cardiovascular: Negative for chest pain.   Gastrointestinal: Negative for abdominal pain.   Neurological: Negative for dizziness.        Physical Exam  Temp:  [36.1 °C (97 °F)-36.5 °C (97.7 °F)] 36.1 °C (97 °F)  Pulse:  [74-99] 74  Resp:  [16-18] 18  BP: (107-127)/(61-72) 111/67  SpO2:  [91 %-97 %] 94 %    Physical Exam  Vitals signs and nursing note reviewed.   Constitutional:       General: She is not in acute distress.     Appearance: She is not toxic-appearing or diaphoretic.      Comments: frail   HENT:      Head: Normocephalic.      Mouth/Throat:      Mouth: Mucous membranes are moist.   Eyes:      General:         Right eye: No discharge.         Left eye: No discharge.   Neck:      Musculoskeletal: Neck supple.   Cardiovascular:      Rate and Rhythm: Normal rate and regular rhythm.   Pulmonary:      Effort: Pulmonary effort is normal.      Breath sounds: No wheezing or rales.   Abdominal:      Palpations: Abdomen is soft.      Tenderness: There is no abdominal tenderness.   Musculoskeletal:      Right lower leg: Edema present.      Left lower leg: Edema present.   Skin:     General: Skin is warm and  dry.   Neurological:      Mental Status: She is alert.      Comments: Oriented to self, disoriented to hospital/city/year. Follows commands   Psychiatric:      Comments: pleasant         Fluids    Intake/Output Summary (Last 24 hours) at 5/20/2020 1108  Last data filed at 5/20/2020 0800  Gross per 24 hour   Intake 880 ml   Output --   Net 880 ml       Laboratory      Recent Labs     05/18/20  0803   SODIUM 135   POTASSIUM 4.3   CHLORIDE 101   CO2 23   GLUCOSE 96   BUN 19   CREATININE 0.67   CALCIUM 9.6                   Imaging  EC-ECHOCARDIOGRAM COMPLETE W/O CONT   Final Result      CT-HEAD W/O   Final Result      1.  No acute intracranial findings.      2.  Diffuse atrophy and periventricular white matter changes, consistent with chronic small vessel disease.         DX-CHEST-PORTABLE (1 VIEW)   Final Result      Perihilar interstitial markings are increased and suggestive of mild pulmonary edema.           Assessment/Plan  * Viral bronchitis- (present on admission)  Assessment & Plan  COVID-19 negative  Resolved with supportive care    UTI (urinary tract infection)- (present on admission)  Assessment & Plan  Finished course of IV Ceftriaxone    Dementia (HCC)- (present on admission)  Assessment & Plan  Avoid benzodiazepines and anticholinergics  Frequent orientation  Avoid early morning labs  Avoid vital signs during sleep  Ambulate if possible  TSH and vit B12 normal  Palliative care  Needs 24/7 supervision, placement pending    Lower extremity pain, bilateral- (present on admission)  Assessment & Plan  Scheduled Tylenol    Parkinson's disease (HCC)- (present on admission)  Assessment & Plan  Sinemet  PT and OT evaluations       VTE prophylaxis: lovenox

## 2020-05-21 NOTE — PROGRESS NOTES
Received report and assumed care of patient. Assessment complete on RA . Pt A&Ox2, oriented to self and place. Pt reports no pain in her legs, but discomfort with movement. Pt mood improved after talking on the phone with a friend. Pt is comfortable in bed. All patient needs met at this time. Safety precautions and hourly rounding in place.

## 2020-05-21 NOTE — DISCHARGE PLANNING
PT/OT recommending group home with 24/7 care for patient with home health services. Aging and Disability services is aware that patient needs placement in a group home. This  will make referrals on Friday for placement to group homes. Patient makes 600.00 in Social Security income. Spouse cannot care for patient at home, he has his own health problems.      CM following    Toma Reinoso RN,

## 2020-05-21 NOTE — PROGRESS NOTES
Report received from day shift nurse. Assumed care @ 1900.     Patient is alert and oriented x 1-2, pleasantly confused at this time. Assessment partially done. Refused skin assessment; refused 2 RN skin check. Denies any pain and discomfort at this time. Patient educated regarding plan of care. Bed alarm in place and functioning. Room near nursing station.    Bed locked, in lowest position, treaded socks on. Needs attended. No further needs at this time. Communication board updated. Call light and personal belongings within reach.

## 2020-05-21 NOTE — CARE PLAN
Problem: Communication  Goal: The ability to communicate needs accurately and effectively will improve  Outcome: PROGRESSING AS EXPECTED  Intervention: Reorient patient to environment as needed  Flowsheets (Taken 5/20/2020 1029 by Jackelyn Grande R.N.)  Oriented to:: All of the Following : Location of Bathroom, Visiting Policy, Unit Routine, Call Light and Bedside Controls, Bedside Rail Policy, Smoking Policy, Rights and Responsibilities, Bedside Report, and Patient Education Notebook     Problem: Safety  Goal: Will remain free from injury  Outcome: PROGRESSING AS EXPECTED  Intervention: Provide assistance with mobility  Flowsheets  Taken 5/21/2020 0930 by Mirtha Fall, C.N.A.  Assistance: No Assistance Required  Taken 5/21/2020 0836 by Sofia Holliday, Student  Ambulation Tolerance: Tolerates Well (Pended)

## 2020-05-21 NOTE — CARE PLAN
Problem: Safety  Goal: Will remain free from falls  Outcome: PROGRESSING AS EXPECTED    Safety precautions in place. Non skid socks on. Bed alarm in place and functioning. Call light within reach. Room near nursing station. Hourly rounding in place.      Problem: Pain Management  Goal: Pain level will decrease to patient's comfort goal  Outcome: PROGRESSING AS EXPECTED

## 2020-05-21 NOTE — PROGRESS NOTES
Hospital Medicine Daily Progress Note    Date of Service  5/21/2020    Chief Complaint  75 y.o. female admitted 5/14/2020 with SOB.    Hospital Course    PMH dementia, Parkinson's, DVT who presented with SOB and cough. CXR showed perihilar interstitial markings and she was admitted for viral bronchitis with hypoxia. COVID PCR was negative. She improved and weaned off O2. PTOT recommended 24/7 supervision.        Interval Problem Update  She says her right hip is hurting    Consultants/Specialty  Pall care    Code Status  DNAR/DNI      Disposition  Needs 24/7 supervision  Group home and Medicaid pending    Review of Systems  Review of Systems   Unable to perform ROS: Dementia   Constitutional: Negative for malaise/fatigue.   Respiratory: Negative for cough and shortness of breath.    Cardiovascular: Negative for chest pain.   Gastrointestinal: Negative for abdominal pain.   Musculoskeletal: Positive for joint pain.   Neurological: Negative for dizziness.        Physical Exam  Temp:  [36.2 °C (97.2 °F)-36.9 °C (98.5 °F)] 36.2 °C (97.2 °F)  Pulse:  [78-85] 83  Resp:  [16-19] 18  BP: (107-118)/(56-66) 116/61  SpO2:  [91 %-95 %] 91 %    Physical Exam  Vitals signs and nursing note reviewed.   Constitutional:       General: She is not in acute distress.     Appearance: She is not toxic-appearing or diaphoretic.      Comments: frail   HENT:      Head: Normocephalic.      Mouth/Throat:      Mouth: Mucous membranes are moist.   Eyes:      General:         Right eye: No discharge.         Left eye: No discharge.   Neck:      Musculoskeletal: Neck supple.   Cardiovascular:      Rate and Rhythm: Normal rate and regular rhythm.   Pulmonary:      Effort: Pulmonary effort is normal.      Breath sounds: No wheezing or rales.   Abdominal:      Palpations: Abdomen is soft.      Tenderness: There is no abdominal tenderness.   Musculoskeletal:      Right lower leg: Edema present.      Left lower leg: Edema present.      Comments: Right  hip ROM limited by pain   Skin:     General: Skin is warm and dry.   Neurological:      Mental Status: She is alert.      Comments: Oriented to self, disoriented to hospital/city/year. Follows commands   Psychiatric:      Comments: pleasant         Fluids    Intake/Output Summary (Last 24 hours) at 5/21/2020 1125  Last data filed at 5/20/2020 2030  Gross per 24 hour   Intake 120 ml   Output --   Net 120 ml       Laboratory                        Imaging  EC-ECHOCARDIOGRAM COMPLETE W/O CONT   Final Result      CT-HEAD W/O   Final Result      1.  No acute intracranial findings.      2.  Diffuse atrophy and periventricular white matter changes, consistent with chronic small vessel disease.         DX-CHEST-PORTABLE (1 VIEW)   Final Result      Perihilar interstitial markings are increased and suggestive of mild pulmonary edema.           Assessment/Plan  * Viral bronchitis- (present on admission)  Assessment & Plan  COVID-19 negative  Resolved with supportive care    UTI (urinary tract infection)- (present on admission)  Assessment & Plan  Finished course of IV Ceftriaxone    Dementia (HCC)- (present on admission)  Assessment & Plan  Avoid benzodiazepines and anticholinergics  Frequent orientation  Avoid early morning labs  Avoid vital signs during sleep  Ambulate if possible  TSH and vit B12 normal  Palliative care  Needs 24/7 supervision, placement pending    Lower extremity pain, bilateral- (present on admission)  Assessment & Plan  Scheduled Tylenol  Increased right hip pain. Ordered for XR and doppler given her edema    Parkinson's disease (HCC)- (present on admission)  Assessment & Plan  Sinemet  PT and OT evaluations       VTE prophylaxis: lovenox

## 2020-05-22 NOTE — PROGRESS NOTES
2 RN skin check complete with Rose Mary ELIAS  Devices in place none.  Skin assessed under devices none.  Confirmed pressure ulcers found on none.  New potential pressure ulcers noted on none. Wound consult placed N/A.  The following interventions in place mepilex to heels, pillows in place for positioning, moisturizer.         Skin assessment: General skin, clean dry and intact.

## 2020-05-22 NOTE — CARE PLAN
Problem: Communication  Goal: The ability to communicate needs accurately and effectively will improve  Outcome: PROGRESSING AS EXPECTED  Intervention: Educate patient and significant other/support system about the plan of care, procedures, treatments, medications and allow for questions  Note: Pt spoke on the phone with her son today, updated son on situation.      Problem: Safety  Goal: Will remain free from injury  Outcome: PROGRESSING AS EXPECTED  Intervention: Educate patient and significant other/support system about adaptive mobility strategies and safe transfers  Note: Pt educated to call and how to call when needing assistance with mobilization.

## 2020-05-22 NOTE — THERAPY
Occupational Therapy  Daily Treatment     Patient Name: Carmenza Bennett  Age:  75 y.o., Sex:  female  Medical Record #: [id number]  Today's Date: 5/22/2020     Precautions  Precautions: Fall Risk  Comments: hx PD with cog deficits      Assessment    Pt making progress; functional mobility progress slower than expected but ADLs improving well. Pt limited by cognitive deficits.     Plan    Continue current treatment plan.    Discharge recommendations:  Recommend post-acute placement for additional occupational therapy services prior to discharge home.     05/22/20 1107   Cognition    Cognition / Consciousness X   Speech/ Communication Delayed Responses   Orientation Level Not Oriented to Age;Not Oriented to Address;Not Oriented to Year;Not Oriented to Month;Not Oriented to Day;Not Oriented to Time;Not Oriented to Place;Not Oriented to Reason   Level of Consciousness Confused   Ability To Follow Commands 1 Step   Safety Awareness Impaired   New Learning Impaired   Attention Impaired   Sequencing Impaired   Initiation Impaired   Comments Pleasant and cooperative; intermittent anxiety   Balance   Sitting Balance (Static) Fair   Sitting Balance (Dynamic) Fair -   Standing Balance (Static) Poor -   Standing Balance (Dynamic) Poor -   Weight Shift Sitting Fair   Weight Shift Standing Poor   Skilled Intervention Verbal Cuing;Tactile Cuing;Postural Facilitation;Compensatory Strategies   Comments using FWW; leans back in standing; anxiety with standing   Activities of Daily Living   Eating Moderate Assist  (assist to cut food and open containers; then able to eat)   Grooming Seated;Stand by Assist   Upper Body Dressing Minimal Assist   Lower Body Dressing Maximal Assist   Toileting Maximal Assist  (incontinent)   Skilled Intervention Verbal Cuing;Tactile Cuing;Postural Facilitation;Compensatory Strategies   Functional Mobility   Sit to Stand Moderate Assist   Bed, Chair, Wheelchair Transfer Moderate Assist   Transfer Method Stand  Pivot   Skilled Intervention Verbal Cuing;Tactile Cuing;Postural Facilitation;Compensatory Strategies  (using FWW)   Short Term Goals   Short Term Goal # 1 pt will demo functional transfer with Lashonda   Goal Outcome # 1 Progressing slower than expected   Short Term Goal # 2 pt will sequence seated oral care without cues   Goal Outcome # 2 Goal met   Anticipated Discharge Equipment   DC Equipment Unable To Determine At This Time

## 2020-05-22 NOTE — PROGRESS NOTES
2 RN Skin Check    2 RN skin check complete with Brynn ELIAS.   Devices in place: Mepilex to heels.  Skin assessed under devices: yes, flaky skin and blanchable redness to b/l heels.  Confirmed pressure ulcers found on: N/A.  New potential pressure ulcers noted on: N/A   Wound consult placed: N/A   The following interventions in place: Protective Mepilex to b/l heels, pillows used to turn and reposition, barrier cream to sacrum.    Pt with generalized dry, flaky skin. Generalized bruising noted. Blanchable redness to b/l heels and elbows. Sacrum is red but blanchable. Will continue to monitor changes in skin

## 2020-05-22 NOTE — THERAPY
Physical Therapy   Daily Treatment     Patient Name: Carmenza Bennett  Age:  75 y.o., Sex:  female  Medical Record #: [id number]  Today's Date: 5/21/2020     Precautions: Fall Risk    Subjective    Pt confused but cooperative, agrees to PT. Pt has lunch food all over her bed.     Objective       05/21/20 1501   Cognition    Speech/ Communication Delayed Responses;Word Finding Impairment   Orientation Level Not Oriented to Place;Not Oriented to Time;Not Oriented to Day;Not Oriented to Month;Not Oriented to Year;Not Oriented to Address   Level of Consciousness Confused   Ability To Follow Commands 1 Step   Safety Awareness Impaired   New Learning Impaired   Attention Impaired   Sequencing Impaired   Initiation Impaired   Comments cooperative, anxious with upright   Balance   Standing Balance (Static) Poor -   Standing Balance (Dynamic) Poor -   Weight Shift Standing Poor   Skilled Intervention Verbal Cuing;Tactile Cuing;Sequencing;Postural Facilitation;Facilitation   Comments with FWW at bedside, strong posterior lean   Gait Analysis   Gait Level Of Assist Unable to Participate   Bed Mobility    Supine to Sit Minimal Assist   Scooting Minimal Assist   Skilled Intervention Verbal Cuing;Tactile Cuing;Sequencing   Functional Mobility   Sit to Stand Moderate Assist   Bed, Chair, Wheelchair Transfer Moderate Assist   Transfer Method Stand Pivot   Mobility bed>chair   Skilled Intervention Verbal Cuing;Tactile Cuing;Sequencing;Facilitation;Postural Facilitation   Activity Tolerance   Sitting Edge of Bed 10 min   Standing 1 min x 4   Comments with FWW   Short Term Goals    Short Term Goal # 1 Pt will be SPV for supine<>sit in 6 txs to improve bed mobility.   Goal Outcome # 1 goal not met   Short Term Goal # 2 Pt will be Lashonda for transfers with FWW in 6 txs to improve OOB activity.   Goal Outcome # 2 Goal not met   Short Term Goal # 3 Pt will be Lashonda for gait with FWW x 50' to improve mobility.   Goal Outcome # 3 Goal not met    Anticipated Discharge Equipment   DC Equipment Unable To Determine At This Time       Assessment    Pt making slow progress with PT. Strong posterior lean with standing, pt not ready for gait, able to perform stand-pivot bed>chair with ModA for balance and constant cues for sequencing and LEs movement.    Plan    Continue current treatment plan.    Discharge recommendations:  Recommend post-acute placement for continued physical therapy services prior to discharge home.

## 2020-05-22 NOTE — PROGRESS NOTES
Pt is A&Ox1, oriented to self. Pt is resting in bed, no signs of labored breathing or pain. Pt on RA. Call light & personal belongings within reach, bed in lowest position & locked, and bed alarm is on. Fall precautions in place and education provided on how to use call light.Pt updated on plan of care for the day. Pt declines any additional needs at this time. Will continue to monitor.

## 2020-05-22 NOTE — DISCHARGE PLANNING
Anticipated Discharge Disposition: group home with home health    Action: Reviewed chart, this  called [facility], spoke with [facility] who stated he cannot take patient at this time, also that I will have the same situation with other group homes. Medicaid application cannot be processed because of the state freeze with the pandemic.     Barriers to Discharge: Funding for placement/this is a difficult case. Patient only makes 600.00 monthly, group homes will not accept at this time without Medicaid funding. Medicaid is backed up for at least 8 months because of COVID. (they are on a Freeze)    Plan: Unfortunately, patient will be a long term patient at [facility] until we can get Medicaid funding for her, the application process is on a freeze because of the COVID pandemic. Patient's  is elderly and cannot take care of her at home any longer.     CM following    Toma Reinoso RN,

## 2020-05-22 NOTE — CARE PLAN
Problem: Safety  Goal: Will remain free from falls  Outcome: PROGRESSING AS EXPECTED  - Safety precautions reviewed with pt. Bed locked and in low position, call bell in reach and reinforced call bell education to pt. Bed alarm also on for pt safety.      Problem: Psychosocial Needs:  Goal: Level of anxiety will decrease  Outcome: PROGRESSING AS EXPECTED  - At change of shift pt very anxious with staff, demanding that staff are keeping her here. Therapeutic communication used and pt's questions answered. Pt calm at this point and remains pleasantly confused throughout shift.

## 2020-05-22 NOTE — PROGRESS NOTES
Hospital Medicine Daily Progress Note    Date of Service  5/22/2020    Chief Complaint  75 y.o. female admitted 5/14/2020 with SOB.    Hospital Course    PMH dementia, Parkinson's, DVT who presented with SOB and cough. CXR showed perihilar interstitial markings and she was admitted for viral bronchitis with hypoxia. COVID PCR was negative. She improved and weaned off O2. PTOT recommended 24/7 supervision.        Interval Problem Update  Denies hip pain today. Says she feels well.    Consultants/Specialty  Pall care    Code Status  DNAR/DNI      Disposition  Needs 24/7 supervision  Group home and Medicaid pending    Review of Systems  Review of Systems   Unable to perform ROS: Dementia   Constitutional: Negative for malaise/fatigue.   Respiratory: Negative for cough and shortness of breath.    Cardiovascular: Negative for chest pain.   Gastrointestinal: Negative for abdominal pain.   Musculoskeletal: Negative for joint pain.   Neurological: Negative for dizziness.        Physical Exam  Temp:  [36.1 °C (97 °F)-36.4 °C (97.6 °F)] 36.1 °C (97 °F)  Pulse:  [60-94] 75  Resp:  [16-18] 16  BP: ()/(58-82) 127/58  SpO2:  [92 %-95 %] 93 %    Physical Exam  Vitals signs and nursing note reviewed.   Constitutional:       General: She is not in acute distress.     Appearance: She is not toxic-appearing or diaphoretic.      Comments: frail   HENT:      Head: Normocephalic.      Mouth/Throat:      Mouth: Mucous membranes are moist.   Eyes:      General:         Right eye: No discharge.         Left eye: No discharge.   Neck:      Musculoskeletal: Neck supple.   Cardiovascular:      Rate and Rhythm: Normal rate and regular rhythm.   Pulmonary:      Effort: Pulmonary effort is normal.      Breath sounds: No wheezing or rales.   Abdominal:      Palpations: Abdomen is soft.      Tenderness: There is no abdominal tenderness.   Musculoskeletal:      Right lower leg: Edema present.      Left lower leg: Edema present.      Comments:  Right hip ROM limited by pain   Skin:     General: Skin is warm and dry.   Neurological:      Mental Status: She is alert.      Comments: Oriented to self, disoriented to hospital/city/year. Follows commands   Psychiatric:      Comments: pleasant         Fluids    Intake/Output Summary (Last 24 hours) at 5/22/2020 1028  Last data filed at 5/21/2020 2013  Gross per 24 hour   Intake 240 ml   Output --   Net 240 ml       Laboratory                        Imaging  US-EXTREMITY VENOUS LOWER UNILAT RIGHT   Final Result      DX-HIP-UNILATERAL-WITH PELVIS-1 VIEW RIGHT   Final Result      1.  Bony pelvis and the hip joint appear normal      2.  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  No acute intracranial findings.      2.  Diffuse atrophy and periventricular white matter changes, consistent with chronic small vessel disease.         DX-CHEST-PORTABLE (1 VIEW)   Final Result      Perihilar interstitial markings are increased and suggestive of mild pulmonary edema.           Assessment/Plan  * Viral bronchitis- (present on admission)  Assessment & Plan  COVID-19 negative  Resolved with supportive care    UTI (urinary tract infection)- (present on admission)  Assessment & Plan  Finished course of IV Ceftriaxone    Dementia (HCC)- (present on admission)  Assessment & Plan  Avoid benzodiazepines and anticholinergics  Frequent orientation  Avoid early morning labs  Avoid vital signs during sleep  Ambulate if possible  TSH and vit B12 normal  Palliative care  Needs 24/7 supervision, placement pending    Lower extremity pain, bilateral- (present on admission)  Assessment & Plan  Scheduled Tylenol  Doppler did not show DVT  XR showed osteoarthritis of hip  PTOT    Parkinson's disease (HCC)- (present on admission)  Assessment & Plan  Sinemet  PT and OT evaluations       VTE prophylaxis: lovenox

## 2020-05-22 NOTE — PROGRESS NOTES
Pt care assumed at 1900. Pt resting in room overnight, Alert to self and event. Pt asking appropriate questions about course of stay. Re- orienting pt throughout night. Denies pain. Skin check completed with Cecy ELIAS. All skin intact and pt does well turning/ moving in bed. Bed is locked and in low position. Call bell in reach and re-educated pt. Bed alarm on for pt. Safety. Pt can be impulsive at times.

## 2020-05-22 NOTE — PROGRESS NOTES
2 RN skin check complete with Jillian RN  Devices in place: Mepilex to bilat  heels.  Skin assessed under devices yes, red and blanching.  Confirmed pressure ulcers found on N/A.  New potential pressure ulcers noted on N/A. Wound consult placed N/A.  The following interventions in place Mepilex to bilat heels, waffle cushion over mattress, pt turns self in bed, Q2 turns, moisturizer.     Skin is clean, dry, warm and intact. Small bruise on right leg.

## 2020-05-23 NOTE — PROGRESS NOTES
Hospital Medicine Daily Progress Note    Date of Service  5/23/2020    Chief Complaint  75 y.o. female admitted 5/14/2020 with SOB.    Hospital Course    PMH dementia, Parkinson's, DVT who presented with SOB and cough. CXR showed perihilar interstitial markings and she was admitted for viral bronchitis with hypoxia. COVID PCR was negative. She improved and weaned off O2. PTOT recommended SNF and 24/7 supervision. She has difficulty placement since she will eventually need a group home.       Interval Problem Update  Denies hip pain today. Says she feels well.    Consultants/Specialty  Pall care    Code Status  DNAR/DNI      Disposition  PTOT - SNF  Needs 24/7 supervision and group home  Difficult placement, case management assisting    Patient is medically cleared for discharge.   No Acute issues at this time. Hospital medicine will be rounding twice weekly unless any acute changes.   Do not hesitate to notify us of any ongoing concerns or questions regarding the care of this patient.    Marcin Jose M.D.      Review of Systems  Review of Systems   Unable to perform ROS: Dementia   Constitutional: Negative for malaise/fatigue.   Respiratory: Negative for cough and shortness of breath.    Cardiovascular: Negative for chest pain.   Gastrointestinal: Negative for abdominal pain.   Musculoskeletal: Negative for joint pain.   Neurological: Negative for dizziness.        Physical Exam  Temp:  [36.2 °C (97.1 °F)-37.6 °C (99.7 °F)] 36.6 °C (97.9 °F)  Pulse:  [71-82] 79  Resp:  [16-18] 17  BP: ()/(62-71) 112/66  SpO2:  [90 %-94 %] 90 %    Physical Exam  Vitals signs and nursing note reviewed.   Constitutional:       General: She is not in acute distress.     Appearance: She is not toxic-appearing or diaphoretic.      Comments: frail   HENT:      Head: Normocephalic.      Mouth/Throat:      Mouth: Mucous membranes are moist.   Eyes:      General:         Right eye: No discharge.         Left eye: No discharge.   Neck:       Musculoskeletal: Neck supple.   Cardiovascular:      Rate and Rhythm: Normal rate and regular rhythm.   Pulmonary:      Effort: Pulmonary effort is normal.      Breath sounds: No wheezing or rales.   Abdominal:      Palpations: Abdomen is soft.      Tenderness: There is no abdominal tenderness.   Musculoskeletal:      Right lower leg: Edema present.      Left lower leg: Edema present.   Skin:     General: Skin is warm and dry.   Neurological:      Mental Status: She is alert.      Comments: Oriented to self, disoriented to hospital/city/year. Follows commands   Psychiatric:      Comments: pleasant         Fluids  No intake or output data in the 24 hours ending 05/23/20 1406    Laboratory                        Imaging  US-EXTREMITY VENOUS LOWER UNILAT RIGHT   Final Result      DX-HIP-UNILATERAL-WITH PELVIS-1 VIEW RIGHT   Final Result      1.  Bony pelvis and the hip joint appear normal      2.  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  No acute intracranial findings.      2.  Diffuse atrophy and periventricular white matter changes, consistent with chronic small vessel disease.         DX-CHEST-PORTABLE (1 VIEW)   Final Result      Perihilar interstitial markings are increased and suggestive of mild pulmonary edema.           Assessment/Plan  * Viral bronchitis- (present on admission)  Assessment & Plan  COVID-19 negative  Resolved with supportive care    UTI (urinary tract infection)- (present on admission)  Assessment & Plan  Finished course of IV Ceftriaxone    Dementia (HCC)- (present on admission)  Assessment & Plan  Avoid benzodiazepines and anticholinergics  Frequent orientation  Avoid early morning labs  Avoid vital signs during sleep  Ambulate if possible  TSH and vit B12 normal  Palliative care  SNF placement, then needs 24/7 supervision with group home, placement pending    Lower  extremity pain, bilateral- (present on admission)  Assessment & Plan  Scheduled Tylenol  Doppler did not show DVT  XR showed osteoarthritis of hip  PTOT    Parkinson's disease (HCC)- (present on admission)  Assessment & Plan  Sinemet  PT and OT evaluations       VTE prophylaxis: lovenox

## 2020-05-23 NOTE — PROGRESS NOTES
Pt care assumed at 1900. Pt only alert to self overnight. Pt denies pain. Skin check completed with Brynn ELIAS. Pt with increased redness to her groin/ sacrum. Pt is now a Q2 turn and reposition. No complaints overnight. Bed locked and in low position. Call bell in reach and bed alarm on. Will continue to monitor.

## 2020-05-23 NOTE — CARE PLAN
Problem: Safety  Goal: Will remain free from falls  Outcome: PROGRESSING AS EXPECTED  - Safety care plan reviewed with pt. Bed alarm on. Pt is not impulsive and dose not try to get OOB. Bed locked and in low position, call bell in reach.      Problem: Skin Integrity  Goal: Risk for impaired skin integrity will decrease  Outcome: PROGRESSING AS EXPECTED  - Skin care plan reviewed with pt. 2 RN skin check in place. Q2 hour turns beginning this AM. Pt with increased redness to groin and sacrum.

## 2020-05-23 NOTE — PROGRESS NOTES
AAOx1, self. Pleasant this AM. Denies presence of pain at this time. -N/V. -N/T. Denies new onset of chest pain/SOB. +BS in all 4 quadrants, last BM yesterday. Q2 turns. POC discussed, denies further needs at this time. Bed alarm on, call light within reach & hourly rounding in place.

## 2020-05-23 NOTE — PROGRESS NOTES
2 RN Skin Check    2 RN skin check complete with Brynn ELIAS.   Devices in place: Mepilex to heels.  Skin assessed under devices: Yes, blanchable redness.  Confirmed pressure ulcers found on: N/A  New potential pressure ulcers noted on: N/A.   Wound consult placed: N/A .  The following interventions in place: Protective mepilex to b/l heels and elbows. Waffle cushion, Q2 hour turning and repositioning with pillows, barrier cream to perineum and sacrum, 2 RN skin check, offering frequent toileting.    B/l heels and elbows reddened, but blanchable. Protective mepilex's in place. Bright redness to groin and sacrum. Areas blanchable. Barrier cream applied and Q2 hour turning and repositioning in place. Generalized bruising.

## 2020-05-24 NOTE — PROGRESS NOTES
"Patient in room attempting to get out of bed stating, \"I need to get out of here\" explained to patient that' she was not steady enough to get out of bed on her own and she continued to yell and get out of bed. This RN attempted to put patients legs back in the bed and patient began kicking and trying to pinch this nurse. This nurse was able to put patients legs back in the bed. Spoke with Dr. Jose about patient behavior and patient will be monitored by sitter and prn haldol ordered.  "

## 2020-05-24 NOTE — PROGRESS NOTES
Hospital Medicine Daily Progress Note    Date of Service  5/24/2020    Chief Complaint  75 y.o. female admitted 5/14/2020 with SOB.    Hospital Course    PMH dementia, Parkinson's, DVT who presented with SOB and cough. CXR showed perihilar interstitial markings and she was admitted for viral bronchitis with hypoxia. COVID PCR was negative. She improved and weaned off O2. PTOT recommended SNF and 24/7 supervision. She has difficulty placement since she will eventually need a group home.       Interval Problem Update  Patient remains confused, more agitated today and combative, trying to get out of bed. She has not received any additional medications since yesterday.  When asked why she's upset, she's unable to give any clear answer. Yelling that she's not being treated right and to call the police    Consultants/Specialty  Pall care    Code Status  DNAR/DNI      Disposition  PTOT - SNF  Needs 24/7 supervision and group home  Difficult placement, case management assisting    Review of Systems  Review of Systems   Unable to perform ROS: Dementia   Psychiatric/Behavioral: Positive for memory loss. The patient is nervous/anxious.         Physical Exam  Temp:  [36.1 °C (97 °F)-36.8 °C (98.3 °F)] 36.4 °C (97.5 °F)  Pulse:  [67-83] 80  Resp:  [16-17] 17  BP: (103-120)/(53-68) 115/68  SpO2:  [92 %-94 %] 93 %    Physical Exam  Vitals signs and nursing note reviewed.   Constitutional:       Appearance: She is not toxic-appearing or diaphoretic.      Comments: Frail, upset and yelling   HENT:      Head: Normocephalic.      Mouth/Throat:      Mouth: Mucous membranes are moist.   Eyes:      General:         Right eye: No discharge.         Left eye: No discharge.   Neck:      Musculoskeletal: Neck supple.   Pulmonary:      Effort: No respiratory distress.   Abdominal:      General: There is no distension.   Musculoskeletal:      Right lower leg: Edema present.      Left lower leg: Edema present.   Skin:     General: Skin is warm  and dry.   Neurological:      Mental Status: She is alert.      Comments: Oriented to self, disoriented to hospital/city/year. Moving all extremities   Psychiatric:         Mood and Affect: Affect is angry.         Speech: Speech is tangential.         Behavior: Behavior is combative.         Fluids    Intake/Output Summary (Last 24 hours) at 5/24/2020 1050  Last data filed at 5/24/2020 0900  Gross per 24 hour   Intake 118 ml   Output --   Net 118 ml       Laboratory                        Imaging  US-EXTREMITY VENOUS LOWER UNILAT RIGHT   Final Result      DX-HIP-UNILATERAL-WITH PELVIS-1 VIEW RIGHT   Final Result      1.  Bony pelvis and the hip joint appear normal      2.  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  No acute intracranial findings.      2.  Diffuse atrophy and periventricular white matter changes, consistent with chronic small vessel disease.         DX-CHEST-PORTABLE (1 VIEW)   Final Result      Perihilar interstitial markings are increased and suggestive of mild pulmonary edema.           Assessment/Plan  * Viral bronchitis- (present on admission)  Assessment & Plan  COVID-19 negative  Resolved with supportive care    UTI (urinary tract infection)- (present on admission)  Assessment & Plan  Finished course of IV Ceftriaxone    Dementia (HCC)- (present on admission)  Assessment & Plan  Avoid benzodiazepines and anticholinergics  Frequent orientation  Avoid early morning labs  Avoid vital signs during sleep  Ambulate if possible  TSH and vit B12 normal  Palliative care  SNF placement, then needs 24/7 supervision with group home, placement pending    More agitated today and at risk of falling out of bed.  Ordered for bedside sitter  Seroquel qhs and haldol PRN  Check BMP and CBC    Lower extremity pain, bilateral- (present on admission)  Assessment & Plan  Scheduled  Tylenol  Doppler did not show DVT  XR showed osteoarthritis of hip  PTOT    Parkinson's disease (HCC)- (present on admission)  Assessment & Plan  Sinemet  PT and OT evaluations       VTE prophylaxis: lovenox

## 2020-05-24 NOTE — PROGRESS NOTES
Patient alert to self only, BB, and on RA. Pt complains of pain in R hip, medicated per MAR with scheduled Tylenol. Denies SOB/chest pain, n/v/d. Pt has no PIV, MD aware and ok with this. Pt pleasant and cooperative with staff. Bed in low and locked position. Hourly rounding in place. No signs of acute distress.

## 2020-05-24 NOTE — PROGRESS NOTES
2 RN skin check complete with Vanna ELIAS.   Devices in place: N/A.  Skin assessed under devices: N/A.  Confirmed pressure ulcers found: N/A.  New potential pressure ulcers noted: N/A. Wound consult placed: N/A.  The following interventions in place: float heels, podus boots (patient refused podus boots). Q2 turns. Checking frequently for incontinence. Barrier cream noted.       Heels & elbows red and blanching bilaterally. Sacrum pink/blanching. Right leg bruising. Some redness noted in orlin area.

## 2020-05-24 NOTE — PROGRESS NOTES
2 RN skin check complete w/ Chanelle ELIAS.   Devices in place: N/A.  Skin assessed under devices: N/A.  Confirmed pressure ulcers found: N/A.  New potential pressure ulcers noted: N/A. Wound consult placed: N/A.  The following interventions in place: Waffle mattress overlay in place. Q2 turns. Checking frequently for incontinence. Barrier cream. Mepilex in place to heels & elbows bilaterally - pt takes off periodically throughout the day despite education. Heel float boots applied. Sacral mepilex in place.     Heels & elbows red and blanching bilaterally. Sacrum pink/blanching. Generalized bruising.

## 2020-05-25 NOTE — THERAPY
"Occupational Therapy  Daily Treatment     Patient Name: Carmenza Bennett  Age:  75 y.o., Sex:  female  Medical Record #: [id number]  Today's Date: 5/25/2020     Precautions  Precautions: Fall Risk  Comments: hx of Parkinson's & Dementia      Assessment    Pt seen for OT tx today.  Pt had been given Haldol & Serequil earlier today due to agitation.  Pt appeared more lethargic & was not willing to participate in any ADL's & refused to attempt to stand.  Pt was Max A for supine to sit EOB.  Any attempt at standing pt would push post.  Pt appears capable to being more functional however her cognitive impairments are a strong limiting factor.    Plan    Continue current treatment plan.    Discharge recommendations:  Recommend post-acute placement for additional occupational therapy services prior to discharge home      Subjective    \"Why are we doing this?\"     Objective       05/25/20 1436   Short Term Goals   Short Term Goal # 1 pt will demo functional transfer with [place]   Goal Outcome # 1 Progressing slower than expected   Short Term Goal # 2 pt will sequence seated oral care without cues   Goal Outcome # 2 Goal not met     .    "

## 2020-05-25 NOTE — PROGRESS NOTES
Received call back from Dr. Jose who stated that she would put in orders for fluids, UA, blood cultures, covid swab and labs.

## 2020-05-25 NOTE — PROGRESS NOTES
Paged Dr. Jose in regards to patient having a temp of 100.3f and bp 87/49. Awaiting return phone call.

## 2020-05-25 NOTE — PROGRESS NOTES
"Patient alert to self, BB, and on RA. No complaints of pain, n/v/d/, nor SOB/chest pain. Pt at beginning of shift refused 2 RN skin check and began yelling at young male PSA and calling him names. Pt purposefully knocked her dinner tray and drinks to the floor in a tantrum. Day RN and NOC RN attempting to calm pt down. Pt calling NOC RN \"stupid\" and PSA \"dumb little boy.\" Situation brought to CRN attention, male PSA reassigned and new female PSA took his place. Pt still yelling, calling staff names, and refusing to let go of tubing to BP machine that CNA was using. After several failed attempts at deescalating the situation, security was called and PRN IM Haldol given. Pt eventually calmed down enough and even became a little tearful with no other outbursts. Pt able to sleep throughout the night. PSA sitter remains at bedside. Bed in low and locked position with alarm active and audible. No signs of acute distress.   "

## 2020-05-25 NOTE — PROGRESS NOTES
Hospital Medicine Daily Progress Note    Date of Service  5/25/2020    Chief Complaint  75 y.o. female admitted 5/14/2020 with SOB.    Hospital Course    PMH dementia, Parkinson's, DVT who presented with SOB and cough. CXR showed perihilar interstitial markings and she was admitted for viral bronchitis with hypoxia. COVID PCR was negative. She improved and weaned off O2. PTOT recommended SNF and 24/7 supervision. She has difficulty placement since she will eventually need a group home.       Interval Problem Update  Patient yelling at staff overnight. Did receive seroquel and haldol.  She has no complaints this morning. Did cooperate with exam.    Consultants/Specialty  Pall care    Code Status  DNAR/DNI      Disposition  PTOT - SNF  Needs 24/7 supervision and group home  Difficult placement, case management assisting    Review of Systems  Review of Systems   Unable to perform ROS: Dementia   Constitutional: Negative for malaise/fatigue.   Respiratory: Negative for cough and shortness of breath.    Cardiovascular: Negative for chest pain.   Gastrointestinal: Negative for nausea.   Musculoskeletal: Negative for joint pain.   Psychiatric/Behavioral: Positive for memory loss.        Physical Exam  Temp:  [36.6 °C (97.8 °F)-36.9 °C (98.5 °F)] 36.9 °C (98.5 °F)  Pulse:  [67-93] 81  Resp:  [16-18] 16  BP: (100-146)/(54-89) 100/54  SpO2:  [92 %-95 %] 93 %    Physical Exam  Vitals signs and nursing note reviewed.   Constitutional:       Appearance: She is not toxic-appearing or diaphoretic.      Comments: Frail   HENT:      Head: Normocephalic.      Mouth/Throat:      Mouth: Mucous membranes are moist.   Eyes:      General:         Right eye: No discharge.         Left eye: No discharge.   Neck:      Musculoskeletal: Neck supple.   Cardiovascular:      Rate and Rhythm: Normal rate and regular rhythm.   Pulmonary:      Effort: No respiratory distress.      Breath sounds: No wheezing or rales.   Abdominal:      Palpations:  Abdomen is soft.      Tenderness: There is no abdominal tenderness.   Musculoskeletal:      Right lower leg: Edema present.      Left lower leg: Edema present.   Skin:     General: Skin is warm and dry.   Neurological:      Mental Status: She is alert.      Comments: Oriented to self, disoriented to hospital/city/year. Moving all extremities   Psychiatric:      Comments: pleasant         Fluids    Intake/Output Summary (Last 24 hours) at 5/25/2020 1030  Last data filed at 5/25/2020 0930  Gross per 24 hour   Intake 600 ml   Output --   Net 600 ml       Laboratory  Recent Labs     05/25/20  0141   WBC 7.3   RBC 4.60   HEMOGLOBIN 13.8   HEMATOCRIT 42.6   MCV 92.6   MCH 30.0   MCHC 32.4*   RDW 46.2   PLATELETCT 320   MPV 9.1     Recent Labs     05/25/20  0141   SODIUM 138   POTASSIUM 4.1   CHLORIDE 104   CO2 22   GLUCOSE 99   BUN 17   CREATININE 0.82   CALCIUM 9.3                   Imaging  US-EXTREMITY VENOUS LOWER UNILAT RIGHT   Final Result      DX-HIP-UNILATERAL-WITH PELVIS-1 VIEW RIGHT   Final Result      1.  Bony pelvis and the hip joint appear normal      2.  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  No acute intracranial findings.      2.  Diffuse atrophy and periventricular white matter changes, consistent with chronic small vessel disease.         DX-CHEST-PORTABLE (1 VIEW)   Final Result      Perihilar interstitial markings are increased and suggestive of mild pulmonary edema.           Assessment/Plan  * Viral bronchitis- (present on admission)  Assessment & Plan  COVID-19 negative  Resolved with supportive care    UTI (urinary tract infection)- (present on admission)  Assessment & Plan  Finished course of IV Ceftriaxone    Dementia (HCC)- (present on admission)  Assessment & Plan  Avoid benzodiazepines and anticholinergics  Frequent orientation  Avoid early morning labs  Avoid vital  signs during sleep  Ambulate if possible  TSH and vit B12 normal  Palliative care  SNF placement, then needs 24/7 supervision with group home, placement pending    Intermittent agitated, aggressive towards staff and at risk of falling out of bed.  Ordered for bedside sitter  Seroquel BID and haldol PRN  Improving    Lower extremity pain, bilateral- (present on admission)  Assessment & Plan  Scheduled Tylenol, lidocaine patch  Doppler did not show DVT  XR showed osteoarthritis of hip  PTOT    Parkinson's disease (HCC)- (present on admission)  Assessment & Plan  Sinemet  PT and OT evaluations       VTE prophylaxis: lovenox

## 2020-05-25 NOTE — CARE PLAN
Problem: Safety  Goal: Will remain free from falls  Outcome: PROGRESSING AS EXPECTED  Intervention: Implement fall precautions  Note: Patient will have no falls/injuries during shift. All appropriate fall precautions are in place. Bed in low and locked position, alarm active and audible. PSA sitter at bedside.     Problem: Urinary Elimination:  Goal: Ability to reestablish a normal urinary elimination pattern will improve  Outcome: PROGRESSING AS EXPECTED  Intervention: Encourage scheduled voiding  Note: Patient is incontinent at baseline.

## 2020-05-25 NOTE — PROGRESS NOTES
Assumed care of pt at shift change. A/Ox1, Pt on room air. Patient attempted to get out of bed during shift and was attempting to kick staff and pinch. Dr. Pope notified. ALAN Eli ordered and sitter.      All needs met at this time. call light within reach, instructed to call for any assistance, hourly rounding in place.

## 2020-05-25 NOTE — CARE PLAN
Problem: Safety  Goal: Will remain free from injury  Outcome: PROGRESSING SLOWER THAN EXPECTED     Problem: Knowledge Deficit  Goal: Knowledge of disease process/condition, treatment plan, diagnostic tests, and medications will improve  Outcome: PROGRESSING SLOWER THAN EXPECTED

## 2020-05-25 NOTE — DISCHARGE PLANNING
Anticipated Discharge Disposition: Group Home    Action: Discussed patient's needs during IDT rounds.  Patient appears to be having sundowner behaviors, verbally and physically aggressive in the late afternoon/evening.  Patient getting out of bed, attempting to kick staff, yelling and calling staff members names.  ALAN Eli given, 5/25/2020.     Barriers to Discharge: behaviors/placement cost of care    Plan: follow up with Aging/Disabilty worker on Medicaid application being submitted

## 2020-05-26 NOTE — PROGRESS NOTES
At beginning of shift pt BP was 83/43 and HR 90. Pt also had low BP earlier in the day and had received a liter bolus of NS. NOC RN paged Dr. العراقي at 1929 and obtained an order for another liter bolus of NS. When bolus completes pt is to remain on IVF of NS infusing at 100 mL/hr. Pt quiet and fatigued during shift. CNA sitter remains in room for safety. Bed in low and locked position. Bed alarm active and audible. No signs of acute distress.

## 2020-05-26 NOTE — PROGRESS NOTES
Hospital Medicine Daily Progress Note    Date of Service  5/26/2020    Chief Complaint  75 y.o. female admitted 5/14/2020 with SOB.    Hospital Course    PMH dementia, Parkinson's, DVT who presented with SOB and cough. CXR showed perihilar interstitial markings and she was admitted for viral bronchitis with hypoxia. COVID PCR was negative. She improved and weaned off O2. PTOT recommended SNF and 24/7 supervision. She has difficulty placement since she will eventually need a group home.       Interval Problem Update  The patient is eating breakfast.  Denies any cough, difficulty breathing.  Her vital was reviewed and she has low-grade temperature of 100.3 overnight.  I saw and examined the patient today.    Consultants/Specialty  Pall care    Code Status  DNAR/DNI      Disposition  PTOT - SNF  Needs 24/7 supervision and group home  Difficult placement, case management assisting    Review of Systems  Review of Systems   Unable to perform ROS: Dementia   Constitutional: Negative for malaise/fatigue.   Respiratory: Negative for cough.    Cardiovascular: Negative for chest pain.   Gastrointestinal: Negative for nausea.   Musculoskeletal: Negative for joint pain.   Psychiatric/Behavioral: Positive for memory loss.        Physical Exam  Temp:  [36.5 °C (97.7 °F)-37.9 °C (100.3 °F)] 36.5 °C (97.7 °F)  Pulse:  [66-90] 74  Resp:  [15-18] 16  BP: ()/(43-65) 108/64  SpO2:  [90 %-92 %] 92 %    Physical Exam  Vitals signs and nursing note reviewed.   Constitutional:       Appearance: She is not toxic-appearing or diaphoretic.      Comments: Frail   HENT:      Head: Normocephalic.      Mouth/Throat:      Mouth: Mucous membranes are moist.   Eyes:      General:         Right eye: No discharge.   Neck:      Musculoskeletal: Neck supple.   Cardiovascular:      Rate and Rhythm: Normal rate.   Pulmonary:      Effort: No respiratory distress.      Breath sounds: No rales.   Abdominal:      Palpations: Abdomen is soft.       Tenderness: There is no abdominal tenderness.   Musculoskeletal:      Right lower leg: Edema present.      Left lower leg: Edema present.   Skin:     General: Skin is warm and dry.   Neurological:      Mental Status: She is alert.      Comments: Oriented to self, disoriented to hospital/city/year. Moving all extremities   Psychiatric:      Comments: pleasant         Fluids    Intake/Output Summary (Last 24 hours) at 5/26/2020 0835  Last data filed at 5/25/2020 2134  Gross per 24 hour   Intake 1360 ml   Output --   Net 1360 ml       Laboratory  Recent Labs     05/25/20  0141 05/25/20  1537 05/26/20  0124   WBC 7.3 6.9 5.6   RBC 4.60 4.93 4.18*   HEMOGLOBIN 13.8 14.6 12.5   HEMATOCRIT 42.6 46.2 40.2   MCV 92.6 93.7 96.2   MCH 30.0 29.6 29.9   MCHC 32.4* 31.6* 31.1*   RDW 46.2 47.3 48.9   PLATELETCT 320 337 274   MPV 9.1 9.1 9.5     Recent Labs     05/25/20  0141 05/25/20  1537 05/26/20  0124   SODIUM 138 139 136   POTASSIUM 4.1 4.4 4.0   CHLORIDE 104 103 110   CO2 22 23 19*   GLUCOSE 99 104* 84   BUN 17 18 17   CREATININE 0.82 0.84 0.67   CALCIUM 9.3 9.6 8.3*                   Imaging  DX-CHEST-PORTABLE (1 VIEW)   Final Result      No acute cardiopulmonary abnormality.      US-EXTREMITY VENOUS LOWER UNILAT RIGHT   Final Result      DX-HIP-UNILATERAL-WITH PELVIS-1 VIEW RIGHT   Final Result      1.  Bony pelvis and the hip joint appear normal      2.  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  No acute intracranial findings.      2.  Diffuse atrophy and periventricular white matter changes, consistent with chronic small vessel disease.         DX-CHEST-PORTABLE (1 VIEW)   Final Result      Perihilar interstitial markings are increased and suggestive of mild pulmonary edema.           Assessment/Plan  * Viral bronchitis- (present on admission)  Assessment & Plan  COVID-19 negative  Resolved with  supportive care    UTI (urinary tract infection)- (present on admission)  Assessment & Plan  Finished course of IV Ceftriaxone    Dementia (HCC)- (present on admission)  Assessment & Plan  Avoid benzodiazepines and anticholinergics  Frequent orientation  Avoid early morning labs  Avoid vital signs during sleep  Ambulate if possible  TSH and vit B12 normal  Palliative care  SNF placement, then needs 24/7 supervision with group home, placement pending    Intermittent agitated, aggressive towards staff and at risk of falling out of bed.  Ordered for bedside sitter  Seroquel BID and haldol PRN  Improving    Lower extremity pain, bilateral- (present on admission)  Assessment & Plan  Scheduled Tylenol, lidocaine patch  Doppler did not show DVT  XR showed osteoarthritis of hip  PTOT    Parkinson's disease (HCC)- (present on admission)  Assessment & Plan  Sinemet  PT and OT evaluations       VTE prophylaxis: lovenox

## 2020-05-26 NOTE — PROGRESS NOTES
2 RN skin check complete with Jonathon Holt RN.   Devices in place: PIV  Skin assessed under devices: yes  Confirmed pressure ulcers found: N/A.  New potential pressure ulcers noted: N/A. Wound consult placed: N/A.  The following interventions in place: float heels, Q2 turns. Checking frequently for incontinence. Barrier cream noted.       Heels & elbows pink and blanching bilaterally. Sacrum pink/blanching. Right leg bruising. Some redness noted in orlin area.

## 2020-05-26 NOTE — PROGRESS NOTES
Assumed care of pt at shift change. A/Ox1, Pt on room air. NS IV fluids continue.      All needs met at this time. call light within reach, instructed to call for any assistance, hourly rounding in place.

## 2020-05-26 NOTE — DISCHARGE PLANNING
Medical Social Work  PC from [facility]; has not received the Medicaid application from the spouse.  Has left several messages, [facility]una stated if she does not hear from him by Thursday.  Will do a drive by his home, will provide update to [facility].

## 2020-05-26 NOTE — PROGRESS NOTES
2 RN skin check complete with Jonathon Holt RN.   Devices in place: N/A.  Skin assessed under devices: N/A.  Confirmed pressure ulcers found: N/A.  New potential pressure ulcers noted: N/A. Wound consult placed: N/A.  The following interventions in place: float heels, Q2 turns. Checking frequently for incontinence. Barrier cream noted.       Heels & elbows pink and blanching bilaterally. Sacrum pink/blanching. Right leg bruising. Some redness noted in orlin area. Labia redness as a result of straight cath patient.

## 2020-05-26 NOTE — CARE PLAN
Problem: Safety  Goal: Will remain free from injury  Outcome: PROGRESSING AS EXPECTED     Problem: Respiratory:  Goal: Respiratory status will improve  Description: Pt here with PNA. Pt was on IV/PO abx. Pt has been placed on room air. Pt not in any respiratory distress. Pt with clear lung sounds, but diminished to the bases. Will continue to monitor for changes.   Outcome: PROGRESSING AS EXPECTED     Problem: Skin Integrity  Goal: Risk for impaired skin integrity will decrease  Outcome: PROGRESSING AS EXPECTED

## 2020-05-26 NOTE — PROGRESS NOTES
Assumed care of pt at shift change. A/Ox1, Pt on room air. Patient had a fever mid-day and sbp in low 80s, Dr. Jose notified and ordered UA, Chest xray, Covid swab, blood cultures, labs and bolus IV fluids. Had to straight cath patient for urine sample which took multiple attempts by several nurses. IV inserted in left forearm for IV fluids.      All needs met at this time. call light within reach, instructed to call for any assistance, hourly rounding in place.

## 2020-05-26 NOTE — CARE PLAN
Problem: Safety  Goal: Will remain free from falls  Outcome: PROGRESSING AS EXPECTED  Intervention: Assess risk factors for falls  Note: Patient will have no falls during shift. She is considered a high fall risk and has all appropriate precautions in place. Safety sitter remains at bedside. Adequate lighting provided.     Problem: Fluid Volume:  Goal: Will maintain balanced intake and output  Outcome: PROGRESSING AS EXPECTED  Intervention: Monitor, educate, and encourage compliance with therapeutic intake of liquids  Note: Patient BP low and pt appears dry. IVF started infusing at 100 mL/hr. Monitor number of incontinent episodes.

## 2020-05-26 NOTE — CARE PLAN
Problem: Safety  Goal: Will remain free from injury  Outcome: PROGRESSING AS EXPECTED     Problem: Pain Management  Goal: Pain level will decrease to patient's comfort goal  Outcome: PROGRESSING AS EXPECTED     Problem: Skin Integrity  Goal: Risk for impaired skin integrity will decrease  Outcome: PROGRESSING AS EXPECTED

## 2020-05-27 NOTE — THERAPY
"Physical Therapy   Daily Treatment     Patient Name: Carmenza Bennett  Age:  75 y.o., Sex:  female  Medical Record #: [id number]  Today's Date: 5/27/2020     Precautions: Fall Risk    Assessment    Patient required significant motivation to get out of bed, however once sitting up patient very compliant and motivated.  Patient demonstrates significant posterior lean in standing, indicating potential extensor tone - however this was not assessed this visit - Patient demonstrates ability to improve lumbar/thoracic extension and was able to maintain position in sitting with some effort.  Patient did repeatedly state \"I'm just so anxious\" however patient states she is not nervous about falling or exercising. Focused today's session on sitting balance, core strength and endurance.  Patient very fatigued this session, however stated \"I feel better after doing that\" to end session.       05/27/20 0401   Cognition    Cognition / Consciousness X   Speech/ Communication Delayed Responses   Level of Consciousness Alert   Ability To Follow Commands 1 Step   Safety Awareness Impaired   New Learning Impaired   Attention Impaired   Sequencing Impaired   Initiation Impaired   Comments required motivation, willing to participate   Sitting Lower Body Exercises   Sitting Lower Body Exercises Yes   Long Arc Quad 2 sets of 10   Sit to Stand   (3 x 1 )   Other Exercises Posterior trunk lean, crossbody reaches, reaching beyond SKYE, sitting to elbow prop   Standing Lower Body Exercises   Standing Lower Body Exercises Yes   Other Exercises standing - 3 x 15 seconds   Neurological Concerns   Neurological Concerns Yes   Comments PMHx, cognition   Balance   Sitting Balance (Static) Good   Sitting Balance (Dynamic) Fair +   Standing Balance (Static) Poor   Standing Balance (Dynamic) Poor -   Comments posterior lean in standing   Gait Analysis   Gait Level Of Assist Unable to Participate   Bed Mobility    Supine to Sit Moderate Assist   Sit to Supine " Moderate Assist   Scooting Maximal Assist   Functional Mobility   Sit to Stand Maximal Assist   How much difficulty does the patient currently have...   Turning over in bed (including adjusting bedclothes, sheets and blankets)? 1   Sitting down on and standing up from a chair with arms (e.g., wheelchair, bedside commode, etc.) 1   Moving from lying on back to sitting on the side of the bed? 1   How much help from another person does the patient currently need...   Moving to and from a bed to a chair (including a wheelchair)? 2   Need to walk in a hospital room? 1   Climbing 3-5 steps with a railing? 1   6 clicks Mobility Score 7   Short Term Goals    Short Term Goal # 1 Pt will be SPV for supine<>sit in 6 txs to improve bed mobility.   Goal Outcome # 1 goal not met   Short Term Goal # 2 Pt will be [place] for transfers with FWW in 6 txs to improve OOB activity.   Goal Outcome # 2 Goal not met   Short Term Goal # 3 Pt will be [place] for gait with FWW x 50' to improve mobility.   Goal Outcome # 3 Goal not met   Education Group   Education Provided Role of Physical Therapist   Role of Physical Therapist Patient Response Patient;Acceptance;Explanation;Demonstration;Reinforcement Needed;No Learning Evidence     Plan    Treatment plan modified to 3 times per week until therapy goals are met for the following treatments:  Bed Mobility, Community Re-integration, Gait Training, Neuro Re-Education / Balance, Self Care/Home Evaluation, Stair Training, Therapeutic Activities and Therapeutic Exercises.    Discharge recommendations:  Post Acute Placement

## 2020-05-27 NOTE — PROGRESS NOTES
2 RN skin check completed with CURT Fernández.   Devices in place: n/a.  Skin assessed under devices: n/a.  Confirmed pressure ulcers found on: n/a.  New potential pressure ulcers noted on:   n/a.    The following interventions in place: barrier paste to bottom, incontinent monitoring and linen changes as needed, mepliex on heels for preventive measures,  [id number], 2 RN skin check, pillows for positioning and to float heels.     Heels: red and blanching; mepliex in place for preventive measures.  BLE swelling.  Sacral area: red/light purple and very slow to obed; barrier paste applied.  Groin area: red and blanching; barrier paste applied.  Elbows: pink/red and blanching.

## 2020-05-27 NOTE — CARE PLAN
Problem: Safety  Goal: Will remain free from falls  Outcome: PROGRESSING AS EXPECTED  Intervention: Assess risk factors for falls  Note: Patient will have no falls during shift. She is a high fall risk and has all appropriate precautions in place. Bed in low and locked position. CNA sitter remains at bedside.     Problem: Urinary Elimination:  Goal: Ability to reestablish a normal urinary elimination pattern will improve  Outcome: PROGRESSING AS EXPECTED  Intervention: Implement bladder training program  Note: Patient continues to be incontinent as this is her new baseline.

## 2020-05-27 NOTE — CARE PLAN
Problem: Communication  Goal: The ability to communicate needs accurately and effectively will improve  Outcome: PROGRESSING AS EXPECTED  Note: Encouraged pt to express feelings and ask questions. Pt is A&Ox1 to self. Will reorient pt as needed.      Problem: Safety  Goal: Will remain free from injury  Outcome: PROGRESSING AS EXPECTED  Note: Fall precautions in place. Bed in lowest position. Non-skid socks in place. Mobility sign on door. Pt educated regarding fall prevention and states understanding. Pt has CNA sitter for safety.

## 2020-05-27 NOTE — PROGRESS NOTES
"Received report from night shift nurse. Pt ripped off IV early morning stating pt no longer needed IV per night shift nurse. Pt was receiving fluids through IV due to dehydration and low bps. Bp was within normal range last night and this morning bp was 122/67. Pt said \"I promise to keep hydrated. I dont want another IV.\" Last KING was made aware of situation. Gave verbal approval that no IV is needed to be reinserted as long as pt states hydrated.   "

## 2020-05-27 NOTE — PROGRESS NOTES
Patient alert to self, BB, and on RA. No complaints of pain, SOB/chest pains, n/v/d. Patient mainly pleasant and cooperative with staff with intermittent periods of irritability. At one point pt removed her PIV and refused RN to place another.

## 2020-05-27 NOTE — PROGRESS NOTES
Hospital Medicine Daily Progress Note    Date of Service  5/27/2020    Chief Complaint  75 y.o. female admitted 5/14/2020 with SOB.    Hospital Course    PMH dementia, Parkinson's, DVT who presented with SOB and cough. CXR showed perihilar interstitial markings and she was admitted for viral bronchitis with hypoxia. COVID PCR was negative. She improved and weaned off O2. PTOT recommended SNF and 24/7 supervision. She has difficulty placement since she will eventually need a group home.       Interval Problem Update  No acute issue overnight.  Patient vital is stable afebrile.  I saw and examined the patient today.    Consultants/Specialty  Pall care    Code Status  DNAR/DNI      Disposition  PTOT - SNF  Needs 24/7 supervision and group home  Difficult placement, case management assisting    Review of Systems  Review of Systems   Unable to perform ROS: Dementia   Constitutional: Negative for malaise/fatigue.   Respiratory: Negative for cough.    Cardiovascular: Negative for chest pain.   Gastrointestinal: Negative for nausea.   Musculoskeletal: Negative for joint pain.   Psychiatric/Behavioral: Positive for memory loss.        Physical Exam  Temp:  [36.3 °C (97.4 °F)-36.4 °C (97.6 °F)] 36.4 °C (97.5 °F)  Pulse:  [72-85] 75  Resp:  [16-18] 16  BP: (100-122)/(55-75) 122/67  SpO2:  [90 %-92 %] 90 %    Physical Exam  Vitals signs and nursing note reviewed.   Constitutional:       Appearance: She is not toxic-appearing or diaphoretic.      Comments: Frail   HENT:      Head: Normocephalic.      Mouth/Throat:      Mouth: Mucous membranes are moist.   Eyes:      General:         Right eye: No discharge.   Neck:      Musculoskeletal: Neck supple.   Cardiovascular:      Rate and Rhythm: Normal rate.   Pulmonary:      Effort: No respiratory distress.      Breath sounds: No rales.   Abdominal:      Palpations: Abdomen is soft.      Tenderness: There is no abdominal tenderness.   Musculoskeletal:      Right lower leg: Edema  present.      Left lower leg: Edema present.   Skin:     General: Skin is warm and dry.   Neurological:      Mental Status: She is alert.      Comments: Oriented to self, disoriented to hospital/city/year. Moving all extremities   Psychiatric:      Comments: pleasant         Fluids    Intake/Output Summary (Last 24 hours) at 5/27/2020 0836  Last data filed at 5/26/2020 1941  Gross per 24 hour   Intake 720 ml   Output --   Net 720 ml       Laboratory  Recent Labs     05/25/20  0141 05/25/20  1537 05/26/20  0124   WBC 7.3 6.9 5.6   RBC 4.60 4.93 4.18*   HEMOGLOBIN 13.8 14.6 12.5   HEMATOCRIT 42.6 46.2 40.2   MCV 92.6 93.7 96.2   MCH 30.0 29.6 29.9   MCHC 32.4* 31.6* 31.1*   RDW 46.2 47.3 48.9   PLATELETCT 320 337 274   MPV 9.1 9.1 9.5     Recent Labs     05/25/20  0141 05/25/20  1537 05/26/20  0124   SODIUM 138 139 136   POTASSIUM 4.1 4.4 4.0   CHLORIDE 104 103 110   CO2 22 23 19*   GLUCOSE 99 104* 84   BUN 17 18 17   CREATININE 0.82 0.84 0.67   CALCIUM 9.3 9.6 8.3*                   Imaging  DX-CHEST-PORTABLE (1 VIEW)   Final Result      No acute cardiopulmonary abnormality.      US-EXTREMITY VENOUS LOWER UNILAT RIGHT   Final Result      DX-HIP-UNILATERAL-WITH PELVIS-1 VIEW RIGHT   Final Result      1.  Bony pelvis and the hip joint appear normal      2.  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  No acute intracranial findings.      2.  Diffuse atrophy and periventricular white matter changes, consistent with chronic small vessel disease.         DX-CHEST-PORTABLE (1 VIEW)   Final Result      Perihilar interstitial markings are increased and suggestive of mild pulmonary edema.           Assessment/Plan  * Viral bronchitis- (present on admission)  Assessment & Plan  COVID-19 negative  Resolved with supportive care  Monitor closely    UTI (urinary tract infection)- (present on  admission)  Assessment & Plan  Finished course of IV Ceftriaxone    Dementia (HCC)- (present on admission)  Assessment & Plan  Avoid benzodiazepines and anticholinergics  Palliative care  SNF placement, then needs 24/7 supervision with group home, placement pending    Intermittent agitated, aggressive towards staff and at risk of falling out of bed.  Ordered for bedside sitter  Seroquel BID and haldol PRN  Improving    Lower extremity pain, bilateral- (present on admission)  Assessment & Plan  Scheduled Tylenol, lidocaine patch  Doppler did not show DVT  XR showed osteoarthritis of hip  PTOT    Parkinson's disease (HCC)- (present on admission)  Assessment & Plan  Sinemet  PT and OT evaluations       VTE prophylaxis: lovenox

## 2020-05-27 NOTE — PROGRESS NOTES
"Pt is A&Ox1 to self but is able to state correct month. Denies any pain at this moment and refused morning lidocaine patch stating \" I don't need it.\" Pt is on RA. VSS. Assessment complete. Skin check complete.  POC discussed with pt; all questions answered at this time. Fall precautions in place. Pt has CNA sitter for safety. Pt educated regarding fall prevention and states understanding.   "

## 2020-05-27 NOTE — PROGRESS NOTES
2 RN skin check completed with CURT Mosher.   Devices in place: none.  Skin assessed under devices: yes.  Confirmed pressure ulcers found on: none.  New potential pressure ulcers noted on: sacral area.  The following interventions in place: waffle mattress overlay, barrier paste, incontinent monitoring and linen changes as needed, [id number], 2 RN skin check qshift, pillows to for positioning and to float heels.    Heels: light red and blanching  RLE: bruising noted  Sacral area: red/light purple (barrier paste thickly applied)  Groin area: slightly red and blanching (barrier paste applied)  Elbows: pink and blanching    All skin noted to be intact

## 2020-05-28 NOTE — CARE PLAN
Problem: Safety  Goal: Will remain free from injury  Outcome: PROGRESSING AS EXPECTED   Bed alarm on. Call light and belongings within reach      Problem: Mobility  Goal: Risk for activity intolerance will decrease  Outcome: PROGRESSING AS EXPECTED

## 2020-05-28 NOTE — PROGRESS NOTES
2 RN skin check completed with CURT Becerra.   Devices in place: n/a.  Skin assessed under devices: n/a.  Confirmed pressure ulcers found on: n/a.  New potential pressure ulcers noted on: n/a.        BLE swelling  Sacral area: red/dark purple/very slow to obed; barrier paste applied.  Groin area: red and blanching; barrier paste applied.  Elbows: pink/red and blanching.  Heels: red/boggy/blanching; mepliex in place for preventive measures.    The following interventions in place: barrier paste to bottom, incontinent monitoring and linen changes as needed, mepliex on heels for preventive measures,  [id number], 2 RN skin check, pillows for positioning and to float heels.

## 2020-05-28 NOTE — CARE PLAN
Problem: Communication  Goal: The ability to communicate needs accurately and effectively will improve  Outcome: PROGRESSING AS EXPECTED  Note: Encouraged pt to express feelings and ask questions. Pt was a bit agitated this morning while doing assessment.     Problem: Safety  Goal: Will remain free from injury  Outcome: PROGRESSING AS EXPECTED  Note: Fall precautions in place. Bed in lowest position. Personal possessions within reach. Mobility sign on door. Bed-alarm on. Call light within reach. Pt educated regarding fall prevention and states understanding.
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
## 2020-05-28 NOTE — PROGRESS NOTES
"Pt is A&Ox2 to self and place. On room air and tolerating well. Denies any pain at this moment. Pt was a bit agitated during assessment and repositioning this morning and refused getting morning medication. \"I want to eat. I don't want any medications right now.\" Pt settled down once set up for breakfast. POC discussed with pt; all questions answered at this time. Fall precautions in place. Bed in lowest position. Mobility sign on door. Bed-alarm on. Call light within reach. Pt educated regarding fall prevention and states understanding. Hourly rounding in place.   "

## 2020-05-28 NOTE — PROGRESS NOTES
2 RN skin check completed with CURT [place]ag[place].   Devices in place: n/a.  Skin assessed under devices: n/a.  Confirmed pressure ulcers found on: n/a.  New potential pressure ulcers noted on:   n/a.     The following interventions in place: barrier paste to bottom, incontinent monitoring and linen changes as needed, mepliex on heels for preventive measures,  [id number], 2 RN skin check, pillows for positioning and to float heels.     Heels: red/boggy and blanching; mepliex in place for preventive measures.  Trace of BLE swelling.  Sacral area: red/light purple and very slow to obed; barrier paste applied.  Groin area: red and blanching; barrier paste applied.  Elbows: pink/red and blanching.

## 2020-05-28 NOTE — PROGRESS NOTES
Hospital Medicine Daily Progress Note    Date of Service  5/28/2020    Chief Complaint  75 y.o. female admitted 5/14/2020 with SOB.    Hospital Course    PMH dementia, Parkinson's, DVT who presented with SOB and cough. CXR showed perihilar interstitial markings and she was admitted for viral bronchitis with hypoxia. COVID PCR was negative. She improved and weaned off O2. PTOT recommended SNF and 24/7 supervision. She has difficulty placement since she will eventually need a group home.       Interval Problem Update  No acute issue overnight.  Eating breakfast.  Patient vital is stable afebrile.  I saw and examined the patient today.    Consultants/Specialty  Pall care    Code Status  DNAR/DNI      Disposition  PTOT - SNF  Needs 24/7 supervision and group home  Difficult placement, case management assisting    Review of Systems  Review of Systems   Unable to perform ROS: Dementia   Constitutional: Negative for malaise/fatigue.   Respiratory: Negative for cough.    Gastrointestinal: Negative for nausea and vomiting.   Musculoskeletal: Negative for joint pain.   Psychiatric/Behavioral: Positive for memory loss.        Physical Exam  Temp:  [36.6 °C (97.9 °F)-37.5 °C (99.5 °F)] 36.6 °C (97.9 °F)  Pulse:  [76-91] 76  Resp:  [16-18] 17  BP: (115-130)/(63-87) 122/66  SpO2:  [93 %-95 %] 94 %    Physical Exam  Vitals signs and nursing note reviewed.   Constitutional:       Comments: Frail   HENT:      Head: Normocephalic.      Mouth/Throat:      Mouth: Mucous membranes are moist.   Neck:      Musculoskeletal: Neck supple.   Cardiovascular:      Rate and Rhythm: Normal rate.   Pulmonary:      Effort: No respiratory distress.      Breath sounds: No rales.   Abdominal:      Palpations: Abdomen is soft.   Musculoskeletal:      Right lower leg: Edema present.      Left lower leg: Edema present.   Skin:     General: Skin is warm and dry.   Neurological:      Mental Status: She is alert.      Comments: Oriented to self, disoriented  to hospital/city/year. Moving all extremities   Psychiatric:      Comments: pleasant         Fluids    Intake/Output Summary (Last 24 hours) at 5/28/2020 0856  Last data filed at 5/27/2020 2052  Gross per 24 hour   Intake 720 ml   Output --   Net 720 ml       Laboratory  Recent Labs     05/25/20  1537 05/26/20  0124   WBC 6.9 5.6   RBC 4.93 4.18*   HEMOGLOBIN 14.6 12.5   HEMATOCRIT 46.2 40.2   MCV 93.7 96.2   MCH 29.6 29.9   MCHC 31.6* 31.1*   RDW 47.3 48.9   PLATELETCT 337 274   MPV 9.1 9.5     Recent Labs     05/25/20  1537 05/26/20  0124   SODIUM 139 136   POTASSIUM 4.4 4.0   CHLORIDE 103 110   CO2 23 19*   GLUCOSE 104* 84   BUN 18 17   CREATININE 0.84 0.67   CALCIUM 9.6 8.3*                   Imaging  DX-CHEST-PORTABLE (1 VIEW)   Final Result      No acute cardiopulmonary abnormality.      US-EXTREMITY VENOUS LOWER UNILAT RIGHT   Final Result      DX-HIP-UNILATERAL-WITH PELVIS-1 VIEW RIGHT   Final Result      1.  Bony pelvis and the hip joint appear normal      2.  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  No acute intracranial findings.      2.  Diffuse atrophy and periventricular white matter changes, consistent with chronic small vessel disease.         DX-CHEST-PORTABLE (1 VIEW)   Final Result      Perihilar interstitial markings are increased and suggestive of mild pulmonary edema.           Assessment/Plan  * Viral bronchitis- (present on admission)  Assessment & Plan  COVID-19 negative  Resolved with supportive care  Monitor closely    UTI (urinary tract infection)- (present on admission)  Assessment & Plan  Finished course of IV Ceftriaxone    Dementia (HCC)- (present on admission)  Assessment & Plan  Avoid benzodiazepines and anticholinergics  Palliative care  SNF placement, then needs 24/7 supervision with group home, placement pending    Intermittent agitated, aggressive towards  staff and at risk of falling out of bed.  Ordered for bedside sitter  Seroquel BID and haldol PRN  Improving    Lower extremity pain, bilateral- (present on admission)  Assessment & Plan  Scheduled Tylenol, lidocaine patch  Doppler did not show DVT  XR showed osteoarthritis of hip  PTOT    Parkinson's disease (HCC)- (present on admission)  Assessment & Plan  Sinemet  PT and OT evaluations  Fall precautions       VTE prophylaxis: lovenox

## 2020-05-28 NOTE — DISCHARGE PLANNING
Medical Social Work  Voice message from [facility], talked to spouse and he will be mailing over the requested information.  Will contact SW when she receives it and submits it to [employer].

## 2020-05-29 NOTE — THERAPY
"Occupational Therapy  Daily Treatment     Patient Name: Carmenza Bennett  Age:  75 y.o., Sex:  female  Medical Record #: [id number]  Today's Date: 5/29/2020     Precautions  Precautions: Fall Risk  Comments: PD and dementia    Subjective    \"They don't let me do anything around here.\"     Objective       05/29/20 1458   Precautions   Precautions Fall Risk   Comments PD and dementia   Cognition    Cognition / Consciousness X   Level of Consciousness Confused   Ability To Follow Commands 1 Step   Safety Awareness Impaired   New Learning Impaired   Attention Impaired   Sequencing Impaired   Initiation Impaired   Comments Requires a lot of cuing for sequencing, improper use of tools   Balance   Sitting Balance (Static) Fair -   Sitting Balance (Dynamic) Poor +   Standing Balance (Static) Poor +   Standing Balance (Dynamic) Poor   Weight Shift Sitting Fair   Weight Shift Standing Poor   Skilled Intervention Compensatory Strategies;Facilitation;Postural Facilitation;Sequencing;Tactile Cuing;Verbal Cuing   Comments Posterior lean sitting and standing, unable to orient to midline   Bed Mobility    Supine to Sit Maximal Assist   Scooting Maximal Assist   Skilled Intervention Compensatory Strategies;Facilitation;Sequencing;Tactile Cuing;Verbal Cuing   Activities of Daily Living   Grooming Seated;Moderate Assist   Lower Body Dressing Maximal Assist   Skilled Intervention Compensatory Strategies;Facilitation;Sequencing;Tactile Cuing;Verbal Cuing   Comments Pt able to pull up L sock once donned over toes, but unable to reach R foot. Pt with difficulty sequencing tooth brushing task.   Functional Mobility   Sit to Stand Moderate Assist   Bed, Chair, Wheelchair Transfer Maximal Assist   Transfer Method Stand Pivot  (With anterior support)   Mobility sup>sit, STS   Skilled Intervention Facilitation;Postural Facilitation;Sequencing;Tactile Cuing;Verbal Cuing   Short Term Goals   Short Term Goal # 1 pt will demo functional transfer with " Lashonda   Goal Outcome # 1 Goal not met   Short Term Goal # 2 pt will sequence seated oral care without cues   Goal Outcome # 2 Progressing slower than expected       Assessment    Pt seen for OT treatment. Limited by RLE pain, poor balance sitting and standing, and impaired cognition. Pt is very fearful of falling, demos significant posterior lean, which impairs ability to transfer without physical assistance. Pt had difficulty sequencing tooth brushing task, and demos inappropriate use of tools, requires frequent cuing. Will continue to follow.     Plan    Continue current treatment plan.    Discharge recommendations:  Recommend post-acute placement for additional occupational therapy services prior to discharge home.

## 2020-05-29 NOTE — CARE PLAN
Problem: Skin Integrity  Goal: Risk for impaired skin integrity will decrease  Outcome: PROGRESSING AS EXPECTED  Intervention: Implement precautions to protect skin integrity in collaboration with the interdisciplinary team  Flowsheets  Taken 5/29/2020 0200 by LISA LuzNJENARO  Skin Preventative Measures: Pillows in Use for Support / Positioning  Taken 5/28/2020 2040 by Bijal Crsos R.N.  Bed Types: Pressure Redistribution Mattress (Atmosair)  Friction Interventions: Draw Sheet / Pad Used for Repositioning  PT / OT Involved in Care:   Physical Therapy Involved   Occupational Therapy Involved  Moisturizers: Barrier Paste  Taken 5/29/2020 0225 by Bijal Cross R.N.  Protocols: Incontinence Associated Dermatitis Protocol in Place  Note: Waffle overlay in place, incontinence checks and care, barrier paste applied to sacrum, q2 turns in place, pillows in place for repositioning.      Problem: Mobility  Goal: Risk for activity intolerance will decrease  Outcome: PROGRESSING SLOWER THAN EXPECTED  Intervention: Encourage patient to increase activity level in collaboration with Interdisciplinary Team  Note: PT and OT on board, however patient does not always work with them. Q2 turns in place. 1-2 person assist to turn patient.

## 2020-05-29 NOTE — CARE PLAN
"  Problem: Communication  Goal: The ability to communicate needs accurately and effectively will improve  Outcome: PROGRESSING AS EXPECTED  Note: Pt is currently A&Ox1 to self. Encouraged pt to express feelings and ask questions as possible. Will reorient pt as needed.     Problem: Pain Management  Goal: Pain level will decrease to patient's comfort goal  Outcome: PROGRESSING AS EXPECTED  Note: Pt assessed for pain regularly and medicated PRN per MAR. Pt denies any pain at this moment. Refused lidocaine patch due to lack of pain. \" I dont need it. I dont have pain. Im okay\" per pt.      "

## 2020-05-29 NOTE — PROGRESS NOTES
Pt A/O x 2 to person and time, disoriented to event and place. Oriented patient during shift appropriately. Patient tearful at times during shift, but pleasant. Pt is cooperative and scheduled medications administered per MAR. Pt tolerates medications whole, one at a time. Pt needs 24/7 supervision, looking for [facility] placement. Needs cared for at this time. POC discussed, pt verbalized understanding. Questions and concerns answered at bedside. Bed in lowest position and locked. Call light and belongings within reach. Q2 turns in place and pillows in place for repositioning. Bed alarm on and working.

## 2020-05-29 NOTE — PROGRESS NOTES
"Pt is A&Ox1 to self. On room air and tolerating well. Denies any pain at this moment. Refused lidocaine patch this morning; \" I dont need it. I don't have pain. Im okay\" per pt.  POC discussed with pt; all questions answered at this time. Fall precautions in place. Bed in lowest position. Mobility sign on door. Bed-alarm on. Call light within reach. Pt educated regarding fall prevention and states understanding. Hourly rounding in place.           "

## 2020-05-29 NOTE — PROGRESS NOTES
2 RN skin check completed with CURT Bazzi.   Devices in place:removed purewick.  Skin assessed under devices N/A.  Confirmed pressure ulcers found on N/A.  New potential pressure ulcers noted on N/A. Wound consult placed N/a.  The following interventions in place: q2h turns, Pillows in place for repositioning, incontinence checks and care, barrier cream applied to sacrum and buttocks, placed mepilex on L elbow, and bilateral heels, educated pt on heel float boots but patient refuses to wear them, floating heels on pillows at this time, waffle overlay in place.    Skin Assessment: L elbow slow to obed and red, groin is pink and blanching. Sacrum red and blanching in some areas, and slow to obed in some areas, barrier cream applied no mepilex in place because incontinent. Scattered bruising on all four extremities. Heels are red and boggy but blanching, mepilexes placed on both heels, floated with a pillow.

## 2020-05-29 NOTE — PROGRESS NOTES
Hospital Medicine Daily Progress Note    Date of Service  5/29/2020    Chief Complaint  75 y.o. female admitted 5/14/2020 with SOB.    Hospital Course    PMH dementia, Parkinson's, DVT who presented with SOB and cough. CXR showed perihilar interstitial markings and she was admitted for viral bronchitis with hypoxia. COVID PCR was negative. She improved and weaned off O2. PTOT recommended SNF and 24/7 supervision. She has difficulty placement since she will eventually need a group home.       Interval Problem Update  I saw and examined the patient today.  Patient is sleeping comfortably on the bed.    Consultants/Specialty  Pall care    Code Status  DNAR/DNI      Disposition  PTOT - SNF  Needs 24/7 supervision and group home  Difficult placement, case management assisting    Review of Systems  Review of Systems   Unable to perform ROS: Dementia   Constitutional: Positive for malaise/fatigue.   Respiratory: Negative for cough.    Gastrointestinal: Negative for nausea and vomiting.   Musculoskeletal: Negative for back pain and joint pain.   Psychiatric/Behavioral: Positive for memory loss.        Physical Exam  Temp:  [36.5 °C (97.7 °F)-36.7 °C (98 °F)] 36.7 °C (98 °F)  Pulse:  [72-88] 78  Resp:  [18] 18  BP: ()/(57-64) 113/64  SpO2:  [93 %-98 %] 95 %    Physical Exam  Vitals signs and nursing note reviewed.   Constitutional:       Comments: Frail   HENT:      Head: Normocephalic.      Mouth/Throat:      Mouth: Mucous membranes are moist.   Neck:      Musculoskeletal: Neck supple.   Cardiovascular:      Rate and Rhythm: Normal rate and regular rhythm.   Pulmonary:      Effort: No respiratory distress.   Musculoskeletal:      Right lower leg: Edema present.      Left lower leg: Edema present.   Skin:     General: Skin is warm and dry.   Neurological:      Mental Status: She is alert.      Comments: Oriented to self, disoriented to hospital/city/year. Moving all extremities   Psychiatric:      Comments: pleasant          Fluids    Intake/Output Summary (Last 24 hours) at 5/29/2020 0837  Last data filed at 5/28/2020 2226  Gross per 24 hour   Intake 358 ml   Output --   Net 358 ml       Laboratory                        Imaging  DX-CHEST-PORTABLE (1 VIEW)   Final Result      No acute cardiopulmonary abnormality.      US-EXTREMITY VENOUS LOWER UNILAT RIGHT   Final Result      DX-HIP-UNILATERAL-WITH PELVIS-1 VIEW RIGHT   Final Result      1.  Bony pelvis and the hip joint appear normal      2.  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  No acute intracranial findings.      2.  Diffuse atrophy and periventricular white matter changes, consistent with chronic small vessel disease.         DX-CHEST-PORTABLE (1 VIEW)   Final Result      Perihilar interstitial markings are increased and suggestive of mild pulmonary edema.           Assessment/Plan  * Viral bronchitis- (present on admission)  Assessment & Plan  COVID-19 negative  Resolved with supportive care  Monitor closely    UTI (urinary tract infection)- (present on admission)  Assessment & Plan  Finished course of IV Ceftriaxone    Dementia (HCC)- (present on admission)  Assessment & Plan  Avoid benzodiazepines and anticholinergics  Intermittent agitated, aggressive towards staff and at risk of falling out of bed.  Ordered for bedside sitter  Seroquel BID and haldol PRN  Improving    Lower extremity pain, bilateral- (present on admission)  Assessment & Plan  Scheduled Tylenol, lidocaine patch  Doppler did not show DVT  XR showed osteoarthritis of hip  PTOT    Parkinson's disease (HCC)- (present on admission)  Assessment & Plan  Sinemet  PT and OT evaluations  Fall precautions  SNF       VTE prophylaxis: lovenox

## 2020-05-30 NOTE — PROGRESS NOTES
Assessment complete. Pt A&Ox1 Room air. Regular diet. Pills whole one at a time. Pt is a max assist to turn, and incontinent of both bowel and bladder. Call light within reach.  Bed locked and in lowest position.

## 2020-05-30 NOTE — THERAPY
Physical Therapy   Daily Treatment     Patient Name: Carmenza Bennett  Age:  75 y.o., Sex:  female  Medical Record #: [id number]  Today's Date: 5/29/2020     Precautions: Fall Risk (PD and dementia)    Assessment  Patient continues to be primarily limited by medical comorbidities. She required max A for all mobility and in sitting demonstrated L lateral and posterior lean. She was able to correct somewhat to center with verbal and tactile cueing. Provided instruction for seated LE exercise after attempted stand with max A and patient unable to achieve full extension. Patient also reported fear and anxiety regarding standing attempt and possible fall. Significant rigidity of BLE observed with bed mobility.    Plan  Continue current treatment plan.  Discharge recommendations:  As before, anticipate patient will require long term care.    Subjective  Agreeable     Objective   05/29/20 8816   Cognition    Cognition / Consciousness X   Level of Consciousness Confused   Ability To Follow Commands 1 Step   Safety Awareness Impaired   New Learning Impaired   Attention Impaired   Sequencing Impaired   Initiation Impaired   Comments pleasantly confused   Balance   Sitting Balance (Static) Fair -   Sitting Balance (Dynamic) Poor +   Standing Balance (Static) Trace +   Weight Shift Sitting Poor   Weight Shift Standing Poor   Comments posterior lean. minimal ability to find midline with verbal and tactile cues   Gait Analysis   Gait Level Of Assist Unable to Participate   Bed Mobility    Supine to Sit Maximal Assist   Sit to Supine Maximal Assist   Scooting Maximal Assist   Functional Mobility   Sit to Stand Maximal Assist  (unable to achieve full stand)   Patient / Family Goals    Patient / Family Goal #1 to get her legs stronger   Goal #1 Outcome Goal not met   Short Term Goals    Short Term Goal # 1 Pt will be SPV for supine<>sit in 6 txs to improve bed mobility.   Goal Outcome # 1 goal not met   Short Term Goal # 2 Pt will be Lashonda  for transfers with FWW in 6 txs to improve OOB activity.   Goal Outcome # 2 Goal not met   Short Term Goal # 3 Pt will be Lashonda for gait with FWW x 50' to improve mobility.   Goal Outcome # 3 Goal not met

## 2020-05-30 NOTE — PROGRESS NOTES
2 RN skin check completed with CRUT [place].   Devices in place: n/a.  Skin assessed under devices: n/a.  Confirmed pressure ulcers found on: n/a.  New potential pressure ulcers noted on:   n/a.  The following interventions in place: Q2hr turns, pillows for repositioning, barrier paste to bottom, mepliex on heels for preventive measures, incontinence checks as well as linen changes, and heel float boots.    Skin Assessment:  Heels: red/boggy and blanching; mepliex in place for preventive measures.  Trace of BLE swelling.  Sacral area: red/light purple and blanching throughout just slow to obed in some areas; barrier paste applied.  Groin area: very red and blanching; barrier paste applied.  Elbows: red and blanching.

## 2020-05-30 NOTE — PROGRESS NOTES
2 RN skin check completed with Narciso ELIAS.   No devices in place.  Skin assessed under devices: n/a.  No confirmed pressure ulcers found.  No new potential pressure ulcers noted.   The following interventions in place: Q2hr turns, pillows for repositioning, barrier paste to bottom, mepliex on heels for preventive measures, incontinence checks as well as linen changes, and heel float boots.     Skin Assessment:  Heels: red/boggy and blanching; mepliex in place for preventive measures.  Trace of BLE swelling.  Sacral area: red/light purple and blanching throughout just slow to obed in some areas; barrier paste applied.  Groin area: very red and blanching; barrier paste applied.  Elbows: red and blanching.

## 2020-05-30 NOTE — PROGRESS NOTES
2 RN skin check completed with CURT [place].   Devices in place: n/a.  Skin assessed under devices: n/a.  Confirmed pressure ulcers found on: n/a.  New potential pressure ulcers noted on:   n/a.     The following interventions in place: barrier paste to bottom, incontinent monitoring and linen changes as needed, mepliex on heels for preventive measures,  [id number], 2 RN skin check, pillows for positioning and to float heels.     Heels: red/boggy and blanching; mepliex in place for preventive measures.  Trace of BLE swelling.  Sacral area: red/light purple and blanching throughout just slow to obed in some areas; barrier paste applied.  Groin area: very red and blanching; barrier paste applied.  Elbows: red and blanching.

## 2020-05-30 NOTE — PROGRESS NOTES
Report received by dayshift RN. Assumed care of pt. Assessment complete. Pt A&Ox1 to self, VSS and on RA. Pt in no apparent signs of distress, denies pain at this time. Pt is a max assist to turn, and incontinent of both bowel and bladder. Swallows pills with no issues, and with thin liquids. No other needs at this time. Plan of care discussed. Call light within reach, bed locked and in lowest position, and hourly rounding in place.

## 2020-05-30 NOTE — PROGRESS NOTES
Hospital Medicine Daily Progress Note    Date of Service  5/30/2020    Chief Complaint  75 y.o. female admitted 5/14/2020 with SOB.    Hospital Course    PMH dementia, Parkinson's, DVT who presented with SOB and cough. CXR showed perihilar interstitial markings and she was admitted for viral bronchitis with hypoxia. COVID PCR was negative. She improved and weaned off O2. PTOT recommended SNF and 24/7 supervision. She has difficulty placement since she will eventually need a group home.       Interval Problem Update  No acute issue overnight.  I saw and examined the patient today.  Patient is sleeping comfortably on the bed.    Consultants/Specialty  Pall care    Code Status  DNAR/DNI      Disposition  PTOT - SNF  Needs 24/7 supervision and group home  Difficult placement, case management assisting    Review of Systems  Review of Systems   Unable to perform ROS: Dementia   Constitutional: Positive for malaise/fatigue.   Respiratory: Negative for cough and hemoptysis.    Gastrointestinal: Negative for heartburn, nausea and vomiting.   Musculoskeletal: Negative for back pain, joint pain and neck pain.   Psychiatric/Behavioral: Positive for memory loss.        Physical Exam  Temp:  [36.2 °C (97.2 °F)-36.8 °C (98.3 °F)] 36.6 °C (97.8 °F)  Pulse:  [66-92] 85  Resp:  [16-20] 17  BP: ()/(59-68) 106/68  SpO2:  [92 %-97 %] 92 %    Physical Exam  Vitals signs and nursing note reviewed.   Constitutional:       Appearance: Normal appearance.      Comments: Frail   HENT:      Head: Normocephalic.      Mouth/Throat:      Mouth: Mucous membranes are moist.   Neck:      Musculoskeletal: Normal range of motion and neck supple.   Cardiovascular:      Rate and Rhythm: Normal rate and regular rhythm.   Pulmonary:      Effort: No respiratory distress.      Breath sounds: Normal breath sounds.   Musculoskeletal:      Right lower leg: Edema present.      Left lower leg: Edema present.   Skin:     General: Skin is warm and dry.    Neurological:      Mental Status: She is alert.      Comments: Oriented to self, disoriented to hospital/city/year. Moving all extremities   Psychiatric:      Comments: pleasant         Fluids    Intake/Output Summary (Last 24 hours) at 5/30/2020 0847  Last data filed at 5/30/2020 0003  Gross per 24 hour   Intake 908 ml   Output --   Net 908 ml       Laboratory                        Imaging  DX-CHEST-PORTABLE (1 VIEW)   Final Result      No acute cardiopulmonary abnormality.      US-EXTREMITY VENOUS LOWER UNILAT RIGHT   Final Result      DX-HIP-UNILATERAL-WITH PELVIS-1 VIEW RIGHT   Final Result      1.  Bony pelvis and the hip joint appear normal      2.  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  No acute intracranial findings.      2.  Diffuse atrophy and periventricular white matter changes, consistent with chronic small vessel disease.         DX-CHEST-PORTABLE (1 VIEW)   Final Result      Perihilar interstitial markings are increased and suggestive of mild pulmonary edema.           Assessment/Plan  * Viral bronchitis- (present on admission)  Assessment & Plan  COVID-19 negative  Resolved with supportive care  Monitor closely    UTI (urinary tract infection)- (present on admission)  Assessment & Plan  Finished course of IV Ceftriaxone    Dementia (HCC)- (present on admission)  Assessment & Plan  Avoid benzodiazepines and anticholinergics  Ordered for bedside sitter  Seroquel BID and haldol PRN  Improving    Lower extremity pain, bilateral- (present on admission)  Assessment & Plan  Scheduled Tylenol, lidocaine patch  Doppler did not show DVT  XR showed osteoarthritis of hip  PTOT    Parkinson's disease (HCC)- (present on admission)  Assessment & Plan  Sinemet  PT and OT evaluations  Fall precautions  SNF       VTE prophylaxis: lovenox

## 2020-05-31 NOTE — PROGRESS NOTES
2 RN skin check completed with CURT Caldwell.   Devices in place: n/a.  Skin assessed under devices: n/a.  Confirmed pressure ulcers found on: n/a.  New potential pressure ulcers noted on:   n/a.  The following interventions in place: Q2hr turns, pillows for repositioning, barrier paste to bottom, mepliex on heels for preventive measures, incontinence checks as well as linen changes, and heel float boots.     Skin Assessment:  Heels: red/boggy and blanching; mepliex in place for preventive measures.  Trace of BLE swelling.  Sacral area: red/light purple and blanching throughout just slow to obed in some areas; barrier paste applied.  Groin area: very red and blanching; barrier paste applied.  Elbows: red and blanching.

## 2020-05-31 NOTE — PROGRESS NOTES
Report received by dayshift RN. Assumed care of pt. Assessment complete. Pt A&Ox1 to self, VSS and on RA. Pt in no apparent signs of distress, denies pain and/or n/v. Compliant with meds. Incontinent checks performed. Encouraged fluid intake. No other issues at this time. Plan of care discussed. Call light within reach, bed in lowest position, and pt has no further questions at this time.

## 2020-05-31 NOTE — PROGRESS NOTES
Hospital Medicine Daily Progress Note    Date of Service  5/31/2020    Chief Complaint  75 y.o. female admitted 5/14/2020 with SOB.    Hospital Course    PMH dementia, Parkinson's, DVT who presented with SOB and cough. CXR showed perihilar interstitial markings and she was admitted for viral bronchitis with hypoxia. COVID PCR was negative. She improved and weaned off O2. PTOT recommended SNF and 24/7 supervision. She has difficulty placement since she will eventually need a group home.       Interval Problem Update  No acute issue overnight.  I saw and examined the patient today.  Patient is sleeping comfortably on the bed.    Consultants/Specialty  Pall care    Code Status  DNAR/DNI      Disposition  PTOT - SNF  Needs 24/7 supervision and group home  Difficult placement, case management assisting    Review of Systems  Review of Systems   Unable to perform ROS: Dementia   Constitutional: Positive for malaise/fatigue.   Respiratory: Negative for hemoptysis.    Gastrointestinal: Negative for nausea and vomiting.   Musculoskeletal: Negative for back pain and joint pain.   Psychiatric/Behavioral: Positive for memory loss.        Physical Exam  Temp:  [36.3 °C (97.3 °F)-36.9 °C (98.5 °F)] 36.3 °C (97.3 °F)  Pulse:  [71-90] 71  Resp:  [16] 16  BP: ()/(55-68) 109/68  SpO2:  [91 %-94 %] 94 %    Physical Exam  Vitals signs and nursing note reviewed.   Constitutional:       Appearance: Normal appearance.      Comments: Frail   HENT:      Head: Normocephalic.      Mouth/Throat:      Mouth: Mucous membranes are moist.   Neck:      Musculoskeletal: Normal range of motion and neck supple.   Cardiovascular:      Rate and Rhythm: Normal rate.   Pulmonary:      Effort: Pulmonary effort is normal. No respiratory distress.      Breath sounds: Normal breath sounds.   Musculoskeletal:      Right lower leg: Edema present.      Left lower leg: Edema present.   Skin:     General: Skin is warm.   Neurological:      Mental Status: She  is alert.      Comments: Oriented to self, disoriented to hospital/city/year. Moving all extremities   Psychiatric:      Comments: pleasant         Fluids    Intake/Output Summary (Last 24 hours) at 5/31/2020 0814  Last data filed at 5/30/2020 2000  Gross per 24 hour   Intake 1040 ml   Output --   Net 1040 ml       Laboratory                        Imaging  DX-CHEST-PORTABLE (1 VIEW)   Final Result      No acute cardiopulmonary abnormality.      US-EXTREMITY VENOUS LOWER UNILAT RIGHT   Final Result      DX-HIP-UNILATERAL-WITH PELVIS-1 VIEW RIGHT   Final Result      1.  Bony pelvis and the hip joint appear normal      2.  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  No acute intracranial findings.      2.  Diffuse atrophy and periventricular white matter changes, consistent with chronic small vessel disease.         DX-CHEST-PORTABLE (1 VIEW)   Final Result      Perihilar interstitial markings are increased and suggestive of mild pulmonary edema.           Assessment/Plan  * Viral bronchitis- (present on admission)  Assessment & Plan  COVID-19 negative  Resolved with supportive care  Monitor closely    UTI (urinary tract infection)- (present on admission)  Assessment & Plan  Finished course of IV Ceftriaxone    Dementia (HCC)- (present on admission)  Assessment & Plan  Avoid benzodiazepines and anticholinergics  Ordered for bedside sitter  Seroquel BID and haldol PRN  Improving    Lower extremity pain, bilateral- (present on admission)  Assessment & Plan  Scheduled Tylenol, lidocaine patch  Doppler did not show DVT  XR showed osteoarthritis of hip  PTOT    Parkinson's disease (HCC)- (present on admission)  Assessment & Plan  Sinemet  PT and OT evaluations  Fall precautions  SNF     No change of plan from yesterday  VTE prophylaxis: lovenox

## 2020-05-31 NOTE — CARE PLAN
Problem: Safety  Goal: Will remain free from injury  Outcome: PROGRESSING AS EXPECTED     Problem: Pain Management  Goal: Pain level will decrease to patient's comfort goal  Outcome: PROGRESSING AS EXPECTED     Problem: Discharge Barriers/Planning  Goal: Patient's continuum of care needs will be met  Outcome: PROGRESSING SLOWER THAN EXPECTED

## 2020-06-01 NOTE — PROGRESS NOTES
[facility] Medicine Daily Progress Note    Date of Service  6/1/2020    Chief Complaint  75 y.o. female admitted 5/14/2020 with SOB.    Hospital Course    PMH dementia, Parkinson's, DVT who presented with SOB and cough. CXR showed perihilar interstitial markings and she was admitted for viral bronchitis with hypoxia. COVID PCR was negative. She improved and weaned off O2. PTOT recommended SNF and 24/7 supervision. She has difficulty placement since she will eventually need a group home.       Interval Problem Update  No acute issue overnight.  I saw and examined the patient today.  Patient is having breakfast.    Consultants/Specialty  Pall care    Code Status  DNAR/DNI      Disposition  PTOT - SNF  Needs 24/7 supervision and group home  Difficult placement, case management assisting    Review of Systems  Review of Systems   Unable to perform ROS: Dementia   Constitutional: Positive for malaise/fatigue.   Respiratory: Negative for cough and hemoptysis.    Gastrointestinal: Negative for heartburn, nausea and vomiting.   Musculoskeletal: Negative for back pain and joint pain.   Psychiatric/Behavioral: Positive for memory loss.        Physical Exam  Temp:  [36.2 °C (97.2 °F)-37.1 °C (98.7 °F)] 36.4 °C (97.6 °F)  Pulse:  [70-95] 70  Resp:  [16-18] 17  BP: (104-121)/(60-67) 104/62  SpO2:  [91 %-95 %] 94 %    Physical Exam  Vitals signs and nursing note reviewed.   Constitutional:       Comments: Frail   HENT:      Head: Normocephalic.      Mouth/Throat:      Mouth: Mucous membranes are moist.   Neck:      Musculoskeletal: Normal range of motion and neck supple.   Cardiovascular:      Rate and Rhythm: Normal rate and regular rhythm.   Pulmonary:      Effort: Pulmonary effort is normal. No respiratory distress.      Breath sounds: Normal breath sounds.   Musculoskeletal:      Right lower leg: Edema present.      Left lower leg: Edema present.   Skin:     General: Skin is warm.   Neurological:      Mental Status: She is  alert.      Comments: Oriented to self, disoriented to hospital/city/year. Moving all extremities   Psychiatric:      Comments: pleasant         Fluids    Intake/Output Summary (Last 24 hours) at 6/1/2020 0826  Last data filed at 6/1/2020 0000  Gross per 24 hour   Intake 865 ml   Output --   Net 865 ml       Laboratory                        Imaging  DX-CHEST-PORTABLE (1 VIEW)   Final Result      No acute cardiopulmonary abnormality.      US-EXTREMITY VENOUS LOWER UNILAT RIGHT   Final Result      DX-HIP-UNILATERAL-WITH PELVIS-1 VIEW RIGHT   Final Result      1.  Bony pelvis and the hip joint appear normal      2.  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  No acute intracranial findings.      2.  Diffuse atrophy and periventricular white matter changes, consistent with chronic small vessel disease.         DX-CHEST-PORTABLE (1 VIEW)   Final Result      Perihilar interstitial markings are increased and suggestive of mild pulmonary edema.           Assessment/Plan  * Viral bronchitis- (present on admission)  Assessment & Plan  COVID-19 negative  Resolved with supportive care  Monitor closely    UTI (urinary tract infection)- (present on admission)  Assessment & Plan  Finished course of IV Ceftriaxone    Dementia (HCC)- (present on admission)  Assessment & Plan  Avoid benzodiazepines and anticholinergics  Ordered for bedside sitter  Seroquel BID and haldol PRN  Improving    Lower extremity pain, bilateral- (present on admission)  Assessment & Plan  Scheduled Tylenol, lidocaine patch  Doppler did not show DVT  XR showed osteoarthritis of hip  PTOT    Parkinson's disease (HCC)- (present on admission)  Assessment & Plan  Sinemet  PT and OT evaluations  Fall precautions  SNF     No change of plan from yesterday  VTE prophylaxis: lovenox

## 2020-06-01 NOTE — CARE PLAN
Problem: Safety  Goal: Will remain free from injury  Outcome: PROGRESSING AS EXPECTED  Note: Pt remains injury free  Goal: Will remain free from falls  Outcome: PROGRESSING AS EXPECTED  Note: Pt remains free from falls

## 2020-06-01 NOTE — PROGRESS NOTES
2 RN skin check completed with CURT Stoner.   Devices in place: n/a.  Skin assessed under devices: n/a.  Confirmed pressure ulcers found on: n/a.  New potential pressure ulcers noted on:   n/a.  The following interventions in place: Q2hr turns, pillows for repositioning, barrier paste, mepliex on heels for preventive measures, incontinence checks as well as linen changes, and heel float boots.     Skin Assessment:  Heels: red/boggy and blanching; mepliex in place for preventive measures.   Trace of BLE swelling.  Sacral area: red/light purple and blanching throughout just slow to obed in some areas; barrier paste applied.  Groin area: very red and blanching; barrier paste applied.  Elbows: red and blanching.

## 2020-06-01 NOTE — PROGRESS NOTES
2 RN skin check completed with Mamie RN.   No devices in place.  Skin assessed under devices: n/a.  No confirmed pressure ulcers found.  No new potential pressure ulcers noted.   The following interventions in place: Q2hr turns, pillows for repositioning, barrier paste to bottom, mepliex on heels for preventive measures, incontinence checks as well as linen changes, and heel float boots.     Skin Assessment:  Heels: red/boggy and blanching; mepliex in place for preventive measures.  Trace of BLE swelling.  Sacral area: red/light purple and blanching throughout just slow to obed in some areas; barrier paste applied.  Groin area: very red and blanching; barrier paste applied.  Elbows: red and blanching

## 2020-06-01 NOTE — CARE PLAN
Problem: Safety  Goal: Will remain free from injury  Outcome: PROGRESSING AS EXPECTED     Problem: Venous Thromboembolism (VTW)/Deep Vein Thrombosis (DVT) Prevention:  Goal: Patient will participate in Venous Thrombosis (VTE)/Deep Vein Thrombosis (DVT)Prevention Measures  Outcome: PROGRESSING AS EXPECTED     Problem: Skin Integrity  Goal: Risk for impaired skin integrity will decrease  Outcome: PROGRESSING AS EXPECTED

## 2020-06-01 NOTE — PALLIATIVE CARE
Palliative Care follow-up  Spoke with Unit GENO Rachel, work is progressing on pts [place] Medicaid application for [facility].   Called and spoke with pts spouse, Gee. Updated on new visiting policy and discussed POLST for DC. Arranged to meet on Friday at 17:00 to complete POLST.     Plan: Meet with pts spouse Gee, on Friday at 17:00 to complete POLST for DC.     Thank you for allowing Palliative Care to support this patient and family. Contact [phone] for additional assistance, change in patient status, or with any questions/concerns.

## 2020-06-01 NOTE — THERAPY
"Occupational Therapy  Daily Treatment     Patient Name: Carmenza Bennett  Age:  75 y.o., Sex:  female  Medical Record #: [id number]  Today's Date: 6/1/2020       Precautions: Fall Risk  Comments: hx of Parkinson's & dementia    Assessment    Pt seen for OT tx.  Pt was pleasant & co-operative today but reported feeling very scared here.  Pt also stated she wants to kill herself & jump out the window because she's so unhappy.  Pt did well with reassurance.  Pt spoke to her  on the phone as she repeatedly asked where he was & why won't he come take her home.    Plan    Continue current treatment plan.    Discharge recommendations:  Recommend post-acute placement for additional occupational therapy services prior to discharge home.  Pt's cognitive impairments her ability to safely perform ADL's & functional mobility. Pt will need 24/7 supervision & assist in any environment she D/C to.    Subjective    \"I'm so scared & unhappy here.  I just want to cry!\"     Objective       06/01/20 1043   Cognition    Cognition / Consciousness X   Orientation Level Not Oriented to Age;Not Oriented to Address;Not Oriented to Year;Not Oriented to Month;Not Oriented to Day;Not Oriented to Place;Not Oriented to Reason;Not Oriented to Time   Level of Consciousness Alert   Ability To Follow Commands 1 Step   New Learning Impaired   Initiation Impaired   Comments Pt was co-operative during tx & was able to follow simple commands.  Pt stated she feels very scared & just wants to cry.   Other Treatments   Other Treatments Provided Pt upset & scared during tx today asking where her  was. Assisted pt to phone her  to speak with him on the phone.   Balance   Sitting Balance (Static) Fair -   Sitting Balance (Dynamic) Poor +   Standing Balance (Static) Trace +   Standing Balance (Dynamic) Trace   Weight Shift Sitting Fair   Weight Shift Standing Poor   Comments Pt's cognition impacts her ability to sit &  midline   Bed " Mobility    Supine to Sit Minimal Assist   Sit to Supine Minimal Assist   Scooting Moderate Assist   Rolling Minimal Assist to Rt.   Activities of Daily Living   Eating Minimal Assist   Grooming Minimal Assist;Seated   Upper Body Dressing Minimal Assist   Lower Body Dressing Maximal Assist   Toileting Maximal Assist   Functional Mobility   Sit to Stand Moderate Assist   Patient / Family Goals   Patient / Family Goal #1 Go home   Goal #1 Outcome Goal not met   Short Term Goals   Short Term Goal # 1 pt will demo functional transfer with Lashonda   Goal Outcome # 1 Goal not met   Short Term Goal # 2 pt will sequence seated oral care without cues   Goal Outcome # 2 Progressing slower than expected

## 2020-06-01 NOTE — PROGRESS NOTES
2 RN skin check completed with Narciso ELIAS.   No devices in place.  Skin assessed under devices: n/a.  No confirmed pressure ulcers found.  No new potential pressure ulcers noted.   The following interventions in place: Q2hr turns, pillows for repositioning, barrier paste to bottom, mepliex on heels for preventive measures, incontinence checks as well as linen changes, and heel float boots.     Skin Assessment:  Heels: red/boggy and blanching; mepliex in place for preventive measures.  Trace of BLE swelling.  Sacral area: red/light purple and blanching throughout just slow to obed in some areas; barrier paste applied.  Groin area: very red and blanching; barrier paste applied.  Elbows: red and blanching

## 2020-06-01 NOTE — PROGRESS NOTES
Assessment complete. Pt A&Ox1 Room air. Regular diet. Pills whole one at a time. Pt is a max assist to turn, and incontinent of both bowel and bladder. Call light within reach.  Bed locked and in lowest position

## 2020-06-01 NOTE — PROGRESS NOTES
Report received by dayshift RN. Assumed care of pt. Assessment complete. Pt A&Ox1, confused and oriented to self. VSS and on RA. Pt in no apparent signs of distress, denies any pain at this time. Incontinence checks performed throughout shift. No other needs at this time. Call light within reach, bed in lowest position, and hourly rounding in place.

## 2020-06-01 NOTE — THERAPY
Physical Therapy   Daily Treatment     Patient Name: Carmenza Bennett  Age:  75 y.o., Sex:  female  Medical Record #: [id number]  Today's Date: 6/1/2020     Precautions: Fall Risk, Parkinson's, Dementia    Assessment  Pt motivated to participate in PT. She expresses fear of falling; however is cooperative and demos good effort for all tasks. Pt states she knows she has to work hard and recovery is a long process. Focused session on balance and midline training. Pt able to sit EOB with UE support but demonstrates R lateral/posterior lean and mild push to the L. States she is aware of lean. Requires increased assist to weight shift to the R and tends to resist. After weight shifting in all directions, pt able to achieve close to midline on her own, ultimately still requires assist to lean R. Cross body reaching exercises in sitting translated to improved initiation during rolling while reaching for bed rails. Will continue to work with pt in acute setting, continue to recommend placement.     Plan  Continue current treatment plan.  Discharge recommendations:  Recommend post-acute placement for continued physical therapy services prior to discharge home.          06/01/20 1159   Sitting Lower Body Exercises   Other Exercises scap retractions EOB for posture x5   Neuro-Muscular Treatments   Neuro-Muscular Treatments Anterior weight shift;Postural Facilitation;Verbal Cuing;Weight Shift Right;Weight Shift Left;Other (See Comments)  (dynamic reaching crossbody)   Comments slight push to the L, posterior lean. is able to correct with manual cueing, does admit to leaning sideways. weight shift in all directions, pt with difficulty with lateral lean to the R and requires incr assist, does resist. challenged pt to find midline on her own, may benefit from mirror for visual cues.    Other Treatments   Other Treatments Provided sequencing rolling for linen change, cues to assist with scooting and positioning in bed. pt with improved  initiation after few trials of rolling. demonstates better ability to roll to the R vs the L using bedrail.    Balance   Sitting Balance (Static) Fair -   Sitting Balance (Dynamic) Poor +   Standing Balance (Static) Poor -   Standing Balance (Dynamic) Trace +   Weight Shift Sitting Fair   Weight Shift Standing Poor   Comments is able to hold balance at EOB with UE support, requires assist to maintain midline however d/t R lateral/posterior lean   Gait Analysis   Gait Level Of Assist Unable to Participate   Comments performed STS x2 to FWW. required mod A d/t knee flexion and posterior R lean. does state she knows she is leaning sideways when standing. tolerates standing ~ 10-20 seconds.    Bed Mobility    Supine to Sit Minimal Assist   Sit to Supine Minimal Assist   Rolling Minimal Assist to Rt.;Moderate Assist to Lt.   Functional Mobility   Sit to Stand Moderate Assist   Short Term Goals    Short Term Goal # 1 Pt will be SPV for supine<>sit in 6 txs to improve bed mobility.   Goal Outcome # 1 goal not met   Short Term Goal # 2 Pt will be Lashonda for transfers with FWW in 6 txs to improve OOB activity.   Goal Outcome # 2 Goal not met   Short Term Goal # 3 Pt will be Lashonda for gait with FWW x 50' to improve mobility.   Goal Outcome # 3 Goal not met   Short Term Goal # 4 pt will sit EOB 5 min in midline with SPV in 6 visits for improved participation in functional tasks   Goal Outcome # 4 Goal not met   Anticipated Discharge Equipment   DC Equipment Unable To Determine At This Time

## 2020-06-01 NOTE — DISCHARGE PLANNING
Anticipated Discharge Disposition: Group Home    Action: Patient is orientated to self only/confused, incontinent, needs max assist to turn.  Takes all medications.     Barriers to Discharge: Medicaid.  Patient's spouse mailed Medicaid application to APS worker who will submit to Medicaid. Patient was denied Medicaid previously as Medicaid did not separate patient/spouse income    Plan: continue to monitor for discharge barriers, follow up with APS worker

## 2020-06-02 NOTE — PROGRESS NOTES
2 RN skin check completed with CURT Swain.   Devices in place: none.  Skin assessed under devices: n/a.  Confirmed pressure ulcers found: none.  New potential pressure ulcers noted: none. Wound consult placed:  n/a.    Skin assessment: heels pink/red/slow obed/boggy. Sacrum pink/red/blanching/excoriation. Groin red/blanching. Elbows pink/red/blanching.    The following interventions in place: waffle overlay mattress, q2h turns, frequent incontinence checks, pillows for support/positioning, mepilex on bilateral heels, barrier paste.

## 2020-06-02 NOTE — PROGRESS NOTES
Hospital Medicine Daily Progress Note    Date of Service  6/2/2020    Chief Complaint  SOB.    Hospital Course    75 y.o. female admitted 5/14/2020 with dementia, Parkinson's, DVT who presented with SOB and cough. CXR showed perihilar interstitial markings. She was treated for viral bronchitis with hypoxia. COVID PCR was negative. She has clinically improved and weaned off O2.  Spouse with was unable to care for patient, and PT and OT had evaluated her, and recommended SNF and will eventually need group home placement with 24/7 care after SNF making it a difficult discharge disposition.           Interval Problem Update  6/2/2020 - I reviewed the patient's chart. There were no significant overnight events. Remains hemodynamically stable and afebrile. Stable on RA.  No new labs.      > I have personally seen and examined the patient today.  She is comfortably sitting on the chair.  She has no complaints.  No chest pain, shortness of breath, nausea or vomiting, abdominal pain.  No complaints of pain.  She answers questions coherently.        Consultants/Specialty  Pall care    Code Status  DNAR/DNI      Disposition  Monitor on the medical floors.   Needs SNF placement, then will eventually need group home placement with 24/7 care after  Difficult discharge.   Will transition to the long-term list starting tomorrow.       Review of Systems  Review of Systems       Pertinent positives/negatives as mentioned above.     A complete review of systems was personally done by me, limited by dementia. All other systems were negative.       Physical Exam  Temp:  [36.2 °C (97.2 °F)-36.4 °C (97.6 °F)] 36.3 °C (97.3 °F)  Pulse:  [68-90] 90  Resp:  [16-18] 17  BP: ()/(56-62) 100/62  SpO2:  [91 %-95 %] 95 %    Physical Exam  Vitals signs and nursing note reviewed.   Constitutional:       General: She is not in acute distress.     Appearance: Normal appearance. She is normal weight. She is not ill-appearing or diaphoretic.       Comments: Frail   HENT:      Head: Normocephalic and atraumatic.      Mouth/Throat:      Mouth: Mucous membranes are moist.      Pharynx: No oropharyngeal exudate or posterior oropharyngeal erythema.   Eyes:      General: No scleral icterus.     Extraocular Movements: Extraocular movements intact.      Conjunctiva/sclera: Conjunctivae normal.      Pupils: Pupils are equal, round, and reactive to light.   Neck:      Musculoskeletal: Normal range of motion and neck supple. No neck rigidity or muscular tenderness.   Cardiovascular:      Rate and Rhythm: Normal rate and regular rhythm.      Heart sounds: Normal heart sounds. No murmur.   Pulmonary:      Effort: Pulmonary effort is normal. No respiratory distress.      Breath sounds: Normal breath sounds. No stridor. No wheezing, rhonchi or rales.   Chest:      Chest wall: No tenderness.   Abdominal:      General: Bowel sounds are normal. There is no distension.      Palpations: Abdomen is soft. There is no mass.      Tenderness: There is no abdominal tenderness. There is no guarding or rebound.   Musculoskeletal: Normal range of motion.         General: No swelling.      Right lower leg: Edema present.      Left lower leg: Edema present.   Lymphadenopathy:      Cervical: No cervical adenopathy.   Skin:     General: Skin is warm and dry.      Coloration: Skin is not jaundiced.      Findings: No rash.   Neurological:      General: No focal deficit present.      Mental Status: She is alert.      Cranial Nerves: No cranial nerve deficit.      Comments: Oriented to self, disoriented to time, place, circumstances.  Awake, and answers questions coherently.  Cooperative.   Psychiatric:      Comments: Pleasant, impaired memory.  Impaired judgment.             Fluids    Intake/Output Summary (Last 24 hours) at 6/2/2020 1531  Last data filed at 6/2/2020 1419  Gross per 24 hour   Intake 540 ml   Output --   Net 540 ml       Laboratory                         Imaging  DX-CHEST-PORTABLE (1 VIEW)   Final Result      No acute cardiopulmonary abnormality.      US-EXTREMITY VENOUS LOWER UNILAT RIGHT   Final Result      DX-HIP-UNILATERAL-WITH PELVIS-1 VIEW RIGHT   Final Result      1.  Bony pelvis and the hip joint appear normal      2.  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  No acute intracranial findings.      2.  Diffuse atrophy and periventricular white matter changes, consistent with chronic small vessel disease.         DX-CHEST-PORTABLE (1 VIEW)   Final Result      Perihilar interstitial markings are increased and suggestive of mild pulmonary edema.           Assessment/Plan  * Viral bronchitis- (present on admission)  Assessment & Plan  -COVID-19 negative. Resolved with supportive care.    UTI (urinary tract infection)- (present on admission)  Assessment & Plan  -Finished course of IV Ceftriaxone.    Lower extremity pain, bilateral- (present on admission)  Assessment & Plan  -Doppler did not show DVT. XR showed osteoarthritis of hip  -Continue scheduled Tylenol, lidocaine patch.  -PT/OT recommending SNF placement, but will eventually need group home placement with 24/7 care after SNF making it a difficult discharge disposition.    Parkinson's disease (HCC)- (present on admission)  Assessment & Plan  -Continue Sinemet.  -ET/OT recommending SNF placement, but again will require 24/7 care at the group home after.    Dementia (HCC)- (present on admission)  Assessment & Plan  -Continues to be high risk for delirium while in the hospital.  -Frequent re-orientation, reestablish circadian rhythm, encourage familiar faces/family in room, avoid or minimize narcotics/sedatives.   -Continue on Seroquel, and Prozac, along with haldol PRN       VTE prophylaxis: lovenox SQ
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
## 2020-06-02 NOTE — PROGRESS NOTES
1330 PM- Pt called from room. Pt Found with buttocks on floor and arm on bed. Pt stated she slid out of bed. Pt denied hitting head/ Pt denied hurting herself during fall. Bed alarms and strip alarm on bed. Dr. Castillo notified about event. Geevalentín Bennett (spouse) Notified about event. Chanelle Charge nurse  notified about event. 2 rn skin check performed- no new bruising or redness noticed.  Moved pt's bed to door side of room to better observe her.

## 2020-06-02 NOTE — PROGRESS NOTES
Pt alert/oriented to self, pleasant, denies pain/discomfort. Pt resting quietly in bed, needs met. Call light within reach, personal belongings available, bed in lowest position, treaded socks on, and bed alarm on. Hourly rounding in place.

## 2020-06-03 NOTE — PROGRESS NOTES
Assessment complete. Pt A&Ox1 Room air. Regular diet. Pills whole one at a time. Pt is a max assist to turn, and incontinent of both bowel and bladder. Fall today see earlier progress note. Call light within reach.  Bed locked and in lowest position

## 2020-06-03 NOTE — THERAPY
Physical Therapy   Daily Treatment     Patient Name: Carmenza Bennett  Age:  75 y.o., Sex:  female  Medical Record #: [id number]  Today's Date: 6/3/2020     Precautions: Fall Risk, hx Parkinson's, dementia    Assessment  Pt continues to be motivated to participate in therapy. Pt demonstrated significant improvement overall today. Pt was able to maintain midline balance at EOB with very minimal cueing. Performed multiple dynamic balance tasks at EOB. Practiced anterior weight shift to translate to standing. Pt performed STS to FWW x3. Was able to achieve B foot clearance for marching. Tolerated standing at least 1 min each time. On third trial, pt only required CGA to maintain the stand. Pt with noticeable fatigue and declined xfr to chair today. Continue to recommend placement. Will follow.     Plan  Modified to 4x/wk.   Discharge recommendations: Recommend post-acute placement for continued physical therapy services prior to discharge home.          06/03/20 1108   Neuro-Muscular Treatments   Neuro-Muscular Treatments Anterior weight shift;Weight Shift Right;Weight Shift Left;Verbal Cuing;Sequencing;Postural Facilitation;Compensatory Strategies;Other (See Comments)   Comments significantly improved sitting balance EOB. very minimal L lateral lean but able to correct and maintain. challenged static balance with LE AROM - pt with incr difficulty maintaining midline; crossbody reaches - difficulty visually scanning and following direction for R vs L and to find target; mild pertubations in all directions - pt able to maintain balance.    Other Treatments   Other Treatments Provided reinforced sequencing and technique for rolling in bed to assist with repositioning and linen change. improved rolling to the R vs L. struggles with cues for RUE vs LUE and sequencing legs at same time.    Gait Analysis   Gait Level Of Assist Unable to Participate   Comments performed STS x3. pt was able to march in place with FWW today. improved  foot clearance with visual demo for high knees. on third trial, pt required CGA only to maintain the stand. tolerated each stand at least 1 min.    Bed Mobility    Supine to Sit Moderate Assist   Sit to Supine Maximal Assist   Scooting Moderate Assist   Rolling Minimal Assist to Rt.;Moderate Assist to Lt.   Comments incr difficulty with overall bed mobility today, may be d/t fatigue   Functional Mobility   Sit to Stand Moderate Assist   Mobility cues for anterior weight shift and hand placement. performed STS x3. improved foot clearance and ability to sequence stepping to chair, though too fatigued at this time.    Short Term Goals    Short Term Goal # 1 Pt will be SPV for supine<>sit in 6 txs to improve bed mobility.   Goal Outcome # 1 goal not met   Short Term Goal # 2 Pt will be [place] for transfers with FWW in 6 txs to improve OOB activity.   Goal Outcome # 2 Goal not met   Short Term Goal # 3 Pt will be [place] for gait with FWW x 50' to improve mobility.   Goal Outcome # 3 Goal not met   Short Term Goal # 4 pt will sit EOB 5 min in midline with SPV in 6 visits for improved participation in functional tasks   Goal Outcome # 4 Goal met   Anticipated Discharge Equipment   DC Equipment Unable To Determine At This Time

## 2020-06-03 NOTE — DISCHARGE PLANNING
Anticipated Discharge Disposition: Group home    Action: This  attempted to contact APS and could not leave a voice message.     Barriers to Discharge: Medicaid, patient's spouse mailed Medicaid application to APS worker who will submit to Medicaid. Patient was denied Medicaid previously as medicaid did not separate patient/patient income.     Plan: continue to monitor for discharge barriers    Toma Reinoso RN,

## 2020-06-03 NOTE — PROGRESS NOTES
At this point the patient requires minimal medical interventions/care and is pending placement. She has been assessed for the long-term list and appears appropriate. She will therefore be seen twice weekly by the hospitalist NP barring any further changes in her clinical status. Attending physician changed to reflect this and IP consult to SW placed.       Electronically signed by:  Brenda León, MATTHEW, RN, APRN, ACNPC-AG, CCRN  Nurse Practitioner, [facility]  Work # [phone]    6/3/2020    9:52 AM

## 2020-06-03 NOTE — PROGRESS NOTES
2 RN skin check completed with CURT Mosher.   Devices in place: none.  Skin assessed under devices: n/a.  Confirmed pressure ulcers found: none.  New potential pressure ulcers noted: none. Wound consult placed:  n/a.     Skin assessment: heels pink/red/slow obed/boggy. Groin excoriated/red/blanching. Sacrum pink/red/blanching/excoriation. Elbows pink/red/blanching.     The following interventions in place: waffle overlay mattress, q2h turns, frequent incontinence checks, pillows for support/positioning, mepilex on bilateral heels, barrier paste.

## 2020-06-03 NOTE — PROGRESS NOTES
Pt alert/oriented to self, denies pain, compliant. No behavior issues noted at this time. Call light within reach, personal belongings available, bed in lowest position, treaded socks on, and bed alarm on. Hourly rounding in place.

## 2020-06-03 NOTE — PROGRESS NOTES
RN skin check completed with Mamie ELIAS.   No devices in place.  Skin assessed under devices: n/a.  No confirmed pressure ulcers found.  No new potential pressure ulcers noted.   The following interventions in place: Q2hr turns, pillows for repositioning, barrier paste to bottom, mepliex on heels for preventive measures, incontinence checks as well as linen changes, and heel float boots.     Skin Assessment:  Heels: red/boggy and blanching; mepliex in place for preventive measures.  Trace of BLE swelling.  Sacral area: red/light purple and blanching throughout just slow to obed in some areas; barrier paste applied.  Groin area:  red and blanching; barrier paste applied.  Elbows: red and blanching

## 2020-06-03 NOTE — PROGRESS NOTES
RN skin check completed with Jeanie ELIAS.   No devices in place.  Skin assessed under devices: n/a.  No confirmed pressure ulcers found.  No new potential pressure ulcers noted.   The following interventions in place: Q2hr turns, pillows for repositioning, barrier paste to bottom, mepliex on heels for preventive measures, incontinence checks as well as linen changes, and heel float boots.     Skin Assessment:  Heels: red/boggy and blanching; mepliex in place for preventive measures.  Trace of BLE swelling.  Sacral area: red/light purple and blanching throughout just slow to obed in some areas; barrier paste applied.  Groin area:  red and blanching; barrier paste applied.  Elbows: red and blanching

## 2020-06-03 NOTE — CARE PLAN
Problem: Safety  Goal: Will remain free from falls  Note: Pt had a recent fall 06/02/2020. Safety precautions in place. Bed in low position, treaded socks on, personal possessions in reach, call light in reach, bed alarm on.      Problem: Discharge Barriers/Planning  Goal: Patient's continuum of care needs will be met  Note: Pt awaiting placement pending Medicaid. SW involved.

## 2020-06-04 NOTE — CARE PLAN
Problem: Safety  Goal: Will remain free from falls  Outcome: PROGRESSING AS EXPECTED  Intervention: Implement fall precautions  Note: Bed alarm in place. Bed in lowest position, treaded socks on, personal belongings and call light within reach, instructed to call for any assistance, hourly rounding in place.      Problem: Skin Integrity  Goal: Risk for impaired skin integrity will decrease  Outcome: PROGRESSING AS EXPECTED  Intervention: Implement precautions to protect skin integrity in collaboration with the interdisciplinary team  Note: Q 2 turn, waffle overlay in place. Reina cream applied. Frequent check for incontinence. Keep pt dry and clean. 2 RN skin check in place.

## 2020-06-04 NOTE — PROGRESS NOTES
2 RN skin check completed with CURT Stuart.   Devices in place: none.  Skin assessed under devices: n/a.  Confirmed pressure ulcers found: none.  New potential pressure ulcers noted: none. Wound consult placed:  n/a.     Skin assessment:  Sacrum pink/red/blanching/excoriation.   Groin excoriated/red/blanching.   Heels pink/red/slow obed/boggy. Mepilex in place.    Elbows pink/red/blanching.     The following interventions in place: q2h turns, frequent incontinence checks, pillows for support/positioning, mepilex on bilateral heels, barrier paste, waffle overlay mattress.

## 2020-06-04 NOTE — PROGRESS NOTES
Assessment complete. Pt A&Ox1 Room air. Regular diet. Pills whole one at a time. Pt is a max assist to turn, and incontinent of both bowel and bladder.Call light within reach.  Bed locked and in lowest position

## 2020-06-04 NOTE — THERAPY
Physical Therapy   Daily Treatment     Patient Name: Carmenza Bennett  Age:  75 y.o., Sex:  female  Medical Record #: [id number]  Today's Date: 6/4/2020     Precautions: Fall Risk      Assessment    Pt continues to demonstrate balance impairments and rigidity. Significant lean to L when sitting EOB. This improved after anterior weight shift activities but if cues removed she would have gradual LOB L. STS training completed x5 focusing on foot positioning and anterior weight shift. In standing again strong lean to L. Marching attempted, very minimal foot clearance achieved and LOB to L. Attempted marching in sitting and again very minimal foot clearance. Emphasis on big reciprocal motions. Stood again and attempted to take lateral steps, unable to do so. Stand pivot used to transfer patient to chair. Continue to recommend post acute placement. Acute PT to continue to follow.     Plan    Continue current treatment plan.    Discharge recommendations:  Recommend post-acute placement for continued physical therapy services prior to discharge home.          06/04/20 1154   Sitting Lower Body Exercises   Long Arc Quad 1 set of 10;Bilateral   Marching Reciprocal;2 sets of 10   Standing Lower Body Exercises   Marching 2 sets of 10   Neuro-Muscular Treatments   Neuro-Muscular Treatments Weight Shift Left;Weight Shift Right;Verbal Cuing;Tapping;Tactile Cuing;Sequencing;Postural Facilitation   Comments anterior rocking performed at EOB 2x10 to decrease rigidity and prepare for standing. Reciprocal exercises (marching) performed in sitting and standing to reduce rigidity and improve freedom of movement. Pt will lean to L in both sitting and standing conditions   Balance   Sitting Balance (Static) Fair -   Sitting Balance (Dynamic) Poor +   Standing Balance (Static) Poor -   Standing Balance (Dynamic) Trace +   Weight Shift Sitting Fair   Weight Shift Standing Poor   Gait Analysis   Gait Level Of Assist Unable to Participate   Assistive  Device Front Wheel Walker   Bed Mobility    Supine to Sit Moderate Assist   Scooting Moderate Assist   Functional Mobility   Sit to Stand Moderate Assist   Bed, Chair, Wheelchair Transfer Maximal Assist

## 2020-06-04 NOTE — PROGRESS NOTES
2 RN skin check completed with CURT Mosher.   Devices in place: none.  Skin assessed under devices: n/a.  Confirmed pressure ulcers found: none.  New potential pressure ulcers noted: none. Wound consult placed:  n/a.     Skin assessment: Sacrum pink/red/blanching/excoriation. Groin excoriated/red/blanching. Heels pink/red/slow obed/boggy. Elbows pink/red/blanching.     The following interventions in place: q2h turns, frequent incontinence checks, pillows for support/positioning, mepilex on bilateral heels, barrier paste, waffle overlay mattress.

## 2020-06-04 NOTE — PROGRESS NOTES
Pt alert/oriented to self, fatigued, resting quietly in bed. Medication taken without difficulty. reach, personal belongings available, bed in lowest position, treaded socks on, and bed alarm on. Hourly rounding in place.

## 2020-06-04 NOTE — PROGRESS NOTES
Assumed care of pt at shift change. A/Ox2, Reoriented pt to time and situation, discussed plan of care. Pt on room air. Denied pain. Pt is incontinent.     All needs met at this time. Bed in lowest position, treaded socks on, personal belongings and call light within reach, instructed to call for any assistance, hourly rounding in place.

## 2020-06-04 NOTE — CARE PLAN
Problem: Pain Management  Goal: Pain level will decrease to patient's comfort goal  Note: Medicated with tylenol for generalized pain.     Problem: Discharge Barriers/Planning  Goal: Patient's continuum of care needs will be met  Note: Pt awaiting placement. SW involved.

## 2020-06-05 PROBLEM — Z02.9 DISCHARGE PLANNING ISSUES: Status: ACTIVE | Noted: 2020-01-01

## 2020-06-05 PROBLEM — J20.8 VIRAL BRONCHITIS: Status: RESOLVED | Noted: 2020-01-01 | Resolved: 2020-01-01

## 2020-06-05 PROBLEM — N39.0 UTI (URINARY TRACT INFECTION): Status: RESOLVED | Noted: 2020-01-01 | Resolved: 2020-01-01

## 2020-06-05 NOTE — PROGRESS NOTES
Assumed care of patient. Pt is awake and laying in bed. She is A&Ox1. Oriented to self only. She is on RA with no signs of distress. Reports pain of 7/10 to the right hip and declines any intervention at this time. POC discussed with pt. Bed locked and in the lowest position. Call light by bedside. All needs met at this time.

## 2020-06-05 NOTE — CARE PLAN
Problem: Communication  Goal: The ability to communicate needs accurately and effectively will improve  Outcome: PROGRESSING AS EXPECTED  Intervention: Reorient patient to environment as needed  Flowsheets (Taken 6/5/2020 0330)  Oriented to::   All of the Following : Location of Bathroom, Visiting Policy, Unit Routine, Call Light and Bedside Controls, Bedside Rail Policy, Smoking Policy, Rights and Responsibilities, Bedside Report, and Patient Education Notebook   Call Light & Bedside Controls   Bedside Rail Policy   Visiting Policy   Unit Routine   Patient Rights and Responsibilities   Bedside Report  Note: Reoriented patient during shift, pt needs reinforcement. POC discussed.      Problem: Safety  Goal: Will remain free from injury  Outcome: PROGRESSING AS EXPECTED  Goal: Will remain free from falls  Outcome: PROGRESSING AS EXPECTED  Intervention: Implement fall precautions  Flowsheets (Taken 6/5/2020 0330)  Bed Alarm: Yes - Alarm On  Bedrails: Bedrails Closest to Bathroom Down  Chair/Bed Strip Alarm: Yes - Alarm On  Note: Bed alarm on and working, pt's room near the nursing station.

## 2020-06-05 NOTE — PROGRESS NOTES
"Patient refused 2 RN skin Check, educated regarding importance.  Education reinforced by CURT pApiah.  Patient continues to refuse.    Pt becoming more agitated when asking to complete a skin check, says \"No your not!!!\"    Charge Melissa ELIAS notified.  "

## 2020-06-05 NOTE — PROGRESS NOTES
RN received report from Blanquita ELIAS, pt laying in bed comfortably and sleeping.Bed in lowest position and locked. Call light and belongings within reach.

## 2020-06-05 NOTE — THERAPY
Occupational Therapy  Daily Treatment     Patient Name: Carmenza Bennett  Age:  75 y.o., Sex:  female  Medical Record #: [id number]  Today's Date: 6/4/2020     Precautions  Precautions: Fall Risk  Comments: Parkinsons, dementia    Assessment    Patient agreeable to session. Continues to be limited be cognition which impacts her command following and sequencing, overall impacting her safety and independent with transfers and ADL. Will continue to follow.     Plan    Continue current treatment plan.    Discharge recommendations:  Recommend post-acute placement for additional occupational therapy services prior to discharge home.       06/04/20 1640   Cognition    Cognition / Consciousness X   Level of Consciousness Alert   Ability To Follow Commands 1 Step   Safety Awareness Impaired   New Learning Impaired   Attention Impaired   Sequencing Impaired   Initiation Impaired   Comments Unabel to follow more than one step commands, poor sequencing   Activities of Daily Living   Grooming Seated;Moderate Assist  (For sequecning)   Lower Body Dressing Moderate Assist   Skilled Intervention Compensatory Strategies;Facilitation;Sequencing;Tactile Cuing;Verbal Cuing   Functional Mobility   Sit to Stand Moderate Assist   Bed, Chair, Wheelchair Transfer Maximal Assist   Transfer Method Stand Pivot   Mobility sup>sit, STS   Short Term Goals   Short Term Goal # 1 pt will demo functional transfer with Lashonda   Goal Outcome # 1 Goal not met   Short Term Goal # 2 pt will sequence seated oral care without cues   Goal Outcome # 2 Progressing slower than expected

## 2020-06-05 NOTE — CARE PLAN
Problem: Skin Integrity  Goal: Risk for impaired skin integrity will decrease  Intervention: Implement precautions to protect skin integrity in collaboration with the interdisciplinary team  Note: Frequently turn pt and ensure pt is clean and dry     Problem: Safety  Goal: Will remain free from falls  Outcome: PROGRESSING AS EXPECTED  Intervention: Implement fall precautions  Note: All fall risk precautions and hourly rounding in place.
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
## 2020-06-05 NOTE — PROGRESS NOTES
Hospital Medicine Twice Weekly Progress Note     Hospital Course  Ms. Bennett is a wheelchair bound 75-year-old female with a PMH of arthritis, DVT, Parkinson's, and dementia who presented 5/14/2020 with complaints of shortness of breath, nonproductive cough, fevers, and chills for 5 days. She is confused at baseline and was sent by her  since he was unable to care for her. An EKG was done in the ED and was unremarkable, though a chest xray showed bilateral interstitial infiltrates. COVID was ruled out in the ED. She was evaluated by PT/OT who recommended 24/7 supervision. Her  also stated he is no longer able to care for her either. She is now pending placement to SNF vs group home, though medicaid must be obtained first as she has limited income.      Interval Problem Update  -Remains confused. Denies pain. PE revealed BLE edema, unsure if chronic?    -Last lab work done 5/26/2020 was unremarkable.     -Vital signs reviewed and have been stable.     -Pending medicaid, once accepted will need SNF followed by group home placement.     Assessment & Plan  Dementia (HCC)- (present on admission)  Assessment & Plan  -Continues to be high risk for delirium while in the hospital.  -Frequent re-orientation, reestablish circadian rhythm, encourage familiar faces/family in room, avoid or minimize narcotics/sedatives.   -Continue on seroquel and prozac, along with prn haldol.    Discharge planning issues  Assessment & Plan  -Pending medicaid, once accepted will need SNF followed by group home placement.     Lower extremity pain, bilateral- (present on admission)  Assessment & Plan  -With edema. Doppler negative for DVT. Xray showed osteoarthritis of the hip. Will try & place HOLA hose.   -Pain controlled at time of exam. Continue scheduled tylenol.   -PT/OT recommending SNF placement, but will eventually need group home placement with 24/7 care after SNF making it a difficult discharge..    Parkinson's disease  (HCC)- (present on admission)  Assessment & Plan  -Continue sinemet.  -PT/OT recommending SNF placement, but again will require 24/7 care at a group home afterward.      Electronically signed by:  Brenda León, MATTHEW, RN, APRN, ACNPC-AG, CCRN  Nurse Practitioner, [facility]  Work # [phone]    6/5/2020    12:21 PM

## 2020-06-06 NOTE — PROGRESS NOTES
2 RN skin check completed with CURT Stuart.   Devices in place: none.  Skin assessed under devices: n/a.  Confirmed pressure ulcers found: none.  New potential pressure ulcers noted: none. Wound consult placed:  n/a.     Skin assessment:  Sacrum pink/red/blanching/excoriation. Barrier cream applied every time we change pt  Groin excoriated/red/blanching. Barrier cream applied every time we change pt  Heels pink/red/slow obed/boggy. Mepilex changed   Elbows pink/red/blanching.     The following interventions in place: q2h turns, frequent incontinence checks, pillows for support/positioning, mepilex on bilateral heels, barrier paste, waffle overlay mattress.

## 2020-06-06 NOTE — PALLIATIVE CARE
Palliative Care follow-up  Arrived on the Unit to meet with pts spouse, Gee Bennett, to complete POLST form. Pts spouse has not arrived, waited on the Unit until 17:30 , spouse did not arrive.     Updated: Spoke with pts BS RN Jonathon Holt and apprentice RN Esme, that a completed POLST will be left on the pts Hard chart in case he arrives later for his signature.     Plan: Will continue to work with pts family to complete POLST foe Dc to  once arrangements can be completed.     Thank you for allowing Palliative Care to support this patient and family. Contact [phone] for additional assistance, change in patient status, or with any questions/concerns.

## 2020-06-06 NOTE — PROGRESS NOTES
Patient was calmed,pleasant and alert to self,room air,denies pain,patient 2 RN skin checked documented,refused meds,repositioning patient mostly every 2 hours,call light and personal belongings within reach and bed in low position,bed alarm on.
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
## 2020-06-06 NOTE — CARE PLAN
Problem: Bowel/Gastric:  Goal: Normal bowel function is maintained or improved  Outcome: PROGRESSING SLOWER THAN EXPECTED     Problem: Knowledge Deficit  Goal: Knowledge of disease process/condition, treatment plan, diagnostic tests, and medications will improve  Outcome: PROGRESSING SLOWER THAN EXPECTED     Problem: Fluid Volume:  Goal: Will maintain balanced intake and output  Outcome: PROGRESSING SLOWER THAN EXPECTED     Problem: Skin Integrity  Goal: Risk for impaired skin integrity will decrease  Outcome: PROGRESSING AS EXPECTED     Problem: Psychosocial Needs:  Goal: Level of anxiety will decrease  Outcome: PROGRESSING SLOWER THAN EXPECTED

## 2020-06-06 NOTE — PROGRESS NOTES
2 RN skin check completed with CURT Pina.   Devices in place: none.  Skin assessed under devices: n/a.  Confirmed pressure ulcers found: none.  New potential pressure ulcers noted: none. Wound consult placed:  n/a.     Groin pink  and excoriated  Sacrum pink/red/blanching/excoriation    Heels pink/red/slow obed/boggy. Mepilex in place   Bilateral elbows pink and blanching  The following interventions in place: q2h turns, frequent incontinence checks, pillows for support/positioning, mepilex on left heels, barrier paste, waffle overlay mattress.

## 2020-06-06 NOTE — CARE PLAN
Problem: Safety  Goal: Will remain free from falls  Outcome: PROGRESSING AS EXPECTED  Note: Fall precautions in place. Bed in lowest position. Non-skid socks in place. Personal possessions within reach.  Bed-alarm on. Call light within reach. Pt educated regarding fall prevention and states understanding.       Problem: Pain Management  Goal: Pain level will decrease to patient's comfort goal  Outcome: PROGRESSING AS EXPECTED  Note: Pt assessed for pain regularly. Pt encouraged to report any pain.

## 2020-06-07 NOTE — PROGRESS NOTES
2 RN skin check complete with CURT Miles.  Devices in place N/a  Skin assessed under devices n/a.  Confirmed pressure ulcers found on n/a.  New potential pressure ulcers noted on N/A. Wound consult placed N/A.    Noted bilateral heels is pinkish to red, blanching; other areas of skin is intact; no open wounds seen.      The following interventions in place on pressure redistribution mattress with waffle overlay; incontinence care provided; assisted to turn to sides every 2 hours; applied barrier paste to sacral area.

## 2020-06-07 NOTE — CARE PLAN
Problem: Safety  Goal: Will remain free from falls  Outcome: PROGRESSING AS EXPECTED  Intervention: Implement fall precautions  Note: Fall precautions in place. Bed in lowest position. Non-skid socks in place. Personal possessions within reach. Mobility sign on door. Bed-alarm on. Call light within reach. Pt educated regarding fall prevention and states understanding.       Problem: Pain Management  Goal: Pain level will decrease to patient's comfort goal  Outcome: PROGRESSING AS EXPECTED  Intervention: Follow pain managment plan developed in collaboration with patient and Interdisciplinary Team  Note: Pt assessed for pain regularly and encouraged to voice any pain

## 2020-06-07 NOTE — PROGRESS NOTES
Pt was agitated and aggressive and tried to get out of bed multiple times. Education didn't help. PRN Haldol given. Pt calm down and is resting on bed.

## 2020-06-07 NOTE — PROGRESS NOTES
Alert and oriented x1, only to self. Offered fluids and snacks. Safety precautions in placed. Bed in lowest position. Upper side rails up. Treaded socks on. Reinforced the use of call light when needing assistance.

## 2020-06-07 NOTE — PROGRESS NOTES
"Pt refused to take medications due for this morning. Offered with applesauce, pudding or ice cream. Pt said \" I dont want it now, I want to sleep\". Encouraged to sleep. Calm and quiet environment provided.  "

## 2020-06-07 NOTE — PROGRESS NOTES
Assumed care of pt. Pt is A&Ox2 disoriented to situation and time. On RA. With no signs oif distress. POC discussed with pt. Pt voiced refusal of her medication (Lovenox) education provided to pt, agrees to taking the medication. All comfort measure and fall precautions in place. All needs met at this time  And hourly rounding in place.

## 2020-06-07 NOTE — PROGRESS NOTES
2 RN skin check completed with CURT Liz.   Devices in place: none.  Skin assessed under devices: n/a.  Confirmed pressure ulcers found: none.  New potential pressure ulcers noted: none. Wound consult placed:  n/a.     Skin assessment:  Sacrum pink/red/blanching/excoriation. Barrier cream applied every time we change pt  Groin excoriated/red/blanching. Barrier cream applied every time we change pt  Heels pink/red/slow obed/boggy. Mepilex in place.  Elbows pink/red/blanching.     The following interventions in place: q2h turns, frequent incontinence checks, pillows for support/positioning, mepilex on bilateral heels, barrier paste, waffle overlay mattress.

## 2020-06-08 NOTE — DISCHARGE PLANNING
Medical Social Work  PC to Shauna at [facility], [phone]: message left requesting an update on Medicaid application.

## 2020-06-08 NOTE — CARE PLAN
Problem: Skin Integrity  Goal: Risk for impaired skin integrity will decrease  Outcome: PROGRESSING AS EXPECTED  2 RN skin checks. PRN incontinent checks.        Problem: Safety  Goal: Will remain free from injury  Outcome: PROGRESSING AS EXPECTED   Bed alarm on. Room by nursing station. Call light and belongings within reach.

## 2020-06-08 NOTE — PROGRESS NOTES
2 RN skin check completed with CURT Mcdonnell.   Devices in place: none.  Skin assessed under devices: n/a.  Confirmed pressure ulcers found: none.  New potential pressure ulcers noted: none.   Wound consult placed:  n/a.     Skin assessment:  Sacrum: red/excoriation and blanching (barrier cream in use)  Groin: excoriated/red/blanching (barrier cream in use)  Heels: red/boggy/slow to obed (mepilex in place)  Elbows: pink/red/blanching.     The following interventions in place: q2h turns, incontinence care and linen changes as needed, pillows for support/positioning, mepilex on bilateral heels, barrier paste, waffle overlay mattress.

## 2020-06-08 NOTE — CARE PLAN
Problem: Communication  Goal: The ability to communicate needs accurately and effectively will improve  Outcome: PROGRESSING AS EXPECTED  Note: Pt is A&Ox2; will reoirient as needed. Encouraged pt to express feelings and ask questions      Problem: Safety  Goal: Will remain free from injury  Outcome: PROGRESSING AS EXPECTED  Note: Fall precautions in place. Bed in lowest position. Non-skid socks in place. Personal possessions within reach. Mobility sign on door. Bed-alarm on. Call light within reach. Pt educated regarding fall prevention and states understanding.

## 2020-06-08 NOTE — PROGRESS NOTES
"A&Ox2, disoriented to time and event. Pt refused 2 RN skin check, stating \"I don't need that\". Education provided regarding maintaining skin integrity. No c/o pain. All needs currently met.   "

## 2020-06-08 NOTE — THERAPY
Occupational Therapy  Daily Treatment     Patient Name: Carmenza Bennett  Age:  75 y.o., Sex:  female  Medical Record #: [id number]  Today's Date: 6/8/2020     Precautions  Precautions: Fall Risk  Comments: Parkinsons, dementia    Assessment  Pt seen for OT tx, pt remains w/generalized weakness delayed initiation and poor postural control all impacting ADL's.    Plan  Continue current treatment plan.    Discharge recommendations:  Recommend post-acute placement for additional occupational therapy services prior to discharge home.       Objective   06/08/20 1445   Cognition    Cognition / Consciousness X   Speech/ Communication Delayed Responses   Comments able to follow 1 step commands needs clear simple cues    Supine Upper Body Exercises   Supine Upper Body Exercises Yes   Comments AROM in bed    Other Treatments   Other Treatments Provided Focused on seated ADl's EOB    Activities of Daily Living   Grooming Moderate Assist;Seated   Upper Body Dressing Moderate Assist   Lower Body Dressing Moderate Assist   Toileting Total Assist   Skilled Intervention Verbal Cuing;Tactile Cuing;Facilitation;Compensatory Strategies   Comments incontinent in bed, cues to initiate grooming seated and dressing    Functional Mobility   Sit to Stand Moderate Assist   Bed, Chair, Wheelchair Transfer Moderate Assist   Mobility EOB sit>Stand    Skilled Intervention Compensatory Strategies;Verbal Cuing;Tactile Cuing;Postural Facilitation;Facilitation   Activity Tolerance   Sitting Edge of Bed 20   Standing 5   Comments impaired by weakness and cognition   Short Term Goals   Short Term Goal # 1 pt will demo functional transfer with Lashonda   Goal Outcome # 1 Goal not met   Short Term Goal # 2 pt will sequence seated oral care without cues   Goal Outcome # 2 Progressing slower than expected

## 2020-06-08 NOTE — PROGRESS NOTES
Pt is A&Ox2 to self and place; room air. Will reorient as needed. Denies any pain; shows no signs of distress. Assessment complete. 2 RN skin check complete.   POC discussed with pt; all questions answered at this time. Fall precautions in place. Bed-alarm on. Call light within reach. Pt educated regarding fall prevention and states understanding. Hourly rounding in place.

## 2020-06-09 NOTE — PROGRESS NOTES
"Pt is resting in bed. Midnight medications given. Pt refused skin check at this time, but agreeable to do it in the morning. Offered fluids. No c/o pain. q2 turns. Room by nursing station. Call light and belongings within reach.     6/9 0502: pt refused skin check again in the morning, stated \"My skin is fine. It doesn't need to be checked. I don't need it\". Education provided that since she is not mobilizing as much, skin issues can occur. Pt still adamantly refused a complete check with another RN. Skin assessed during incontinence change. Redness and excoriation to the sacrum and groin. Mepilexes on heels. Heels red/blanching.   "

## 2020-06-09 NOTE — DISCHARGE PLANNING
Medical Social Work  PC from Shauna at [facility], stated she has not received application from spouse.  Also stated that there are no opening on the group home waiver at this time, and unsure when they will open it again.  SW asked about family support, son.  Shauna stated she did talk to him and he is not able to assist.    PC to spouse, Gee [phone]; SW verified that he can not take care of spouse.  Stated he is 92 congestive heat failure, I took care of her for a long time.  No other family that is available to help.  Has the Medicaid application, just has not mailed it in.  Gee stated they have applied before and have been denied, sort of feels it is a waste of time.  Will try to mail it today.

## 2020-06-09 NOTE — PROGRESS NOTES
Pt is A&Ox1 to self, and on room air. Denies any pain and shows no signs of discomfort or distress. Assessment complete. Pt has generalized weakness; 2 person assist. Pt is a q2turn and 2 RN skin check.  POC discussed with pt; all questions answered at this time. Fall precautions in place. Bed-alarm on. Call light within reach. Pt educated regarding fall prevention. Hourly rounding in place.

## 2020-06-09 NOTE — THERAPY
Physical Therapy   Daily Treatment     Patient Name: Carmenza Bennett  Age:  75 y.o., Sex:  female  Medical Record #: [id number]  Today's Date: 6/8/2020     Precautions: Fall Risk    Assessment    Pt with significant rigidity that negatively impacted postural control. Extensive neuromuscular techniques at EOB to decrease rigidity and improve midline as documented below. After these was able to hold seat at edge. Progressed to standing tasks. Posterior lean when standing with lack of weight shift to R. Again as documented below neuromuscular training completed and pre-gait tasks, significant postural facilitation required. Poor weight shift and foot clearance with marching. Increased tolerance to standing. Acute PT to continue to follow while in house. Continue to recommend post acute placement.     Plan    Continue current treatment plan.    Discharge recommendations:  Recommend post-acute placement for continued physical therapy services prior to discharge home.            Objective       06/08/20 1431   Neuro-Muscular Treatments   Neuro-Muscular Treatments Weight Shift Left;Weight Shift Right;Verbal Cuing;Tapping;Tactile Cuing;Sequencing;Postural Facilitation   Comments anterior rocking to decrease rigidity at EOB. Also performed lateral weight shift R due to continual LOB to L. Performed weight shift in these directions 3x5. Standing weight shift attempted with FWW 2x10, with attempted progression into marching for pre-gait training. Required facilitated shift to R and manual faciliation at pelvis to maintain stand    Balance   Sitting Balance (Static) Poor +   Sitting Balance (Dynamic) Poor   Standing Balance (Static) Trace +   Standing Balance (Dynamic) Trace   Weight Shift Sitting Fair   Weight Shift Standing Poor   Comments FWW when standing   Gait Analysis   Gait Level Of Assist Unable to Participate   Assistive Device Front Wheel Walker   Bed Mobility    Supine to Sit Moderate Assist   Sit to Supine Moderate  Assist   Scooting Moderate Assist   Functional Mobility   Sit to Stand Moderate Assist   Activity Tolerance   Sitting Edge of Bed 15 min   Standing 3x2 minutes

## 2020-06-09 NOTE — THERAPY
"Physical Therapy   Daily Treatment     Patient Name: Carmenza Bennett  Age:  75 y.o., Sex:  female  Medical Record #: [id number]  Today's Date: 6/9/2020     Precautions: Fall Risk, Parkinson's, Dementia    Assessment  Pt pleasant and motivated to participate. Continues to be limited by weakness and impaired cognition. Pt repeats \"I don't know what I'm doing\" and \"I don't know how\" throughout session. Requires simple, step-by-step directions and does well with visual demonstration. Focused on balance activities and weight shifting for improved postural control at EOB prior to stand. Performs STS to FWW and requires facilitation to correct posterior lean. Pt demonstrates ability to step with FWW though did require constant cueing for sequencing moving from EOB to chair d/t impaired cognition. Pt will continue to benefit from acute PT, recommend pt sit EOB (with SPV) daily with nursing and up to chair as able. Recommend placement.     Plan  Continue current treatment plan.  Discharge recommendations:  Recommend post-acute placement for continued physical therapy services prior to discharge home.          06/09/20 1214   Neuro-Muscular Treatments   Neuro-Muscular Treatments Weight Shift Right;Weight Shift Left;Verbal Cuing;Tactile Cuing;Sequencing;Postural Facilitation;Compensatory Strategies;Anterior weight shift   Comments anterior rocking sitting EOB x10 prior to performing STS. lateral weight shifting to find midline, pt able to find and hold midline after facilitation of R weight shift a few times. able to hold static balance on her own, with LE AROM she required posterior support   Balance   Comments standing with FWW. posterior lean improves after fwd/back weight shifting practice.   Gait Analysis   Gait Level Of Assist Unable to Participate   Comments performed SPT with FWW and shuffled steps from EOB to chair. requires postural facilitation d/t posterior lean. requires continuous 1-step cues for sequencing turn to " "chair with FWW and stepping mechanics.    Bed Mobility    Supine to Sit Moderate Assist   Scooting Moderate Assist   Rolling Minimal Assist to Rt.;Moderate Assist to Lt.   Comments step-by-step cues to sequence bed mobility. requires assist at trunk to come to upright sitting.    Functional Mobility   Sit to Stand Moderate Assist   Bed, Chair, Wheelchair Transfer Moderate Assist   Transfer Method Stand Pivot   Mobility shuffled steps with FWW. pt gets \"stuck\" in standing and states \"I dont know what to do\" though demonstrates ability to step and turn.    Short Term Goals    Short Term Goal # 1 Pt will be SPV for supine<>sit in 6 txs to improve bed mobility.   Goal Outcome # 1 goal not met   Short Term Goal # 2 Pt will be Lashonda for transfers with FWW in 6 txs to improve OOB activity.   Goal Outcome # 2 Goal not met   Short Term Goal # 3 Pt will be Lashonda for gait with FWW x 50' to improve mobility.   Goal Outcome # 3 Goal not met   Anticipated Discharge Equipment   DC Equipment Unable To Determine At This Time       "

## 2020-06-09 NOTE — CARE PLAN
Problem: Communication  Goal: The ability to communicate needs accurately and effectively will improve  Outcome: PROGRESSING AS EXPECTED  Note: Pt was encouraged to express feelings and ask questions. Pt gets frustrated when it comes to asking orientation questions to assess alertness and orientation. Pt was reorientated; pt seemed to calm down after reorientating.      Problem: Safety  Goal: Will remain free from injury  Outcome: PROGRESSING AS EXPECTED  Note: Fall precautions in place. Bed in lowest position. Non-skid socks in place. Personal possessions within reach. Mobility sign on door. Bed-alarm on. Call light within reach. Pt educated regarding fall prevention. Hourly rounding in place. Pt is A&Ox1 to self; will reorient as needed.

## 2020-06-09 NOTE — PROGRESS NOTES
2 RN skin check completed with CURT Mcdonnell.   Devices in place: mepliex.  Skin assessed under devices: yes.  Confirmed pressure ulcers found: none.  New potential pressure ulcers noted: none.   Wound consult placed:  n/a.     Skin assessment:  Sacrum: red/excoriation and blanching (barrier cream in use)  Groin: excoriated/red/blanching (barrier cream in use)  Heels: red/boggy/slow to obed (mepilex in place)  Elbows: red/blanching (mepliex in place)     The following interventions in place: q2h turns, incontinence care and linen changes as needed, pillows for support/positioning, mepilex on bilateral heels and elbows, barrier paste, waffle overlay mattress.

## 2020-06-09 NOTE — CARE PLAN
Problem: Safety  Goal: Will remain free from falls  Outcome: PROGRESSING AS EXPECTED       Problem: Pain Management  Goal: Pain level will decrease to patient's comfort goal  Outcome: PROGRESSING AS EXPECTED

## 2020-06-10 NOTE — PROGRESS NOTES
2 RN skin check complete.   Devices in place none.  Skin assessed under devices n/a.  Confirmed pressure ulcers found on n/a.  New potential pressure ulcers noted on n/a. Wound consult placed n/a.    Bilateral elbows red, blanching.  Sacrum red, excoriated, blanching.  Groin area, red, excoriated, blanching.  Bilateral heels red, boggy, blanching.    The following interventions in place waffle overlay, q2 turns, barrier paste to sacrum, ppx mepilex to bilateral heels and elbows, heels elevated by folding waffle cushion, frequent linen changes to keep skin dry.

## 2020-06-10 NOTE — PROGRESS NOTES
Hospital Medicine Twice Weekly Progress Note     Hospital Course  Ms. Bennett is a wheelchair bound 75-year-old female with a PMH of arthritis, DVT, Parkinson's, and dementia who presented 5/14/2020 with complaints of shortness of breath, nonproductive cough, fevers, and chills for 5 days. She is confused at baseline and was sent by her  since he was unable to care for her. An EKG was done in the ED and was unremarkable, though a chest xray showed bilateral interstitial infiltrates. COVID was ruled out in the ED. She was evaluated by PT/OT who recommended 24/7 supervision. Her  also stated he is no longer able to care for her either. She is now pending placement to SNF vs group home, though medicaid must be obtained first as she has limited income.  Guardianship is pending.     Interval Problem Update  Patient is lying in bed, pleasant and cooperative.  She is alert and oriented only to herself and place.  She does not appear to be in any acute distress.  She denies pain, difficulty breathing, cough and any other problems at this time.  All systems reviewed and exam is otherwise unchanged from prior exam.    -Continue therapies, out of bed for meals and ambulation 3 times a day.  -Stable on current medication regimen.  -Continue Lovenox for VTE prophylaxis  -Pending medicaid, once accepted will need SNF followed by group home placement.  Guardianship pending.    Assessment & Plan  Dementia (HCC)- (present on admission)  Assessment & Plan  -Continues to be high risk for delirium while in the hospital.  -Frequent re-orientation, reestablish circadian rhythm, encourage familiar faces/family in room, avoid or minimize narcotics/sedatives.   -Continue on seroquel and prozac, along with prn haldol.    Discharge planning issues  Assessment & Plan  -Pending guardianship, Medicaid, once accepted will need SNF followed by group home placement.     Lower extremity pain, bilateral- (present on admission)  Assessment  & Plan  -With edema. Doppler negative for DVT. Xray showed osteoarthritis of the hip. Will try & place HOLA hose.   -Continue scheduled tylenol.  Patient denies pain at time of exam.  -PT/OT recommending SNF placement, but will eventually need group home placement with 24/7 care after SNF making it a difficult discharge.  -Continue out of bed for all meals and mobilization 3 times a day.    Parkinson's disease (HCC)- (present on admission)  Assessment & Plan  -Continue sinemet.  -PT/OT recommending SNF placement, but again will require 24/7 care at a group home afterward.  -TESSY Garcia.

## 2020-06-10 NOTE — PROGRESS NOTES
Pt is A&Ox1 to self, and on room air. Denies any pain and shows no signs of discomfort or distress. Assessment complete. Pt is a q2turn and 2 RN skin check.  POC discussed with pt; all questions answered at this time. Fall precautions in place. Bed-alarm on. Call light within reach. Pt educated regarding fall prevention. Hourly rounding in place.

## 2020-06-10 NOTE — CARE PLAN
Problem: Communication  Goal: The ability to communicate needs accurately and effectively will improve  Outcome: PROGRESSING AS EXPECTED  Note: Encouraged pt to express feelings and ask questions.      Problem: Safety  Goal: Will remain free from injury  Outcome: PROGRESSING AS EXPECTED  Note: Fall precautions in place. Bed in lowest position. Non-skid socks in place. Personal possessions within reach. Mobility sign on door. Bed-alarm on. Call light within reach. Pt educated regarding fall prevention and states understanding.

## 2020-06-10 NOTE — PROGRESS NOTES
Assumed care of pt at shift change, report received. Pt A&Ox2, disoriented to time and event. Resting in bed comfortably, pleasant at this time. Denies any pain, nausea, dizziness, SOB, no other s/s of distress noted. Scheduled medications at 1800 given now, ok per pharmacy. Medications given whole with thin liquid, no s/s of aspiration. On ra. BS normoactive x4, LBM tonight. Skin assessment completed, q2 turns in place, jass cream applied and heel floated. Bed alarm in place. Safety precautions in place.

## 2020-06-10 NOTE — PROGRESS NOTES
Cris HURT is aware of pts current bp of 98/65; no orders placed but to just to encourage pt to keep hydrated to keep bp from falling down.

## 2020-06-11 NOTE — PROGRESS NOTES
2 RN skin check complete wilfred Skaggs RN  Devices in place none.  Skin assessed under devices n/a.  Confirmed pressure ulcers found on n/a.  New potential pressure ulcers noted on n/a. Wound consult placed n/a.     Bilateral elbows red, blanching.  Sacrum red, excoriated, blanching.  Groin area, red, excoriated, blanching.  Bilateral heels red, boggy, blanching. Skin peeling while mepilex peeled off.     The following interventions in place waffle overlay, q2 turns, barrier paste to sacrum, ppx mepilex to bilateral heels and elbows, heels elevated with pillow.

## 2020-06-11 NOTE — PROGRESS NOTES
"Pt refused 2 RN skin check. Per pt \" Im so cold. Don't take my blankets off. I dont want it\". Pt was given warm blanket; no skin check done on pt.   "

## 2020-06-11 NOTE — PROGRESS NOTES
2 RN skin check complete with CURT oMsher.  Devices in place none.  Skin assessed under devices n/a.  Confirmed pressure ulcers found on n/a.  New potential pressure ulcers noted on n/a. Wound consult placed n/a.  The following interventions in place: 2 RN skin check, Q2 turns, waffle overlay, heel offloaded with pillows, mepilex covering heels for protection, incontinence care provided as needed, mepilex to elbows for protection.     Skin Assessment:  -Bilat elbows: red, blanching  -Sacrum: red, excoriated, blanching, barrier paste applied  -Groin area: red, excoriated, blanching  -Bilat heels: red, boggy, blanching

## 2020-06-11 NOTE — THERAPY
Physical Therapy   Daily Treatment     Patient Name: Carmenza Bennett  Age:  75 y.o., Sex:  female  Medical Record #: [id number]  Today's Date: 6/11/2020     Precautions: Fall Risk    Assessment    Pt was pleasant and agreeable to therapy session. Pt with more flat affect and expressed frustrations about current situation. She required modA for trunk to reach EOB. At first once EOB pt with lateral lean to the left and posterior. Practiced anterior weight shifting and finding midline with BUE support. She completed 2 STS with modAx2. Vc/tc for posture and increase her SKYE. Pt continues to be limited due to balance, endurance, strength limiting her mobility. Will continue to follow while in house.     Plan    Continue current treatment plan.    Discharge recommendations: Recommend post-acute placement for continued physical therapy services prior to discharge home.              06/11/20 1455   Neuro-Muscular Treatments   Neuro-Muscular Treatments Weight Shift Right;Verbal Cuing;Tactile Cuing;Sequencing   Comments finding midline at EOB pt withposterior lean and lean to the left. using BUE support and tc for correction. By end of tx session able to hold self up at EOB with improved balance.    Gait Analysis   Gait Level Of Assist Unable to Participate   Bed Mobility    Supine to Sit Moderate Assist   Sit to Supine Moderate Assist   Scooting Moderate Assist   Skilled Intervention Verbal Cuing;Tactile Cuing;Sequencing   Comments step by step cues to sequence.    Functional Mobility   Sit to Stand Moderate Assist   Skilled Intervention Verbal Cuing;Sequencing   Short Term Goals    Short Term Goal # 1 Pt will be SPV for supine<>sit in 6 txs to improve bed mobility.   Goal Outcome # 1 goal not met   Short Term Goal # 2 Pt will be Lashonda for transfers with FWW in 6 txs to improve OOB activity.   Goal Outcome # 2 Goal not met   Short Term Goal # 3 Pt will be Lashonda for gait with FWW x 50' to improve mobility.   Goal Outcome # 3 Goal not  met

## 2020-06-11 NOTE — CARE PLAN
Problem: Communication  Goal: The ability to communicate needs accurately and effectively will improve  Outcome: PROGRESSING SLOWER THAN EXPECTED     Problem: Knowledge Deficit  Goal: Knowledge of disease process/condition, treatment plan, diagnostic tests, and medications will improve  Outcome: PROGRESSING SLOWER THAN EXPECTED     Problem: Skin Integrity  Goal: Risk for impaired skin integrity will decrease  Outcome: PROGRESSING SLOWER THAN EXPECTED

## 2020-06-11 NOTE — THERAPY
Occupational Therapy  Daily Treatment     Patient Name: Carmenza Bennett  Age:  75 y.o., Sex:  female  Medical Record #: [id number]  Today's Date: 6/11/2020     Precautions  Precautions: Fall Risk  Comments: PD and dementia     Assessment    Pt seen for OT tx, pt just completing PT session, did appear fatigued, has flat withdrawn affect. On going rigid posture would benefit from post acute w/a structured environment and routine     Plan    Continue current treatment plan.    Discharge recommendations:  Recommend post-acute placement for additional occupational therapy services prior to discharge home.       Objective       06/11/20 1501   Cognition    Comments appears frustated and withdrawn, was cooperative    Supine Upper Body Exercises   Supine Upper Body Exercises Yes   Comments AAROM    Sitting Upper Body Exercises   Sitting Upper Body Exercises Yes   Comments AAROM    Activities of Daily Living   Grooming Moderate Assist;Seated   Upper Body Dressing Maximal Assist   Lower Body Dressing Maximal Assist   Skilled Intervention Verbal Cuing;Facilitation;Compensatory Strategies   Functional Mobility   Sit to Stand Moderate Assist   Mobility EOB sit>Stand    Short Term Goals   Short Term Goal # 1 pt will demo functional transfer with Lashonda   Goal Outcome # 1 Goal not met   Short Term Goal # 2 pt will sequence seated oral care without cues   Goal Outcome # 2 Goal not met

## 2020-06-11 NOTE — PROGRESS NOTES
Patient very aggressive ,complaning being hungry when I offered drinks and snacks she trow them away across the room.

## 2020-06-11 NOTE — PROGRESS NOTES
Pt alert to self only. VSS, lungs clear diminished on RA. Denies any pain, and shows no signs of distress at this time. Pt is cooperative, and redirectable. Medications take whole with thin liquids. BS normoactive x4. Q2 turns, barrier cream applied and floating heels. Bed alarm on. Safety precautions in place, bed low and locked, call light within reach, WCM.

## 2020-06-11 NOTE — CARE PLAN
Problem: Safety  Goal: Will remain free from injury  Outcome: PROGRESSING AS EXPECTED  Goal: Will remain free from falls  Outcome: PROGRESSING AS EXPECTED     Problem: Pain Management  Goal: Pain level will decrease to patient's comfort goal  Outcome: PROGRESSING AS EXPECTED     Problem: Respiratory:  Goal: Respiratory status will improve  Description: Pt here with PNA. Pt was on IV/PO abx. Pt has been placed on room air. Pt not in any respiratory distress. Pt with clear lung sounds, but diminished to the bases. Will continue to monitor for changes.   Outcome: PROGRESSING AS EXPECTED

## 2020-06-12 NOTE — THERAPY
Physical Therapy   Daily Treatment     Patient Name: Carmenza Bennett  Age:  75 y.o., Sex:  female  Medical Record #: [id number]  Today's Date: 6/12/2020     Precautions: (P) Fall Risk    Assessment    Pt was pleasantly confused and agreeable to therapy session. Less frustrated today but did report she simply felt more nervous as she presented with a very rigid posture at EOB with cues to relax. Performed log rolling for linen change Lashonda. She required modA to reach EOB. Her balance was not as good today compared to yesterday she was only able to hold midline for a few seconds. Practiced multiple balance activities at eob. Performed standing with modA and facilitated lateral weight shifting. Will continue to follow while in house.     Plan    Continue current treatment plan.    Discharge recommendations:  Recommend post-acute placement for continued physical therapy services prior to discharge home.          06/12/20 1535   Neuro-Muscular Treatments   Comments finding midline, anterior weight shifting to promote forward weight shifting. Lateral lean onto bue elbows 10x each Lashonda to find midline.    Gait Analysis   Gait Level Of Assist Unable to Participate   Bed Mobility    Supine to Sit Moderate Assist   Sit to Supine Moderate Assist   Scooting Moderate Assist   Rolling Minimal Assist to Rt.;Minimum Assist to Lt.   Skilled Intervention Verbal Cuing;Tactile Cuing;Facilitation;Compensatory Strategies   Comments log rolling for linen change, modA for trunk. Pt was able to initiate laying back supine.    Functional Mobility   Sit to Stand Moderate Assist   Short Term Goals    Short Term Goal # 1 Pt will be SPV for supine<>sit in 6 txs to improve bed mobility.   Goal Outcome # 1 goal not met   Short Term Goal # 2 Pt will be Lashonda for transfers with FWW in 6 txs to improve OOB activity.   Goal Outcome # 2 Goal not met   Short Term Goal # 3 Pt will be Lashonda for gait with FWW x 50' to improve mobility.   Goal Outcome # 3 Goal not  met

## 2020-06-12 NOTE — PROGRESS NOTES
2 RN skin check complete with CURT Álvarez.  Devices in place none.  Skin assessed under devices n/a.  Confirmed pressure ulcers found on n/a.  New potential pressure ulcers noted on n/a. Wound consult placed n/a.  The following interventions in place: 2 RN skin check, Q2 turns, waffle overlay, heel offloaded with pillows, mepilex covering heels for protection, incontinence care provided as needed, mepilex to elbows for protection.      Skin Assessment:  -Bilat elbows: red, blanching  -Sacrum: red, excoriated, blanching, barrier paste applied  -Groin area: red, excoriated, blanching, barrier paste applied  -Bilat heels: red, boggy, blanching

## 2020-06-12 NOTE — PROGRESS NOTES
2 RN skin check complete with Jeanie RN  Devices in place none.  Skin assessed under devices n/a.  Confirmed pressure ulcers found on n/a.  New potential pressure ulcers noted on n/a. Wound consult placed n/a.     Bilateral elbows red, blanching.  Sacrum red, excoriated, blanching.  Groin area, red, excoriated, blanching.  Bilateral heels red, boggy, blanching. Skin peeling while mepilex peeled off.     The following interventions in place waffle overlay, q2 turns, barrier paste to sacrum, ppx mepilex to bilateral heels and elbows, heels elevated with pillow.

## 2020-06-12 NOTE — CARE PLAN
Problem: Communication  Goal: The ability to communicate needs accurately and effectively will improve  Outcome: PROGRESSING SLOWER THAN EXPECTED     Problem: Safety  Goal: Will remain free from injury  Outcome: PROGRESSING SLOWER THAN EXPECTED     Problem: Bowel/Gastric:  Goal: Normal bowel function is maintained or improved  Outcome: PROGRESSING SLOWER THAN EXPECTED

## 2020-06-12 NOTE — PROGRESS NOTES
Pt alert to self. VSS. Lungs clear diminished on RA. Denies pain, no signs of distress at this time. Meds taken whole with thin liquids. Q2 turns, barrier cream, and floating heels. Bed alarm on. Safety precautions in place, bed low and locked, call light within reach, WCM.

## 2020-06-12 NOTE — PROGRESS NOTES
Received report form night shift nurse. Patient alert to self. Patient screamed out multiple times during shift wanting breakfast, lunch, or dinner. Patient spoke with son on phone stating that she wanted to die. 2 RN skin check performed redness and excoriation noted on sacrum.

## 2020-06-13 NOTE — PROGRESS NOTES
Received report from night shift nurse. Patient alert to self.  2 RN skin check performed redness and excoriation noted on sacrum. No other issues noted during shift.

## 2020-06-13 NOTE — PALLIATIVE CARE
Palliative Care follow-up  Spoke with Remedios HURT, and [facility] Rachel on pt and POLST. Wilson is to meet with pts spouse later today and will attempt to have him sign the POLST at that time.   POLST has been reviewed by BLU and signed.     Thank you for allowing Palliative Care to support this patient and family. Contact [phone] for additional assistance, change in patient status, or with any questions/concerns.

## 2020-06-13 NOTE — PROGRESS NOTES
2 RN skin check complete with Brynn ELIAS   Devices in place: none  Skin assessed under devices: n/a  Confirmed pressure ulcers found on:n/a  New potential pressure ulcers noted on: n/a. Wound consult placed: n/a  The following interventions in place:     Waffle mattress overlay,Q2 turning/repositionig, incontinence checks/changing as necessary, barrier paste applied to sacrum/buttocks, mepilex to bilateral heels, pillows for support/repositioning.       Assessment:   bilateral elbows:  red, blanching,   Sacrum: reddened raised rash- blanches. Barrier cream applied.   Bilateral heels: red, boggy- blanches.

## 2020-06-13 NOTE — PROGRESS NOTES
2 RN skin check complete with Judy RN  Devices in place none.  Skin assessed under devices n/a.  Confirmed pressure ulcers found on n/a.  New potential pressure ulcers noted on n/a. Wound consult placed n/a.     Bilateral elbows red, blanching.  Sacrum dark red, excoriated, blanching.  Groin area, red, excoriated, blanching.  Bilateral heels red, boggy, blanching. Heels with flaky skin.       The following interventions in place: waffle overlay, q2 turns, barrier paste to sacrum, powder to groin, mepilex to bilateral heels, heels elevated with pillow.

## 2020-06-13 NOTE — CARE PLAN
Problem: Safety  Goal: Will remain free from injury  Outcome: PROGRESSING AS EXPECTED      Problem: Knowledge Deficit  Goal: Knowledge of the prescribed therapeutic regimen will improve  Outcome: PROGRESSING SLOWER THAN EXPECTED

## 2020-06-13 NOTE — PROGRESS NOTES
"Pt agitated and verbally aggressive upon entering room at 1930. No c/o pain. Repeated mentioned she is \"in a lot of shit doo doo. I can't stay here. Is there anyone that can speak English? Where is the tall lady?\". Unable to be redirected or soothed. PRN haldol given at 2001. Pt is currently asleep, no signs of distress. Room by nursing station.    "

## 2020-06-13 NOTE — PROGRESS NOTES
06/13/20    Interval progress note    Patient is lying in bed, pleasant and cooperative.  She is alert and oriented only to herself and place.  She does not appear to be in any acute distress.  She denies head, chest and abdominal pain, difficulty breathing, cough and any other problems at this time.  Nursing reports occasional verbal aggression. Responds well to as needed Haldol. Trial of seroquel increased frequency to twice daily.    All systems reviewed and exam is unchanged from prior exam.  Vital signs within normal limits.    -Continue current plan of care  -Mobilization as tolerated  -Pending medicaid, once accepted will need SNF followed by group home placement.  Guardianship pending.  -Social work to deliver POLST to patient's spouse for signature.    TESSY Kelley.      I certify that the patient requires continued medically necessary hospital services for the treatment of Dementia and will remain in the hospital for at least 30 days.  Discharge plan is to be determined by guardianship process, likely skilled nursing facility.

## 2020-06-14 NOTE — PALLIATIVE CARE
Palliative Care follow-up  Met with [facility] for pts POLST. POLST scanned into EMR and original placed on pts hard chart for DC.     Thank you for allowing Palliative Care to support this patient and family. Contact [phone] for additional assistance, change in patient status, or with any questions/concerns.

## 2020-06-14 NOTE — PROGRESS NOTES
"Bedside report received at change of shift. Patient was agitated, attempting to get out of bed. Assisted pt with getting her legs back into bed, and covered up with hospital gown, blankets. Bed alarm on.    Patient is yelling out repeating \"I want a doctor\".  Pt is confused, agitated, verbally aggressive with staff. Unable to calm patient down despite repeated attempts to reorient and reassure.  Medicated pt per MAR. Will continue to monitor.    Needs attended. No additional needs at this time.  Call light and personal belongings within reach. Bed in low locked position. Hourly rounding in place.  "

## 2020-06-14 NOTE — PROGRESS NOTES
Pt alert/oriented to self, denies pain/discomfort. Medication taken without difficulty. Pt on q2h turns. Tolerating RA, no s/sx respiratory distress. Pt resting quietly in bed, needs met. Call light within reach, personal belongings available, bed in lowest position, treaded socks on, and bed alarm on. Hourly rounding in place.

## 2020-06-14 NOTE — PROGRESS NOTES
"Pt continuing to yell out. She is attempting to get out of bed. Pt kicked at staff when attempting to help her back into bed, and continues to be verbally aggressive, such as stating \"bitch\". Safety education provided, de-escalation techniques unsuccessful in reducing agitation. WCTM.  "

## 2020-06-14 NOTE — PROGRESS NOTES
2 RN skin check complete with CURT Yang.   Devices in place: none.  Skin assessed under devices: N/A.  Confirmed pressure ulcers found: N/A.  New potential pressure ulcers noted: N/A.     Sacrum dark red/blanching.  Groin area: red/blanching.  Bilateral heels: red/boggy/blanching/flaky.     The following interventions are in place: waffle overlay, q2h turns, 2 rn skin check, barrier paste on sacrum, pillows for support/repositioning, frequent incontinence checks with linen changes as needed

## 2020-06-14 NOTE — PROGRESS NOTES
2 RN skin check complete with CURT Smith.   Devices in place: none.  Skin assessed under devices: N/A.  Confirmed pressure ulcers found: N/A.  New potential pressure ulcers noted: N/A.  Would consult placed: N/A.    Sacrum dark red/blanching.  Groin area: red/blanching.  Bilateral heels: red/boggy/blanching/flaky.    The following interventions are in place: waffle overlay, q2h turns, barrier paste on sacrum, powder on groin, heels floated with pillow.

## 2020-06-14 NOTE — CARE PLAN
Problem: Safety  Goal: Will remain free from falls  Outcome: PROGRESSING AS EXPECTED  Intervention: Implement fall precautions  Flowsheets  Taken 6/14/2020 1355  Bed Alarm: Yes - Alarm On  Chair/Bed Strip Alarm: Yes - Alarm On  Taken 6/14/2020 1203  Environmental Precautions:   Treaded Slipper Socks on Patient   Personal Belongings, Wastebasket, Call Bell etc. in Easy Reach   Report Given to Other Health Care Providers Regarding Fall Risk   Bed in Low Position   Mobility Assessed & Appropriate Sign Placed   Bed is in low, locked position.  Call light and belongings are within reach.      Problem: Skin Integrity  Goal: Risk for impaired skin integrity will decrease  Outcome: PROGRESSING AS EXPECTED   Pt turned and positioned every two hours. Incontinence care provided and barrier paste applied as needed.

## 2020-06-14 NOTE — DISCHARGE PLANNING
Medical Social Work  Sw obtained supporting documentation for Medicaid application from spouse, he did not sign the application as he stated he has misplaced it.

## 2020-06-15 NOTE — CARE PLAN
Problem: Knowledge Deficit  Goal: Knowledge of the prescribed therapeutic regimen will improve  Outcome: PROGRESSING AS EXPECTED     Problem: Skin Integrity  Goal: Risk for impaired skin integrity will decrease  Outcome: PROGRESSING AS EXPECTED  Patient demonstrates understanding of importance of skin care.

## 2020-06-15 NOTE — CARE PLAN
Problem: Communication  Goal: The ability to communicate needs accurately and effectively will improve  Outcome: PROGRESSING AS EXPECTED  Note: Patient agreeable this morning and pleasant. Communicating needs effectively.      Problem: Safety  Goal: Will remain free from injury  Outcome: PROGRESSING AS EXPECTED  Note: Fall precautions in place. Bed in lowest position. Non-skid socks in place. Personal possessions within reach. Mobility sign on door. Bed-alarm on. Call light within reach. Pt educated regarding fall prevention and states understanding.

## 2020-06-15 NOTE — PROGRESS NOTES
Received report from night shift RN and assumed care of patient. Patient is A&O x 2. Disoriented to time and situation. Patient reports no pain at this time. Patient is pleasant with RN and denies SOB, nausea, numbness, and tingling.   No signs of distress at this time. Bed is in lowest, locked position, call light and belongings are within reach. Patient calls for assistance and bed alarm is on.  All other needs met.

## 2020-06-15 NOTE — PROGRESS NOTES
Pharmacy Pharmacotherapy Consult for LOS >30 days    Admit Date: 5/14/2020      Medications were reviewed for appropriateness and ongoing need.     Current Facility-Administered Medications   Medication Dose Route Frequency Provider Last Rate Last Dose   • QUEtiapine (SEROQUEL) tablet 25 mg  25 mg Oral BID Remedios Lynch A.P.R.N.   25 mg at 06/14/20 1833   • haloperidol lactate (HALDOL) injection 2-5 mg  2-5 mg Intramuscular Q6HRS PRN Remedios Lynch A.P.R.N.   5 mg at 06/14/20 1013   • acetaminophen (TYLENOL) tablet 500 mg  500 mg Oral Q6HRS PRN Donnell Kiran M.D.       • acetaminophen (TYLENOL) tablet 1,000 mg  1,000 mg Oral BID Donnell Kiran M.D.   1,000 mg at 06/15/20 1242   • enoxaparin (LOVENOX) inj 40 mg  40 mg Subcutaneous DAILY Donnell Kiran M.D.   40 mg at 06/15/20 0542   • carbidopa-levodopa (SINEMET)  MG tablet 1 Tab  1 Tab Oral 4XDAY Saumya Molina M.D.   1 Tab at 06/15/20 1242   • FLUoxetine (PROZAC) capsule 40 mg  40 mg Oral DAILY Saumya Molina M.D.   40 mg at 06/14/20 0551   • senna-docusate (PERICOLACE or SENOKOT S) 8.6-50 MG per tablet 2 Tab  2 Tab Oral BID Saumya Molina M.D.   2 Tab at 06/14/20 1832    And   • polyethylene glycol/lytes (MIRALAX) PACKET 1 Packet  1 Packet Oral QDAY PRN Saumya Molina M.D.        And   • magnesium hydroxide (MILK OF MAGNESIA) suspension 30 mL  30 mL Oral QDAY PRN Saumya Molina M.D.        And   • bisacodyl (DULCOLAX) suppository 10 mg  10 mg Rectal QDAY PRN Saumya Molina M.D.       • Respiratory Therapy Consult   Nebulization Continuous RT Saumya Molina M.D.           Recommendations:  Per chart review, patient agitation has improved with increase from seroquel daily to BID dosing.  This has resulted in decrease of Haldol PRN agitation use.  Team wanting to continue scheduled APAP at this time as well.  All other medications reordered per protocol.    Mika Mccabe, RaynaD, BCPS

## 2020-06-15 NOTE — PROGRESS NOTES
Pt alert/oriented to self only, denies pain/discomfort. Pt frequently required reorienting. PO medication taken without difficulty. Pt on q2h turns. Tolerating RA, no s/sx respiratory distress. Pt resting quietly in bed, needs met. Pt updated on plan of care for the night. Call light within reach, personal belongings available, bed in lowest position, treaded socks on, bed alarm on. Hourly rounding in place.

## 2020-06-15 NOTE — DISCHARGE PLANNING
Anticipated Discharge Disposition: Skilled    Action: PC to Shauna at [facility], does not have a copy of Medicaid application sent to spouse.    PC to [facility], requested they screen patient, informed them SW has supporting documentation, just need areas highlighted were spouse will need to sign    Barriers to Discharge: placement/cost of care    Plan: continue to monitor for d/c barriers

## 2020-06-16 NOTE — PROGRESS NOTES
2 RN skin check complete with CURT Torres.  Devices in place none .  Skin assessed under devices n/a.  Confirmed pressure ulcers found on none.  New potential pressure ulcers noted on n/a.  Wound consult placed n/a.    Fading bruise to forehead close to hair line.  Bilateral elbows red, blanching.  Sacrum and groin area red, excoriated, blanching.  Bilateral heels red, boggy, blanching. Right heel peeling.    The following interventions in place waffle cushion, ppx mepilex to bilateral heels, pillows to elevate the heels, barrier paste in use, q turns in place, linen changes provided as needed.

## 2020-06-16 NOTE — THERAPY
Physical Therapy   Daily Treatment     Patient Name: Carmenza Bennett  Age:  75 y.o., Sex:  female  Medical Record #: [id number]  Today's Date: 6/15/2020     Precautions: Fall Risk, PD and dementia    Assessment    Pt seen for follow up PT tx. Pt cooperative but perseverating on being hungry throughout session. Therapist facilitated bed mobility and transfer training. Pt continues to demonstrate poor midline control in sitting and standing requiring cues for weight shifting anteriorly. Attempted to side step EOB, pt unable to sequence weight shifting enough. Pt continues to be limited in functional mobility by fatigue, weakness, balance deficits, and cognition. PT cont    Plan    Continue current treatment plan.    Discharge recommendations:  Recommend post-acute placement for continued physical therapy services prior to discharge home.          06/15/20 1656   Cognition    Level of Consciousness Alert   Comments confused and perseverating on being hungry   Neuro-Muscular Treatments   Neuro-Muscular Treatments Weight Shift Left;Weight Shift Right;Verbal Cuing;Tactile Cuing;Sequencing;Anterior weight shift   Comments significant posterior lean when therapist is in front. Pt did better with midline when therapist assisted from the side   Gait Analysis   Gait Level Of Assist Unable to Participate   Comments attempted side stepping EOB, pt could not sequence weight shifting enough   Bed Mobility    Supine to Sit Moderate Assist   Sit to Supine Moderate Assist   Scooting Maximal Assist   Rolling Moderate Assist to Rt.   Skilled Intervention Verbal Cuing;Tactile Cuing;Sequencing   Comments unabel to sequence scooting EOB and required cues for all other transfers   Functional Mobility   Sit to Stand Moderate Assist   Bed, Chair, Wheelchair Transfer Refused   Mobility EOB and several STS with attempts to side step   Skilled Intervention Verbal Cuing   Short Term Goals    Short Term Goal # 1 Pt will be SPV for supine<>sit in 6 txs  to improve bed mobility.   Goal Outcome # 1 goal not met   Short Term Goal # 2 Pt will be Lashonda for transfers with FWW in 6 txs to improve OOB activity.   Goal Outcome # 2 Goal not met   Short Term Goal # 3 Pt will be Lashonda for gait with FWW x 50' to improve mobility.   Goal Outcome # 3 Goal not met   Anticipated Discharge Equipment   DC Equipment Unable To Determine At This Time

## 2020-06-16 NOTE — PROGRESS NOTES
2 RN skin check complete with CURT Hayden.   Devices in place: none.  Skin assessed under devices: N/A.  Confirmed pressure ulcers found: N/A.  New potential pressure ulcers noted: N/A.     Sacrum dark red/blanching.  Groin area: red/blanching.  Bilateral heels: red/boggy/blanching/flaky.     The following interventions are in place: waffle overlay, q2h turns, 2 rn skin check, barrier paste on sacrum, pillows for support/repositioning, frequent incontinence checks with linen changes as needed

## 2020-06-16 NOTE — THERAPY
"Occupational Therapy  Daily Treatment     Patient Name: Carmenza Bennett  Age:  75 y.o., Sex:  female  Medical Record #: [id number]  Today's Date: 6/15/2020       Precautions: Fall Risk  Comments: PD and dementia    Assessment    Pt seen for OT tx, initially reluctant to participate and making remarks consistent with emotional depression (ie: feelings of dispair, lacking motivation, hopelessness, RN made aware of comments) Pt receptive to encouragement and agreeable to functional activities. Pt is limited by posterior lean in sitting, sitting posture improved following anterior/posterior rocking to facilitate finding midline. Pt required assist for UB dress, seated grooming, unable to complete LB dress without MaxA. Pt will require 24hr care/supervision 2/2 cognitive and functional independence deficits. Acute OT to continue to follow while admitted.   Plan    Continue current treatment plan.    Discharge recommendations:  Recommend post-acute placement for additional occupational therapy services prior to discharge home.      Subjective    \"I can't do anything. I don't care anymore.\" (pt provided encouragement and emotional support, receptive)      06/15/20 1535   Precautions   Precautions Fall Risk   Comments PD, dementia, depression(?)   Cognition    Speech/ Communication Delayed Responses   Level of Consciousness Alert   Ability To Follow Commands 1 Step   Safety Awareness Impaired   New Learning Impaired   Attention Impaired   Sequencing Impaired   Initiation Impaired   Comments pleasant but intermittent negative comments eluding to wishing for end of life, RN made aware of statements   Sitting Upper Body Exercises   Sitting Upper Body Exercises Yes   Front Arm Raise 1 set of 10   Side Arm Raise 1 set of 10   Comments AAROM for strengthening and motor control   Neuro-Muscular Treatments   Neuro-Muscular Treatments Anterior weight shift;Postural Facilitation;Tactile Cuing;Verbal Cuing;Tapping   Comments pt rocked " ant/post to increase sitting balance at EOB, required x2 throughout seated ADLs to reduce posterior lean   Balance   Weight Shift Sitting Poor   Weight Shift Standing Absent   Comments pt unable to achieve full upright stand with HHA   Bed Mobility    Supine to Sit Moderate Assist   Sit to Supine Moderate Assist   Scooting Maximal Assist   Rolling Moderate Assist to Rt.;Moderate Assist to Lt.   Comments step by step cues for bed mobility required   Activities of Daily Living   Grooming Seated;Moderate Assist   Upper Body Dressing Moderate Assist   Lower Body Dressing Maximal Assist   Toileting Total Assist   Comments posterior lean seated EOB, incontinent   Functional Mobility   Sit to Stand Maximal Assist   Toilet Transfers Refused   Mobility unable to weightshift or come to full stand at EOB   Activity Tolerance   Comments limited by weakness   Short Term Goals   Short Term Goal # 1 pt will demo functional transfer with Lashonda   Goal Outcome # 1 Goal not met   Short Term Goal # 2 pt will sequence seated oral care without cues   Goal Outcome # 2 Goal not met

## 2020-06-16 NOTE — PROGRESS NOTES
Dementia patient with PD admitted with viral bronchitis. Family unable to care for her. Medical clear and stable. Pending medicaid. Once accepted will need SNF followed by group home placement.  Guardianship pending.    I have performed a physical exam and reviewed and updated ROS and Plan today (6/16/2020). In review of yesterday's note (6/15/2020), there are no changes except as documented above.     I certify that the patient requires continued medically necessary hospital services for the treatment of dementia, parkinson's requiring 24/7 supervision. The patient will remain in the hospital for the foreseeable future.  Discharge may or may not occur in the next 20 days due to ongoing discharge delays due to no medically acceptable discharge option.

## 2020-06-16 NOTE — THERAPY
Physical Therapy   Daily Treatment     Patient Name: Carmenza Bennett  Age:  75 y.o., Sex:  female  Medical Record #: [id number]  Today's Date: 6/16/2020     Precautions: Fall Risk    Assessment    Pt was pleasant, still confused but follows commands appropriately. She is continuing to slowly progress with mobility. Practiced seated balance and standing lateral weight shifting. She is mainly limited by poor sequencing and strength to be able to take steps. She was able to perform multiple STS with HHA and fww today. As well performed stand pivot from bed<.bsc. Will continue to follow while in house.     Plan    Continue current treatment plan.    Discharge recommendations:  Recommend post-acute placement for continued physical therapy services prior to discharge home.          06/16/20 1112   Other Treatments   Other Treatments Provided standing lateral weight shifting. Standing. Utilized fww and HHA    Gait Analysis   Gait Level Of Assist Unable to Participate   Comments attempted side stepping and lateral weight shifting. difficulty weight shifting.    Bed Mobility    Supine to Sit Moderate Assist   Sit to Supine Moderate Assist   Scooting Maximal Assist   Rolling Moderate Assist to Rt.   Skilled Intervention Verbal Cuing;Tactile Cuing;Sequencing   Comments Able to initate moving BLE toward EOB required modA for trunk.    Functional Mobility   Sit to Stand Moderate Assist   Bed, Chair, Wheelchair Transfer Moderate Assist   Toilet Transfers Moderate Assist   Mobility stand pivot, bsc to bed, STS 4x    Skilled Intervention Verbal Cuing;Sequencing   Short Term Goals    Short Term Goal # 1 Pt will be SPV for supine<>sit in 6 txs to improve bed mobility.   Goal Outcome # 1 goal not met   Short Term Goal # 2 Pt will be Lashonda for transfers with FWW in 6 txs to improve OOB activity.   Goal Outcome # 2 Goal not met   Short Term Goal # 3 Pt will be Lashonda for gait with FWW x 50' to improve mobility.   Goal Outcome # 3 Goal not met

## 2020-06-16 NOTE — PROGRESS NOTES
Assumed care of pt at shift change, report received. Pt sleeping, easy to arouse. Oriented to self only. Denies pain, nausea, dizziness, SOB. No other s/s of distress noted. On RA. SBP at 94, asymptomatic. BS normoactive x4. Q2 turns in place, pressure ulcer prevention protocol in place. Bed alarm and safety precautions in place.

## 2020-06-17 NOTE — PROGRESS NOTES
2 Rn skin check complete with CURT Holt.  Devices in place : NA.  Skin assessed under devices :NA.  Confirmed pressure ulcers found on: NA.  New potential pressure ulcers noted on NA Wound consult placed NO.  The following interventions in place. Waffle overlay. q2h turns. Up to chair. Attempting to use commode. Pillows to float heals. Heel Mepilex.   Sacrum is red. Reina applied as patient is incontinent sacral mepilex not a good option  Heels are red but obed. The left foot is very dry and pealing. Patient was sitting down for over 1.5 hrs and heals appeared redder than while in bed but yet were still blanching. Elbows pink and blanching.

## 2020-06-17 NOTE — PROGRESS NOTES
"Late entry 11: Patient AAO x 1-2. At times was able to say she was in a hospital. Complaint of no pain at time of assessment. Was able to state she needed to use the bathroom 2x's. Had a BM that was formed. Is able to use call light for assistance at times. Very pleasant today engaged in conversation and care. Received a shower in which she assisted to scrub herself. Received a hair cut which she stated was very nice. Sat up at the chair which she stated \"felt really good\". Encouraged her to be out of bed for meals. Patient is able to feed self though her dexterity is poor and misses. Encouraging feeding to assist patient in consuming majority of meals as she tires and becomes frustrated otherwise. Fall precautions in place. No needs at this time.   "

## 2020-06-18 NOTE — PROGRESS NOTES
2 Rn skin check complete with CURT Holt.  Devices in place : NA.  Skin assessed under devices :NA.  Confirmed pressure ulcers found on: NA.  New potential pressure ulcers noted on NA Wound consult placed NO.  The following interventions in place. Waffle overlay. q2h turns. Up to chair. Attempting to use commode. Pillows to float heals. Heel Mepilex.   Sacrum is red. Reina applied as patient is incontinent sacral mepilex not a good option  Heels are red but obed. The left foot is very dry and pealing.  Elbows pink and blanching.

## 2020-06-18 NOTE — PROGRESS NOTES
Pt in a good mood tonight, A&Ox1, no reports of pain. Provided pt with a snack and water. Hourly rounding in place.  All needs met at this time.

## 2020-06-18 NOTE — CARE PLAN
Problem: Safety  Goal: Will remain free from injury  Outcome: PROGRESSING AS EXPECTED   Bed alarm on.  Problem: Bowel/Gastric:  Goal: Normal bowel function is maintained or improved  Outcome: PROGRESSING AS EXPECTED   BM today, incontinent   Problem: Communication  Goal: The ability to communicate needs accurately and effectively will improve  Outcome: PROGRESSING SLOWER THAN EXPECTED   Pt unable to use call light and yells when she needs help  Problem: Urinary Elimination:  Goal: Ability to reestablish a normal urinary elimination pattern will improve  Outcome: PROGRESSING SLOWER THAN EXPECTED   Incontinent

## 2020-06-18 NOTE — THERAPY
"Occupational Therapy  Daily Treatment     Patient Name: Carmenza Bennett  Age:  75 y.o., Sex:  female  Medical Record #: [id number]  Today's Date: 6/17/2020     Precautions: Fall Risk  Comments: PD and Dementia    Assessment    Pt seen for OT tx, received in bedside chair with food everywhere from attempting to self-feed lunch. Pt participated in seated dressing and grooming ADLs, good participation and attention to task. Pt agreeable to sit>stands x4 in prep for functional transfers and to \"warm up\" as pt reported feeling very cold. Pt sit>stand independence improved with each attempt, declined transfer to [facility] stating no need to toilet and declined functional transfer BTB electing to stay up in the chair. Pt with improved mood today however does make intermittent depressed comments, receptive to encouragement and compliments on progress. Acute OT to continue to follow.     Plan    Continue current treatment plan.    Discharge recommendations:  Recommend post-acute placement for additional occupational therapy services prior to discharge home. Pt will require 24hr care.     Subjective    \"You really think I did better today?\"     06/17/20 1441   Precautions   Precautions Fall Risk   Comments PD and Dementia   Pain 0 - 10 Group   Therapist Pain Assessment Post Activity Pain Same as Prior to Activity;Nurse Notified  (c/o cold not pain)   Cognition    Speech/ Communication   (flat affect, parkinsonian)   Ability To Follow Commands 2 Step   Safety Awareness Impaired   New Learning Impaired   Attention Impaired   Sequencing Impaired   Initiation Impaired   Balance   Weight Shift Sitting Fair   Weight Shift Standing Poor   Comments standing with FWW   Bed Mobility    Scooting Maximal Assist  (in chair)   Comments no bed mobility witnessed this session   Activities of Daily Living   Eating Minimal Assist   Grooming Minimal Assist;Seated  (facewashing, hairbrushing)   Upper Body Dressing Moderate Assist   Lower Body Dressing " Maximal Assist  (good participation during attempt to complete sock doff/don )   Skilled Intervention Verbal Cuing;Tactile Cuing;Facilitation;Compensatory Strategies   Functional Mobility   Sit to Stand Moderate Assist  (progressed to Lashonda with repeated attempts)   Bed, Chair, Wheelchair Transfer Refused  (requested to stay in chair)   Toilet Transfers Refused   Transfer Method   (sit>stand x4 only)   Mobility sit>stand with assist   Short Term Goals   Short Term Goal # 1 pt will demo functional transfer with Lashonda   Goal Outcome # 1 Progressing as expected   Short Term Goal # 2 pt will sequence seated oral care without cues   Goal Outcome # 2 Goal not met

## 2020-06-18 NOTE — PROGRESS NOTES
Received report from night shift RN and assumed care of patient. Patient is A&O x 1 - 2. Disoriented to time and event and sometimes place. Patient frequently refers to being in care home instead of a hospital and requires reorientation.  Patient reports no pain at this time. Patient tearful and angry off and on throughout the day.  RN call son, who talked to patient for several minutes, but patient continued to cry. Son then called RN and asked if RN could put patient in touch with  which RN did. Patient commenced to yell at  and get very angry. Patient sitting up in chair for dinner with no other signs of distress at this time. Bed is in lowest, locked position, call light and belongings are within reach. Patient does not call for assistance and bed alarm is on.  All other needs met.

## 2020-06-18 NOTE — PROGRESS NOTES
Pt agitated and upset tonight. Oriented to self. Pt kept telling this RN that she only remembers her nurse, but doesn't remember her surroundings.  Pt said it was a very scary feeling to not know what was going on and that her dementia and parkinsons causes her a lot of distress.  Tried to provide pt with a listening ear and support. Reoriented her to the current situation.  Pt appeared to get comfort from these conversations.  Hourly rounding in place.

## 2020-06-18 NOTE — CARE PLAN
Problem: Communication  Goal: The ability to communicate needs accurately and effectively will improve  Outcome: PROGRESSING SLOWER THAN EXPECTED  Note: Patient cried and tearful all day because she said she was abandoned by her family. I made calls to both her son and . When she talked to her son, she continued crying. When she talked to her , she was angry and used expletives and hung up on him.      Problem: Safety  Goal: Will remain free from injury  Outcome: PROGRESSING AS EXPECTED  Note: Fall precautions in place. Bed in lowest position. Non-skid socks in place. Personal possessions within reach. Mobility sign on door. Bed-alarm on. Call light within reach. Pt educated regarding fall prevention and states understanding.

## 2020-06-18 NOTE — CARE PLAN
Problem: Communication  Goal: The ability to communicate needs accurately and effectively will improve  Outcome: PROGRESSING AS EXPECTED     Problem: Safety  Goal: Will remain free from falls  Outcome: PROGRESSING AS EXPECTED    In desk bed, bed alarm is on, bed is locked and in lowest position, call light and bedside table are within reach, treaded slipper socks are on, and hourly rounding is in place.       Problem: Skin Integrity  Goal: Risk for impaired skin integrity will decrease  Outcome: PROGRESSING AS EXPECTED    Q2 turns, heels floated with pillow, waffle overlay in use, incontinence care, keeping linens clean and dry, barrier cream in use     Problem: Mobility  Goal: Risk for activity intolerance will decrease  Outcome: PROGRESSING AS EXPECTED    Encouraging pt to sit for meals and to use commodes.

## 2020-06-18 NOTE — PROGRESS NOTES
2 RN skin check complete with CURT Lan.   Devices in place: NA  Skin assessed under devices: NA  Confirmed pressure ulcers found on: NA  New potential pressure ulcers noted on: NA  The following interventions in place: toileting offering, incontinence care, keeping linens clean and dry, q2 turns, waffle overlay, heel mepilex, heels floated on pillow, barrier cream    - Bruise to forehead at midline.  - Redness with blanching to sacrum. Photo taken of sacrum and uploaded to pt chart.   - Excoriation/redness to perineum.   - Bilateral heels boggy, red, and blanching. On right heel there is a smaller area that is a darker red color, but does obed. Photo taken of right heel and uploaded to pt chart.

## 2020-06-18 NOTE — PROGRESS NOTES
2 Rn skin check complete with CURT Seay.  Devices in place : NA.  Skin assessed under devices :NA.  Confirmed pressure ulcers found on: NA.  New potential pressure ulcers noted on NA Wound consult placed NO.  The following interventions in place. Waffle overlay. q2h turns. Up to chair. Attempting to use commode. Pillows to float heals. Heel Mepilex.   Sacrum is red.   Heels are red but obed. The left foot is very dry and pealing.  Elbows pink and blanching.

## 2020-06-19 NOTE — THERAPY
Occupational Therapy  Daily Treatment     Patient Name: Carmenza Bennett  Age:  75 y.o., Sex:  female  Medical Record #: [id number]  Today's Date: 6/19/2020       Precautions: Fall Risk  Comments: PD and dementia     Assessment    Pt seen for OT tx. Continues to be limited by impaired safety awareness, poor insight, impaired sequencing and decreased activity tolerance. Pt pleasant and cooperative during session. Required extra time to complete ADLs w/ cues and redirection. Will continue to benefit from OT services while in house.    Plan    Continue current treatment plan.    Discharge recommendations:  Recommend post-acute placement for additional occupational therapy services prior to discharge home. Pt will require 24 hr care.       06/19/20 1432   Cognition    Cognition / Consciousness X   Safety Awareness Impaired   New Learning Impaired   Attention Impaired   Sequencing Impaired   Active ROM Upper Body   Active ROM Upper Body  X   Comments R limited proximally d/t pain    Strength Upper Body   Upper Body Strength  X   Gross Strength Generalized Weakness, Equal Bilaterally.    Bed Mobility    Supine to Sit Maximal Assist   Sit to Supine Maximal Assist   Scooting Maximal Assist   Activities of Daily Living   Grooming Minimal Assist;Seated   Upper Body Dressing Moderate Assist   Lower Body Dressing Maximal Assist   Toileting Total Assist  (incontinent )   Functional Mobility   Sit to Stand Moderate Assist   Short Term Goals   Short Term Goal # 1 pt will demo functional transfer with Lashonda   Goal Outcome # 1 Progressing as expected   Short Term Goal # 2 pt will sequence seated oral care without cues   Goal Outcome # 2 Progressing as expected

## 2020-06-19 NOTE — PALLIATIVE CARE
Palliative Care follow-up  Spoke with [facility] RN, Pt has POLST on file. [facility]/SW working on placement. Pt will be moved to the intermittent monitoring list. Please call for a change in status or any needs.     Thank you for allowing Palliative Care to support this patient and family. Contact [phone] for additional assistance, change in patient status, or with any questions/concerns.

## 2020-06-19 NOTE — THERAPY
Physical Therapy   Daily Treatment     Patient Name: Carmenza Bennett  Age:  75 y.o., Sex:  female  Medical Record #: [id number]  Today's Date: 6/19/2020     Precautions: Fall Risk    Assessment    Pt was pleasant and continues to be intermittently confused but cooperative. She continues to require assist for bed mobility. Completed multiple STS with modA and HHA. Still unable to complete full gait due to impairments in balance and weakness. Will follow while in house.     Plan    Continue current treatment plan.    Discharge recommendations: Recommend post-acute placement for continued physical therapy services prior to discharge home.          06/19/20 1400   Gait Analysis   Gait Level Of Assist Unable to Participate   Bed Mobility    Supine to Sit Maximal Assist   Sit to Supine Maximal Assist   Scooting Maximal Assist   Skilled Intervention Verbal Cuing   Comments more assist this afternoon due to fatigue.    Functional Mobility   Sit to Stand Moderate Assist   Mobility STS 3x    Skilled Intervention Verbal Cuing;Facilitation;Tactile Cuing   Short Term Goals    Short Term Goal # 1 Pt will be SPV for supine<>sit in 6 txs to improve bed mobility.   Goal Outcome # 1 goal not met   Short Term Goal # 2 Pt will be Lashonda for transfers with FWW in 6 txs to improve OOB activity.   Goal Outcome # 2 Goal not met   Short Term Goal # 3 Pt will be Lashonda for gait with FWW x 50' to improve mobility.   Goal Outcome # 3 Goal not met

## 2020-06-19 NOTE — PROGRESS NOTES
Pt is agitated, keep shouting hello. Reoriented, encouraged to sleep and rest. Calm and quiet environment provided.

## 2020-06-19 NOTE — CARE PLAN
Problem: Safety  Goal: Will remain free from injury  Outcome: PROGRESSING AS EXPECTED     Problem: Discharge Barriers/Planning  Goal: Patient's continuum of care needs will be met  Outcome: PROGRESSING AS EXPECTED     Problem: Skin Integrity  Goal: Risk for impaired skin integrity will decrease  Outcome: PROGRESSING AS EXPECTED

## 2020-06-19 NOTE — PROGRESS NOTES
2 RN skin check complete with CURT Kim   Devices in place N/A.  Skin assessed under devices N/A.  Confirmed pressure ulcers found on N/A.  New potential pressure ulcers noted on N/A. Wound consult placed N/A.    Sacral area has excoriation, redness and blanching. Perineal area is pinkish to red, blanching. Right foot medial area has redish, slow to obed small spot. Bilateral heels is pinkish to red, blanching with excoriation.     The following interventions in place; provided incontinence care. Assisted to turn to sides. Mepilex in placed. On pressure redistribution mattress.
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
## 2020-06-20 NOTE — CARE PLAN
Problem: Safety  Goal: Will remain free from falls  Outcome: PROGRESSING AS EXPECTED  Intervention: Implement fall precautions  Flowsheets  Taken 6/18/2020 1940 by Javi Cho R.N.  Environmental Precautions:   Treaded Slipper Socks on Patient   Bed in Low Position  Taken 6/20/2020 0002 by Mercedez Porras R.N.  Chair/Bed Strip Alarm: Yes - Alarm On     Problem: Skin Integrity  Goal: Risk for impaired skin integrity will decrease  Outcome: PROGRESSING AS EXPECTED  Intervention: Implement precautions to protect skin integrity in collaboration with the interdisciplinary team  Flowsheets  Taken 6/19/2020 2010 by Antonieta Bar C.N.AStormy  Skin Preventative Measures: Pillows in Use for Support / Positioning  Taken 6/19/2020 0800 by Idalia Damon R.N.  Bed Types: Pressure Redistribution Mattress (Atmosair)  Friction Interventions: Draw Sheet / Pad Used for Repositioning  Moisturizers: Barrier Paste  Taken 6/20/2020 0002 by Mercedez Porras R.N.  Protocols: Incontinence Associated Dermatitis Protocol in Place  Note: 2 RN skin checks in place, Q2 turns, mepilex on bilateral heels

## 2020-06-20 NOTE — PROGRESS NOTES
aox2 disorient to event and time. Confused. became agitated and irritable. Yelling out constantly for help, when you enter the room she had forgotten what she was yelling about. I gave haldol prn as ordered. 2 rn skin check complete. Safety precautions in place. Call light and belongings in reach. Wcm.

## 2020-06-20 NOTE — PROGRESS NOTES
2 RN skin check completed with ildefonso RN    Noted bilateral heels red, dry, flaky, blanching  Sacrum red blanching  Otherwise intact    Devices in place: n/a  Interventions: 2 rn skin check, hourly rounding, q2h turns, waffle mattress, incontinence care, barrier cream, mepilex

## 2020-06-20 NOTE — CARE PLAN
Problem: Communication  Goal: The ability to communicate needs accurately and effectively will improve  Outcome: PROGRESSING AS EXPECTED     Problem: Safety  Goal: Will remain free from injury  Outcome: PROGRESSING AS EXPECTED  Goal: Will remain free from falls  Outcome: PROGRESSING AS EXPECTED     Problem: Pain Management  Goal: Pain level will decrease to patient's comfort goal  Outcome: PROGRESSING AS EXPECTED     Problem: Respiratory:  Goal: Respiratory status will improve  Description: Pt here with PNA. Pt was on IV/PO abx. Pt has been placed on room air. Pt not in any respiratory distress. Pt with clear lung sounds, but diminished to the bases. Will continue to monitor for changes.   Outcome: PROGRESSING AS EXPECTED     Problem: Infection  Goal: Will remain free from infection  Outcome: PROGRESSING AS EXPECTED     Problem: Venous Thromboembolism (VTW)/Deep Vein Thrombosis (DVT) Prevention:  Goal: Patient will participate in Venous Thrombosis (VTE)/Deep Vein Thrombosis (DVT)Prevention Measures  Outcome: PROGRESSING AS EXPECTED     Problem: Bowel/Gastric:  Goal: Normal bowel function is maintained or improved  Outcome: PROGRESSING AS EXPECTED  Goal: Will not experience complications related to bowel motility  Outcome: PROGRESSING AS EXPECTED     Problem: Knowledge Deficit  Goal: Knowledge of disease process/condition, treatment plan, diagnostic tests, and medications will improve  Outcome: PROGRESSING AS EXPECTED  Goal: Knowledge of the prescribed therapeutic regimen will improve  Outcome: PROGRESSING AS EXPECTED     Problem: Discharge Barriers/Planning  Goal: Patient's continuum of care needs will be met  Outcome: PROGRESSING AS EXPECTED     Problem: Fluid Volume:  Goal: Will maintain balanced intake and output  Outcome: PROGRESSING AS EXPECTED     Problem: Urinary Elimination:  Goal: Ability to reestablish a normal urinary elimination pattern will improve  Outcome: PROGRESSING AS EXPECTED     Problem: Skin  Integrity  Goal: Risk for impaired skin integrity will decrease  Outcome: PROGRESSING AS EXPECTED     Problem: Mobility  Goal: Risk for activity intolerance will decrease  Outcome: PROGRESSING AS EXPECTED     Problem: Psychosocial Needs:  Goal: Level of anxiety will decrease  Outcome: PROGRESSING AS EXPECTED

## 2020-06-20 NOTE — PROGRESS NOTES
"Dementia patient with PD admitted with viral bronchitis. Family unable to care for her. Medical clear and stable. Pending medicaid. Once accepted will need SNF followed by group home placement.  Guardianship pending.    Patient affect angry and confrontational today. When asked about orientation responded: \"What difference does it make.\" Later when asked if I could assess her, or would she prefer I leave the room without the assessment, she choose the latter.    I have performed a physical exam and reviewed and updated ROS and Plan today (6/20/2020). In review of the previous note, there are no changes except as documented above. VSS and WNL. No labs. Taking meds, and % of meals. Last BM 6/19    I certify that the patient requires continued medically necessary hospital services for the treatment of dementia, parkinson's requiring 24/7 supervision. The patient will remain in the hospital for the foreseeable future.  Discharge may or may not occur in the next 20 days due to ongoing discharge delays due to no medically acceptable discharge option.          "

## 2020-06-20 NOTE — PROGRESS NOTES
2 RN skin check complete with Javi ELIAS.  Devices in place N/A  Skin assessed under devices N/A  Confirmed pressure ulcers found on N/A.  New potential pressure ulcers noted on N/A. Wound consult placed N/A.     The following interventions in place: Q2 turns, 2 RN skin checks, pillows in use for positioning/support, incontinence monitoring, changing bed linens as needed, barrier paste, mepilex to bilateral heels    Skin Assessment    Sacral area red, blanching.   Perineal area pink, red, blanching.   Right foot medial area has small red spot, slow to obed.  Bilateral heels pink, red, blanching, boggy. Mepilex in place.

## 2020-06-21 NOTE — PROGRESS NOTES
2 RN skin check completed with jarrell RN     Noted bilateral heels red, dry, flaky, peeling, blanching  Sacrum red blanching  Otherwise intact     Devices in place: n/a  Interventions: 2 rn skin check, hourly rounding, q2h turns, waffle mattress, incontinence care, barrier cream, mepilex

## 2020-06-21 NOTE — PROGRESS NOTES
aox2 disorient to event and time. Confused. Transfer to chair 2+ assist. Stayed sitting up in chair for about 2.5 hours. 2 rn skin check complete. Yelling out agitated I gave haldol prn as ordered. Safety precautions in place. Call light and belongings in reach. Wcm.

## 2020-06-21 NOTE — PALLIATIVE CARE
"Palliative Care follow-up  Received call from pts spouse, Gee. He was requesting update on visiting policy at this time. Let him know that yes, visiting had opening up between 15:00 and 20:00. eGe then began coughing and stated that he had \"some sort of respiratory\" ailment and was on a medrol dose pack and and abx. Let him know that he should wait until his respiratory infection is over before coming in to see his wife, he agreed.     Thank you for allowing Palliative Care to support this patient and family. Contact [phone] for additional assistance, change in patient status, or with any questions/concerns.   "

## 2020-06-22 NOTE — PROGRESS NOTES
Assumed care of pt at shift change, report received. Pt received early in the evening 5mg of Haldol for agitation so she is very sleepy at this time but arousable. Soft BPs, pt denies any dizziness, lightheadedness, nausea or blurred vision, monitoring closely. C/o right shoulder discomfort, repositioned. Bed locked and in low position, alarm on. Safety precautions in place.

## 2020-06-22 NOTE — CARE PLAN
Problem: Safety  Goal: Will remain free from injury  Outcome: PROGRESSING AS EXPECTED  Note: Fall precautions in place. Bed in lowest position. Non-skid socks in place. Personal possessions within reach. Mobility sign on door. Bed-alarm on. Call light within reach. Pt educated regarding fall prevention and states understanding.       Problem: Mobility  Goal: Risk for activity intolerance will decrease  Outcome: PROGRESSING SLOWER THAN EXPECTED  Note: Patient ambulated at bedside this morning. Patient is weak and tires easily. In addition, patient has a hard time moving her left leg. Patient took about 10 steps before needing to sit on edge of bed. Patient then took about 10 steps back to chair. Patient shuffles and holds on to RN for assistance. Patient takes very small steps.
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
## 2020-06-22 NOTE — PROGRESS NOTES
2 RN skin check complete with CURT Bazzi  Devices in place none.  Skin assessed under devices n/a.  Confirmed pressure ulcers found on n/a.  New potential pressure ulcers noted on n/a.  Wound consult placed n/a.    The following interventions in place waffle cushion, heel mepilex, barrier paste, q2h turns, perineal care and linen changes as needed.    Bilateral elbows red, blanching.   Sacrum very red and excoriated but blanching  Groin area red, blanching  Bilateral heels boggy, flaky, red, blanching.
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
## 2020-06-22 NOTE — THERAPY
"Occupational Therapy  Daily Treatment     Patient Name: Carmenza Bennett  Age:  75 y.o., Sex:  female  Medical Record #: [id number]  Today's Date: 6/22/2020     Precautions: Fall Risk  Comments: parkinsons, dementia    Assessment    Pt seen for OT tx.  Pt was able to amb to bathroom with FWW & Min A.  Pt is easily distracted & her Parkinson's makes it difficult to amb without assist.  Pt was Min A for toilet transfer & was able to have a BM on toilet.  Pt was fatigued after tx.  Pt appears to be following commands better & is more engaged in ADL's.    Plan    Continue current treatment plan.  OT tx 3 x/week     Discharge recommendations:  Recommend post-acute placement for additional occupational therapy services prior to discharge home.    Subjective    \"I want to go home with my family\"     Objective       06/22/20 1254   Cognition    Cognition / Consciousness X   Speech/ Communication Delayed Responses   Level of Consciousness Alert   Ability To Follow Commands 1 Step   Safety Awareness Impaired   New Learning Impaired   Attention Impaired   Sequencing Impaired   Initiation Impaired   Comments Pt is pleasant & co-operative but reports being very unhappy in hopsital & wants to go home   Balance   Sitting Balance (Static) Fair +   Sitting Balance (Dynamic) Fair   Standing Balance (Static) Poor +   Standing Balance (Dynamic) Trace +   Weight Shift Sitting Fair   Weight Shift Standing Poor   Comments Pt tends to loose her balance post   Activities of Daily Living   Eating Supervision   Grooming Supervision;Seated  (with V/Q's & extra time)   Upper Body Dressing Minimal Assist  (hospital gown)   Toileting Minimal Assist   Comments Pt amb to bathroom with FWW & Min A.  Pt needed repeated V/Q's as she was confused once seated on the toilet   Functional Mobility   Sit to Stand Minimal Assist   Bed, Chair, Wheelchair Transfer Minimal Assist   Toilet Transfers Minimal Assist   Transfer Method Stand Pivot   Mobility Pt amb with " FWW to bathroom with Min A & repeated V/Q's   Patient / Family Goals   Patient / Family Goal #1 Go home   Goal #1 Outcome Goal not met   Short Term Goals   Short Term Goal # 1 pt will demo functional transfer with Lashonda   Goal Outcome # 1 Progressing as expected   Short Term Goal # 2 pt will sequence seated oral care without cues   Goal Outcome # 2 Progressing as expected   Short Term Goal # 3 Pt will shower seated with Min A   Goal Outcome # 3 Goal not met

## 2020-06-23 NOTE — PROGRESS NOTES
2 RN skin check complete with CURT Singleton.   Devices in place: NA  Skin assessed under devices: NA  Confirmed pressure ulcers found on: NA  New potential pressure ulcers noted on: NA  The following interventions in place: toileting offering, incontinence care, keeping linens clean and dry, q2 turns, waffle overlay, heel mepilex, heels floated on pillow, barrier cream     - Bruise to forehead at midline.  - Redness with blanching to sacrum.   - Redness to perineum.   - Bilateral heels boggy, red, and blanching. On right heel there is a smaller area that is a darker red color, but does obed. Right heel also has peeling skin.

## 2020-06-23 NOTE — PROGRESS NOTES
"Ms. Bennett is a wheelchair bound 75-year-old female with a PMH of arthritis, DVT, Parkinson's, and dementia who presented 5/14/2020 with complaints of shortness of breath, nonproductive cough, fevers, and chills for 5 days. She is confused at baseline and was sent by her  who has been unable to care for her.     12-lead EKG was done in the ED and was unremarkable, though a chest xray showed bilateral interstitial infiltrates. COVID was ruled out.    She was evaluated by PT/OT who recommended 24/7 supervision. She is now pending placement to SNF vs group home, though medicaid must be obtained first as she has limited income.    6/23 - yells out \"help me\". Appears she is just lonely. Offers no complaints    I have performed a physical exam and reviewed and updated ROS and Assessment/Plan today (06/23/20). In review of the previous note there are no changes except as documented above    Please note that this dictation was created using voice recognition software. I have made every reasonable attempt to correct obvious errors, but there may be errors of grammar and possibly content that I did not discover before finalizing the note.    TESSY Najera.    "

## 2020-06-23 NOTE — PROGRESS NOTES
Pt in pleasant mood this evening. A&Ox2. Reported pain to her hip, applied heat pack and repositioned pt. Provided pt with snacks.  All other needs met at this time. Q2 turns in place. Bed alarm is on, bed is locked and in lowest position, call light and bedside table are within reach, treaded slipper socks are on, and hourly rounding is in place.

## 2020-06-23 NOTE — PROGRESS NOTES
2 RN skin check complete with CURT Tanner.   Devices in place: NA  Skin assessed under devices: NA  Confirmed pressure ulcers found on: NA  New potential pressure ulcers noted on: NA  The following interventions in place: toileting offering, incontinence care, keeping linens clean and dry, q2 turns, waffle overlay, heel mepilex, heels floated on pillow, barrier cream     - Bruise to forehead at midline.  -Elbows are red and blanching. Mepilex applied for both.  - Redness with blanching to sacrum. Barrier cream applied.  - Redness to perineum.   - Bilateral heels boggy, red, and blanching. On right heel there is a smaller area that is a darker red color, but does obed. Right heel also has peeling skin. Mepilex changed for both heels.

## 2020-06-24 NOTE — PROGRESS NOTES
Received report and assumed care. Pt AOx 1, very confused, and some paranoia at beginning of this shift.  Pt denies pain and VSS. 2 RN skin checks and Q2 turns in place. Pt has had several snacks this evening. Pt call light in reach but pt does not call. Bed alarm on, bed locked/lowest position, and pt now resting quietly.

## 2020-06-24 NOTE — DISCHARGE PLANNING
Medical Social Work  PC to [facility], went over patient's/spouse income, SW told may qualify for MABBD, will get back to SW with more information.

## 2020-06-24 NOTE — CARE PLAN
Problem: Safety  Goal: Will remain free from falls  Outcome: PROGRESSING AS EXPECTED  Intervention: Implement fall precautions  Note: Bed alarm in place. Bed in lowest position, treaded socks on, personal belongings and call light within reach, instructed to call for any assistance, hourly rounding in place.      Problem: Skin Integrity  Goal: Risk for impaired skin integrity will decrease  Outcome: PROGRESSING AS EXPECTED  Intervention: Assess risk factors for impaired skin integrity and/or pressure ulcers  Note: Q 2 turn, waffle overlay in place. Reina cream applied. Frequent check for incontinence. Keep pt dry and clean. 2 RN skin check in place.

## 2020-06-24 NOTE — DISCHARGE PLANNING
Medical Social Work  Email from Coretta Varghese, [facility], after reviewing families income recommending looking at Institutional.

## 2020-06-24 NOTE — PROGRESS NOTES
2 RN skin check complete with CURT Tanner.   Devices in place: NA  Skin assessed under devices: NA  Confirmed pressure ulcers found on: NA  New potential pressure ulcers noted on: NA  The following interventions in place: toileting offering, incontinence care, keeping linens clean and dry, q2 turns, waffle overlay, heel mepilex, heels floated on pillow, barrier cream     - Bruise to forehead at midline.  -Elbows are pink and blanching.  - Redness with blanching to sacrum. Barrier cream applied.  - Redness to perineum.   - Bilateral heels boggy, red, and blanching. On right heel there is a smaller area that is a darker red color, but does obed. Right heel also has peeling skin. Mepilex in place for both heels.

## 2020-06-24 NOTE — CARE PLAN
Problem: Safety  Goal: Will remain free from falls  Outcome: PROGRESSING AS EXPECTED  Intervention: Implement fall precautions  Flowsheets  Taken 6/24/2020 0046  Bed Alarm: Yes - Alarm On  Chair/Bed Strip Alarm: Yes - Alarm On  Taken 6/23/2020 2005  Environmental Precautions:   Personal Belongings, Wastebasket, Call Bell etc. in Easy Reach   Treaded Slipper Socks on Patient   Report Given to Other Health Care Providers Regarding Fall Risk   Bed in Low Position   Communication Sign for Patients & Families   Mobility Assessed & Appropriate Sign Placed     Problem: Bowel/Gastric:  Goal: Will not experience complications related to bowel motility  Outcome: PROGRESSING AS EXPECTED

## 2020-06-24 NOTE — PROGRESS NOTES
Assumed care of pt at shift change. A/Ox1, reoriented to place, time and situation, discussed plan of care. Pt on room air. Denied pain. Pt was up to chair to eat breakfast. Pt is following up with pt today.    All needs met at this time. Bed in lowest position, treaded socks on, personal belongings and call light within reach, instructed to call for any assistance, hourly rounding in place.

## 2020-06-24 NOTE — THERAPY
Physical Therapy   Daily Treatment     Patient Name: Carmenza Bennett  Age:  75 y.o., Sex:  female  Medical Record #: [id number]  Today's Date: 6/24/2020     Precautions: Fall Risk; hx of PD/dementia; noted Parkinsonian mechanics throughout (ie poor initiation/shuffling gait, etc)    Assessment    Pt progressing as expected given co-morbidities, cognition and limited mobility. See below for details/recs.    Plan    Continue current treatment plan.    Discharge recommendations:  Recommend post-acute placement for continued physical therapy services prior to discharge home.          06/24/20 4456   Cognition    Cognition / Consciousness X   Speech/ Communication Delayed Responses   Orientation Level Not Oriented to Age;Not Oriented to Year;Not Oriented to Address;Not Oriented to Month;Not Oriented to Day;Not Oriented to Place;Not Oriented to Reason;Not Oriented to Time   Level of Consciousness Alert   Ability To Follow Commands 1 Step   Safety Awareness Impaired   New Learning Impaired   Attention Impaired   Sequencing Impaired   Initiation Impaired   Comments pleasant and cooperative; confused throughout   Neuro-Muscular Treatments   Neuro-Muscular Treatments Weight Shift Left;Weight Shift Right   Comments heavy posterior lean; needed facilitation and cueing throughout; improves somwhat once ambulatory    Neurological Concerns   Neurological Concerns Yes   Comments given hx of PD/dementia   Balance   Sitting Balance (Static) Fair -   Sitting Balance (Dynamic) Poor +   Standing Balance (Static) Poor -   Standing Balance (Dynamic) Trace +   Weight Shift Sitting Fair   Weight Shift Standing Poor   Skilled Intervention Verbal Cuing;Compensatory Strategies;Postural Facilitation;Sequencing;Tactile Cuing   Comments delayed motor reactions to perturbations; consistent posterior lean/heel walker   Gait Analysis   Gait Level Of Assist Moderate Assist   Assistive Device Front Wheel Walker   Distance (Feet) 20   # of Times Distance was  Traveled 1   Deviation Bradykinetic;Decreased Heel Strike;Decreased Toe Off;Other (Comment);Decreased Base Of Support  (inconsistent step length; )   # of Stairs Climbed 0   Weight Bearing Status no restrictions   Skilled Intervention Verbal Cuing;Tactile Cuing;Sequencing;Postural Facilitation;Compensatory Strategies   Comments also attempted sidestepping; needed lots of faciltiation and did better sidestepping with HHA/mod A than with attempt to use FWW; parkinsonian gait mechanics throughout; difficulty wiht initiation of movements   Bed Mobility    Supine to Sit Moderate Assist   Sit to Supine Minimal Assist   Skilled Intervention Verbal Cuing;Tactile Cuing;Sequencing;Postural Facilitation;Compensatory Strategies   Comments uses PT HHA to pull self to EOB; does need facilitation and cueing throughout   Functional Mobility   Sit to Stand Moderate Assist  (min/mod dependent on surface height and pre-setup/cueing)   Bed, Chair, Wheelchair Transfer Moderate Assist   Transfer Method Other (Comments)  (walks to chair/EOB)   Mobility with FWW/HHA   Skilled Intervention Verbal Cuing;Sequencing;Tactile Cuing;Postural Facilitation;Compensatory Strategies;Facilitation   How much difficulty does the patient currently have...   Turning over in bed (including adjusting bedclothes, sheets and blankets)? 2   Sitting down on and standing up from a chair with arms (e.g., wheelchair, bedside commode, etc.) 1   Moving from lying on back to sitting on the side of the bed? 1   How much help from another person does the patient currently need...   Moving to and from a bed to a chair (including a wheelchair)? 2   Need to walk in a hospital room? 2   Climbing 3-5 steps with a railing? 2   6 clicks Mobility Score 10   Activity Tolerance   Sitting in Chair NT   Sitting Edge of Bed at least 7 mins   Standing ~4 mins   Comments no overt/acute fatigue   Patient / Family Goals    Patient / Family Goal #1 to get her legs stronger   Goal #1  Outcome Progressing slower than expected   Short Term Goals    Short Term Goal # 1 Pt will be SPV for supine<>sit in 6 txs to improve bed mobility.   Goal Outcome # 1 Progressing as expected   Short Term Goal # 2 Pt will be Lashonda for transfers with FWW in 6 txs to improve OOB activity.   Goal Outcome # 2 Progressing as expected   Short Term Goal # 3 Pt will be Lashonda for gait with FWW x 50' to improve mobility.   Goal Outcome # 3 Progressing as expected   Education Group   Education Provided Role of Physical Therapist;Gait Training;Use of Assistive Device   Role of Physical Therapist Patient Response Patient;Acceptance;Explanation;Demonstration;Reinforcement Needed;No Learning Evidence   Gait Training Patient Response Patient;Acceptance;Explanation;Verbal Demonstration;Reinforcement Needed;No Learning Evidence;Action Demonstration   Use of Assistive Device Patient Response Patient;Acceptance;Explanation;Verbal Demonstration;Action Demonstration;Reinforcement Needed;No Learning Evidence   Additional Comments unclear carryover; pt likely would need consistent/stable enviornment and consistent practice with mobility to enforce procedural memory for carryover (likely would not happen in acute setting/current environment)   Anticipated Discharge Equipment   DC Equipment Unable To Determine At This Time

## 2020-06-24 NOTE — PROGRESS NOTES
Assumed care of pt at shift change. A/Ox2, reoriented to  time and situation, discussed plan of care. Pt on room air. Denied pain.    All needs met at this time. Bed in lowest position, treaded socks on, personal belongings and call light within reach, instructed to call for any assistance, hourly rounding in place.

## 2020-06-25 NOTE — PROGRESS NOTES
2 RN skin check complete with CURT Mckeon.   Devices in place: NA  Skin assessed under devices: NA  Confirmed pressure ulcers found on: NA  New potential pressure ulcers noted on: NA  The following interventions in place: toileting offering, incontinence care, keeping linens clean and dry, q2 turns, waffle overlay, heel mepilex, heels floated on pillow, barrier cream     - Bruise to forehead at midline.  -Elbows are pink and blanching.  - Redness with blanching to sacrum. Barrier cream applied.  - Redness to perineum.   - Bilateral heels boggy, red, and blanching. On right heel there is a smaller area that is a darker red color, but does obed. Right heel also has peeling skin. Mepilex in place for both heels.

## 2020-06-25 NOTE — PROGRESS NOTES
2 RN skin check complete with CURT Singleton.   Devices in place: NA  Skin assessed under devices: NA  Confirmed pressure ulcers found on: NA  New potential pressure ulcers noted on: NA      The following interventions in place: Q2 turns, 2 RN skin checks, pillows in use for positioning/support, incontinence monitoring, changing bed linens as needed, barrier paste, waffle overlay, mepilex to bilateral heels         Bilateral elbows are pink and blanching.  Sacrum some redness, blanching. Barrier cream applied.  Perineum has some redness. Barrier cream applied.  Bilateral heels boggy, flaky, red, and blanching. Heel also have peeling skin especially right. Mepilex changed on both heels.

## 2020-06-25 NOTE — CARE PLAN
Problem: Safety  Goal: Will remain free from falls  Outcome: PROGRESSING AS EXPECTED  Intervention: Implement fall precautions  Flowsheets  Taken 6/24/2020 2045  Environmental Precautions:   Treaded Slipper Socks on Patient   Personal Belongings, Wastebasket, Call Bell etc. in Easy Reach   Report Given to Other Health Care Providers Regarding Fall Risk   Bed in Low Position   Communication Sign for Patients & Families   Mobility Assessed & Appropriate Sign Placed  Taken 6/25/2020 0258  Chair/Bed Strip Alarm: Yes - Alarm On     Problem: Skin Integrity  Goal: Risk for impaired skin integrity will decrease  Outcome: PROGRESSING AS EXPECTED  Intervention: Implement precautions to protect skin integrity in collaboration with the interdisciplinary team  Flowsheets  Taken 6/25/2020 0200 by Jonathan Franco, C.N.A.  Skin Preventative Measures: Pillows in Use for Support / Positioning  Taken 6/24/2020 2045 by Mercedez Porras R.N.  Bed Types: Pressure Redistribution Mattress (Atmosair)  Friction Interventions: Draw Sheet / Pad Used for Repositioning  PT / OT Involved in Care: Physical Therapy Involved  Moisturizers: Barrier Paste  Protocols: Incontinence Associated Dermatitis Protocol in Place  Note: Q2 turns and 2RN skin checks in place.

## 2020-06-25 NOTE — DISCHARGE PLANNING
Medical Social Work  PC to Shauna at [facility], [phone], returning her call.  GENO provided an update on application and what SW is planning to do for d/c  PC to hospitals; requested that patient's spouse be called for an application-institutional.  GENO stated supporting documentation is in the Case Management Office.

## 2020-06-25 NOTE — PROGRESS NOTES
Received report and assumed care. Pt AOx 1, confused and somewhat anxious but cooperative.  Pt denies pain and VSS. 2 RN skin check completed and Q2 turns in place. Pt call light in reach but pt does not call. Bed alarm on, bed locked/lowest position, and pt now resting quietly.

## 2020-06-26 NOTE — THERAPY
"Occupational Therapy  Daily Treatment     Patient Name: Carmenza Bennett  Age:  75 y.o., Sex:  female  Medical Record #: [id number]  Today's Date: 6/26/2020     Precautions  Precautions: Fall Risk  Comments: PD; dementia    Assessment    Pt is progressing slower than expected. Pt has previously walked to the bathroom during OT session but was not able to walk more than a few steps at a time today with FWW.     Plan    Continue current treatment plan.    Discharge recommendations:  Recommend post-acute placement for additional occupational therapy services prior to discharge home.    Subjective    \"I don't have any legs.\"     Objective       06/26/20 1033   Activities of Daily Living   Grooming Supervision;Seated  (verbal cues )   Upper Body Dressing Moderate Assist  (gown to doff and don )   Lower Body Dressing Maximal Assist   Functional Mobility   Sit to Stand Moderate Assist   Mobility Stood twice with FWW, took a few steps forward but would then say she needed to sit back down    Short Term Goals   Short Term Goal # 1 pt will demo functional transfer with Lashonda   Goal Outcome # 1 Progressing slower than expected   Short Term Goal # 2 pt will sequence seated oral care without cues   Goal Outcome # 2 Progressing as expected   Short Term Goal # 3 Pt will shower seated with Min A   Goal Outcome # 3 Goal not met         "

## 2020-06-26 NOTE — CARE PLAN
Problem: Communication  Goal: The ability to communicate needs accurately and effectively will improve  Outcome: PROGRESSING SLOWER THAN EXPECTED  Intervention: [place] patient and significant other/support system to call light to alert staff of needs  Note: Pt expressing anger towards members of the health care team when discussing plan of care and rules set in place. Pt showing difficulty with accepting of plan and protocols set in place.      Problem: Safety  Goal: Will remain free from injury  Outcome: PROGRESSING SLOWER THAN EXPECTED  Intervention: Collaborate with Interdisciplinary Team for safe transfer and mobilization techniques  Note: Pt found with screw using it for self harm, causing cuts to the Left Arm. Security called and performed a check of pt room for any further materials that could be used as self harm. Pt has 1:1 sitter at bedside. Pt educated that doors are to remain open and he is to be watched at all times. Safety check list completed and at doorway to room. No belongings in room. No extra equipment in room. Meal trays delivered without sharps.

## 2020-06-26 NOTE — PROGRESS NOTES
Assumed care of pt at shift change. A/Ox1, reoriented to place, time and situation, discussed plan of care. Pt on room air. Denied pain.   All needs met at this time. Bed in lowest position, treaded socks on, personal belongings and call light within reach, instructed to call for any assistance, hourly rounding in place.

## 2020-06-26 NOTE — CARE PLAN
Problem: Safety  Goal: Will remain free from falls  Outcome: PROGRESSING AS EXPECTED  Intervention: Implement fall precautions  Flowsheets (Taken 6/25/2020 2170)  Environmental Precautions:   Treaded Slipper Socks on Patient   Personal Belongings, Wastebasket, Call Bell etc. in Easy Reach   Report Given to Other Health Care Providers Regarding Fall Risk   Bed in Low Position   Communication Sign for Patients & Families   Mobility Assessed & Appropriate Sign Placed  Chair/Bed Strip Alarm: Yes - Alarm On     Problem: Psychosocial Needs:  Goal: Level of anxiety will decrease  Outcome: PROGRESSING AS EXPECTED  Note: Provide distraction when anxious and use therapeutic listening to identify concerns.

## 2020-06-26 NOTE — THERAPY
Physical Therapy   Daily Treatment     Patient Name: Carmenza Bennett  Age:  75 y.o., Sex:  female  Medical Record #: [id number]  Today's Date: 6/26/2020     Precautions: (P) Fall Risk(dementia and Parkinson's )    Assessment    Patient asleep on presentation, but easily aroused. Very pleasant, however confused throughout session, requiring extra time to follow commands. Compared to her last PT session, she was more challenged this visit to demonstrate the ability to ambulate and transfer w/o maximal assist. Attempted to remove the walker, however there was no improvement in her ability to participate. She had significant retropulsion both in sitting and standing; poor initiation of movement consistent with Parkinson's Dz. Worked on sit <> stand repetitions with proper hand placement and aj; after 3 trials, her sequencing improved slightly. She responded well to simple cues and automatic movements.     Plan    Continue current treatment plan.    Discharge recommendations:  Recommend post-acute placement for continued physical therapy services prior to discharge home.       Subjective    Agreeable to therapy     Objective       06/26/20 1000   Gait Analysis   Gait Level Of Assist Maximal Assist   Assistive Device Front Wheel Walker   Distance (Feet) 5   # of Times Distance was Traveled 1   Deviation   (poor initiation; retropulsion)   Comments poor initiation today with and without walker. Significant retropulsion with difficulty correcting   Bed Mobility    Supine to Sit Moderate Assist   Scooting Total Assist   Rolling Maximal Assist to Rt.   Functional Mobility   Sit to Stand Moderate Assist   Bed, Chair, Wheelchair Transfer Maximal Assist   Transfer Method Stand Step   Mobility attempted mobility with and w/o FWW as per previous visit; however she was challgenged with both today   Activity Tolerance   Sitting in Chair left in chair at end of session   Comments able to tolerate entire PT session w/o fatigue   Short  Term Goals    Short Term Goal # 1 Pt will be SPV for supine<>sit in 6 txs to improve bed mobility.   Goal Outcome # 1 goal not met   Short Term Goal # 2 Pt will be Lashonda for transfers with FWW in 6 txs to improve OOB activity.   Goal Outcome # 2 Goal not met   Short Term Goal # 3 Pt will be Lashonda for gait with FWW x 50' to improve mobility.   Goal Outcome # 3 Goal not met     Aida Miller, PT  [phone]

## 2020-06-26 NOTE — PROGRESS NOTES
Received bedside report and assumed care. Pt AOx 1, mobile with 2 person assist, denies pain, and VSS.  Pt agitated at start of shift and threw food box. Provided therapeutic listening to understand concerns and educated on appropriate behavior when frustrated. Helped pt with drink, linen change, and gave book and magazine for distraction. Pt now cooperative with care. 2 RN skin checks and Q2 turns in place. Fall precautions are in place. Bed alarm on, pt belongings and call light in reach, bed locked/lowest position, and pt now resting quietly.

## 2020-06-26 NOTE — PROGRESS NOTES
2 RN skin check complete with CURT Carvajal.   Devices in place: NA  Skin assessed under devices: NA  Confirmed pressure ulcers found on: NA  New potential pressure ulcers noted on: NA        The following interventions in place: Q2 turns, 2 RN skin checks, pillows in use for positioning/support, incontinence monitoring, changing bed linens as needed, barrier paste, waffle overlay, mepilex to bilateral heels           Bilateral elbows are pink and blanching.  Sacrum some redness, blanching. Barrier cream applied.  Perineum has some redness. Barrier cream applied.  Bilateral heels boggy, flaky, red, and blanching. Heel also have peeling skin especially right. Mepilex in place on both heels.  Right foot 2nd toe has red spot, blanching.

## 2020-06-26 NOTE — PROGRESS NOTES
"Ms. Bennett is a wheelchair bound 75-year-old female with a PMH of arthritis, DVT, Parkinson's, and dementia who presented 5/14/2020 with complaints of shortness of breath, nonproductive cough, fevers, and chills for 5 days. She is confused at baseline and was sent by her  who has been unable to care for her.     12-lead EKG was done in the ED and was unremarkable, though a chest xray showed bilateral interstitial infiltrates. COVID was ruled out.    She was evaluated by PT/OT who recommended 24/7 supervision. She is now pending placement to SNF vs group home, though medicaid must be obtained first as she has limited income.    6/23 - yells out \"help me\". Appears she is just lonely. Offers no complaints    6/26 - behaviors continue to wax and wane. Remains oriented to herself.     I have performed a physical exam and reviewed and updated ROS and Assessment/Plan today (06/23/20). In review of the previous note there are no changes except as documented above    Please note that this dictation was created using voice recognition software. I have made every reasonable attempt to correct obvious errors, but there may be errors of grammar and possibly content that I did not discover before finalizing the note.    TESSY Najera.    "

## 2020-06-26 NOTE — PROGRESS NOTES
Pt is A&Ox1, oriented to self. Pt is resting in bed, no signs of labored breathing or pain. Pt on RA. Call light & personal belongings within reach, bed in lowest position & locked, and bed alarm is on. Fall precautions in place and education provided on how to use call light. Pt updated on plan of care for the day. Pt declines any additional needs at this time. Will continue to monitor.

## 2020-06-27 NOTE — PROGRESS NOTES
2 RN skin check complete with Emilio RN.   Devices in place: bilateral heel mepilexs  Skin assessed under devices: yes  Confirmed pressure ulcers found: no  New potential pressure ulcers noted on: no     The following interventions in place:  Q2 turns, 2 RN skin checks, pillows in use for positioning/support, incontinence monitoring, changing bed linens as needed, barrier paste, waffle overlay, mepilex to bilateral heels    Bilateral elbow: pink and blanching.  Sacrum: red/blanching.   Perineum: red, moist  Bilateral heels: boggy, flaky, red/blanching.   Bilateral toes: red/blanching

## 2020-06-27 NOTE — PROGRESS NOTES
2 RN skin check complete with Sayra ELIAS   Devices in place: n/a  Skin assessed under devices:N/A  Confirmed pressure ulcers found on: N/A  New potential pressure ulcers noted on : N/A   Wound consult placed: n/a   The following interventions in place: incontinence checks and care, Q2 turning/repositioning, waffle mattress overlay, mepilex for heels, pillows for support/repositioning     Assessment:    Elbows pink but blanching  Sacrum: pink but blanching, barrier cream   Bilateral heels: red/pink boggy but blanches. Right heel more red than left. Blanches.

## 2020-06-27 NOTE — PROGRESS NOTES
Pt is A&Ox1, oriented to self. Pt is sitting up in bed, eating breakfast, no signs of labored breathing or pain. Pt on RA. Call light & personal belongings within reach, bed in lowest position & locked, and bed alarm is on. Fall precautions in place and education provided on how to use call light. Pt updated on plan of care for the day. Pt declines any additional needs at this time. Will continue to monitor.

## 2020-06-27 NOTE — CARE PLAN
Problem: Pain Management  Goal: Pain level will decrease to patient's comfort goal  Outcome: PROGRESSING AS EXPECTED  Note: No c/o pain     Problem: Skin Integrity  Goal: Risk for impaired skin integrity will decrease  Outcome: PROGRESSING AS EXPECTED  Note: Q 2 hour turns and  Q shift skin check to monitor for breakdown.

## 2020-06-27 NOTE — CARE PLAN
Problem: Skin Integrity  Goal: Risk for impaired skin integrity will decrease  Outcome: PROGRESSING AS EXPECTED  Intervention: Assess risk factors for impaired skin integrity and/or pressure ulcers  Note: Pt has waffle cushion in place. Pt has mepilex applied to bilat heels. Barrier cream use in place. Q2 turns, and 2RN skin check in place.      Problem: Mobility  Goal: Risk for activity intolerance will decrease  Outcome: PROGRESSING AS EXPECTED  Intervention: Encourage patient to increase activity level in collaboration with Interdisciplinary Team  Note: Encourage pt to sit up for meals. Assiting pt with getting up and sitting in chair and at edge of bed.

## 2020-06-28 NOTE — CARE PLAN
Problem: Communication  Goal: The ability to communicate needs accurately and effectively will improve  Outcome: PROGRESSING AS EXPECTED     Problem: Safety  Goal: Will remain free from injury  Outcome: PROGRESSING AS EXPECTED     Problem: Pain Management  Goal: Pain level will decrease to patient's comfort goal  Outcome: PROGRESSING AS EXPECTED     Problem: Respiratory:  Goal: Respiratory status will improve
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
## 2020-06-28 NOTE — PROGRESS NOTES
Pt is only oriented to self. No complaints of pain or SOB on room air. Incontinence care provided PRN. Pt took off heel mepilexs this evening and refusing to put them back on. Will attempt to put them on again at a later time. Call bell within reach. Bed alarm in place.

## 2020-06-28 NOTE — CARE PLAN
Problem: Communication  Goal: The ability to communicate needs accurately and effectively will improve  Outcome: PROGRESSING AS EXPECTED     Problem: Safety  Goal: Will remain free from injury  Outcome: PROGRESSING AS EXPECTED     Problem: Pain Management  Goal: Pain level will decrease to patient's comfort goal  Outcome: PROGRESSING AS EXPECTED     Problem: Bowel/Gastric:  Goal: Normal bowel function is maintained or improved  Outcome: PROGRESSING AS EXPECTED

## 2020-06-29 NOTE — PROGRESS NOTES
2 RN skin check complete with Celia YAP RN   Devices in place: n/a  Skin assessed under devices:N/A  Confirmed pressure ulcers found on: N/A  New potential pressure ulcers noted on : N/A   Wound consult placed: n/a   The following interventions in place: incontinence checks and care, Q2 turning/repositioning, waffle mattress overlay, mepilex for heels, pillows for support/repositioning      Assessment:     Elbows pink but blanching  Sacrum: pink but blanching, barrier cream   Bilateral heels: red/pink boggy but blanches. Right heel more red than left. Blanches

## 2020-06-29 NOTE — CARE PLAN
Problem: Safety  Goal: Will remain free from falls  Note: Pt is confused. Safety precautions in place. Bed in low position, treaded socks on, personal possessions in reach, call light in reach and bed alarm on.      Problem: Discharge Barriers/Planning  Goal: Patient's continuum of care needs will be met  Note: Pt awaiting placement. SW involved.

## 2020-06-29 NOTE — PROGRESS NOTES
Pt alert/oriented to self, denies pain at this time. Pt pleasant and cooperative, no behavior issues noted. Pt resting quietly in bed, needs met. Call light within reach, personal belongings available, bed in lowest position, treaded socks on, and bed alarm on. Hourly rounding in place.

## 2020-06-29 NOTE — CARE PLAN
Problem: Skin Integrity  Goal: Risk for impaired skin integrity will decrease  Outcome: PROGRESSING AS EXPECTED  Note: Fall precautions in place. Bed in lowest position. Non-skid socks in place. Personal possessions within reach. Mobility sign on door. Bed-alarm on. Call light within reach. Pt educated regarding fall prevention and states understanding.       Problem: Communication  Goal: The ability to communicate needs accurately and effectively will improve  Outcome: PROGRESSING AS EXPECTED  Note: Pt was encouraged to voice feelings, therapeutic communication was utilized.

## 2020-06-29 NOTE — PROGRESS NOTES
2 RN skin check complete with CURT Tijerina.   Devices in place bilateral heel mepilex.  Skin assessed under devices yes.  Confirmed pressure ulcers found on non.  New potential pressure ulcers noted on none. Wound consult placed n/a.  The following interventions in place q 2 hr turns with frequent checks for incontinence, PRN linen changes for incontinence, barrier paste, pillows in use for support and positioning. Waffle overlay mattress in use. Bilateral heel mepilex in place.     Bilateral heels are dry, flaky blanching, red. Sacrum is red/blanching. Groin area is red/blanching/ Bilateral elbows are pink/red/ and blanching.

## 2020-06-29 NOTE — PROGRESS NOTES
2 RN skin check completed with CURT Juares.   Devices in place: bilateral heel mepilex.  Skin assessed under devices: yes.  Confirmed pressure ulcers found: none.  New potential pressure ulcers noted: none.  Wound consult placed:  n/a.      Skin assessment: bilateral heels dry/flaky/red/blanching. Sacrum red/blanching. Groin red/blanching. Bilateral elbows pink/red/blanching.      The following interventions in place: 2RN skin check Qshift, q2h turns with frequent incontinence checks, PRN linen changes for incontinence episodes, barrier paste. Pillows in use for support/positioning, waffle overlay mattress in use. Bilateral heel mepilex.

## 2020-06-30 NOTE — PROGRESS NOTES
Bedside report received. Pt is A&Ox1, to self only. Pt is on room air, pt is in pleasant mood today. Plan is for pt to transfer to [address]. POC discussed with pt; all questions answered at this time.
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
## 2020-06-30 NOTE — PROGRESS NOTES
2 RN skin check completed.  Devices in place: none.  Skin assessed under devices: n/a. Confirmed pressure ulcers found on: n/a.  New potential pressure ulcers noted on: n/a. Wound consult placed: n/a.  The following interventions in place: mepilex to heels (dry, flaky, peeling), Q2 turns, linen changes prn, barrier cream prn, waffle overlay in use, and pillows for comfort/repositioning.     Sacrum pink and blanching.   Elbows pink and blanching.
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
## 2020-06-30 NOTE — PROGRESS NOTES
Assumed care of pt. Pt A&Ox1; oriented to self only. Pt sitting up in bed. No signs of labored breathing or pain. Pt on RA. Chair & bed alarm on. Fall precautions in place and education provided on how to utilize call light. Pt diet is regular, education provided. Pt updated on plan of care for the day. Pt declines any additional needs at this time. Will continue to monitor.

## 2020-06-30 NOTE — CARE PLAN
Problem: Knowledge Deficit  Goal: Knowledge of disease process/condition, treatment plan, diagnostic tests, and medications will improve  Outcome: PROGRESSING AS EXPECTED  Note: Pt educated regarding plan of care and medications. All questions answered.       Problem: Psychosocial Needs:  Goal: Level of anxiety will decrease  Outcome: PROGRESSING AS EXPECTED  Note: Pt was encouraged to voice feelings, therapeutic communication was utilized.

## 2020-06-30 NOTE — CARE PLAN
Problem: Safety  Goal: Will remain free from injury  Outcome: PROGRESSING AS EXPECTED  Note: Bed in lowest position. Personal belongings within reach. Mobility sign on door. Bed-alarm on. Call light within reach. Room near nurses station. Pt educated regarding fall prevention, no evidence of learning.      Problem: Skin Integrity  Goal: Risk for impaired skin integrity will decrease  Outcome: PROGRESSING AS EXPECTED  Note: 2 RN skin check in place every shift with interventions in place.

## 2020-06-30 NOTE — PROGRESS NOTES
Ms. Bennett is a wheelchair bound 75-year-old female with a PMH of arthritis, DVT, Parkinson's, and dementia who presented 5/14/2020 with complaints of shortness of breath, nonproductive cough, fevers, and chills for 5 days. She is confused at baseline and was sent by her  who has been unable to care for her.     12-lead EKG was done in the ED and was unremarkable, though a chest xray showed bilateral interstitial infiltrates. COVID was ruled out.    She was evaluated by PT/OT who recommended 24/7 supervision. She is now pending placement to SNF vs group home, though medicaid must be obtained first as she has limited income.    Interval Problem Update  6/30: Patient seen and examined at bedside. Laying in bed calmly without any signs of acute distress. Oriented to self only. VSS on room air. Denies any physical complaints. No acute changes in PE or ROS. Will transfer to [address] today for discharge planning.    Dementia (HCC)- (present on admission)  Assessment & Plan  -Continues to be high risk for delirium while in the hospital.  -Frequent re-orientation, reestablish circadian rhythm, encourage familiar faces/family in room, avoid or minimize narcotics/sedatives.   -Continue on seroquel and prozac, along with prn haldol.    Discharge planning issues  Assessment & Plan  -Pending guardianship, Medicaid, once accepted will need SNF followed by group home placement.     Lower extremity pain, bilateral- (present on admission)  Assessment & Plan  -With edema. Doppler negative for DVT. Xray showed osteoarthritis of the hip. Will try & place HOLA hose.   -Continue scheduled tylenol.  Patient denies pain at time of exam.  -PT/OT recommending SNF placement, but will eventually need group home placement with 24/7 care after SNF making it a difficult discharge.  -Continue out of bed for all meals and mobilization 3 times a day.    Parkinson's disease (HCC)- (present on admission)  Assessment & Plan  -Continue  sinemet.  -PT/OT recommending SNF placement, but again will require 24/7 care at a group home afterward.  -TESSY Whittington.
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
## 2020-06-30 NOTE — PROGRESS NOTES
Pt A&Ox1, to self only. Nightly medications and assessment completed. Q2 turns in place. Incontinent of bowel and bladder, orlin care prn. Safety and fall precautions in place. Rounding in place. All needs met at this moment.

## 2020-07-01 NOTE — PROGRESS NOTES
2 RN skin check completed with Sarah ELIAS.   Devices in place: bilateral heel mepilex.  Skin assessed under devices: yes.  Confirmed pressure ulcers found: none.  New potential pressure ulcers noted: none.  Wound consult placed:  n/a.        Skin assessment:   bilateral heels: dry/flaky/red/blanching. Mepilex in place  Sacrum:red/dark/blanching. Barrier cream applied.   Groin: red/blanching.   Bilateral elbows: pink/red/blanching. Pt refused mepilex      Interventions in place:   Q2h turns,  barrier paste. Pillows in use for support/positioning.  waffle overlay in place. applied. Frequent check for incontinence. Keep pt dry and clean. 2 RN skin check in place.

## 2020-07-01 NOTE — PROGRESS NOTES
2 RN skin check completed with Judy ELIAS.   Devices in place: bilateral heel mepilex.  Skin assessed under devices: yes.  Confirmed pressure ulcers found: none.  New potential pressure ulcers noted: none.  Wound consult placed:  n/a.        Skin assessment:   bilateral heels: dry/flaky/red/blanching  Sacrum:red/dark/blanching.   Groin: red/blanching.   Bilateral elbows: pink/red/blanching.     Interventions in place:   2RN skin check Qshift, q2h turns with PRN incontinence care, barrier paste. Pillows in use for support/positioning.

## 2020-07-01 NOTE — PROGRESS NOTES
2 RN skin check completed with Malorie ELIAS.   Devices in place none.  Skin assessed under devices none.  Confirmed pressure ulcers found on n/a.  New potential pressure ulcers noted on none. Wound consult placed n/a.  The following interventions in place   mepilex to heels (dry, flaky, peeling), Q2 turns, linen changes prn, barrier cream prn, waffle overlay in use, and pillows for comfort/repositioning.      Skin Assessment:   skin is clean dry intact and blanching

## 2020-07-01 NOTE — PROGRESS NOTES
Pt is only oriented to self. No complaints of pain or SOB on room air at this time. Bed alarm in place. Reviewed plan of care for the night. No needs at this time. Call bell within reach. Pt does not ring appropriately.

## 2020-07-01 NOTE — PROGRESS NOTES
Assumed care of pt at shift change, discussed plan of care. Pt on room air. Denied pain. Pt worked with pt today.   All needs met at this time. Bed in lowest position, treaded socks on, personal belongings and call light within reach, instructed to call for any assistance, hourly rounding in place.

## 2020-07-01 NOTE — CARE PLAN
Problem: Communication  Goal: The ability to communicate needs accurately and effectively will improve  Outcome: PROGRESSING AS EXPECTED     Problem: Safety  Goal: Will remain free from injury  Outcome: PROGRESSING AS EXPECTED     Problem: Pain Management  Goal: Pain level will decrease to patient's comfort goal  Outcome: PROGRESSING AS EXPECTED     Problem: Respiratory:  Goal: Respiratory status will improve  Description: Pt here with PNA. Pt was on IV/PO abx. Pt has been placed on room air. Pt not in any respiratory distress. Pt with clear lung sounds, but diminished to the bases. Will continue to monitor for changes.   Outcome: PROGRESSING AS EXPECTED
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
## 2020-07-02 NOTE — CARE PLAN
Problem: Communication  Goal: The ability to communicate needs accurately and effectively will improve  Outcome: PROGRESSING AS EXPECTED     Problem: Safety  Goal: Will remain free from injury  Outcome: PROGRESSING AS EXPECTED     Problem: Pain Management  Goal: Pain level will decrease to patient's comfort goal  Outcome: PROGRESSING AS EXPECTED     Problem: Knowledge Deficit  Goal: Knowledge of disease process/condition, treatment plan, diagnostic tests, and medications will improve  Outcome: PROGRESSING AS EXPECTED

## 2020-07-02 NOTE — PROGRESS NOTES
2 RN skin check completed with Judy ELIAS.   Devices in place: bilateral heel mepilex.  Skin assessed under devices: yes.  Confirmed pressure ulcers found: none.  New potential pressure ulcers noted: none.  Wound consult placed:  n/a.        Skin assessment:   bilateral heels: dry/flaky/red/blanching  Sacrum:red/dark/blanching.   Groin: red/blanching.   Bilateral elbows: pink/red/blanching.     Interventions in place:   2RN skin check, q2h turns, PRN incontinence care, barrier paste. Pillows in use for support/positioning, bilateral heel mepilex

## 2020-07-02 NOTE — PROGRESS NOTES
Pt is only oriented to self. No complaints of pain or SOB on room air at this time. Q2 turns. Bed alarm in place. Reviewed plan of care for the night. No needs at this time. Call bell within reach. Pt does not ring appropriately

## 2020-07-03 NOTE — PROGRESS NOTES
Patient is AOx1; self only. Denies pain or discomfort. No SOB or cough noted. O2SAT stable on room air. Denies NVD. Feeds self with setup. Q2 turns. Bed alarm in place.Call light in reach. Staff to anticipate wants and needs.

## 2020-07-03 NOTE — CARE PLAN
Problem: Safety  Goal: Will remain free from falls  Outcome: PROGRESSING AS EXPECTED    Safety precautions in place. Non skid socks on. Bed alarm in place and functioning. Call light within reach. Hourly rounding in place.     Problem: Pain Management  Goal: Pain level will decrease to patient's comfort goal  Outcome: PROGRESSING AS EXPECTED     Patient denies any pain and discomfort at this this time. On bed resting comfortably. Call light within reach.

## 2020-07-03 NOTE — PROGRESS NOTES
"Report received from day shift nurse. Assumed care @ 1900.     Patient is alert and oriented to self, irritable. Refused assessment. Pt verbalized \"What are you doing? Get out of here!\" On room air, tolerating well. Denies any pain and discomfort at this time. Patient educated regarding plan of care. 1-2 x assist in bed. Bed alarm in place and functioning. q 2 turns.    Bed locked, in lowest position, treaded socks on. Needs attended. No further needs at this time. Communication board updated. Call light and personal belongings within reach.  "

## 2020-07-03 NOTE — PROGRESS NOTES
"Patient refused 2 RN skin check. Education provided but still continues to refuse, pt verbalized \"Get out of here!\" Charge RN notified.  "

## 2020-07-03 NOTE — PROGRESS NOTES
I certify that the patient requires continued medically necessary hospital services for the treatment of dementia and Parkinson's disease. The patient will remain in the hospital for the foreseeable future.  Discharge may or may not occur in the next 20 days due to ongoing discharge delays due to no medically acceptable discharge option.

## 2020-07-04 NOTE — PROGRESS NOTES
Patient on bed resting, pleasant at this time. Assessment partially done. Denies pain. Fluids offered. Refusing full skin assessment and 2 RN skin check at this time. Will re attempt on a later time.    Needs attended. No additional needs at this time. Call light and personal belongings within reach. Left on bed resting comfortably. Bed alarm in place and functioning. Hourly rounding in place.

## 2020-07-04 NOTE — CARE PLAN
Problem: Safety  Goal: Will remain free from falls  Outcome: PROGRESSING AS EXPECTED    Safety precautions in place. Non skid socks on. Bed alarm in place and functioning. Call light within reach. Hourly rounding in place.        Problem: Discharge Barriers/Planning  Goal: Patient's continuum of care needs will be met  Outcome: PROGRESSING SLOWER THAN EXPECTED    Patient pending discharge/placement. SW assisting.

## 2020-07-04 NOTE — PROGRESS NOTES
Patient is AOx1; self only. C/O chronic neck pain well managed with PRN tylenol. No SOB or cough noted. O2SAT stable on room air. Denies NVD. Feeds self with setup. Q2 turns. Bed alarm in place.Call light in reach. Staff to anticipate wants and needs.
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
## 2020-07-05 NOTE — PROGRESS NOTES
Patient is AOx1; self only. Pain well managed with PRN tylenol. No SOB or cough noted. O2SAT stable on room air. Denies NVD. Feeds self with setup. Q2 turns. Bed alarm in place. Refusing skin check at this time. Call light in reach. Staff to anticipate wants and needs.

## 2020-07-05 NOTE — PROGRESS NOTES
"Patient on bed resting, pleasant at this time. Assessment partially done. Denies pain. Fluids offered. Refusing full skin assessment and 2 RN skin check at this time. Education provided, pt verbalized \"No! Get out!\" Will re attempt on a later time.     Needs attended. No additional needs at this time. Call light and personal belongings within reach. Left on bed resting comfortably. Bed alarm in place and functioning. Hourly rounding in place.  "

## 2020-07-06 NOTE — CARE PLAN
Problem: Communication  Goal: The ability to communicate needs accurately and effectively will improve  Outcome: PROGRESSING AS EXPECTED     Problem: Safety  Goal: Will remain free from injury  Outcome: PROGRESSING AS EXPECTED     Problem: Safety  Goal: Will remain free from falls  Outcome: PROGRESSING AS EXPECTED     Problem: Pain Management  Goal: Pain level will decrease to patient's comfort goal  Outcome: PROGRESSING AS EXPECTED     Problem: Respiratory:  Goal: Respiratory status will improve  Description: Pt here with PNA. Pt was on IV/PO abx. Pt has been placed on room air. Pt not in any respiratory distress. Pt with clear lung sounds, but diminished to the bases. Will continue to monitor for changes.   Outcome: PROGRESSING AS EXPECTED

## 2020-07-06 NOTE — PROGRESS NOTES
"Pt alert to self only. Pain managed well with tylenol PRN. VSS. Lungs diminished on RA. No signs of distress at this time. Pt calls out, \"hello\" often; but after going in there pt is just states \" I'm confused.\" pt refuses full head to toe skin check, education provided with no evidence of learning. Bed alarm on, bed low and locked, call light within reach, safety precautions in place, hourly rounding, WCM.   "

## 2020-07-07 NOTE — CARE PLAN
Problem: Communication  Goal: The ability to communicate needs accurately and effectively will improve  Outcome: PROGRESSING AS EXPECTED     Problem: Safety  Goal: Will remain free from injury  Outcome: PROGRESSING AS EXPECTED  Goal: Will remain free from falls  Outcome: PROGRESSING AS EXPECTED     Problem: Pain Management  Goal: Pain level will decrease to patient's comfort goal  Outcome: PROGRESSING AS EXPECTED     Problem: Respiratory:  Goal: Respiratory status will improve  Description: Pt here with PNA. Pt was on IV/PO abx. Pt has been placed on room air. Pt not in any respiratory distress. Pt with clear lung sounds, but diminished to the bases. Will continue to monitor for changes.   Outcome: PROGRESSING AS EXPECTED     Problem: Infection  Goal: Will remain free from infection  Outcome: PROGRESSING AS EXPECTED     Problem: Venous Thromboembolism (VTW)/Deep Vein Thrombosis (DVT) Prevention:  Goal: Patient will participate in Venous Thrombosis (VTE)/Deep Vein Thrombosis (DVT)Prevention Measures  Outcome: PROGRESSING AS EXPECTED     Problem: Bowel/Gastric:  Goal: Normal bowel function is maintained or improved  Outcome: PROGRESSING AS EXPECTED  Goal: Will not experience complications related to bowel motility  Outcome: PROGRESSING AS EXPECTED     Problem: Knowledge Deficit  Goal: Knowledge of disease process/condition, treatment plan, diagnostic tests, and medications will improve  Outcome: PROGRESSING AS EXPECTED  Goal: Knowledge of the prescribed therapeutic regimen will improve  Outcome: PROGRESSING AS EXPECTED     Problem: Fluid Volume:  Goal: Will maintain balanced intake and output  Outcome: PROGRESSING AS EXPECTED     Problem: Urinary Elimination:  Goal: Ability to reestablish a normal urinary elimination pattern will improve  Outcome: PROGRESSING AS EXPECTED     Problem: Skin Integrity  Goal: Risk for impaired skin integrity will decrease  Outcome: PROGRESSING AS EXPECTED     Problem: Mobility  Goal:  Risk for activity intolerance will decrease  Outcome: PROGRESSING AS EXPECTED     Problem: Psychosocial Needs:  Goal: Level of anxiety will decrease  Outcome: PROGRESSING AS EXPECTED

## 2020-07-07 NOTE — PROGRESS NOTES
2 RN skin check completed with Brian ELIAS.   Devices in place: bilateral heel mepilex.  Skin assessed under devices: yes.  Confirmed pressure ulcers found: none.  New potential pressure ulcers noted: none.  Wound consult placed:  n/a.        Skin assessment:   bilateral heels: dry/flaky/red/blanching  Sacrum:red/dark/blanching.   Groin: red/blanching.   Bilateral elbows: pink/red/blanching.     Interventions in place:   2RN skin check, q2h turns with PRN incontinence care, barrier paste. Pillows in use for support/positioning.

## 2020-07-07 NOTE — PROGRESS NOTES
Pt alert to self only. Pain managed well with tylenol PRN. VSS. Lungs diminished on RA. No signs of distress at this time. Bed alarm on, bed low and locked, call light within reach, safety precautions in place, hourly rounding, WCM.

## 2020-07-07 NOTE — PROGRESS NOTES
Ms. Bennett is a wheelchair bound 75-year-old female with a PMH of arthritis, DVT, Parkinson's, and dementia who presented 5/14/2020 with complaints of shortness of breath, nonproductive cough, fevers, and chills for 5 days. She is confused at baseline and was sent by her  who has been unable to care for her.      12-lead EKG was done in the ED and was unremarkable, though a chest xray showed bilateral interstitial infiltrates. COVID was ruled out.     She was evaluated by PT/OT who recommended 24/7 supervision. She is now pending placement to SNF vs group home, though institutional medicaid must be obtained first as she has limited income.     7/7:  Patient lying in bed.  Oriented to self only.  Baseline mentation.  Denies acute needs.  Denies pain or shortness of breath.

## 2020-07-07 NOTE — PROGRESS NOTES
2 RN skin check completed with CURT Zee.   Devices in place: bilateral heel mepilex.  Skin assessed under devices: yes.  Confirmed pressure ulcers found: none.  New potential pressure ulcers noted: none.  Wound consult placed:  n/a.   Interventions- Q2 turns, 2RN skin check Q shift, incontinence care provided as needed, barrier paste, pillows in use for support/postioning, bilat heel mepilex, heels offloaded on pillow.      Skin assessment:   -Bilat heels: dry/flaky/red/blanching  -Sacrum:red/dark/blanching.   -Groin: red/blanching.   -Bilat elbows: pink/red/blanching.  -BUE: abrasions/bruising

## 2020-07-08 NOTE — PROGRESS NOTES
A&O to self. VSS on RA. No complaints of pain. Turn q2. Call light in reach, bed locked/lowest position/alarm set, safety measures in place. WCTM.

## 2020-07-08 NOTE — PROGRESS NOTES
2 RN skin check completed with CURT Rodriguez.   Devices in place: bilateral heel mepilex.  Skin assessed under devices: yes.  Confirmed pressure ulcers found: none.  New potential pressure ulcers noted: none.  Wound consult placed:  n/a.   Interventions- Q2 turns, 2RN skin check Q shift, incontinence care provided as needed, barrier paste, pillows in use for support/postioning, bilat heel mepilex, heels offloaded on pillow.      Skin assessment:   -Bilat heels: dry/flaky/red/blanching  -Sacrum:red/dark/blanching.   -Groin: red/blanching.   -Bilat elbows: pink/red/blanching.  -BUE: abrasions/bruising

## 2020-07-08 NOTE — PROGRESS NOTES
2 RN skin check completed with CURT Newsome.   Devices in place: bilateral heel mepilex.  Skin assessed under devices: yes.  Confirmed pressure ulcers found: none.  New potential pressure ulcers noted: none.  Wound consult placed:  n/a.    Interventions- Q2 turns, 2RN skin check Q shift, incontinence care provided, barrier paste, pillows in use for support/postioning, bilat heel mepilex, heels offloaded on pillow.       Bilat elbows: pink/red/blanching.  BUE: abrasions/bruising   Sacrum:red/dark/blanching, barrier cream applied  Groin: red/blanching.   Bilat heels: dry/flaky/red/blanching

## 2020-07-09 NOTE — PROGRESS NOTES
2 RN skin check completed with CURT Torres.   Devices in place: N/A  Skin assessed under devices: N/A  Confirmed pressure ulcers found: N/A  New potential pressure ulcers noted: N/A  Wound consult placed:  N/A     The following interventions in place: Q2 turns, 2RN skin check, incontinence care provided with clean dry linens PRN, barrier paste, pillows in use for support/postioning, pillow to float heels, bilateral heel mepilex        Skin Assessment    Bilateral elbows pink/red, blanching.  BUE scattered bruising.   Sacrum pink/red blanching. Barrier cream applied.  Groin slightly red, blanching. Barrier cream applied.  Bilateral heels dry, flaky, red, blanching, and boggy. Mepilex in place.

## 2020-07-09 NOTE — PROGRESS NOTES
Received report and assumed care. Pt AOx 1, pleasant and joking, denies pain, and up to WC with 2 assist. Incontinent of bowel/bladder. VSS. 2RN skin check and Q2 turns in place. Call light and pt belongings in reach, bed locked and in lowest position, and pt now resting quielty. No other current needs.

## 2020-07-09 NOTE — CARE PLAN
Problem: Safety  Goal: Will remain free from falls  Outcome: PROGRESSING AS EXPECTED  Intervention: Implement fall precautions  Flowsheets  Taken 7/8/2020 2120  Environmental Precautions:   Treaded Slipper Socks on Patient   Personal Belongings, Wastebasket, Call Bell etc. in Easy Reach   Report Given to Other Health Care Providers Regarding Fall Risk   Bed in Low Position   Communication Sign for Patients & Families   Mobility Assessed & Appropriate Sign Placed  Taken 7/9/2020 0232  Chair/Bed Strip Alarm: Yes - Alarm On     Problem: Skin Integrity  Goal: Risk for impaired skin integrity will decrease  Outcome: PROGRESSING AS EXPECTED  Intervention: Implement precautions to protect skin integrity in collaboration with the interdisciplinary team  Flowsheets  Taken 7/9/2020 0200 by Marnie Jo  Skin Preventative Measures: Pillows in Use for Support / Positioning  Taken 7/8/2020 2120 by Mercedez Porras R.N.  Bed Types: Pressure Redistribution Mattress ([employer])  PT / OT Involved in Care: Physical Therapy Involved  Protocols: Pressure Ulcer Prevention / Intervention Protocol in Place  Note: Q2 turns and 2 RN skin checks in place.

## 2020-07-10 NOTE — PROGRESS NOTES
Ms. Bennett is a wheelchair bound 75-year-old female with a PMH of arthritis, DVT, Parkinson's, and dementia who presented 5/14/2020 with complaints of shortness of breath, nonproductive cough, fevers, and chills for 5 days.  She was treated for viral bronchitis.  She is confused at baseline and was sent by her  who has been unable to care for her.      12-lead EKG was done in the ED and was unremarkable, though a chest xray showed bilateral interstitial infiltrates. COVID was ruled out.     She was evaluated by PT/OT who recommended 24/7 supervision. She is now pending placement to SNF vs group home, though institutional medicaid must be obtained first as she has limited income.     7/7:  Patient lying in bed.  Oriented to self only.  Baseline mentation.  Denies acute needs.  Denies pain or shortness of breath.     7/10:  Patient evaluated with no acute needs.  Resting comfortably.  No changes to ROS or PE from prior.

## 2020-07-10 NOTE — PROGRESS NOTES
2 RN skin check completed with CURT Mosher.   Devices in place: N/A  Skin assessed under devices: N/A  Confirmed pressure ulcers found: N/A  New potential pressure ulcers noted: N/A  Wound consult placed:  N/A     The following interventions in place: Q2 turns, 2RN skin check, incontinence care provided with clean, dry linens PRN, barrier paste, pillows in use for support/postioning, bilateral heel mepilex           Skin Assessment     Bilateral elbows pink/red, blanching.  BUE scattered bruising, scab.   Sacrum pink/red blanching. Barrier cream applied.  Groin slightly red, blanching. Barrier cream applied.  Bilateral heels dry, flaky, red, blanching, and boggy. Mepilex in place.

## 2020-07-10 NOTE — PROGRESS NOTES
Received report and assumed care. Pt daughter visiting at bedside and helping pt eat. Pt AOx 1, denies pain, and up to chair with assist.  VSS.  2 RN skin checks and Q2 turns in place. Pt stated she was tired and went to sleep shortly after shift changed. Call light and pt belongings in reach, bed locked and in lowest position, bed alarm on, and pt now resting quielty. No other current needs.

## 2020-07-10 NOTE — PROGRESS NOTES
Assumed care of pt. Pt A&Ox1; oriented to self only. Pt sitting up in bed. No signs of labored breathing or pain. Pt on RA. Pt is a two person assist to pivot to chair, pt needs lapbelt when sitting in chair because she slides down in the chair. Fall precautions in place and education provided on how to utilize call light. Pt diet is regular, education provided. Pt updated on plan of care for the day. Pt declines any additional needs at this time. Will continue to monitor.

## 2020-07-10 NOTE — CARE PLAN
Problem: Safety  Goal: Will remain free from falls  Outcome: PROGRESSING AS EXPECTED  Intervention: Implement fall precautions  Flowsheets  Taken 7/9/2020 1930  Environmental Precautions:   Treaded Slipper Socks on Patient   Personal Belongings, Wastebasket, Call Bell etc. in Easy Reach   Report Given to Other Health Care Providers Regarding Fall Risk   Bed in Low Position   Communication Sign for Patients & Families   Mobility Assessed & Appropriate Sign Placed  Taken 7/9/2020 2342  Chair/Bed Strip Alarm: Yes - Alarm On     Problem: Skin Integrity  Goal: Risk for impaired skin integrity will decrease  Outcome: PROGRESSING AS EXPECTED  Intervention: Implement precautions to protect skin integrity in collaboration with the interdisciplinary team  Flowsheets  Taken 7/9/2020 2200 by Marnie Jo  Skin Preventative Measures: Pillows in Use for Support / Positioning  Taken 7/9/2020 1930 by Mercedez Porras R.N.  PT / OT Involved in Care: Physical Therapy Involved  Protocols: Pressure Ulcer Prevention / Intervention Protocol in Place  Activity:   Bed   Chair  Note: Q2 turns and 2RN skin checks in place.

## 2020-07-11 NOTE — PROGRESS NOTES
Patient stating she is going to throw up. Patient given emesis bag. Inquired if patient wanted a ginger ale to help with her stomach. Patient states yes. Patient went to take drink and her hands were shaking. BG taken and 87. Ran HURT notified and Zofran order received. Patient drinking ginger ale and states she is feeling better. Breakfast came and patient started eating and stating she is feeling even better and did not want Zofran at this time.

## 2020-07-11 NOTE — PROGRESS NOTES
2 RN skin check completed with Jackelyn ELIAS  Devices in place: N/A  Skin assessed under devices: N/A  Confirmed pressure ulcers found: N/A  New potential pressure ulcers noted: N/A  Wound consult placed:  N/A     The following interventions in place: Q2 turns, 2RN skin check, incontinence care, barrier paste, pillows in use, refusing bilateral heel mepilex      Bilateral elbows pink/red, blanching  BUE scattered bruising, scab  Sacrum red slow to blanching, barrier cream applied.  Groin slightly red, blanching, barrier cream applied.  Bilateral heels dry, flaky, red, blanching, and boggy. Pt refusing mepilex, education provided.

## 2020-07-11 NOTE — PROGRESS NOTES
A&O to self only. VSS on RA. No complaints of pain. T/Rq2. Call light in reach, bed locked/lowest position/alarm set. TM.

## 2020-07-11 NOTE — PROGRESS NOTES
Bedside shift report received from night nurse. Plan of care discussed with patient and all questions answered. Bed locked, in lowest position. No needs at this time. Communication board updated. Call light and personal belongings within reach. Will continue to monitor.

## 2020-07-11 NOTE — PROGRESS NOTES
2 RN skin check completed with Manuela ELIAS.   Devices in place: N/A  Skin assessed under devices: N/A  Confirmed pressure ulcers found: N/A  New potential pressure ulcers noted: N/A  Wound consult placed:  N/A     The following interventions in place: Q2 turns, 2RN skin check, incontinence care, barrier paste, pillows in use, refusing bilateral heel mepilex      Bilateral elbows pink/red, blanching  BUE scattered bruising, scab  Sacrum red slow to blanching, barrier cream applied.  Groin slightly red, blanching, barrier cream applied.  Bilateral heels dry, flaky, red, blanching, and boggy. Pt refusing mepilex, education provided.

## 2020-07-12 NOTE — CARE PLAN
Problem: Communication  Goal: The ability to communicate needs accurately and effectively will improve  Outcome: PROGRESSING SLOWER THAN EXPECTED     Problem: Safety  Goal: Will remain free from falls  Outcome: PROGRESSING AS EXPECTED

## 2020-07-12 NOTE — PROGRESS NOTES
2 RN skin check completed with Jackelyn ELIAS  Devices in place: N/A  Skin assessed under devices: N/A  Confirmed pressure ulcers found: N/A  New potential pressure ulcers noted: N/A  Wound consult placed:  N/A     The following interventions in place: Q2 turns, 2RN skin check, incontinence care, barrier paste, pillows in use, refusing bilateral heel mepilex      Bilateral elbows pink/red, blanching  BUE scattered bruising, scab  Sacrum red slow to blanching, barrier cream applied.  Groin slightly red, blanching, barrier cream applied.  Bilateral heels dry, flaky, red, blanching, and boggy.

## 2020-07-12 NOTE — PROGRESS NOTES
2 RN skin check completed with CURT Roy  Devices in place N/A.  Skin assessed under devices N/A.  Confirmed pressure ulcers found on N/A.  New potential pressure ulcers noted on N/A. Wound consult placed N/A.    Noted bilateral elbows pink and blanching, bilateral extremities have generalized scabs and bruising, groin area is pink and blanching, sacral area is pinkish to red and blanching, bilateral heels is pinkish to red blanching    The following interventions in place; assisted to turn to sides, heels floated with pillows, incontinence care provide, on pressure redistribution mattress with waffle overlay.

## 2020-07-12 NOTE — PROGRESS NOTES
Pt very agitated. Pt throwing her food at dietary staff. Education provided about appropriate behavior, pt attempted to push bedside table into RN and throw more food. Security called, medication administered with education.

## 2020-07-13 NOTE — PROGRESS NOTES
2 RN skin check complete with CURT Currie.   Devices in place none.  Skin assessed under devices n/a.  Confirmed pressure ulcers found on none.  New potential pressure ulcers noted on none. Wound consult placed n/a.  The following interventions in place waffle mattress overlay, barrier cream, heels floated on pillows, pillows used for support, frequent incontinence checks with linen. Changes as needed. Q 2 turns  In place.    Heels are dry, flaky, redness, blanching, and boggy. Right heel si more red than the left. Sacrum red and slow to obed.  Groin is pink and dry. BUE scattered bruising and small scab to L forearm. Elbows are pink blanching.

## 2020-07-13 NOTE — PROGRESS NOTES
Patient A/Ox1, up with pivot assistance to w/c, and on RA. No complaints of pain, n/v/d, nor SOB. Pt slightly irritated at 2 RN skin check. Pt is safe and needs met. Bed is low and locked. Hourly rounding in place. No signs of acute distress.

## 2020-07-13 NOTE — PROGRESS NOTES
2 RN skin check completed with CURT Roy.   Devices in place: none.  Skin assessed under devices: n/a.  Confirmed pressure ulcers found on: none.  New potential pressure ulcers noted on: none. Wound consult placed: n/a.  The following interventions in place: waffle mattress overlay, barrier cream, heels floated on pillows, pillows used for support, frequent incontinence monitoring w/linen changes as needed, Q2 turns, and 2 RN skin checks Qshift.    Heels: dry, flaky, redness, blanching, and boggy (R more so than L)  Sacrum: red and slow to obed  Groin: pink and dry  BUE: scattered bruising and small scab to L forearm  Elbows: pink and blanching

## 2020-07-13 NOTE — CARE PLAN
Problem: Communication  Goal: The ability to communicate needs accurately and effectively will improve  Outcome: PROGRESSING AS EXPECTED  Intervention: Educate patient and significant other/support system about the plan of care, procedures, treatments, medications and allow for questions  Note: Discussed plan of care for duration of shift including medication administration times, pt acknowledges understanding. Reinforcement needed frequently.     Problem: Pain Management  Goal: Pain level will decrease to patient's comfort goal  Outcome: PROGRESSING AS EXPECTED  Intervention: Follow pain managment plan developed in collaboration with patient and Interdisciplinary Team  Note: Patient will have no complaints of pain during shift. Utilize distraction as needed.

## 2020-07-14 NOTE — PROGRESS NOTES
Patient A/Ox1, up with pivot assistance to w/c, and on RA. No complaints of pain, n/v/d, nor SOB. Pt refused 2 RN skin check, will attempt again by end of shift. Pt is safe and needs met. Bed is low and locked. Hourly rounding in place. No signs of acute distress.

## 2020-07-14 NOTE — PROGRESS NOTES
Hospital Medicine Twice Weekly Progress Note     Hospital Course  Ms. Bennett is a wheelchair bound 75-year-old female with a PMH of arthritis, DVT, Parkinson's, and dementia who presented 5/14/2020 with complaints of shortness of breath, nonproductive cough, fevers, and chills for 5 days. She is confused at baseline and was sent by her  since he was unable to care for her. An EKG was done in the ED and was unremarkable, though a chest xray showed bilateral interstitial infiltrates. COVID was ruled out in the ED. She was evaluated by PT/OT who recommended 24/7 supervision. Her  also stated he is no longer able to care for her either. She is now pending placement to SNF vs group home, though medicaid must be obtained first as she has limited income.      Interval Problem Update  -Seen and examined at the bedside.  Feeling good today.  Has no complaints.  Would like to get out of bed and into a chair more frequently, will discuss with nursing.  Currently denies pain.    -Vital signs reviewed and have been stable.    -Patient not eligible for Medicaid due to income.  [employer] work has requested [employer] to screen for institutional Medicaid.      Assessment & Plan  Dementia (HCC)- (present on admission)  Assessment & Plan  -Chronic and stable.  -Frequent re-orientation, reestablish circadian rhythm, encourage familiar faces/family in room, avoid or minimize narcotics/sedatives.   -Continue on seroquel and prozac.  PRN Haldol available if needed.    Discharge planning issues  Assessment & Plan  -Patient not eligible for Medicaid due to income.  Social work has requested [facility] screen her for institutional Medicaid.  Will need placement after that is obtained.    Lower extremity pain, bilateral- (present on admission)  Assessment & Plan  -PRN Tylenol available if needed.  -PT/OT recommending SNF placement, but will eventually need group home placement with 24/7 care after SNF making it a difficult discharge.  -Mobilize  as tolerated.    Parkinson's disease (HCC)- (present on admission)  Assessment & Plan  -Chronic and stable.  -Continue sinemet.  -PT/OT recommending SNF placement, but again will require 24/7 care at a group home afterward.      Electronically signed by:  Brenda León, MSN, RN, APRN, ACNPC-AG, CCRN  Nurse Practitioner, [facility]  Work # [phone]    7/14/2020    2:31 PM

## 2020-07-14 NOTE — PROGRESS NOTES
2 RN skin check completed with CURT Singleton.   Devices in place: none.  Skin assessed under devices: n/a.  Confirmed pressure ulcers found on: none.  New potential pressure ulcers noted on: none. Wound consult placed: n/a.  The following interventions in place: waffle mattress overlay, barrier cream, heels floated on pillows, pillows used for support, frequent incontinence monitoring w/linen changes as needed, Q2 turns, and 2 RN skin checks Qshift.     Heels: dry, flaky, redness, blanching, and boggy (R is more boggy than L)  Sacrum: red and slow to obed  Groin: pink and dry  BUE: scattered bruising and small scab to L forearm  Elbows: pink and blanching

## 2020-07-14 NOTE — CARE PLAN
Problem: Safety  Goal: Will remain free from falls  Outcome: PROGRESSING AS EXPECTED  Note: Patient will have no falls during shift. Assist pt with two others for transfer from bed to w/c and BTB.     Problem: Urinary Elimination:  Goal: Ability to reestablish a normal urinary elimination pattern will improve  Outcome: PROGRESSING SLOWER THAN EXPECTED  Intervention: Encourage scheduled voiding  Note: Patient is incontinent x2 at baseline. Monitor and protect from skin breakdown.

## 2020-07-14 NOTE — THERAPY
"Physical Therapy   Daily Treatment     Patient Name: Carmenza Bennett  Age:  75 y.o., Sex:  female  Medical Record #: [id number]  Today's Date: 7/14/2020     Precautions: Fall Risk    Assessment  Patient agreeable to participate with PT this session. She exhibited significant speech and motor delay throughout session. She required max A for transfers and attempted ambulation; ambulation unsafe to trial without therapy. She demonstrated inability to weight shift for limb advancement and significant retropulsion in standing. She was able to maintain static sitting balance EOB without external support once positioned at EOB. Will continue to follow.    Plan  Continue current treatment plan.  Discharge recommendations:  Anticipate she will require full time caregiver support following DC.    Subjective  \"I have three diseases that cause me to shake.\"     Objective   07/14/20 0951   Cognition    Cognition / Consciousness X   Speech/ Communication Delayed Responses   Level of Consciousness Alert   Ability To Follow Commands 1 Step  (with significant delay)   Safety Awareness Impaired   New Learning Impaired   Attention Impaired   Sequencing Impaired   Initiation Impaired   Comments cooperative this session, decreased volition   Balance   Sitting Balance (Static) Fair -   Sitting Balance (Dynamic) Poor   Standing Balance (Static) Trace +   Standing Balance (Dynamic) Trace   Comments strong retropulsion in standing. able to maintain static sitting balance EOB once positioned   Gait Analysis   Gait Level Of Assist Maximal Assist   Assistive Device Wheelchair Push   Distance (Feet) 5   # of Times Distance was Traveled 1   Deviation Other (Comment)  (inability to weight shift and initiate limb advancement, ret)   Bed Mobility    Supine to Sit Moderate Assist   Sit to Supine Maximal Assist   Scooting Total Assist   Functional Mobility   Sit to Stand Maximal Assist   Bed, Chair, Wheelchair Transfer Maximal Assist   Toilet Transfers " Maximal Assist  (to BSC)   Transfer Method Stand Pivot   Short Term Goals    Short Term Goal # 1 Pt will be SPV for supine<>sit in 6 txs to improve bed mobility.   Goal Outcome # 1 goal not met   Short Term Goal # 2 Pt will be Lashonda for transfers with FWW in 6 txs to improve OOB activity.   Goal Outcome # 2 Goal not met   Short Term Goal # 3 Pt will be Lashonda for gait with FWW x 50' to improve mobility.   Goal Outcome # 3 Goal not met   Anticipated Discharge Equipment   DC Equipment Unable To Determine At This Time

## 2020-07-14 NOTE — PROGRESS NOTES
Received report and assumed care of pt. Assessment complete on RA. Pt A&OX2, disoriented to time and event. Denies any pain or discomfort at this time. Pt currently sitting up in bed watching tv. All pt needs met at this time. Safety precautions and hourly rounding in place.

## 2020-07-15 NOTE — PROGRESS NOTES
Patient A/Ox1, up with pivot assistance to w/c, and on RA. No complaints of pain, n/v/d, nor SOB. Patient last recorded BM was 7/8/20. Pt is eating well and is receiving Senna and Miralax but has produced no results. Pt refusing MOM and suppository. Pt is safe and needs met. Bed is low and locked. Hourly rounding in place. No signs of acute distress.

## 2020-07-15 NOTE — PROGRESS NOTES
Pharmacy Pharmacotherapy Consult for LOS >30 days    Admit Date: 5/14/2020      Medications were reviewed for appropriateness and ongoing need.     Current Facility-Administered Medications   Medication Dose Route Frequency Provider Last Rate Last Dose   • acetaminophen (TYLENOL) tablet 1,000 mg  1,000 mg Oral Q8HRS PRN Brenda A Heglar, A.P.R.N.   1,000 mg at 07/14/20 1025   • QUEtiapine (SEROQUEL) tablet 25 mg  25 mg Oral BID Brenda A Heglar, A.P.R.N.   25 mg at 07/15/20 0504   • haloperidol lactate (HALDOL) injection 2-5 mg  2-5 mg Intramuscular Q6HRS PRN Brenda A Heglar, A.P.R.N.   5 mg at 07/12/20 1421   • enoxaparin (LOVENOX) inj 40 mg  40 mg Subcutaneous DAILY Brenda A Heglar, A.P.R.N.   40 mg at 07/11/20 0555   • carbidopa-levodopa (SINEMET)  MG tablet 1 Tab  1 Tab Oral 4XDAY Brenda A Heglar, A.P.R.N.   1 Tab at 07/15/20 0504   • FLUoxetine (PROZAC) capsule 40 mg  40 mg Oral DAILY Brenda A Heglar, A.P.R.N.   40 mg at 07/15/20 0504   • senna-docusate (PERICOLACE or SENOKOT S) 8.6-50 MG per tablet 2 Tab  2 Tab Oral BID Brenda A Heglar, A.P.R.N.   2 Tab at 07/15/20 0504    And   • polyethylene glycol/lytes (MIRALAX) PACKET 1 Packet  1 Packet Oral QDAY PRN Brenda A Heglar, A.P.R.N.   1 Packet at 07/15/20 0504       Recommendations:  1) DC Bowel Protocol PRN's MOM and Dulcolax suppositories (not used since ordered) - OK to DC per Hospitalist APRN  2) DC RT Consult, no RT meds (Duoneb, albuterol inhaler, etc.) - OK to DC per Hospitalist APRN  3) Continue all other medications    Rayna LindaD

## 2020-07-15 NOTE — PROGRESS NOTES
2 RN skin check complete Rosey ELIAS.   Devices in place none.  Skin assessed under devices: n/a.  Confirmed pressure ulcers found: n/a.  New potential pressure ulcers noted: n/a. Wound consult placed: no.  The following interventions in place:    Assessed:  Bilateral heels boggy and blanching  Bilateral elbows pink and blanching  Groin pink and dry  BUE scattered bruising  Sacrum red but blanching

## 2020-07-15 NOTE — CARE PLAN
Problem: Communication  Goal: The ability to communicate needs accurately and effectively will improve  Outcome: PROGRESSING AS EXPECTED  Intervention: Educate patient and significant other/support system about the plan of care, procedures, treatments, medications and allow for questions  Note: Discussed plan of care for duration of shift, pt acknowledges understanding. Reinforcement needed.     Problem: Safety  Goal: Will remain free from falls  Outcome: PROGRESSING AS EXPECTED  Note: Patient will have no falls during shift. Bed low and locked. Hourly rounding in place.

## 2020-07-15 NOTE — PROGRESS NOTES
2 RN skin check complete CURT Yang.   Devices in place none.  Skin assessed under devices: n/a.  Confirmed pressure ulcers found: n/a.  New potential pressure ulcers noted: n/a. Wound consult placed: no.  The following interventions in place: waffle cushion, barrier cream, mepilex for protection, heels floated on pillows, pillows used for support, frequent incontinence checks, linen changes as needed, Q2 turns, and 2 RN skin checks.     Assessed:  Bilateral heels boggy and blanching  Bilateral elbows pink and blanching  Groin pink and dry  BUE scattered bruising  Sacrum red but blanching

## 2020-07-15 NOTE — PROGRESS NOTES
Received report and assumed care of pt. Assessment complete on RA. Pt A&OX1, to self only. Denies any pain or discomfort at this time. Pt sat at nurses station had did a puzzle for the morning. Pt currently back in bed to rest. Incontinence care performed. All pt needs met at this time. Safety precautions and hourly rounding in place.

## 2020-07-15 NOTE — PROGRESS NOTES
2 RN skin check completed with CURT Singleton.   Devices in place: none.  Skin assessed under devices: n/a.  Confirmed pressure ulcers found on: none.  New potential pressure ulcers noted on: none. Wound consult placed: n/a.  The following interventions in place: waffle mattress overlay, barrier cream, barrier wipes, mepilex, heels floated on pillows, pillows used for support, frequent incontinence monitoring w/linen changes as needed, Q2 turns, and 2 RN skin checks Qshift.     Heels: dry, flaky, redness, blanching, and boggy (Sween cream applied BLE and heel mepilex applied)  Sacral area: red and slow to obed (barrier wipes and paste utilized)  Groin: pink and dry  BUE: scattered bruising and small scab to L forearm  Elbows: pink and blanching

## 2020-07-16 NOTE — PROGRESS NOTES
Assumed care of pt from day RN. Pt was sitting in chair for dinner and visiting with her , then transferred back to bed with a 2 person assist. Pt is A&Ox1, oriented to self only. Pt denies any pain at this time. Pt did finally have a BM this evening. Call light and belongings within reach, will continue to monitor.

## 2020-07-16 NOTE — PROGRESS NOTES
2 RN skin check completed with CURT Singleton.   Devices in place: none  Skin assessed under devices: N/A  Confirmed pressure ulcers found on: N/A  New potential pressure ulcers noted on: N/A  Wound consult placed: N/A  The following interventions in place: waffle mattress overlay, barrier cream, barrier wipes, mepilex, heels floated on pillows, pillows used for support/ positioning, frequent incontinence monitoring w/linen changes as needed, Q2 turns, and 2 RN skin checks Qshift.     Assessment:  Heels: dry, flaky, redness, blanching, and boggy, Mepilex in place  Bilateral Toes: red/ slow to obed  Sacral area: red and slow to obed (barrier wipes and paste utilized)  Groin: pink and dry  BUE: scattered bruising and small scab to L forearm  Elbows: pink and blanching

## 2020-07-16 NOTE — PROGRESS NOTES
2 RN skin check completed with CURT Yang.   Devices in place: none  Skin assessed under devices: N/A  Confirmed pressure ulcers found on: N/A  New potential pressure ulcers noted on: N/A  Wound consult placed: N/A  The following interventions in place: waffle mattress overlay, barrier cream, barrier wipes, mepilex, heels floated on pillows, pillows used for support/ positioning, frequent incontinence monitoring w/linen changes as needed, Q2 turns, and 2 RN skin checks Qshift.     Assessment:  Heels: dry, flaky, redness, blanching, and boggy, Mepilex in place  Bilateral Toes: red/ slow to obed  Sacral area: red and slow to obed (barrier wipes and paste utilized)  Groin: pink and dry  BUE: scattered bruising and small scab to L forearm  Elbows: pink and blanching

## 2020-07-16 NOTE — CARE PLAN
Problem: Safety  Goal: Will remain free from falls  Outcome: PROGRESSING AS EXPECTED  Note: Fall precautions in place: bed locked and in the lowest position, call light and belongings within reach, treaded socks on, bed alarm in use, pt educated on use of call light for needs.      Problem: Skin Integrity  Goal: Risk for impaired skin integrity will decrease  Outcome: PROGRESSING AS EXPECTED  Note: Skin intervention in place: q2hr turns, 2 RN skin checks, frequent incontinence checks, intake of food and fluids encouraged.

## 2020-07-17 NOTE — PROGRESS NOTES
Pt. Received, up in the wheelchair in a pleasant mood. Denies any complaint of pain. Orientedx1, reoriented her to date place and plan of care. Educated her about fall risks and precautions. Was assisted back to bed by 2 people. Tolerated med intake. Needs attended.

## 2020-07-17 NOTE — PROGRESS NOTES
Hospital Medicine Twice Weekly Progress Note     Hospital Course  Ms. Bennett is a wheelchair bound 75-year-old female with a PMH of arthritis, DVT, Parkinson's, and dementia who presented 5/14/2020 with complaints of shortness of breath, nonproductive cough, fevers, and chills for 5 days. She is confused at baseline and was sent by her  since he was unable to care for her. An EKG was done in the ED and was unremarkable, though a chest xray showed bilateral interstitial infiltrates. COVID was ruled out in the ED. She was evaluated by PT/OT who recommended 24/7 supervision. Her  also stated he is no longer able to care for her either. She is now pending placement to SNF vs group home, though medicaid must be obtained first as she has limited income.      Interval Problem Update  -Frequently calling out for help but can't tell me why. Denies pain. States she doesn't need anything. A&O to self & place. Was pleasant & cooperative. Overall ROS & PE unchanged from prior.     -Vital signs reviewed and are stable.    -Dispo: Needs placement. Patient not eligible for Medicaid due to income.  Social work has requested [facility] to screen for institutional Medicaid.      Assessment & Plan  Dementia (HCC)- (present on admission)  Assessment & Plan  -Chronic and stable.  -Frequent re-orientation, reestablish circadian rhythm, encourage familiar faces/family in room, avoid or minimize narcotics/sedatives.   -Continue on seroquel and prozac.  PRN Haldol available if needed.    Discharge planning issues  Assessment & Plan  -Patient not eligible for Medicaid due to income.  Social work has requested [facility] screen her for institutional Medicaid.  Will need placement after that is obtained.    Lower extremity pain, bilateral- (present on admission)  Assessment & Plan  -PRN Tylenol available if needed.  -PT/OT recommending SNF placement, but will eventually need group home placement with 24/7 care after SNF making it a difficult  discharge.  -Mobilize as tolerated.    Parkinson's disease (HCC)- (present on admission)  Assessment & Plan  -Chronic and stable.  -Continue sinemet.  -PT/OT recommending SNF placement, but again will require 24/7 care at a group home afterward.      Electronically signed by:  Brenda León, MSN, RN, APRN, ACNPC-AG, CCRN  Nurse Practitioner, [facility]  Work # [phone]    7/17/2020    12:48 PM

## 2020-07-17 NOTE — CARE PLAN
Problem: Safety  Goal: Will remain free from injury  Outcome: PROGRESSING AS EXPECTED  Goal: Will remain free from falls  Outcome: PROGRESSING AS EXPECTED  Bed alarm in place, educated about fall risks and use of call light for assistance.     Problem: Skin Integrity  Goal: Risk for impaired skin integrity will decrease  Outcome: PROGRESSING AS EXPECTED   Skin checking done routinely, incontinence checking hourly.

## 2020-07-17 NOTE — PROGRESS NOTES
Skin check complete. Aox1. Patient became agitated and screaming loudly for no reason. Admin haldol as ordered. Call light and belongings in place. All needs met at this time.  Safety precautions in place. Wcm.

## 2020-07-18 NOTE — PROGRESS NOTES
2 RN skin check complete with CURT Pina  Devices in place N/A.  Skin assessed under devices N/A.  Confirmed pressure ulcers found on N/A.  New potential pressure ulcers noted on N/A. Wound consult placed N/A.    Skin is intact. No open wounds noted.    The following interventions in place, Encouraged to turn to sides; provided incontinence care; on pressure redistribution mattress with waffle overlay

## 2020-07-18 NOTE — PROGRESS NOTES
2 RN skin check complete with Michael RN     General Skin intact pink blanching     No devices in place  Interventions: q2h turns, incontinence care, waffle mattress, mepilex, barrier cream

## 2020-07-18 NOTE — PROGRESS NOTES
Skin check complete. Aox1. Up in chair for meals. Call light and belongings in place. All needs met at this time.  Safety precautions in place. Wcm.

## 2020-07-19 NOTE — PROGRESS NOTES
Alert and oriented x1,only to self. Offered fluids and snacks. Safety precautions in placed. Bed in lowest position. Upper side rails up. Treaded socks on. Reinforced the use of call light when needing assistance.

## 2020-07-19 NOTE — PROGRESS NOTES
2 RN skin check complete with CURT Raymond  Devices in place N/A.  Skin assessed under devices N/A.  Confirmed pressure ulcers found on N/A.  New potential pressure ulcers noted on N/A. Wound consult placed N/A.    Skin is intact. No open wounds noted.     The following interventions in place, Encouraged to turn to sides; provided incontinence care; on pressure redistribution mattress with waffle overlay

## 2020-07-19 NOTE — PROGRESS NOTES
Received report and assumed care of pt. Assessment complete on RA. Pt A&OX1, to self only. Denies any pain or discomfort at this time. Pt currently resting in bed. All pt needs met at this time. Safety precautions and hourly rounding in place.

## 2020-07-20 NOTE — THERAPY
Physical Therapy   Daily Treatment     Patient Name: Carmenza Bennett  Age:  75 y.o., Sex:  female  Medical Record #: [id number]  Today's Date: 7/20/2020     Precautions: Fall Risk, PD, Dementia    Assessment  Pt with limited participation in therapy today. Pt appears somewhat lethargic but did express wanting to sit up in chair. Pt demonstrates significant impairments in balance and ability to right to midline. Pt with strong posterior and left lateral lean and pt resists therapist facilitation to midline. Attempted STS though pt unable to achieve. Not safe to transfer to chair today. Pt ultimately bringing self back down onto bed and required heavy assist to fully return to bed. Continue to recommend 24/7 assist at DC.     Plan  Continue current treatment plan.  Discharge recommendations: recommend 24/7 assist at DC, may require long term placement.        07/20/20 1214   Neuro-Muscular Treatments   Neuro-Muscular Treatments Anterior weight shift;Weight Shift Right;Weight Shift Left;Verbal Cuing;Tactile Cuing;Sequencing;Postural Facilitation   Comments strong posterior and lateral lean left. anterior weight shift x5, heavy facilitation for lateral shift R. manual assist for scap retractions. upright sitting improves with B hands on kness vs supported on bed as pt tends to lean down to the L forearm.    Balance   Sitting Balance (Static) Poor +   Sitting Balance (Dynamic) Poor   Standing Balance (Static) Trace   Standing Balance (Dynamic) Trace   Weight Shift Sitting Poor   Weight Shift Standing Poor   Comments poor ability to maintain upright sitting, pt leaning down onto L forearm. unable to achieve full upright standing today.    Gait Analysis   Gait Level Of Assist Unable to Participate   Comments unable to achieve standing. pt with significant L lateral lean despite verbal and manual cues for midline. pt resists therapist facilitation for weight shifting.    Bed Mobility    Supine to Sit Maximal Assist   Sit to  Supine Maximal Assist   Scooting Maximal Assist   Functional Mobility   Sit to Stand Maximal Assist (partial stand)   Bed, Chair, Wheelchair Transfer Unable to Participate   Mobility unsafe to transfer to chair d/t presentation and lethargy   Short Term Goals    Short Term Goal # 1 Pt will be SPV for supine<>sit in 6 txs to improve bed mobility.   Goal Outcome # 1 goal not met   Short Term Goal # 2 Pt will be Lashod[place] for transfers with FWW in 6 txs to improve OOB activity.   Goal Outcome # 2 Goal not met   Short Term Goal # 3 Pt will be Lashonda for gait with FWW x 50' to improve mobility.   Goal Outcome # 3 Goal not met   Anticipated Discharge Equipment   DC Equipment Unable To Determine At This Time

## 2020-07-20 NOTE — PROGRESS NOTES
2 RN skin check complete with Michael ELIAS.   Devices in place: n/a.  Skin assessed under devices: n/a.  Confirmed pressure ulcers found on n/a.  New potential pressure ulcers noted: n/a. Wound consult placed: n/a.  The following interventions in place: waffle mattress, frequent incontinence checks/care, [id number], 2RN skin checks, pillows for support and positioning.    Assessed:  Bilateral elbows pink and blanching  Bilateral heels dry/flaky, red but blanchable.  Sacrum red but blanching  General scattered bruising
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
## 2020-07-20 NOTE — PROGRESS NOTES
Received report and assumed care of pt. Assessment complete on RA. Pt oriented to self only. Denies any pain or discomfort at this time. Pt currently in bed resting. All pt needs met at this time. Safety precautions and hourly rounding in place.

## 2020-07-21 NOTE — PROGRESS NOTES
Hospital Medicine Twice Weekly Progress Note     Hospital Course  Ms. Bennett is a wheelchair bound 75-year-old female with a PMH of arthritis, DVT, Parkinson's, and dementia who presented 5/14/2020 with complaints of shortness of breath, nonproductive cough, fevers, and chills for 5 days. She is confused at baseline and was sent by her  since he was unable to care for her. An EKG was done in the ED and was unremarkable, though a chest xray showed bilateral interstitial infiltrates. COVID was ruled out in the ED. She was evaluated by PT/OT who recommended 24/7 supervision. Her  also stated he is no longer able to care for her either. She is now pending placement to SNF vs group home, though medicaid must be obtained first as she has limited income.      Interval Problem Update  Patient is lying in bed, pleasant and cooperative.  She remains oriented only to self & place.  She denies pain and any other problems at this time.    -ROS & PE unchanged from prior.     -Vital signs reviewed and are stable.    -No changes to care plan at this time.    -Dispo: Needs placement. Patient not eligible for Medicaid due to income.  Social work has requested PFA to screen for institutional Medicaid.       TESSY Kelley.

## 2020-07-21 NOTE — PROGRESS NOTES
Received report and assumed care of pt. Assessment complete on RA. Pt oriented to self only. Denies any pain or discomfort at this time. All pt needs met at this time. Safety precautions and hourly rounding in place.
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
## 2020-07-21 NOTE — PROGRESS NOTES
2 RN skin check complete. zelda  Devices in place mepilex  Skin assessed under devices yes  Confirmed pressure ulcers found on na  New potential pressure ulcers noted on na. Wound consult placed na  The following interventions in place waffle cushion, turning, incont checks, barrier cream, 2 rn skin checks    Skin intact and blanching  Right heel red/pink/slightly boggy; mepilex in place for prevention

## 2020-07-21 NOTE — CARE PLAN
Problem: Safety  Goal: Will remain free from injury  Outcome: PROGRESSING AS EXPECTED  Bed alarm in place for high fall risk and confusion  Problem: Discharge Barriers/Planning  Goal: Patient's continuum of care needs will be met  Outcome: PROGRESSING SLOWER THAN EXPECTED  Awaiting placement

## 2020-07-21 NOTE — PROGRESS NOTES
2 RN skin check complete with CURT Prabhakar.   Devices in place: mepilex.  Skin assessed under devices: yes.  Confirmed pressure ulcers found on n/a.  New potential pressure ulcers noted: n/a. Wound consult placed: n/a.  The following interventions in place: waffle mattress, frequent incontinence checks/care, [id number], 2RN skin checks, pillows for support and positioning.     Assessed:  Bilateral elbows pink and blanching.  Bilateral heels dry/flaky, red but blanchable. Mepilex on right heel for protection.   Sacrum red but blanching.  General scattered bruising.

## 2020-07-22 NOTE — PROGRESS NOTES
RN skin check complete with JAYCOB RN  Devices in place mepilex to heel.  Skin assessed under devices yes  Confirmed pressure ulcers found on na  New potential pressure ulcers noted on na Wound consult placed na.  The following interventions in place waffle cushion, turning, incont checks, barrier cream, 2 rn skin check, mepilex for prevention     Skin intact and blanching  Right heel red/pink/slightly boggy to right heel; mepilex in place

## 2020-07-22 NOTE — PROGRESS NOTES
2 RN skin check complete. zelda  Devices in place mepilex to heel.  Skin assessed under devices yes  Confirmed pressure ulcers found on na  New potential pressure ulcers noted on na Wound consult placed na.  The following interventions in place waffle cushion, turning, incont checks, barrier cream, 2 rn skin check, mepilex for prevention    Skin intact and blanching  Right heel red/pink/slightly boggy to right heel; mepilex in place

## 2020-07-22 NOTE — PROGRESS NOTES
Room air. A&O 2. Assist 1 with wheelchair. 1:1 feedings. Denied need for pain medication. Call bell within reach. Bed low and locked

## 2020-07-22 NOTE — CARE PLAN
Problem: Safety  Goal: Will remain free from injury  Outcome: PROGRESSING AS EXPECTED   Bed alarm on for safety and confusion  Problem: Discharge Barriers/Planning  Goal: Patient's continuum of care needs will be met  Outcome: PROGRESSING SLOWER THAN EXPECTED  Awaiting placment

## 2020-07-23 NOTE — CARE PLAN
Problem: Safety  Goal: Will remain free from injury  Outcome: PROGRESSING AS EXPECTED  Bed alarm on for safety and confusion  Problem: Discharge Barriers/Planning  Goal: Patient's continuum of care needs will be met  Outcome: PROGRESSING SLOWER THAN EXPECTED  Awaiting placement, daughter visiting at the bedside

## 2020-07-23 NOTE — PROGRESS NOTES
Confused, bed alarm in place for safety and confusion. Daughter at the bedside earlier visiting. Took her medications without a problem. Cont plan of care, call light within reach

## 2020-07-24 NOTE — PROGRESS NOTES
Patient refused 2RN skin check, educated regarding importance.  Education reinforced by CURT Caldwell.  Patient continues to refuse.
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
## 2020-07-24 NOTE — PROGRESS NOTES
I certify that the patient requires continued medically necessary hospital services for the treatment of dementia and inability to care for self and will remain in the hospital for a minimum of 30 days.  Discharge plan is to be determined by financial situation and placement options, likely group home or skilled nursing facility.  TESSY Kelley.

## 2020-07-25 NOTE — PROGRESS NOTES
Hospital Medicine Twice Weekly Progress Note     Hospital Course  Ms. Bennett is a wheelchair bound 75-year-old female with a PMH of arthritis, DVT, Parkinson's, and dementia who presented 5/14/2020 with complaints of shortness of breath, nonproductive cough, fevers, and chills for 5 days. She is confused at baseline and was sent by her  since he was unable to care for her. An EKG was done in the ED and was unremarkable, though a chest xray showed bilateral interstitial infiltrates. COVID was ruled out in the ED. She was evaluated by PT/OT who recommended 24/7 supervision. Her  also stated he is no longer able to care for her either. She is now pending placement to SNF vs group home, though medicaid must be obtained first as she has limited income.      Interval Problem Update  Patient is lying in bed, pleasant and cooperative.  She is oriented only to self and is otherwise disoriented.  She denies pain and any other problems at this time.    -All systems reviewed and exam is otherwise unchanged from prior.     -Vital signs reviewed and are within normal limits.    -No changes to care plan at this time.    -Dispo: Needs placement. Patient not eligible for Medicaid due to income.  Social work has requested [facility] to screen for institutional Medicaid.  Patient's  needs to spend down or file for separation of assets.    TESSY Kelley.

## 2020-07-25 NOTE — PROGRESS NOTES
RN skin check complete with Jackelyn RN  Devices in place mepilex to heel.  Skin assessed under devices yes  Confirmed pressure ulcers found on na  New potential pressure ulcers noted on na Wound consult placed na.  The following interventions in place waffle cushion, turning, incont checks, barrier cream, 2 rn skin check, mepilex for prevention     Skin intact and blanching  Right heel / left heel red/pink/slightly boggy to right heel; mepilex in place

## 2020-07-25 NOTE — PROGRESS NOTES
RA, AxOx1, q2 turns, bed bound, 1:1 feedings, call light in reach, awaiting placement, no pain complaints, skin check complete.

## 2020-07-25 NOTE — PROGRESS NOTES
RN skin check complete with Jackelyn RN  Devices in place mepilex to heel.  Skin assessed under devices yes  Confirmed pressure ulcers found on na  New potential pressure ulcers noted on na Wound consult placed na.  The following interventions in place waffle cushion, turning, incont checks, barrier cream, 2 rn skin check, mepilex for prevention     Skin intact and blanching  Right heel red/pink/slightly boggy to right heel; mepilex in place

## 2020-07-26 NOTE — PROGRESS NOTES
Assumed care at 1900. Pt A&Ox1, reoriented pt. On room air. Denies pain. Snack offered, pt ate apple sauce. Q2 repositioning, educated pt on risks and benefits. Waffle overlay in place. Safety precautions maintained. Will continue to monitor.

## 2020-07-26 NOTE — PROGRESS NOTES
2 RN skin check complete CURT Mendoza.   Devices in place mepilex.  Skin assessed under devices yes.  Confirmed pressure ulcers found on NA.  New potential pressure ulcers noted on NA.   Wound consult placed NA.    The following interventions in place 2 RN skin check, Q2 repositions, mepilex, incontinence care, pillow for support, waffle overlay, barrier paste.     Skin Assessment -   Elbows - pink/blanching  Sacrum - pink/blanching  Labia - swollen/pink  Heels - pink/boggy/blanching

## 2020-07-26 NOTE — PROGRESS NOTES
Assumed care of patient this shift. Patient alert and oriented to self, denies any pain or discomfort. Q2 turning/repositioning. Belongings and call bell within reach. Bed alarm on and in place.

## 2020-07-27 NOTE — PROGRESS NOTES
Assumed care at 1900. Pt A&Ox1. Denies pain. Pleasant and cooperative. Repositioned Q2. Safety precautions maintained. Will continue to monitor.

## 2020-07-27 NOTE — PROGRESS NOTES
2 RN skin check complete CURT Bonilla.   Devices in place mepilex.  Skin assessed under devices yes.  Confirmed pressure ulcers found on NA.  New potential pressure ulcers noted on NA.   Wound consult placed NA.    The following interventions in place Q2 repositions, 2RN skin check, mepilex, incontinence care, barrier cream, waffle overlay.     Skin Assessment-  Elbows - pink/blanching  Left forearm - scab  Upper back - red/blanching  Sacrum - pink/blanching  Heels - flaky/boggy/pink/blanching

## 2020-07-27 NOTE — PROGRESS NOTES
2 RN skin check complete: CURT Diaz.   Devices in place: mepilex.  Skin assessed under devices: yes.  Confirmed pressure ulcers found on: N/A.  New potential pressure ulcers noted on: N/A.   Wound consult placed: N/A.     The following interventions in place: Q2 turns, 2RN skin check, mepilex, incontinence care, barrier cream, waffle overlay, float heels      Notes:  Elbows - pink/blanching  Upper back - red/blanching  Sacrum - pink/blanching  Heels - flaky/boggy/pink/blanching with mepilex

## 2020-07-27 NOTE — PROGRESS NOTES
"Received report from night shift and assumed care. Pt is A&OX 1, oriented to self only.  Confused and requires frequent reorienting. Yells out repeating \"Help, can somebody please help me\" and when asked what she needs help with she replies that she \"just need[s] help\". Bed alarm on, pt no making attempts to get out of bed. Denies pain. Medication given per MAR without issue. All needs met. Safety precautions and hourly rounding in place.  "

## 2020-07-28 NOTE — CARE PLAN
Problem: Safety  Goal: Will remain free from injury  Outcome: PROGRESSING AS EXPECTED  Goal: Will remain free from falls  Outcome: PROGRESSING AS EXPECTED  Bed alarm in place, educated about fall risks and precautions     Problem: Skin Integrity  Goal: Risk for impaired skin integrity will decrease  Outcome: PROGRESSING AS EXPECTED   2 RN skin checks every shift, incontinence monitoring.

## 2020-07-28 NOTE — PROGRESS NOTES
Hospital Medicine Twice Weekly Progress Note     Hospital Course  Ms. Bennett is a wheelchair bound 75-year-old female with a PMH of arthritis, DVT, Parkinson's, and dementia who presented 5/14/2020 with complaints of shortness of breath, nonproductive cough, fevers, and chills for 5 days. She is confused at baseline and was sent by her  since he was unable to care for her. An EKG was done in the ED and was unremarkable, though a chest xray showed bilateral interstitial infiltrates. COVID was ruled out in the ED. She was evaluated by PT/OT who recommended 24/7 supervision. Her  also stated he is no longer able to care for her either. She is now pending placement to SNF vs group home, though medicaid must be obtained first as she has limited income.      Interval Problem Update  Laying in bed. Will little functional movement. Requesting to see a MD about her feet, which have some decubitous ulcers to heels, covered with mepilex. Nails overall quite benign, but will request wound team address when available.    I have performed a physical exam and reviewed and updated ROS and Plan today (7/28/2020). In review of the prevoius note, there are no changes except as documented above.     I certify that the patient requires continued medically necessary hospital services for the treatment of dementia, pending placement which will require medicaid, or institutional medicaid, which is pending. The patient will remain in the hospital for the foreseeable future.  Discharge may or may not occur in the next 20 days due to ongoing discharge delays due to no medically acceptable discharge option.    OMAR Garcia.

## 2020-07-28 NOTE — PROGRESS NOTES
Received report from night shift and assumed care.  Pt is A&OX 1, oriented to self only, confused.  She is calm at this time, resting quietly in bed. She is reporting pain in her Right pinky, 7/10, after being reminded by the CNA about her stiffness. Pinky assessment reveals rigidity and decreased movement. Tremors in BUE present.  Medication given per MAR. All needs met. Safety precautions and hourly rounding in place.

## 2020-07-28 NOTE — THERAPY
Physical Therapy   Daily Treatment     Patient Name: Carmenza Bennett  Age:  75 y.o., Sex:  female  Medical Record #: [id number]  Today's Date: 7/28/2020     Precautions: (P) Fall Risk(Dementia)    Assessment    Tx focused primarily on bed mobility and standing transfer. Pt with wavering cognition throughout tx session, intermittently pleasantly confused vs agitated at therapist. Pt continues to demonstrate LE rigidity 2/2 PD and posterior lean with sitting balance. Pt not at safe level for transfers as she requires max Ax2 to achieve sit to squat. Given lack of progress with therapy intervention over the past 2 months, patient not benefiting from therapy at this time and will no longer be followed by PT. Recommend 24/7 care.     Plan    Discharge secondary to no likely benefit.    Discharge recommendations:  Recommend 24/7 care via [facility] or group home.     Patient will not be actively followed for physical therapy services at this time, however may be seen if requested by physician for 1 more visit within 30 days to address any discharge or equipment needs        Objective       07/28/20 0959   Pain 0 - 10 Group   Description Sore  (complained of swelling in B LE )   Cognition    Speech/ Communication Delayed Responses  (Lack of affect)   Orientation Level Not Oriented to Year   Level of Consciousness Confused   Sequencing Impaired   Initiation Impaired   Comments pt confused with treatment and displayed lack of affect   Passive ROM Lower Body   Comments B ankles rigid; B hamstrings decreased PROM   Active ROM Lower Body    Comments knee extension limited by quad weakness and hamstring tightness   Strength Lower Body   Comments B LE grossly weak   Supine Lower Body Exercise   Supine Lower Body Exercises Yes  (Ankle pumps x10 bilaterally)   Sitting Lower Body Exercises   Long Arc Quad   (2 sets of 5)   Balance   Sitting Balance (Static) Trace +   Sitting Balance (Dynamic) Trace +   Standing Balance (Static) Dependent    Standing Balance (Dynamic) Dependent   Comments poor ability to maintain upright standing with max Ax2   Bed Mobility    Supine to Sit Maximal Assist   Sit to Supine Maximal Assist   Scooting Maximal Assist   Rolling Total Assist to Rt.;Total Assist to Lt.   Skilled Intervention Tactile Cuing;Verbal Cuing;Sequencing;Postural Facilitation;Compensatory Strategies   Functional Mobility   Sit to Stand Maximal Assist  (partial stand )   Short Term Goals    Short Term Goal # 1 Pt will be SPV for supine<>sit in 6 txs to improve bed mobility.   Goal Outcome # 1 Progressing slower than expected  (Unable to meet goals 2/2 cognition)   Short Term Goal # 2 Pt will be [place] for transfers with FWW in 6 txs to improve OOB activity.   Goal Outcome # 2 Progressing slower than expected  (Unable to meet goals 2/2 cognition)   Short Term Goal # 3 Pt will be [place] for gait with FWW x 50' to improve mobility.   Goal Outcome # 3 Progressing slower than expected  (Unable to meet goals 2/2 cognition)   Anticipated Discharge Equipment   DC Equipment Unable To Determine At This Time

## 2020-07-28 NOTE — PROGRESS NOTES
2 RN skin check complete CURT Bonilla.   Devices in place Mepilex.  Skin assessed under devices yes.  Confirmed pressure ulcers found on NA.  New potential pressure ulcers noted on NA.     Wound consult placed NA.   Skin assessment:  -bilateral heels pink/boggy but blanching  -sacral area is intact pink and blanching  -Back has some areas of redness but blanching  -bilateral elbows pink and blanching  -some scabbing on Left forearm.     The following interventions in place:  -2 RN skin checking done, Q2 turns in place, mepilexes for protection, barrier cream in place, patient on waffle overlay. Dri omkar pads in use and routine incontinence checking.

## 2020-07-28 NOTE — PROGRESS NOTES
Pt. Receives laying in bed awake,alert and orientedx1. Denies any complaint of pain at the time of assessment, slow to respond verbally but otherwise engages in assessments and conversations. Educated about fall risks and precautions. Reoriented pt. Due meds given. Needs attended.

## 2020-07-29 NOTE — CARE PLAN
Problem: Safety  Goal: Will remain free from injury  Outcome: PROGRESSING AS EXPECTED  Goal: Will remain free from falls  Outcome: PROGRESSING AS EXPECTED  Bed alarm in place, call light within reach.     Problem: Skin Integrity  Goal: Risk for impaired skin integrity will decrease  Outcome: PROGRESSING AS EXPECTED  2 RN skin checking done. Incontinence monitoring.

## 2020-07-29 NOTE — PROGRESS NOTES
"Assumed care of patient this shift. Patient confused alert to self, frequently yells out \"hello\" from her room. Re orienting patient as necessary. Q2 turning/repositioning, incontinence care as needed. Mepilex to heels. Bed alarm in place, belongings within reach.   "

## 2020-07-29 NOTE — PROGRESS NOTES
Pt. Received in bed awake, smiling, denies any complaint of pain at the time of assessment. Pt. Tolerated meds for tonight. All fall precautions in place. No other concerns at this time.

## 2020-07-30 NOTE — PROGRESS NOTES
Assumed care of patient this shift. Alert and oriented x1, confused frequently yells out. Fall precautions in place bed alarm on. Incontinence care provided. Call bell within reach.

## 2020-07-30 NOTE — PROGRESS NOTES
Received report and assumed care at shift change. A&O x 1, no complain of pain or distress. Patient continue to yell out, occasionally. Fall precautions in place, bed locked and in lowest position. Needs attended to.

## 2020-07-30 NOTE — PROGRESS NOTES
2 RN skin check complete with Rosey ADAMS RN   Devices in place- mepilex   Skin assessed under devices : yes   Confirmed pressure ulcers found on: n/a  New potential pressure ulcers noted on : n/a   Wound consult placed: n/a   The following interventions in place:    Q2 turning/repositioning, incontinence care, waffle overlay, mepilex for protection/prevention, barrier cream     Assessment:     Elbows: pink but blanching   Back: some redness noted but blanching, (pillows for support)  Heels: mepilex, boggy but blanching  Sacrum:pink but blanching

## 2020-07-30 NOTE — PROGRESS NOTES
2 RN skin check completed with Mercedez ELIAS.   Devices in place Mepilex .  Skin assessed under devices yes.  Confirmed pressure ulcers found on N/A.  New potential pressure ulcers noted on N/A. Wound consult placed N/A.    The following interventions in place Q2 hour turns, 2 RN skin check, waffle overlay mattress, barrier cream, Mepilex to bilateral heels, pillows for support/repositioning.     Skin assessment:  Bilateral heels boggy, red and blanching.  Sacrum pink and blanching.  Kristy area redness.  Bilateral elbows pink and blanching.  BLE trace edema. *

## 2020-07-31 NOTE — PROGRESS NOTES
Received report and assumed care at shift change. A&O x 1, with flat affect. No complain of pain or distress. Cooperative with cares, showered tonight. Fall precautions in place, bed locked and in lowest position. Needs attended to.

## 2020-07-31 NOTE — PROGRESS NOTES
RN skin check completed with Mercedez ELIAS.   Devices in place Mepilex .  Skin assessed under devices yes.  Confirmed pressure ulcers found on N/A.  New potential pressure ulcers noted on N/A. Wound consult placed N/A.     The following interventions in place Q2 hour turns, 2 RN skin check, waffle overlay mattress, barrier cream, Mepilex to bilateral heels, pillows for support/repositioning.      Skin assessment:  Bilateral heels boggy, red and blanching.  Sacrum pink and blanching.  Kristy area redness.  Bilateral elbows pink and blanching.  BLE trace edema.

## 2020-07-31 NOTE — CARE PLAN
Problem: Safety  Goal: Will remain free from falls  Outcome: PROGRESSING AS EXPECTED     Problem: Discharge Barriers/Planning  Goal: Patient's continuum of care needs will be met  Outcome: PROGRESSING SLOWER THAN EXPECTED

## 2020-08-01 NOTE — PROGRESS NOTES
RN skin check completed with Sandra ELIAS.   Devices in place Mepilex .  Skin assessed under devices yes.  Confirmed pressure ulcers found on N/A.  New potential pressure ulcers noted on N/A.   Wound consult placed N/A.       General skin assessment:  Bilateral ears: Intact  Bilateral elbows pink and blanching.  Bilateral heels boggy, red and blanching.  Sacrum pink and blanching.  Kristy area redness.     The following interventions in place   Q2 hour turns, 2 RN skin check, waffle overlay mattress, barrier cream, Mepilex to bilateral heels, pillows for support/repositioning.

## 2020-08-01 NOTE — PROGRESS NOTES
Received report from night shift RN.  On RA with no signs of acute distress. Pt sat up in chair at the nurses station for an hr. Denies any pain. POC discussed with pt. All safety and comfort measures in place. Call light and personal belongings by bedside. Bed locked and in lowest position. Will continue to monitor.

## 2020-08-01 NOTE — PROGRESS NOTES
"Received report and assumed care at shift change. Patient A&O x1, no complain of pain or distress. Patient's  came to visit at start of shift, patient's was in tears saying she, \"wants to be out of here\". Patient started yelling out, as the  left and attempted to get out of bed. Reassured and encouraged patient. Patient went to asleep after a while. Will continue to monitor.   "

## 2020-08-01 NOTE — PROGRESS NOTES
Assumed care of pt. On RA with no signs of acute distress. Pt reports p[ain to neck. Medicated per MAR. All comfort measures in place. Bed locked and in lowest position. Call light and personal belongings by bedside. Hourly rounding in place.

## 2020-08-01 NOTE — PROGRESS NOTES
Hospital Medicine Twice Weekly Progress Note     Hospital Course  Ms. Bennett is a wheelchair bound 75-year-old female with a PMH of arthritis, DVT, Parkinson's, and dementia who presented 5/14/2020 with complaints of shortness of breath, nonproductive cough, fevers, and chills for 5 days. She is confused at baseline and was sent by her  since he was unable to care for her. An EKG was done in the ED and was unremarkable, though a chest xray showed bilateral interstitial infiltrates. COVID was ruled out in the ED. She was evaluated by PT/OT who recommended 24/7 supervision. Her  also stated he is no longer able to care for her either. She is now pending placement to SNF vs group home, though medicaid must be obtained first as she has limited income.      Interval Problem Update  Laying in bed sleeping comfortably. No complaints when awoken. Eating, voiding, stooling. Taking meds. No behavior problems.    I have performed a physical exam and reviewed and updated ROS and Plan today (8/1/2020). In review of the prevoius note, there are no changes except as documented above.     I certify that the patient requires continued medically necessary hospital services for the treatment of dementia, pending placement which will require medicaid, or institutional medicaid, which is pending. The patient will remain in the hospital for the foreseeable future.  Discharge may or may not occur in the next 20 days due to ongoing discharge delays due to no medically acceptable discharge option.    OMAR Garcia.

## 2020-08-01 NOTE — PROGRESS NOTES
RN skin check completed with Yovani ELIAS.   Devices in place Mepilex .  Skin assessed under devices yes.  Confirmed pressure ulcers found on N/A.  New potential pressure ulcers noted on N/A. Wound consult placed N/A.     The following interventions in place Q2 hour turns, 2 RN skin check, waffle overlay mattress, barrier cream, Mepilex to bilateral heels, pillows for support/repositioning.      Skin assessment:  Bilateral heels boggy, red and blanching.  Sacrum pink and blanching.  Kristy area redness.  Bilateral elbows pink and blanching.  BLE trace edema.

## 2020-08-02 NOTE — CARE PLAN
Problem: Safety  Goal: Will remain free from injury  Outcome: PROGRESSING AS EXPECTED       Problem: Infection  Goal: Will remain free from infection  Outcome: PROGRESSING AS EXPECTED

## 2020-08-02 NOTE — PROGRESS NOTES
RN skin check completed with Yovani ELIAS.   Devices in place: Mepilex .  Skin assessed under devices: yes.  Confirmed pressure ulcers found on: N/A.  New potential pressure ulcers noted on: N/A.   Wound consult placed: N/A.        General skin assessment:  Bilateral ears: Intact  Bilateral elbows pink and blanching.  Bilateral heels boggy, red and blanching.  Sacrum pink and blanching.  Kristy area redness.      The following interventions in place   Q2 hour turns, 2 RN skin check, waffle overlay mattress, barrier cream, Mepilex to bilateral heels, pillows for support/repositioning.

## 2020-08-02 NOTE — PROGRESS NOTES
Assumed care of pt at shift change.  On RA with no signs of acute distress. . Denies any pain. POC discussed with pt. All safety and comfort measures in place. Call light and personal belongings by bedside. Bed locked and in lowest position. Will continue to monitor.

## 2020-08-03 NOTE — PROGRESS NOTES
Assumed care of pt at shift change.  On RA with no signs of acute distress.  Denies any pain. Pt sat at the nurses station for lunch. All safety and comfort measures in place. Call light and personal belongings by bedside. Bed locked and in lowest position. Will continue to monitor.

## 2020-08-03 NOTE — CARE PLAN
Problem: Safety  Goal: Will remain free from falls  Outcome: PROGRESSING AS EXPECTED       Problem: Skin Integrity  Goal: Risk for impaired skin integrity will decrease  Outcome: PROGRESSING AS EXPECTED

## 2020-08-03 NOTE — PROGRESS NOTES
RN skin check completed with Mamie ELIAS.   Devices in place Mepilex .  Skin assessed under devices yes.  Confirmed pressure ulcers found on N/A.  New potential pressure ulcers noted on N/A.   Wound consult placed N/A.        General skin assessment:  Bilateral ears: Intact  Bilateral elbows pink and blanching.  Bilateral heels boggy, red and blanching.  Sacrum pink and blanching.  Kristy area redness.      The following interventions in place   Q2 hour turns, 2 RN skin check, waffle overlay mattress, barrier cream, Mepilex to bilateral heels, pillows for support/repositioning.

## 2020-08-03 NOTE — PROGRESS NOTES
Pt is resting in bed. A&O to self only. No c/o pain. Calm and cooperative. Bed in lowest and locked position. Fall precautions in place. Bed alarm on. Call light and belongings in reach.

## 2020-08-04 NOTE — PROGRESS NOTES
2 rn skin check complete with gonzales RN    Skin intact  Pink blanching    Devices in place: n/a  Interventions: incontinence care, 2 rn skin check, q2h turns, waffle mattress, mepilex, hourly rounding.

## 2020-08-04 NOTE — CARE PLAN
Problem: Safety  Goal: Will remain free from injury  Outcome: PROGRESSING AS EXPECTED  Goal: Will remain free from falls  Outcome: PROGRESSING AS EXPECTED     Problem: Respiratory:  Goal: Respiratory status will improve  Description: Pt here with PNA. Pt was on IV/PO abx. Pt has been placed on room air. Pt not in any respiratory distress. Pt with clear lung sounds, but diminished to the bases. Will continue to monitor for changes.   Outcome: PROGRESSING AS EXPECTED     Problem: Respiratory:  Goal: Respiratory status will improve  Description: Pt here with PNA. Pt was on IV/PO abx. Pt has been placed on room air. Pt not in any respiratory distress. Pt with clear lung sounds, but diminished to the bases. Will continue to monitor for changes.   Outcome: PROGRESSING AS EXPECTED

## 2020-08-04 NOTE — PROGRESS NOTES
Ms. Bennett is a wheelchair bound 75-year-old female with a PMH of arthritis, DVT, Parkinson's, and dementia who presented 5/14/2020 with complaints of shortness of breath, nonproductive cough, fevers, and chills for 5 days. She is confused at baseline and was sent by her  since he was unable to care for her. An EKG was done in the ED and was unremarkable, though a chest xray showed bilateral interstitial infiltrates. COVID was ruled out in the ED. She was evaluated by PT/OT who recommended 24/7 supervision. Her  also stated he is no longer able to care for her either. She is now pending placement to SNF vs group home, though medicaid must be obtained first as she has limited income.    8/4:  Sitting at nurses station eating breakfast on evaluation. Oriented to self only.  Mood appears stable.  No change to PE since prior.  Medically stable.

## 2020-08-04 NOTE — PROGRESS NOTES
2 RN skin check complete with Judy RN  Devices in place n/a  Skin assessed under devices yes  Confirmed pressure ulcers found on N/A.  New potential pressure ulcers noted on N/A.  The following interventions in place :2 RNS check,waffle overlay mattress,barrier cream,pillow to support    Bilateral heels red and blanching  Sacrum red and blanching  Bilateral elbows pink and blanching.  Kristy area  Some redness

## 2020-08-05 NOTE — PROGRESS NOTES
aox1 skin check complete. Confused. wc bound. Up to table to socialize with other patients. Very good appetite eats 100% meals. All needs met at this time. Call light and belongings in reach. wcm.

## 2020-08-06 NOTE — PROGRESS NOTES
aox1 skin check complete. Confused. wc bound. Up to table to socialize with other patients. Daughter came to visit. All needs met at this time. Call light and belongings in reach. wcm.

## 2020-08-06 NOTE — PROGRESS NOTES
"2140  Patient noted to be screaming out \"Hello!\" 3X. This RN went to check on patient, patient noted to be agitated, have both legs out of bed and about to get out of bed. Distraction redirection, education done.     Patient verbalized \"Get me out of this bed!\" Education provided. Other staff also came in to assist, patient started to yell at staff and attempted to hit staff.    2146  PRN haldol given as ordered for agitation. Charge RN notified. Will continue to monitor for agitation.  "

## 2020-08-06 NOTE — PROGRESS NOTES
Report received from day shift nurse. Assumed care @ 1900.     Patient on bed resting. Alert and oriented to self, pleasant at this time. Assessment completed. On room air, tolerating well. Denies any pain and discomfort at this time. Patient educated regarding plan of care. 1-2X assist. q 2 turns in place.    Bed locked, in lowest position, treaded socks on. Needs attended. No further needs at this time. Communication board updated. Call light and personal belongings within reach.

## 2020-08-07 NOTE — PROGRESS NOTES
2 RN skin check complete with Celia RN  Devices in place n/a  Skin assessed under devices yes  Confirmed pressure ulcers found on N/A.  New potential pressure ulcers noted on N/A.  The following interventions in place :2 RNS check,waffle overlay mattress,barrier cream,pillow to support     Bilateral heels red and blanching  Sacrum red and blanching  Bilateral elbows pink and blanching.  Kristy area  Some redness

## 2020-08-07 NOTE — PROGRESS NOTES
Pt calm and resting, pleasant and feeling well.  Denies pain or n/v with adequate appetite. Q2 turns and linen changes with incontinence. VSS with needs met at this time.

## 2020-08-07 NOTE — PROGRESS NOTES
"Patient refused 2 RN skin check. Education provided. Pt verbalized \"No!\" and held on the blanket tight and covered self.  "

## 2020-08-07 NOTE — PROGRESS NOTES
Ms. Bennett is a wheelchair bound 75-year-old female with a PMH of arthritis, DVT, Parkinson's, and dementia who presented 5/14/2020 with complaints of shortness of breath, nonproductive cough, fevers, and chills for 5 days. She is confused at baseline and was sent by her  since he was unable to care for her. An EKG was done in the ED and was unremarkable, though a chest xray showed bilateral interstitial infiltrates. COVID was ruled out in the ED. She was evaluated by PT/OT who recommended 24/7 supervision. Her  also stated he is no longer able to care for her either. She is now pending placement to SNF vs group home, though medicaid must be obtained first as she has limited income.    8/7:  Patient resting in bed.  She denies acute pain or discomfort.  No change in ROS or PE since prior evaluation.  Medicaid pending.

## 2020-08-07 NOTE — PROGRESS NOTES
Patient on bed resting. Denies pain. Due medications given. Fluids offered.    Needs attended. No additional needs at this time. Call light and personal belongings within reach. Left on bed resting comfortably. Bed alarm in place and functioning. Hourly rounding in place.

## 2020-08-08 NOTE — PROGRESS NOTES
Pt resting in bed. Pt has call light within reach. Bed is locked and in lowest position. Pt shows no signs of distress. Pt denies any pain. All needs met at this time.

## 2020-08-08 NOTE — PROGRESS NOTES
2 RN skin check complete with [place] RN  Devices in place no.  Skin assessed under devices n/a.  Confirmed pressure ulcers found on N/A.  New potential pressure ulcers noted on N/A.    The following interventions in place :2 RNS check,waffle overlay mattress,barrier cream,pillow to support    Bilateral elbows pink and blanching.  Sacrum red and blanching  Kristy area  Some redness  Bilateral heels red and blanching

## 2020-08-08 NOTE — PROGRESS NOTES
Resting comfortably in room, watching TV. All needs addressed. Call light in reach, bed locked/lowest position. WCTM.

## 2020-08-08 NOTE — PROGRESS NOTES
2 RN skin check complete with CURT Hayden  Devices in place none.  Skin assessed under devices n/a.  Confirmed pressure ulcers found on n/a.  New potential pressure ulcers noted on n/a.     The following interventions in place: 2RN skin check, waffle overlay mattress, barrier cream, Q2 turns, pillows for positioning, mepilex     Skin assessment:   Bilat ears pink and blanching  Bilat elbows pink and blanching  Sacrum is red and blanching  Bilat heels red and blanching, mepilex in place.

## 2020-08-09 NOTE — PROGRESS NOTES
Pt sitting at table. Pt in pleasant mood. Pt denies pain, shows no sign of distress at this time. All needs met at this time.

## 2020-08-09 NOTE — PROGRESS NOTES
2 RN skin check complete with [place] RN  Devices in place no.  Skin assessed under devices n/a.  Confirmed pressure ulcers found on N/A.  New potential pressure ulcers noted on N/A.     The following interventions in place :2 RNS check,waffle overlay mattress,barrier cream,pillow to support     Bilateral elbows pink and blanching.  Sacrum red and blanching  Kristy area  Some redness  Bilateral heels red and blanching

## 2020-08-09 NOTE — PROGRESS NOTES
2 RN skin check complete with CURT Liz  Devices in place none.  Skin assessed under devices n/a.  Confirmed pressure ulcers found on n/a.  New potential pressure ulcers noted on n/a.      The following interventions in place: 2RN skin check, waffle overlay mattress, barrier cream, Q2 turns, pillows for positioning, mepilex      Skin assessment:   Bilat ears pink and blanching  Bilat elbows pink and blanching  Sacrum is red and blanching  Bilat heels red and blanching, mepilex in place.

## 2020-08-09 NOTE — PROGRESS NOTES
Resting comfortably in room. All needs addressed. Call light in reach, bed locked/lowest position. WCTM.

## 2020-08-10 NOTE — PROGRESS NOTES
2 RN skin check complete with [place] RN  Devices in place no.  Skin assessed under devices n/a.  Confirmed pressure ulcers found on N/A.  New potential pressure ulcers noted on N/A.     The following interventions in place :2 RNS check,waffle overlay mattress,barrier cream,pillow to support     Bilateral elbows pink and blanching.  Sacrum red and blanching  Kristy area  Some redness  Bilateral heels red and blanching

## 2020-08-10 NOTE — PROGRESS NOTES
Received report and assumed care of patient (pt) at change of shift. Pt is A&Ox1. Disoriented to time, place, and situation. Patient denies any pain or discomfort and there is no signs of distress. Patient spilt hot coffee on herself at nurses station. Her left thigh was pink and Ice pack was placed on it. Patient changed and brought back to nurses station for lunch.  Safety precautions in place and all needs met at this time.

## 2020-08-11 NOTE — PROGRESS NOTES
2 RN skin check complete leia   Devices in place mepilex to bilat heels.  Skin assessed under devices yes.  Confirmed pressure ulcers found on na.  New potential pressure ulcers noted on na. Wound consult placed na  The following interventions in place 2 rn skin check, turning, barrier paste, waffle mattress, mepilex for protection    Sacral area pink and blanching; barrier cream applied  bilat heels red left > right side but blanching; right heel boggy > left side; mepilex in place

## 2020-08-11 NOTE — CARE PLAN
Problem: Safety  Goal: Will remain free from injury  Outcome: PROGRESSING AS EXPECTED  Bed alarm on for safety and high fall risk  Problem: Discharge Barriers/Planning  Goal: Patient's continuum of care needs will be met  Outcome: PROGRESSING SLOWER THAN EXPECTED  Awaiting placement

## 2020-08-11 NOTE — PROGRESS NOTES
"Hospital Medicine Twice Weekly Progress Note    Hospital Course   Ms. Bennett is a wheelchair bound 75-year-old female with a PMH of arthritis, DVT, Parkinson's, and dementia who presented 5/14/2020 with complaints of shortness of breath, nonproductive cough, fevers, and chills for 5 days. She is confused at baseline and was sent by her  since he was unable to care for her. An EKG was done in the ED and was unremarkable, though a chest xray showed bilateral interstitial infiltrates. COVID was ruled out in the ED. She was evaluated by PT/OT who recommended 24/7 supervision. Her  also stated he is no longer able to care for her either. She is now pending placement to SNF vs group home, though medicaid must be obtained first as she has limited income.    Interval Problem Update  8/11: Seen and examined at bedside. Alert and sitting up in bed without any signs of acute distress. Flat affect and depressed mood this morning. She denied any physical complaints or medical needs. She responded to every ROS question with \"I feel great. Nothing is wrong with me\". No significant changes in PE or ROS. VSS on room air. No acute medical needs at this time. Working on obtaining medicaid for placement.     Assessment and Plan  Dementia (HCC)- (present on admission)  Assessment & Plan  - Chronic and stable.  - Frequent re-orientation, reestablish circadian rhythm, encourage familiar faces/family in room, avoid or minimize narcotics/sedatives.   - Continue on seroquel and prozac.   - PRN Haldol available if needed. Has not required Haldol since 8/5.     Discharge planning issues  Assessment & Plan  -Patient not eligible for Medicaid due to income.  Social work has requested [facility] screen her for institutional Medicaid.  Will need placement after that is obtained.  -Patient's  needs to spend down or file for separation of assets.      Lower extremity pain, bilateral- (present on admission)  Assessment & Plan  -PRN " Tylenol available if needed.  -PT/OT recommending SNF placement, but will eventually need group home placement with 24/7 care after SNF making it a difficult discharge.  -Mobilize as tolerated.    Parkinson's disease (HCC)- (present on admission)  Assessment & Plan  -Chronic and stable.  -Continue sinemet.  -PT/OT recommending SNF placement, but again will require 24/7 care at a group home afterward.

## 2020-08-11 NOTE — PROGRESS NOTES
2 RN skin check complete with CURT Liz  Devices in place no.  Skin assessed under devices n/a.  Confirmed pressure ulcers found on N/A.  New potential pressure ulcers noted on N/A.     The following interventions in place :2 RNS check,waffle overlay mattress,barrier cream,pillow to support     Bilateral elbows pink and blanching.  Sacrum red and blanching  Kristy area  Some redness  Bilateral heels red and blanching

## 2020-08-11 NOTE — PROGRESS NOTES
Confused, bed alarm on for safety; cooperative and compliant with care. Cont plan of care, call light within reach

## 2020-08-12 NOTE — CARE PLAN
Problem: Safety  Goal: Will remain free from injury  Outcome: PROGRESSING AS EXPECTED  Bed alarm on for safety and assistance and high fall risk  Problem: Discharge Barriers/Planning  Goal: Patient's continuum of care needs will be met  Outcome: PROGRESSING SLOWER THAN EXPECTED  Awaiting placement

## 2020-08-12 NOTE — PROGRESS NOTES
I certify that the patient requires continued medically necessary hospital services for the treatment of cognitive disorder. The patient will remain in the hospital for the foreseeable future.  Discharge may or may not occur in the next 20 days due to ongoing discharge delays due to no medically acceptable discharge option.

## 2020-08-12 NOTE — PROGRESS NOTES
Alert to self, bed alarm in place. Sitting at the nurses station in her wheelchair and then showered before going to bed. Cont plan of care, call light within reach

## 2020-08-12 NOTE — PROGRESS NOTES
2 RN skin check complete. leia  Devices in place mepilex to heels, boots placed  Skin assessed under devices na  Confirmed pressure ulcers found on na.  New potential pressure ulcers noted on na. Wound consult placed na.  The following interventions in place 2 rn skin check, turning, barrier paste, waffle mattress, mepilex for protection     Sacral area pink and blanching; barrier cream applied  bilat heels red left > right side but blanching; right heel boggy > left side; new mepilex applied and moon placed    Showered this evening

## 2020-08-13 NOTE — PROGRESS NOTES
2 RN skin check complete. leia  Devices in place mepilex, moon boots.  Skin assessed under devices yes  Confirmed pressure ulcers found on na.  New potential pressure ulcers noted on na Wound consult placed na  The following interventions in place  2 rn skin check, turning, barrier paste, waffle mattress, mepilex for protection, moon boots     Sacral area pink and blanching; barrier cream applied  bilat heels red left > right side but blanching; right heel boggy > left side; mepilex in place and moon boots replaced  Lateral sides of feet red but blanching

## 2020-08-13 NOTE — CARE PLAN
Problem: Safety  Goal: Will remain free from injury  Outcome: PROGRESSING AS EXPECTED  Bed alarm on for safety and confusion with high fall risk  Problem: Discharge Barriers/Planning  Goal: Patient's continuum of care needs will be met  Outcome: PROGRESSING SLOWER THAN EXPECTED  Awaiting placement

## 2020-08-13 NOTE — PROGRESS NOTES
Confused and will occasionally yell out for help. Bed alarm in place and cooperative with care tonight. Cont plan of care, call light within reach    Irritable this morning, yelling at us telling us to get out when changing her incontinence sheets

## 2020-08-14 NOTE — PROGRESS NOTES
Patient refused 2RK [place], educated regarding importance.  Education reinforced by CURT Caldwell.  Patient continues to refuse.

## 2020-08-14 NOTE — PROGRESS NOTES
Pharmacy Pharmacotherapy Consult for LOS >30 days    Admit Date: 5/14/2020      Medications were reviewed for appropriateness and ongoing need.     Current Facility-Administered Medications   Medication Dose Route Frequency Provider Last Rate Last Dose   • carbidopa-levodopa (SINEMET)  MG tablet 1 Tab  1 Tab Oral Q6HRS Brenda A Heglar, A.P.R.N.   1 Tab at 08/14/20 1018   • acetaminophen (TYLENOL) tablet 1,000 mg  1,000 mg Oral Q8HRS PRN Brenda A Heglar, A.P.R.N.   1,000 mg at 08/13/20 0831   • QUEtiapine (SEROQUEL) tablet 25 mg  25 mg Oral BID Brenda A Heglar, A.P.R.N.   25 mg at 08/14/20 1018   • haloperidol lactate (HALDOL) injection 2-5 mg  2-5 mg Intramuscular Q6HRS PRN Brenda A Heglar, A.P.R.N.   5 mg at 08/13/20 1959   • enoxaparin (LOVENOX) inj 40 mg  40 mg Subcutaneous DAILY Brenda A Heglar, A.P.R.N.   40 mg at 08/14/20 1017   • FLUoxetine (PROZAC) capsule 40 mg  40 mg Oral DAILY Brenda A Heglar, A.P.R.N.   40 mg at 08/14/20 1017   • senna-docusate (PERICOLACE or SENOKOT S) 8.6-50 MG per tablet 2 Tab  2 Tab Oral BID Brenda A Heglar, A.P.R.N.   2 Tab at 08/14/20 1017    And   • polyethylene glycol/lytes (MIRALAX) PACKET 1 Packet  1 Packet Oral QDAY PRN Brenda A Heglar, A.P.R.N.   1 Packet at 07/15/20 0504       Recommendations:  No recommendations at this time.    Linda Sena, RaynaD, BCPS

## 2020-08-15 NOTE — THERAPY
"Speech Language Pathology   Clinical Swallow Evaluation     Patient Name: Carmenza Bennett  AGE:  75 y.o., SEX:  female  Medical Record #: [id number]  Today's Date: 8/15/2020     Precautions  Precautions: (P) Fall Risk, Swallow Precautions ( See Comments)  Comments: (P) PD, dementia    Assessment    74 y/o admitted on 5/14 for SOB/nonproductive cough. PMH includes DVT, Parkinson's, and dementia. Not seen by SLP at [facility]. Dx chest on 5/25 found \"No acute cardiopulmonary abnormality.\"    Pt seen on this date for a clinical swallow evaluation. Per CNA, pt c/o difficulty swallowing waffles this morning. Pt A&Ox2 to self and hospital with disorientation to other spheres. No gross asymmetry appreciated during the oral motor evaluation. Immediate coughing occurred in ~50% of soft solid trials which is concerning for aspiration. Throat clear x1 appreciated with consecutive straw sips of thin liquids, otherwise no overt s/sx of aspiration appreciated with thin liquids, oatmeal, apple sauce, or pudding. Upon palpation, laryngeal elevation was weak and swallow trigger delayed ~3-4 seconds. Pt required 1:1 feeding for all trials.     Plan    Downgrade to MM5/TN0 diet with 1:1 feeding and implementation of swallowing strategies (small bites/sips, up at 90* during meals, slow rate)    Recommend Speech Therapy 3 times per week until therapy goals are met for the following treatments:  Dysphagia Training and Patient / Family / Caregiver Education.    Discharge Recommendations: (P) Recommend post-acute placement for additional speech therapy services prior to discharge home     Objective       08/15/20 1224   Precautions   Precautions Fall Risk;Swallow Precautions ( See Comments)   Comments PD, dementia   Vitals   O2 (LPM) 0   O2 Delivery Device None - Room Air   Pain 0 - 10 Group   Therapist Pain Assessment Nurse Notified;Post Activity Pain Same as Prior to Activity;0   Prior Level Of Function   Communication Impaired   Swallow Unknown "   Dentition Poor Quality    Hearing Within Functional Limits for Evaluation   Vision Within Functional Limits for Evaluation   Patient's Primary Language English   Occupation (Pre-Hospital Vocational) Not Employed   Oral Motor Eval    Is Patient Able to Complete Oral Motor Eval Yes, Within Normal Limits   Laryngeal Function   Voice Quality Within Functional Limits   Volutional Cough Within Functional Limits   Excursion Upon Swallow Weak    Oral Food Presentation   Serial Swallow Thin (0) Within Functional Limits   Minced & Moist (5) - (Dysphagia II) Within Functional Limits   Soft & Bite-Sized (6) - (Dysphagia III) Moderate   Self Feeding Needs Total Assistance   Dysphagia Strategies / Recommendations   Strategies / Interventions Recommended (Yes / No) Yes   Compensatory Strategies Strict 1:1 Feeding;Head of Bed 90 Degrees During Eating / Drinking;Single Sips / Bites   Diet / Liquid Recommendation Thin (0);Minced & Moist (5) - (Dysphagia II)   Medication Administration  Crush all Medications in Puree;Float Whole with Puree   Therapy Interventions Dysphagia Therapy By Speech Language Pathologist;Oral Motor Exercises;Pharyngeal / Laryngeal Exercises

## 2020-08-15 NOTE — PROGRESS NOTES
Assumed care of pt at shift change. On RA with no signs of acute distress. Denies any pain. CNA reported pt was having difficulty swallowing her breakfast. A swallow eval consult was placed in per protocol. Pt is sitting at the nurses's station for breakfast. POC discussed with pt. All safety and comfort measures in place. Call light and personal belongings by bedside. Bed locked and in lowest position. Will continue to monitor.

## 2020-08-15 NOTE — PROGRESS NOTES
2 RN skin check complete with Vanna RN  Devices in place mepilex to heels  Confirmed pressure ulcers found on na.  New potential pressure ulcers noted on na.   Wound consult placed na.     Skin Assessment  Bilat ears: Pink and intact  Bilat elbows: intact  Bilat heels red/blanching/ boggy, mepilex in use  Sacral area pink and blanching;      The following interventions in place 2 RN skin check, turning, barrier paste, waffle mattress, mepilex for protection, frequent incontinence checks.

## 2020-08-15 NOTE — PROGRESS NOTES
2 RN skin check complete CURT Raymond.   Devices in place mepilex.  Skin assessed under devices yes.  Confirmed pressure ulcers found on NA.  New potential pressure ulcers noted on NA.   Wound consult placed NA.     The following interventions in place Q2 repositions, 2RN skin check, mepilex, incontinence care, barrier cream, waffle overlay.      Skin Assessment-  Cheeks - red/flushed  Ears - pink/red/blanching   Elbows - pink/blanching  Upper back - red/blanching  Sacrum - pink/blanching  Heels - boggy/red/pink/blanching

## 2020-08-15 NOTE — PROGRESS NOTES
"Hospital Medicine Twice Weekly Progress Note    Hospital Course   Ms. Bennett is a wheelchair bound 75-year-old female with a PMH of arthritis, DVT, Parkinson's, and dementia who presented 5/14/2020 with complaints of shortness of breath, nonproductive cough, fevers, and chills for 5 days. She is confused at baseline and was sent by her  since he was unable to care for her. An EKG was done in the ED and was unremarkable, though a chest xray showed bilateral interstitial infiltrates. COVID was ruled out in the ED. She was evaluated by PT/OT who recommended 24/7 supervision. Her  also stated he is no longer able to care for her either. She is now pending placement to SNF vs group home, though medicaid must be obtained first as she has limited income.    Interval Problem Update  08/11/20: Seen and examined at bedside. Alert and sitting up in bed without any signs of acute distress. Flat affect and depressed mood this morning. She denied any physical complaints or medical needs. She responded to every ROS question with \"I feel great. Nothing is wrong with me\". No significant changes in PE or ROS. VSS on room air. No acute medical needs at this time. Working on obtaining medicaid for placement.    08/15/20: Patient was examined while sitting up at nurses' station this morning. She is oriented to self and \"hospital\". She denies any pain or physical complaints. Continues to experience intermittent agitation and verbal outbursts requiring Haldol. VS have been stable on room air. No acute medical needs at this time.         Assessment and Plan  Dementia (HCC)- (present on admission)  Assessment & Plan  - Chronic and stable.  - Frequent re-orientation, reestablish circadian rhythm, encourage familiar faces/family in room, avoid or minimize narcotics/sedatives.   - Continue on seroquel and prozac.   - PRN Haldol available if needed. Last administered 8/14     Discharge planning issues  Assessment & Plan  -Patient " not eligible for Medicaid due to income.  Social work has requested [facility] screen her for institutional Medicaid.  Will need placement after that is obtained.  -Patient's  needs to spend down or file for separation of assets.      Lower extremity pain, bilateral- (present on admission)  Assessment & Plan  -PRN Tylenol available if needed.  -PT/OT recommending SNF placement, but will eventually need group home placement with 24/7 care after SNF making it a difficult discharge.  -Mobilize as tolerated.    Parkinson's disease (HCC)- (present on admission)  Assessment & Plan  -Chronic and stable.  -Continue sinemet.  -PT/OT recommending SNF placement, but again will require 24/7 care at a group home afterward.

## 2020-08-15 NOTE — PROGRESS NOTES
2 RN skin check complete with Vanna RN  Devices in place mepilex to heels  Confirmed pressure ulcers found on na.  New potential pressure ulcers noted on na.   Wound consult placed na.     Skin Assessment  Bilat ears: Pink and intact  Bilat elbows: intact  Bilat heels red/blanching/ boggy, mepilex in use  Sacral area pink and blanching;     The following interventions in place 2 rn skin check, turning, barrier paste, waffle mattress, mepilex for protection.

## 2020-08-15 NOTE — PROGRESS NOTES
Pt A&Ox2. Denies pain. Yells frequently for help. Reoriented and redirected pt. Repositioned q2. Safety precautions maintained. Will continue to monitor.

## 2020-08-15 NOTE — PROGRESS NOTES
Assumed care of pt at shift change.  On RA with no signs of acute distress. Denies any pain. POC discussed with pt. All safety and comfort measures in place. Call light and personal belongings by bedside. Bed locked and in lowest position. Will continue to monitor

## 2020-08-16 NOTE — PROGRESS NOTES
Assumed care at 1900. No events overnight. Oriented x1. Denies pain. Yells frequently for help. Reoriented and redirected pt. Repositioned q2. Safety precautions maintained. Will continue to monitor.

## 2020-08-16 NOTE — CARE PLAN
Problem: Skin Integrity  Goal: Risk for impaired skin integrity will decrease  Outcome: PROGRESSING AS EXPECTED     Problem: Communication  Goal: The ability to communicate needs accurately and effectively will improve  Outcome: PROGRESSING AS EXPECTED

## 2020-08-16 NOTE — PROGRESS NOTES
2 RN skin check complete CURT Caldwell.   Devices in place mepilex.  Skin assessed under devices yes.  Confirmed pressure ulcers found on NA.  New potential pressure ulcers noted on NA.   Wound consult placed NA.     The following interventions in place Q2 repositions, 2RN skin check, mepilex, incontinence care, barrier cream, waffle overlay.      Skin Assessment-  Ears - pink/red/blanching   Elbows - pink/blanching  Upper back - red/blanching  Sacrum - pink/blanching  Heels - boggy/red/pink/blanching

## 2020-08-16 NOTE — PROGRESS NOTES
Assumed care of pt at shift change. On RA with no signs of acute distress. Denies any pain. Pt is calm and pleasant. Sitting at the nurses station for meals. All safety and comfort measures in place. Hourly rounding in place.

## 2020-08-16 NOTE — PROGRESS NOTES
2 RN skin check complete with Vanna RN  Devices in place mepilex to heels  Confirmed pressure ulcers found on na.  New potential pressure ulcers noted on na.   Wound consult placed na.     Skin Assessment  Bilat ears: Pink and iblanching  Bilat elbows: pink and blanching  Upper back: red and blanching  Bilat heels red/blanching/ boggy, mepilex in use  Sacral area pink and blanching.      The following interventions in place 2 RN skin check, turning, barrier paste, waffle mattress, mepilex for protection, frequent incontinence checks.

## 2020-08-17 NOTE — PROGRESS NOTES
"Assumed care of pt at shift change, report received. Pt is oriented to self only. Resting in bed, yelling \"hello\". Very irritable at this time d/t staff attempted to provide shower. Requires frequent reorientation. Multiple attempts required to administer medications, patient would agree to take the medication but then would spit them out. No s/s of distress noted. Safety precautions in place.   "

## 2020-08-18 NOTE — PROGRESS NOTES
2 RN skin check complete with Jackelyn RN  Devices in place mepilex to heels  Confirmed pressure ulcers found on na.  New potential pressure ulcers noted on na.   Wound consult placed na.     Skin Assessment    Bilat heels red/blanching/ boggy, mepilex in use  Sacral are excoriated and  blanching.      The following interventions in place 2 RN skin check, turning, barrier paste, waffle mattress, mepilex for protection, frequent incontinence checks.

## 2020-08-18 NOTE — PROGRESS NOTES
Pt alert/oriented to self, pleasant, cooperative, denies pain/discomfort at this time. Medication taken without difficulty. Pt resting quietly in bed watching television, needs met. Call light within reach, personal belongings available, bed in lowest position, treaded socks on, and bed alarm on. Hourly rounding in place.

## 2020-08-18 NOTE — PROGRESS NOTES
"Patient has been sitting in wheelchair at the table. She was yelling \"help\" after being repositioned and changed in bed so pt was placed in wheelchair and she has been fine. Bed locked and in lowest position, all needs met, will continue to monitor pt   "

## 2020-08-18 NOTE — PROGRESS NOTES
2 RN skin check completed with CURT aCldwell.   Devices in place: heel mepilex.  Skin assessed under devices: yes.  Confirmed pressure ulcers found: none.  New potential pressure ulcers noted: none.  Wound consult placed:  N/A.      Skin assessment: bilateral heels red/blanching/boggy. Sacrum pink/red/blanching. Elbows pink/blanching.      The following interventions in place: bilateral heel mepilex. Q2h turns, waffle overlay mattress, 2 RN skin check Qshift, frequent incontinence checks with PRN linen change, and barrier paste. Pillows for support/positioning.

## 2020-08-18 NOTE — CARE PLAN
Problem: Safety  Goal: Will remain free from falls  Note: Pt is confused. Safety precautions in place. Bed in low position, treaded socks on, personal possessions in reach, call light in reach and bed alarm on.      Problem: Knowledge Deficit  Goal: Knowledge of disease process/condition, treatment plan, diagnostic tests, and medications will improve  Note: Discussed plan of care. Answered pt's questions.

## 2020-08-19 NOTE — PROGRESS NOTES
2 RN skin check complete with CURT Hayden  Devices in place bilat heel mepilex.  Skin assessed under devices yes.  Confirmed pressure ulcers found on none.  New potential pressure ulcers noted on none. Wound consult placed n/a.  The following interventions in place bilat heels mepilex, Q2 turns, waffle overlay mattress, 2RN skin check Q shift, freq incontinence checks with linen changes, and barrier paste, pillows for support and positioning.     Skin Assessment:  bilat heels red/blanching/boggy, sacrum pink, blanching. Elbows pink, blanching.

## 2020-08-19 NOTE — PROGRESS NOTES
Pt resting in bed. Pt denies any pain. Pt has no complaints or concerns at this time. Pt has fall precautions in place. Pt educated to use of call light. Hourly rounding in place.

## 2020-08-19 NOTE — CARE PLAN
Problem: Safety  Goal: Will remain free from falls  Note: Safety precautions in place. Pt is confused. Bed in low position, treaded socks on, personal possessions in reach, call light in reach and bed alarm on.      Problem: Skin Integrity  Goal: Risk for impaired skin integrity will decrease  Note: Q2h turns, Qshift 2RN skin check, waffle overlay mattress, frequent incontinence checks, barrier paste, PRN bed linen change.

## 2020-08-19 NOTE — PROGRESS NOTES
2 RN skin check completed with CURT Lan.   Devices in place: bilateral heel mepilex.  Skin assessed under devices: yes.  Confirmed pressure ulcers found: none.  New potential pressure ulcers noted: none.  Wound consult placed:  N/A.        Skin assessment: Sacrum pink/red/blanching. Elbows pink/blanching. Bilateral heels red/blanching/boggy.          The following interventions in place: Q2h turns, waffle overlay mattress, 2 RN skin check Qshift, frequent incontinence checks with PRN linen change, and barrier paste. Pillows for support/positioning. Bilateral heel mepilex.

## 2020-08-19 NOTE — PROGRESS NOTES
Pt pleasant this evening, medication taken without difficulty.  No report of pain/discomfort. Pt sitting at RN station finishing her meal and requesting to go back to bed after she eats her food. Safety precautions and hourly rounding in place.

## 2020-08-19 NOTE — PROGRESS NOTES
2 RN skin check completed with Phyllis ELIAS.   Devices in place: bilateral heels mepilex.  Skin assessed under devices: yes.  Confirmed pressure ulcers found: none.  New potential pressure ulcers noted: none.  Wound consult placed:  N/A.        Skin assessment: bilateral heels red/blanching/boggy. Sacrum pink, blanching. Elbows pink, blanching.        The following interventions in place: bilateral heel mepilex. Q2h turns, waffle overlay mattress, 2 RN skin check Qshift, frequent incontinence checks with PRN linen change, and barrier paste. Pillows for support/positioning.

## 2020-08-19 NOTE — PROGRESS NOTES
Hospital Medicine Twice Weekly Progress Note    Date of Service  8/19/2020    Chief Complaint  75 y.o. female admitted 5/14/2020 with shortness of breath    Hospital Course    Ms. Bennett is a wheelchair bound 75-year-old female with a PMH of arthritis, DVT, Parkinson's, and dementia who presented 5/14/2020 with complaints of shortness of breath, nonproductive cough, fevers, and chills for 5 days. She is confused at baseline and was sent by her  since he was unable to care for her. An EKG was done in the ED and was unremarkable, though a chest xray showed bilateral interstitial infiltrates. COVID was ruled out in the ED. She was evaluated by PT/OT who recommended 24/7 supervision. Her  also stated he is no longer able to care for her either. She is now pending placement to SNF vs group home, though medicaid must be obtained first as she has limited income.          Interval Problem Update  -Patient seen and examined.  Patient appears very upset and somewhat aggressive.  Reports from RN and aides noted patient has been throwing things towards staff.  Patient reports she is upset because no one listens to her.  Notified patient in regards to round-the-clock care with staff.  Patient calmed after I left the room.  Patient notified in POC.  -POC: Continue to provide supportive care, monitor for safety, noted to staff in regards to patient aggression; pending placement to SNF versus group home, spouse to assess assets.  -Lab work: Last obtained on 5/26/2020, reviewed; unremarkable  -VSS at this time    Consultants/Specialty  None    Code Status  DNAR/DNI    Disposition  TBD -pending placement to SNF versus group home    Assessment/Plan  Dementia (HCC)- (present on admission)  Assessment & Plan  - Chronic and stable.  - Frequent re-orientation, reestablish circadian rhythm, encourage familiar faces/family in room, avoid or minimize narcotics/sedatives.   - Continue on seroquel and prozac.   - PRN Haldol  available if needed. Last administered 8/14     Discharge planning issues  Assessment & Plan  -Patient not eligible for Medicaid due to income.  Social work has requested [facility] screen her for institutional Medicaid.  Will need placement after that is obtained.  -Patient's  needs to spend down or file for separation of assets.      Lower extremity pain, bilateral- (present on admission)  Assessment & Plan  -PRN Tylenol available if needed.  -PT/OT recommending SNF placement, but will eventually need group home placement with 24/7 care after SNF making it a difficult discharge.  -Mobilize as tolerated.    Parkinson's disease (HCC)- (present on admission)  Assessment & Plan  -Chronic and stable.  -Continue sinemet.  -PT/OT recommending SNF placement, but again will require 24/7 care at a group home afterward.       ==============================================================================================================================  Please note that this dictation was created using voice recognition software. I have made every reasonable attempt to correct obvious errors, but there may be errors of grammar and possibly content that I did not discover before finalizing the note.    Electronically signed by:  VIET Salazar, MSN, APRN, FNP-C  Hospitalist Services  [facility]  [phone]  [email or web address]  08/19/20   9900

## 2020-08-20 NOTE — PROGRESS NOTES
2 RN skin check complete with CURT Whitley  Devices in place bilat heel mepilex.  Skin assessed under devices yes.  Confirmed pressure ulcers found on none.  New potential pressure ulcers noted on none. Wound consult placed n/a.  The following interventions in place bilat heels mepilex, Q2 turns, waffle overlay mattress, 2RN skin check Q shift, freq incontinence checks with linen changes, and barrier paste, pillows for support and positioning.      Skin Assessment:  bilat heels red/blanching/boggy, sacrum pink, blanching. Elbows pink, blanching.

## 2020-08-20 NOTE — PROGRESS NOTES
Pt up at nurses station in wheel chair. Pt in good spirits, pt enjoys seeing people. Pt is comfortable. Safety precautions in place.

## 2020-08-20 NOTE — WOUND TEAM
Pt seen for finger and foot nail care. Fingernails long and thick. Cleaned her hands with warm soapy wash cloths. Toenails noted to be overgrown and mycotic. Toes wrapped with soapy, wet washcloths prior to trimming and filing of nails. Trimming and filing completed without difficulty, no knicks or cuts. Moisturizer applied to both feet prior to replacing slipper-socks.

## 2020-08-20 NOTE — CARE PLAN
Problem: Communication  Goal: The ability to communicate needs accurately and effectively will improve  Outcome: PROGRESSING AS EXPECTED     Problem: Safety  Goal: Will remain free from injury  Outcome: PROGRESSING AS EXPECTED  Goal: Will remain free from falls  Outcome: PROGRESSING AS EXPECTED     Problem: Pain Management  Goal: Pain level will decrease to patient's comfort goal  Outcome: PROGRESSING AS EXPECTED     Problem: Respiratory:  Goal: Respiratory status will improve  Description: Pt here with PNA. Pt was on IV/PO abx. Pt has been placed on room air. Pt not in any respiratory distress. Pt with clear lung sounds, but diminished to the bases. Will continue to monitor for changes.   Outcome: PROGRESSING AS EXPECTED

## 2020-08-20 NOTE — PROGRESS NOTES
2 RN skin check complete with CURT Lan  Devices in place bilat heel mepilex.  Skin assessed under devices yes.  Confirmed pressure ulcers found on none.  New potential pressure ulcers noted on none. Wound consult placed n/a.  The following interventions in place bilat heels mepilex, Q2 turns, waffle overlay mattress, 2RN skin check Q shift, freq incontinence checks with linen changes, and barrier paste, pillows for support and positioning.      Skin Assessment:  -Bilat heels: red/blanching/boggy, covered with mepilex for protection  -Bilat elbows: red/blanching, covered with mepilex for protection  -Sacrum: pink/blanching

## 2020-08-20 NOTE — PROGRESS NOTES
Pt alert to self only. VSS. Lungs clear diminished on RA. Pt denies any pain at this time. Pt is assist x2 OOB. Pt pleasant and cooperative with staff. Pt is turned and positioned every 2 hours, and incontinence care provided as needed. Pt up in chair by nurses station most of the day. Bed low and locked, call light within reach, safety precautions in place, hourly rounding, WCM.

## 2020-08-21 NOTE — PROGRESS NOTES
2 RN skin check complete with CURT Lan  Devices in place bilat heel mepilex.  Skin assessed under devices yes.  Confirmed pressure ulcers found on none.  New potential pressure ulcers noted on none. Wound consult placed n/a.  The following interventions in place bilat heels mepilex, Q2 turns, waffle overlay mattress, 2RN skin check Q shift, freq incontinence checks with linen changes, and barrier paste, pillows for support and positioning.      Skin Assessment:  -Bilat heels: red/blanching/boggy, covered with mepilex for protection. Pt refuses to wear bilat heel boots.   -Bilat elbows: red/blanching, covered with mepilex for protection  -Sacrum: pink/blanching

## 2020-08-21 NOTE — PROGRESS NOTES
"BLU [place] notified pt at 755 BP 99/61 HR 71; at 1033 BP 98/72 HR 98. Pt does not feel anything out of the usual; \"I feel okay\" per pt. BLU orders to encourage pt to increase fluid intake and to continue to monitor.   "

## 2020-08-21 NOTE — PROGRESS NOTES
2 RN skin check complete with CURT Whitley  Devices in place: mepilex.  Skin assessed under devices: yes.  Confirmed pressure ulcers found: none.  New potential pressure ulcers noted: none.   Wound consult placed: n/a.    The following interventions in place:   bilat heels mepilex, Q2 turns, waffle overlay mattress, 2RN skin check Q shift, freq incontinence checks with linen changes, and barrier paste, pillows for support and positioning.      Skin Assessment:  -Bilat heels: red/boggy and blanching (mepliex in place; pt refused to wear bilat heel boots)  -Bilat elbows: red/pink and blanching (mepilex in place)  -Sacrum: pink/blanching (barrier paste in use)

## 2020-08-21 NOTE — PROGRESS NOTES
Pt is A&Ox1 to self only. Denies any pain or discomfort. No signs of distress. Pt is a 2 person assist OOB. Pt is turned and positioned every 2 hours, and incontinence care provided as needed. 2 RN skin check in place. Pt up in chair by nurses station most of the day. POC discussed with pt; all questions answered at this time. Fall precautions in place. Bed-alarm on. Call light within reach. Pt educated regarding fall prevention. Hourly rounding in place.

## 2020-08-21 NOTE — THERAPY
Speech Language Pathology  Daily Treatment     Patient Name: Carmenza Bennett  Age:  75 y.o., Sex:  female  Medical Record #: [id number]  Today's Date: 8/21/2020     Precautions  Precautions: Fall Risk, Swallow Precautions ( See Comments)  Comments: PD, dementia    Assessment    Pt seen on this date for dysphagia therapy. Per RN, pt tolerating MM5/TN0 diet with 1:1 feeding with no difficulty. Pt found by nursing station but moved into room from session as she was distracted by other pts. She was oriented x1 to self only and agreeable to PO trials. She consumed trials of oatmeal (minced and moist), peaches (soft and bite sized), and thin liquids with no overt s/sx of aspiration. Slightly prolonged mastication appreciated with trials of both minced and moist and soft solids, however, functional for both textures and vocal quality clear following all trials. She required 1:1 feeding for all trials with spoon and hand over hand assistance with straw sips of thins.    Plan    Recommend upgrade to SB6/TN0 with direct supervision during meals. Please discontinue PO with any overt s/sx of aspiration or difficulty    Continue current treatment plan.    Discharge Recommendations: Recommend post-acute placement for additional speech therapy services prior to discharge home       Objective       08/21/20 1617   Vitals   O2 (LPM) 0   O2 Delivery Device None - Room Air   Pain 0 - 10 Group   Therapist Pain Assessment Post Activity Pain Same as Prior to Activity;Nurse Notified;0   Written Expression   Dominant Hand Right   Cognitive-Linguistic   Level of Consciousness Alert   Orientation Level Not Oriented to Reason;Not Oriented to Place;Not Oriented to Month;Not Oriented to Year   Dysphagia    Dysphagia X   Positioning / Behavior Modification Modulate Rate or Bite Size   Other Treatments trials of MM5, SB6, and thins via straw sip   Diet / Liquid Recommendation Soft & Bite-Sized (6) - (Dysphagia III);Thin (0)   Nutritional Liquid Intake  Rating Scale Non thickened beverages   Nutritional Food Intake Rating Scale Total oral diet with multiple consistencies but requiring special preparation or compensations   Nursing Communication Swallow Precaution Sign Posted at Head of Bed   Skilled Intervention Compensatory Strategies;Verbal Cueing   Recommended Route of Medication Administration   Medication Administration  Crush all Medications in Puree;Float Whole with Puree   Short Term Goals   Short Term Goal # 1 Pt will consume an MM5/TN0 diet with no overt s/sx of aspiration   Goal Outcome # 1 Goal met   Short Term Goal # 2 NEW 8/21/20: Pt will consume an SB6/TN0 diet with no overt s/sx of aspiration

## 2020-08-22 NOTE — PROGRESS NOTES
2 RN skin check complete with CURT Newsome  Devices in place bilat heel mepilex.  Skin assessed under devices yes.  Confirmed pressure ulcers found on none.  New potential pressure ulcers noted on none. Wound consult placed n/a.  The following interventions in place bilat heels mepilex, Q2 turns, waffle overlay mattress, 2RN skin check Q shift, freq incontinence checks with linen changes, and barrier paste, pillows for support and positioning.      Skin Assessment:  -Bilat heels: red/blanching/boggy, covered with mepilex for protection. Pt refuses to wear bilat heel boots.   -Bilat elbows: red/blanching, covered with mepilex for protection  -Sacrum: pink/blanching barrier cream in place for protection

## 2020-08-22 NOTE — PROGRESS NOTES
Pt is A&Ox1 to self only. Denies any pain or discomfort. No signs of distress. Pt is a 2xassist OOB. Q2turn and 2 RN skin check in place. Pt up in chair by nurses station most of the day. Pt pleasant with staff and other patients when interacting. D/c pending on placement. POC discussed with pt; all questions answered at this time. Fall precautions in place. Bed-alarm on. Call light within reach. Pt educated regarding fall prevention. Hourly rounding in place.

## 2020-08-22 NOTE — PROGRESS NOTES
2 RN skin check complete with CURT Stuart  Devices in place: mepilex.  Skin assessed under devices: yes.  Confirmed pressure ulcers found: none.  New potential pressure ulcers noted: none.   Wound consult placed: n/a.     The following interventions in place:   bilat heels mepilex, Q2 turns, waffle overlay mattress, 2RN skin check, freq incontinence checks with linen changes as needed, barrier paste, pillows for support and positioning.      Skin Assessment:  -Bilat ears: pink and blanching  -Bilat heels: red/boggy and blanching (mepliex in place; pt refused to wear bilat heel boots)  -Bilat elbows: red/pink and blanching (mepilex in place)  -Sacrum: pink/blanching (barrier paste in use)

## 2020-08-23 NOTE — PROGRESS NOTES
Hospital Medicine Twice Weekly Progress Note    Date of Service  8/23/2020    Chief Complaint  75 y.o. female admitted 5/14/2020 with shortness of breath    Hospital Course    Ms. Bennett is a wheelchair bound 75-year-old female with a PMH of arthritis, DVT, Parkinson's, and dementia who presented 5/14/2020 with complaints of shortness of breath, nonproductive cough, fevers, and chills for 5 days. She is confused at baseline and was sent by her  since he was unable to care for her. An EKG was done in the ED and was unremarkable, though a chest xray showed bilateral interstitial infiltrates. COVID was ruled out in the ED. She was evaluated by PT/OT who recommended 24/7 supervision. Her  also stated he is no longer able to care for her either. She is now pending placement to SNF vs group home, though medicaid must be obtained first as she has limited income.          Interval Problem Update  -Patient seen and examined.  Patient has had episodes of yelling from her room wanting someone to sit with her in her room.  Patient brought out to the common area but complains of feeling cold and wanting to go back to bed to sleep.  Patient brought back to the room and started yelling again.  Asked patient why she has been yelling and reports feeling anxious as no one is there with her.  Explained to patient that she was recently brought to the common area to be around staff, but requested to go back to bed.  Patient reports not remembering being around staff.  Patient reoriented and updated plan of care.  -POC: Continue to provide supportive care, monitor for safety, noted to staff in regards to patient aggression; pending placement to SNF versus group home, spouse to assess assets.  -Lab work: Last obtained on 5/26/2020, reviewed; unremarkable; will order labs as warranted.  -VSS at this time  -There is no significant changes from previous ROS/PE, please see my previous note for further  details.    Consultants/Specialty  None    Code Status  DNAR/DNI    Disposition  TBD -pending placement to SNF versus group home    Assessment/Plan  Dementia (HCC)- (present on admission)  Assessment & Plan  - Chronic and stable.  - Frequent re-orientation, reestablish circadian rhythm, encourage familiar faces/family in room, avoid or minimize narcotics/sedatives.   - Continue on seroquel and prozac.   - PRN Haldol available if needed. Last administered 8/14     Discharge planning issues  Assessment & Plan  -Patient not eligible for Medicaid due to income.  Social work has requested PFA screen her for institutional Medicaid.  Will need placement after that is obtained.  -Patient's  needs to spend down or file for separation of assets.      Lower extremity pain, bilateral- (present on admission)  Assessment & Plan  -PRN Tylenol available if needed.  -PT/OT recommending SNF placement, but will eventually need group home placement with 24/7 care after SNF making it a difficult discharge.  -Mobilize as tolerated.    Parkinson's disease (HCC)- (present on admission)  Assessment & Plan  -Chronic and stable.  -Continue sinemet.  -PT/OT recommending SNF placement, but again will require 24/7 care at a group home afterward.     ==============================================================================================================================  Please note that this dictation was created using voice recognition software. I have made every reasonable attempt to correct obvious errors, but there may be errors of grammar and possibly content that I did not discover before finalizing the note.    Electronically signed by:  VIET Salazar, MSN, APRN, FNP-C  Hospitalist Services  [facility]  [phone]  [email or web address]  08/23/20   [id number]

## 2020-08-23 NOTE — PROGRESS NOTES
Pt is A&Ox1 to self only. Denies any pain or discomfort. No signs of distress. Pt is a 2xassist OOB. Q2turn and 2 RN skin check in place. Pt irritable during med pass. D/c pending on placement. Fall precautions in place. Bed-alarm on. Call light within reach. Pt educated regarding fall prevention. Hourly rounding in place.

## 2020-08-23 NOTE — PROGRESS NOTES
Received bedside report and accepted care of patient.  Patient currently resting in bed in no visible or stated distress.  Bed controls on and bed in locked position.  Bed alarm on.  Call light and personal possessions within reach.  Plan of care to include pain management, assistance with ADL's and continued discharge planning efforts.  Will continue to update notes/plan of care as needed throughout shift.

## 2020-08-23 NOTE — CARE PLAN
Problem: Safety  Goal: Will remain free from injury  Outcome: PROGRESSING AS EXPECTED  Goal: Will remain free from falls  Outcome: PROGRESSING AS EXPECTED  Note: Calls for assistance when appropriate. Call light and personal belongings within reach. Bed in low and locked position. Fall education provided to patient.       Problem: Discharge Barriers/Planning  Goal: Patient's continuum of care needs will be met  Outcome: PROGRESSING AS EXPECTED  Note: Patient discharge pending placement.

## 2020-08-23 NOTE — PROGRESS NOTES
2 RN skin check complete with CURT Newsome  Devices in place: mepilex.  Skin assessed under devices: yes.  Confirmed pressure ulcers found: none.  New potential pressure ulcers noted: none.   Wound consult placed: n/a.     The following interventions in place:   bilat heels mepilex, Q2 turns, waffle overlay mattress, 2RN skin check, freq incontinence checks with linen changes as needed, barrier paste, pillows for support and positioning.      Skin Assessment:  -Bilat ears: pink and blanching  -Bilat heels: pink/boggy and blanching (mepliex in place; pt refused to wear bilat heel boots)  -Bilat elbows: red/pink and blanching (mepilex in place right elbow)  -Sacrum: pink/blanching (barrier paste in use)

## 2020-08-23 NOTE — CARE PLAN
Problem: Communication  Goal: The ability to communicate needs accurately and effectively will improve  Outcome: PROGRESSING SLOWER THAN EXPECTED     Problem: Safety  Goal: Will remain free from injury  Outcome: PROGRESSING SLOWER THAN EXPECTED     Problem: Pain Management  Goal: Pain level will decrease to patient's comfort goal  Outcome: PROGRESSING AS EXPECTED

## 2020-08-24 NOTE — PROGRESS NOTES
2 RN skin check complete with CURT Newsome  Devices in place: mepilex.  Skin assessed under devices: yes.  Confirmed pressure ulcers found: none.  New potential pressure ulcers noted: none.   Wound consult placed: n/a.     The following interventions in place:   Bilat heels mepilex-replaced, Q2 turns, waffle overlay mattress, 2RN skin check, freq incontinence checks with linen changes as needed, barrier paste, pillows for support and positioning.      Skin Assessment:  -Bilat ears: pink and blanching  -Bilat heels: pink/boggy and blanching (mepliex in place; pt refused to wear bilat heel boots)  -Bilat elbows: pink and blanching (mepilex in place right elbow)  -Sacrum: red/blanching (barrier paste in use)

## 2020-08-24 NOTE — CARE PLAN
Problem: Safety  Goal: Will remain free from injury  Outcome: PROGRESSING AS EXPECTED  Goal: Will remain free from falls  Outcome: PROGRESSING AS EXPECTED  Note: Calls for assistance when appropriate. Call light and personal belongings within reach. Bed in low and locked position. Fall education provided to patient.  Bed alarm on.     Problem: Skin Integrity  Goal: Risk for impaired skin integrity will decrease  Outcome: PROGRESSING AS EXPECTED  Note: Frequent position changes. Education provided to patient on importance of Q2 hour turns. Monitor areas of redness and skin breakdown. Mepilex to bilat heels and right elbow.

## 2020-08-24 NOTE — PROGRESS NOTES
"Patient has an increase in yelling and screaming of \"help \", distressing other patients.  Patient brought to nursing station to be around staff, but keeps complaining of feeling cold and wanting to go back to bed along with back hurting from sitting up.  Once put back into bed, patient resumes yelling and screaming.  Added Haldol 1 mg tab p.o. every 6 hours with her routine medication.  We will continue to monitor.    ============================================================================================  Please note that this dictation was created using voice recognition software. I have made every reasonable attempt to correct obvious errors, but there may be errors of grammar and possibly content that I did not discover before finalizing the note.    Electronically signed by:  VIET Salazar, MSN, APRN, FNP-C  Hospitalist Services  [facility]  [phone]  [email or web address]  08/24/20   [id number]  "

## 2020-08-24 NOTE — PROGRESS NOTES
"Patient yelling \"help\" and becoming agitated, tried to give haldol to patient but she chew up and spit out haldol and carbidopa and refused to take medication. Will try again in 6 hours if needed   "

## 2020-08-25 NOTE — PROGRESS NOTES
2 RN skin check complete Elias RN   Devices in place mepilex on bilateral heels.  Skin assessed under devices yes.  Confirmed pressure ulcers found on N/A.  New potential pressure ulcers noted on N/A. Wound consult placed no.  The following interventions in place:    2 RN skin check, repositioned Q2H, waffle overlay, mepilex on bilateral heels and right elbow, heels floated on pillows, incontinence checks       Bilateral heels red boggy blanchable   Bilateral elbows pink and blanchable   Sacrum pink and blanching   Skin intact

## 2020-08-25 NOTE — PROGRESS NOTES
2 RN skin check complete with CURT [place]  Devices in place: mepilex.  Skin assessed under devices: yes.  Confirmed pressure ulcers found: none.  New potential pressure ulcers noted: none.   Wound consult placed: n/a.     The following interventions in place:   Bilat heels mepilex, Q2 turns, waffle overlay mattress, 2RN skin check, freq incontinence checks with linen changes as needed, barrier paste, pillows for support and positioning.      Skin Assessment:  -Bilat ears: pink and blanching  -Bilat heels: pink/boggy and blanching   -Bilat elbows: pink and blanching, mepilex on right elbow  -Sacrum: red, blanching

## 2020-08-25 NOTE — PROGRESS NOTES
aox1 confused yelling. Gave haldol prn as ordered wcm.     No c/o pain at this time. Resting comfortably. All needs met at this time. Safety precautions in place. Wcm.

## 2020-08-25 NOTE — THERAPY
"Speech Language Pathology  Daily Treatment     Patient Name: Carmenza Bennett  Age:  75 y.o., Sex:  female  Medical Record #: [id number]  Today's Date: 8/25/2020     Precautions  Precautions: Fall Risk, Swallow Precautions ( See Comments)  Comments: PD, dementia    Assessment    Patient was seen on this date for dysphagia therapy and agreeable to PO trials with minimal verbal prompting. She consumed trials of soft and bite sized foods, thin liquids via straw sip and single bite of regular texture. All textures were consumed without any overt s/sx of aspiration. Patient continued to require 1:1 supervision during PO trials d/t weakness. Vocal quality remained clear throughout trials. Recommend continued SB6/TH0 with direct supervision during meals.     Plan    Continue current treatment plan.    Discharge Recommendations: Recommend post-acute placement for additional speech therapy services prior to discharge home    Subjective    Patient was very anxious upon arrival of SLP. Repeatedly stated \"I am scared\". Later in the session she reported that she is thinking of death a lot. RN was notified.      Objective       08/25/20 1516   Dysphagia    Positioning / Behavior Modification Modulate Rate or Bite Size;Alternate Solids and Liquids   Other Treatments Trials of SB6 and thins via straw sips    Diet / Liquid Recommendation Soft & Bite-Sized (6) - (Dysphagia III);Thin (0)   Nutritional Liquid Intake Rating Scale Non thickened beverages   Nutritional Food Intake Rating Scale Total oral diet with multiple consistencies but requiring special preparation or compensations   Nursing Communication Swallow Precaution Sign Posted at Head of Bed   Skilled Intervention Compensatory Strategies;Verbal Cueing   Recommended Route of Medication Administration   Medication Administration  Crush all Medications in Puree;Float Whole with Puree         "

## 2020-08-25 NOTE — PROGRESS NOTES
2 RN skin check complete with wanda ELIAS    Noted: flavio heels red boggy flaky blanching  Sacrum red blanching  Elbows pink blanching  Intact    Devices in place: n/a  Interventions: 2 RN skin check, incontinence care, waffle mattress, q2h turns, mepilex.

## 2020-08-25 NOTE — PROGRESS NOTES
Patient up in wheelchair, she is A&O x 1. She refused to take medications this morning, she spit the up and when RN was giving Lovenox  to pt she started yelling and screaming. Received report from night shift RN and assumed care of patient at change of shift. Patient reports no pain at this time. Patient shows no signs of distress at this time. Bed is in lowest, locked position, call light and belongings are within reach. Will continue to monitor pt.

## 2020-08-25 NOTE — PROGRESS NOTES
"Hospital Medicine Twice Weekly Progress Note    Date of Service  8/25/2020    Chief Complaint  75 y.o. female admitted 5/14/2020 with shortness of breath    Hospital Course    Ms. Bennett is a wheelchair bound 75-year-old female with a PMH of arthritis, DVT, Parkinson's, and dementia who presented 5/14/2020 with complaints of shortness of breath, nonproductive cough, fevers, and chills for 5 days. She is confused at baseline and was sent by her  since he was unable to care for her. An EKG was done in the ED and was unremarkable, though a chest xray showed bilateral interstitial infiltrates. COVID was ruled out in the ED. She was evaluated by PT/OT who recommended 24/7 supervision. Her  also stated he is no longer able to care for her either. She is now pending placement to SNF vs group home, though medicaid must be obtained first as she has limited income.          Interval Problem Update  -Patient seen and examined.  Patient sitting at common area table yelling and upset about \"belongings she states are still in her apartment that she needs before she was forced to move here\". Patient able to calm but remained upset. Continues to be confused.   -POC: Continue to provide supportive care, monitor for safety,  patient aggression is an issue; pending placement to SNF versus group home, spouse to assess assets.  -Lab work: Last obtained on 5/26/2020, reviewed; unremarkable; will order labs as warranted.  -VSS at this time  -There is no significant changes from previous ROS/PE    Consultants/Specialty  None    Code Status  DNAR/DNI    Disposition  TBD -pending placement to SNF versus group home- patient does have verbal outbursts     Assessment/Plan  Dementia (HCC)- (present on admission)  Assessment & Plan  - Chronic and stable.  - Frequent re-orientation, reestablish circadian rhythm, encourage familiar faces/family in room, avoid or minimize narcotics/sedatives.   - Continue on seroquel and prozac.   - PRN " Haldol available if needed. Last administered 8/22    Discharge planning issues  Assessment & Plan  -Patient not eligible for Medicaid due to income.  Social work has requested PFA screen her for institutional Medicaid.  Will need placement after that is obtained.  -Patient's  needs to spend down or file for separation of assets.      Lower extremity pain, bilateral- (present on admission)  Assessment & Plan  -PRN Tylenol available if needed.  -PT/OT recommending SNF placement, but will eventually need group home placement with 24/7 care after SNF making it a difficult discharge.  -Mobilize as tolerated.    Parkinson's disease (HCC)- (present on admission)  Assessment & Plan  -Chronic and stable.  -Continue sinemet.  -PT/OT recommending SNF placement, but again will require 24/7 care at a group home afterward.

## 2020-08-26 NOTE — PROGRESS NOTES
Patient up in wheelchair, only alert to self, she took her medications this morning but refused the Lovenox. All other needs met, will continue to monitor pt

## 2020-08-26 NOTE — PROGRESS NOTES
2 RN skin check complete with: CURT Griffin.   Devices in place: n/a.  Skin assessed under devices: yes.  Confirmed pressure ulcers found on: n/a.  New potential pressure ulcers noted on: n/a. Wound consult placed: n/a.  The following interventions in place: mepilex on bilateral heels and right elbow   , heels floated on a pillow , repositioned in bed, 2 RN skin check, patient's pad changed         Bilateral heels boggy, red, slow top obed   Bilateral elbows pink blanching   Sacrum pink blanching

## 2020-08-26 NOTE — PROGRESS NOTES
2 RN skin check complete with Chiquita ELIAS     Noted: flavio heels red boggy flaky blanching  Sacrum red blanching  Elbows pink blanching  Intact     Devices in place: n/a  Interventions: 2 RN skin check, incontinence care, waffle mattress, q2h turns, mepilex.

## 2020-08-27 NOTE — PROGRESS NOTES
2 RN skin check completed with CURT Griffin.   Devices in place: none.  Skin assessed under devices: n/a.  Confirmed pressure ulcers found: n/a.  New potential pressure ulcers noted: n/a.  Wound consult placed:  n/a.        Skin assessment:   BL heels: red, boggy, blanching, dry  Sacrum: red, blanching  Elbows: pink, blanching        The following interventions in place: 2RN skin check Qshift, q2hr turns, incontinence monitoring with bed linen changes PRN for incontinence episodes, barrier cream. Pillows in use for support/positioning, pressure redistribution mattress, waffle overlay.

## 2020-08-27 NOTE — PROGRESS NOTES
Bedside report received at change of shift. Patient was resting in bed, eyes closed, even unlabored breathing. No indication of distress or discomfort.      Pt is A&Ox1 (oriented to self only) on RA and tolerating well. No indication of pain/discomfort at this time. Pt denies pain at this time.      Needs attended. No additional needs at this time.  Call light and personal belongings within reach. Bed in low locked position. Hourly rounding in place.

## 2020-08-27 NOTE — PROGRESS NOTES
2 RN skin check complete with Mariam ELIAS     Noted: flavio heels red boggy blanching  Sacrum red blanching  Elbows pink blanching  Intact     Devices in place: n/a  Interventions: 2 RN skin check, incontinence care, waffle mattress, q2h turns, mepilex

## 2020-08-28 NOTE — PROGRESS NOTES
2 RN skin check david stoner RN     Noted: flavio heels red boggy blanching  Sacrum red blanching  Elbows pink blanching  Intact     Devices in place: n/a  Interventions: 2 RN skin check, incontinence care, waffle mattress, q2h turns, mepilex

## 2020-08-28 NOTE — PROGRESS NOTES
2 RN skin check completed with CURT Hatch.   Devices in place: none.  Skin assessed under devices: n/a.  Confirmed pressure ulcers found: n/a.  New potential pressure ulcers noted: n/a.  Wound consult placed:  n/a.        Skin assessment:   BL heels: red, boggy, slow-to-obed, dry  Sacrum: red, blanching  Elbows: pink, blanching         The following interventions in place: 2RN skin check Qshift, q2hr turns, incontinence monitoring with bed linen changes PRN for incontinence episodes, barrier cream. Pillows in use for support/positioning, pressure redistribution mattress, waffle overlay.

## 2020-08-29 NOTE — PROGRESS NOTES
Assumed care of pt from day RN. Pt lying in bed A&Ox1, oriented to self only. Pt denies any pain at this time. Call light and belongings within reach, will continue to monitor.

## 2020-08-29 NOTE — PROGRESS NOTES
Hospital Medicine Twice Weekly Progress Note    Date of Service  8/29/2020    Chief Complaint  75 y.o. female admitted 5/14/2020 with shortness of breath    Hospital Course    Ms. Bennett is a wheelchair bound 75-year-old female with a PMH of arthritis, DVT, Parkinson's, and dementia who presented 5/14/2020 with complaints of shortness of breath, nonproductive cough, fevers, and chills for 5 days. She is confused at baseline and was sent by her  since he was unable to care for her. An EKG was done in the ED and was unremarkable, though a chest xray showed bilateral interstitial infiltrates. COVID was ruled out in the ED. She was evaluated by PT/OT who recommended 24/7 supervision. Her  also stated he is no longer able to care for her either. She is now pending placement to SNF vs group home, though medicaid must be obtained first as she has limited income.          Interval Problem Update  -Patient seen and examined.  Patient sitting at common area table awaiting breakfast. States no needs. Is being pleasant this am. Continue to wait for placement. Continues to be confused.   -POC: Continue to provide supportive care, monitor for safety,  patient aggression is an issue; pending placement to SNF versus group home, spouse to assess assets.  -Lab work: Last obtained on 5/26/2020, reviewed; unremarkable; will order labs as warranted.  -VSS at this time  -There is no significant changes from previous ROS/PE    Consultants/Specialty  None    Code Status  DNAR/DNI    Disposition  TBD -pending placement to SNF versus group home- patient does have verbal outbursts     Assessment/Plan  Dementia (HCC)- (present on admission)  Assessment & Plan  - Chronic and stable.  - Frequent re-orientation, reestablish circadian rhythm, encourage familiar faces/family in room, avoid or minimize narcotics/sedatives.   - Continue on seroquel and prozac.   - PRN Haldol available if needed. Last administered 8/22    Discharge  planning issues  Assessment & Plan  -Patient not eligible for Medicaid due to income.  Social work has requested PFA screen her for institutional Medicaid.  Will need placement after that is obtained.  -Patient's  needs to spend down or file for separation of assets.      Lower extremity pain, bilateral- (present on admission)  Assessment & Plan  -PRN Tylenol available if needed.  -PT/OT recommending SNF placement, but will eventually need group home placement with 24/7 care after SNF making it a difficult discharge.  -Mobilize as tolerated.    Parkinson's disease (HCC)- (present on admission)  Assessment & Plan  -Chronic and stable.  -Continue sinemet.  -PT/OT recommending SNF placement, but again will require 24/7 care at a group home afterward.

## 2020-08-29 NOTE — CARE PLAN
Problem: Safety  Goal: Will remain free from falls  Outcome: PROGRESSING AS EXPECTED  Note: Fall precautions in place: bed locked and in the lowest position, call light and belongings within reach, treaded socks on pt, fall risk ID band on, bed alarm in use, pt educated on use of call light for needs.      Problem: Skin Integrity  Goal: Risk for impaired skin integrity will decrease  Outcome: PROGRESSING AS EXPECTED  Note: Skin interventions in place: q2 hr turns, 2 RN skin checks, pillows in use for support/ positioning, mepilex in use, intake of food/ fluids encouraged.

## 2020-08-29 NOTE — PROGRESS NOTES
2 RN skin check completed with CURT Hatch.   Devices in place: none.  Skin assessed under devices: n/a.  Confirmed pressure ulcers found: n/a.  New potential pressure ulcers noted: n/a.  Wound consult placed:  n/a.        Skin assessment:   BL heels: red, boggy, slow-to-obed, dry, cold  Sacrum: red/purple, blanching  Elbows: pink, blanching         The following interventions in place: 2RN skin check Qshift, q2hr turns, incontinence monitoring with bed linen changes PRN for incontinence episodes, barrier cream. Pillows in use for support/positioning, pressure redistribution mattress, waffle overlay.

## 2020-08-29 NOTE — PROGRESS NOTES
2 RN skin check complete with Manuela ELIAS.  Devices in place: Mepilex  Skin assessed under devices: yes  Confirmed pressure ulcers found on: N/A  New potential pressure ulcers noted on: N/A  Wound consult placed: N/A  The following interventions in place: q2 hr turns, pillows in use for support/ positioning, 2 RN skin checks, frequent incontinence checks and linen changes as needed, barrier cream, waffle overlay.    General skin assessment:   Bilateral elbows: pink/ blanching  Bilateral heels: red/ blanching  LLE: bruising to lateral lower leg  Right breast: small reddened area with slight excoriation, powder applied

## 2020-08-30 NOTE — PROGRESS NOTES
Assumed care of pt from day RN. Pt sitting at nurse's station, A&Ox1, oriented to self only. Pt has a healthy appetite this evening. Pt denies any pain at this time. Call light and belongings within reach, will continue to monitor.

## 2020-08-30 NOTE — PROGRESS NOTES
Assumed care of pt at 0700. Pt alert, oriented to self mostly. Calling out at times and forgetful. ax2 OOB to wheelchair, needing assistance feeding during meals as well. No other changes.

## 2020-08-30 NOTE — CARE PLAN
Problem: Fluid Volume:  Goal: Will maintain balanced intake and output  Outcome: PROGRESSING AS EXPECTED  Note: Pt's BP a little low this evening, encouraged intake of fluids throughout the night.      Problem: Mobility  Goal: Risk for activity intolerance will decrease  Outcome: PROGRESSING AS EXPECTED  Note: Pt up in wheelchair at nurse's station for dinner and snacks this evening, tolerated well.

## 2020-08-30 NOTE — PROGRESS NOTES
Pt requesting Peanut butter and jelly sandwich several times today. Kitchen called and unfortunately sandwich is not on specific diet for patient r/t swallowing issues.

## 2020-08-30 NOTE — PROGRESS NOTES
2 RN skin check complete with Manuela ELIAS.  Devices in place: Mepilex, heel float boots  Skin assessed under devices: yes  Confirmed pressure ulcers found on: N/A  New potential pressure ulcers noted on: N/A  Wound consult placed: N/A  The following interventions in place: q2 hr turns, pillows in use for support/ positioning, 2 RN skin checks, frequent incontinence checks and linen changes as needed, barrier cream, waffle overlay, mepilex, heel float boots.      General skin assessment:   Bilateral elbows: pink/ blanching  Bilateral heels: red/ blanching, mepilex and heel float boots in place  LLE: bruising to lateral lower leg  Right breast: small reddened area with slight excoriation, powder applied

## 2020-08-31 NOTE — THERAPY
Speech Language Pathology  Daily Treatment     Patient Name: Carmenza Bennett  Age:  75 y.o., Sex:  female  Medical Record #: [id number]  Today's Date: 8/31/2020     Precautions  Precautions: (P) Fall Risk, Swallow Precautions ( See Comments)  Comments: (P) Parkinson's dementia    Assessment    Patient was admitted 5/14 SOB, with a non-productive cough.  PMH includes DVT, Parkinson's disease, dementia, wheelchair bound at baseline with spouse unable to care for her and aggression an intermittent issue.   Patient seen with re-orders for swallow evauation 8/29 due to reports of difficulty drinking thin liquids; pt currently on an SB6/thin liquid diet and was seen with a full meal tray.  Pt is repositioned upright in bed and reports to be 'starving.'  Pt's affect is flat, she is AAO x self and place only, and vocal quality is clear at baseline.  Pt tolerated her soft and cut solids without s/s of aspiration with mild infrequent pocketing in the L lateral sulci.  Alternating liquids and solids cleared mild residue.  Successive sips with straws were also tolerated without s/s of aspiration.  Pt presents with generalized weakness preventing self-feeding as well as refusal to hold utensils or cup without hand over hand assist.  Pt did, however, attempt to manipulate solid foods with her fingers; finger foods should be encouraged.  Collaborated with current bedside RN, and she reports no s/s of aspiration with meals yesterday.  Will follow as appropriate.    Plan    Continue current treatment plan.    Discharge Recommendations: (P) Recommend post-acute placement for additional speech therapy services prior to discharge home     Objective       08/31/20 0939   Voice   Comments Clear   Dysphagia    Dysphagia X   Positioning / Behavior Modification Modulate Rate or Bite Size;Alternate Solids and Liquids   Other Treatments Seen with meal tray of SB6/thin liquids; concerns regarding liquid toleration on 8/29   Diet / Liquid  "Recommendation Soft & Bite-Sized (6) - (Dysphagia III);Thin (0)   Nutritional Liquid Intake Rating Scale Non thickened beverages   Nutritional Food Intake Rating Scale Total oral diet with multiple consistencies without special preparation but with specific food limitations   Nursing Communication Swallow Precaution Sign Posted at Head of Bed   Skilled Intervention Verbal Cueing;Tactile Cueing;Compensatory Strategies   Recommended Route of Medication Administration   Medication Administration  Crush all Medications in Puree   Patient / Family Goals   Patient / Family Goal #1 \"I'm starving; I want to eat everything here.\"   Goal #1 Outcome Progressing as expected   Short Term Goals   Short Term Goal # 2 8/31 revised: Pt will consume a SB6/thin liquid diet without s/s of aspiration with 1:1 assisted feeding   Goal Outcome # 2  Progressing slower than expected   Interdisciplinary Plan of Care Collaboration   IDT Collaboration with  Nursing;Certified Nursing Assistant   Patient Position at End of Therapy Seated;In Bed;Call Light within Reach;Tray Table within Reach;Other (Comments)  (CNA in room with pt)   Session Information   Date / Session Number 4, 8/31/20 (2/3-8/31 CW)   Priority 3  (3x. Hx Parkinson's/dementia.re-order re thins; SB6/thins 1:1)         "

## 2020-08-31 NOTE — PROGRESS NOTES
Assumed care of pt from day RN. Pt sitting at nurse's station pleasantly conversing with staff, A&Ox1, oriented to self only. Pt denies any pain at this time. Call light and belongings within reach, will continue to monitor.

## 2020-08-31 NOTE — PROGRESS NOTES
2 RN skin check complete with Manuela ELIAS.  Devices in place: Mepilex, heel float boots  Skin assessed under devices: yes  Confirmed pressure ulcers found on: N/A  New potential pressure ulcers noted on: N/A  Wound consult placed: N/A  The following interventions in place: q2 hr turns, pillows in use for support/ positioning, 2 RN skin checks, frequent incontinence checks and linen changes as needed, barrier cream, waffle overlay, mepilex, heel float boots.      General skin assessment:   Bilateral elbows: pink/ blanching  Bilateral heels: red/ blanching, mepilex and heel float boots in place (right heel slightly more red and slower to obed but pt had just been in wheelchair for awhile)  LLE: bruising to lateral lower leg  Right breast: small reddened area with slight excoriation, powder applied

## 2020-08-31 NOTE — PROGRESS NOTES
2 RN skin check complete with Mamie RN  Devices in place chair alarm pad, waffle cusion, treaded socks.  Skin assessed under devices yes.  Confirmed pressure ulcers found on none.  New potential pressure ulcers noted on none. Wound consult placed not needed.  The following interventions in place treaded socks, waffle cushion on chair and bed, q2h turning and repositioning in place and maintained.

## 2020-08-31 NOTE — CARE PLAN
Problem: Skin Integrity  Goal: Risk for impaired skin integrity will decrease  Outcome: PROGRESSING AS EXPECTED  Note: Skin interventions in place: q 2hr turns, 2 RN skin checks, pillows in use for support/ positioning, heel float boots, mepilex, frequent incontinent checks and linen changes PRN.      Problem: Psychosocial Needs:  Goal: Level of anxiety will decrease  Outcome: PROGRESSING AS EXPECTED  Note: Pt in wheelchair at nurse's station pleasantly conversing with staff and other pts, CNA took pt for a stroll around the unit and provided ADLs, pt responded with being much more at ease and calm this evening.

## 2020-08-31 NOTE — PROGRESS NOTES
2 RN skin check complete with Jeanie RN  Devices in place: Mepilex, heel float boots  Skin assessed under devices: yes  Confirmed pressure ulcers found on: N/A  New potential pressure ulcers noted on: N/A  Wound consult placed: N/A  The following interventions in place: q2 hr turns, pillows in use for support/ positioning, 2 RN skin checks, frequent incontinence checks and linen changes as needed, barrier cream, waffle overlay, mepilex, heel float boots.      General skin assessment:   Bilateral elbows: pink/ blanching  Bilateral heels: red/ blanching, mepilex and heel float boots in place (right heel slightly more red and slower to obed but pt had just been in wheelchair for awhile)  LLE: bruising to lateral lower leg  Right breast: small reddened area with slight excoriation, powder applied

## 2020-08-31 NOTE — PROGRESS NOTES
Assumed care of patient at 0700. Pt resting but easily wakes up with stimulation, a+ox2-3. SLP re evaluated patient at bedside, no upgrades and diet remains the same. OOB to wheelchair for most of the day, requiring ax2. Participating in group activities today with staff encouragement. No other changes at this time.

## 2020-09-01 NOTE — PROGRESS NOTES
2 RN skin check complete bouchra   Devices in place mepilex , moon boots  Skin assessed under devices yes  Confirmed pressure ulcers found on na  New potential pressure ulcers noted on na. Wound consult placed na  The following interventions in place q2 hr turns, pillows in use for support/ positioning, 2 RN skin checks, frequent incontinence checks and linen changes as needed, barrier cream, waffle overlay, mepilex, heel float boots.     Sacral area pink and blanching; barrier cream applied  bilat heels boggy/red/blanching; mepilex in place/moon boots

## 2020-09-01 NOTE — PROGRESS NOTES
Was in the wheelchair at the nurses station for dinner, 2 person max assist back to bed- bed alarm in place. Cont plan of care, call light within reach

## 2020-09-01 NOTE — PROGRESS NOTES
Hospital Medicine Twice Weekly Progress Note    Date of Service  9/1/2020    Chief Complaint  75 y.o. female admitted 5/14/2020 with shortness of breath    Hospital Course    Ms. Bennett is a wheelchair bound 75-year-old female with a PMH of arthritis, DVT, Parkinson's, and dementia who presented 5/14/2020 with complaints of shortness of breath, nonproductive cough, fevers, and chills for 5 days. She is confused at baseline and was sent by her  since he was unable to care for her. An EKG was done in the ED and was unremarkable, though a chest xray showed bilateral interstitial infiltrates. COVID was ruled out in the ED. She was evaluated by PT/OT who recommended 24/7 supervision. Her  also stated he is no longer able to care for her either. She is now pending placement to SNF vs group home, though medicaid must be obtained first as she has limited income.          Interval Problem Update  -Patient lying in bed, pleasant and cooperative.  She is disoriented to exact date, however otherwise fully oriented.  She denies pain, nausea and any other problems at this time.  -Vital signs within normal limits  -All systems reviewed and exam is unchanged from prior exam.  -No changes to plan of care at this time    Consultants/Specialty  None    Code Status  DNAR/DNI    Disposition  TBD -pending placement to SNF versus group home- patient does have verbal outbursts. spouse to assess assets, spend down.  Patient needs to apply for institutional Medicaid.  Daughter is involved.    Assessment/Plan  Dementia (HCC)- (present on admission)  Assessment & Plan  - Chronic and stable.  - Frequent re-orientation, reestablish circadian rhythm, encourage familiar faces/family in room, avoid or minimize narcotics/sedatives.   - Continue on seroquel and prozac.   - PRN Haldol available if needed. Last administered 8/22    Discharge planning issues  Assessment & Plan  -Patient not eligible for Medicaid due to income.  Social work  has requested PFA screen her for institutional Medicaid.  Will need placement after that is obtained.  -Patient's  needs to spend down or file for separation of assets.      Lower extremity pain, bilateral- (present on admission)  Assessment & Plan  -PRN Tylenol available if needed.  -PT/OT recommending SNF placement, but will eventually need group home placement with 24/7 care after SNF making it a difficult discharge.  -Mobilize as tolerated.    Parkinson's disease (HCC)- (present on admission)  Assessment & Plan  -Chronic and stable.  -Continue sinemet.  -PT/OT recommending SNF placement, but again will require 24/7 care at a group home afterward.    TESSY Kelley.         I certify that the patient requires continued medically necessary hospital services for the treatment of Dementia and will remain in the hospital for 30 days.  Discharge plan is to be determined once institutional medicaid is processed, will need SNF placement.

## 2020-09-01 NOTE — THERAPY
"Speech Language Pathology  Daily Treatment     Patient Name: Camrenza Bennett  Age:  75 y.o., Sex:  female  Medical Record #: [id number]  Today's Date: 9/1/2020     Precautions  Precautions: (P) Fall Risk, Swallow Precautions ( See Comments)  Comments: (P) Parkinson's and dementia    Assessment    Pt seen for thin liquids while sitting at nurses station. Small amount of jesus material wiped, using moistened wash cloth, near pt's lips. Pt stating it was chocolate from her lunch. Pt agreeable to one cup of thin juice. Pt requires hand over hand assist and cues to initiate sips of the thin liquid. Pt triggering swallows in approx 1 second. No coughing post swallow and pt with clear voice.  Pt with flat affect and confused speech that is 100% intelligible. Nurs asking regarding medication administration with crushed recommended by SLP during swallow eval. Nurs have been giving crushed meds with pt previously spitting out or fronting pills when floated in applesauce.     Plan  Cont SB6/thins with 1-1  Continue current treatment plan.    Discharge Recommendations: (P) Recommend post-acute placement for additional speech therapy services prior to discharge home       09/01/20 1440   Dysphagia    Dysphagia X   Positioning / Behavior Modification Modulate Rate or Bite Size   Diet / Liquid Recommendation Soft & Bite-Sized (6) - (Dysphagia III)   Nutritional Liquid Intake Rating Scale Non thickened beverages   Nutritional Food Intake Rating Scale Total oral diet with multiple consistencies but requiring special preparation or compensations   Nursing Communication Swallow Precaution Sign Posted at Head of Bed   Skilled Intervention Compensatory Strategies;Verbal Cueing   Recommended Route of Medication Administration   Medication Administration  Crush all Medications in Puree   Patient / Family Goals   Patient / Family Goal #1 \"I'm starving; I want to eat everything here.\"   Goal #1 Outcome Progressing as expected   Short Term Goals "   Short Term Goal # 2 8/31 revised: Pt will consume a SB6/thin liquid diet without s/s of aspiration with 1:1 assisted feeding   Goal Outcome # 2  Progressing slower than expected   Session Information   Priority 3  (3x,Parkinson's/dementia,reorder for thins, SB6/thins 1-1)

## 2020-09-01 NOTE — PROGRESS NOTES
2 RN skin check complete with Malorie RN  Devices in place mepilexes to bilateral heels, pillows for positioning.  Skin assessed under devices yes.  Confirmed pressure ulcers found on none.  New potential pressure ulcers noted on none. Wound consult placed not needed at this time.  The following interventions in place mepilexes to bilateral heels, q2h turning repositioning maintained, barrier cream and baby powder used as well.

## 2020-09-01 NOTE — PROGRESS NOTES
Assumed care of patient at 0700. Pt A+Ox1 only. q2h turning and repositioning maintained while in bed. Pt up in wheelchair for a good portion of the day. Meds given crushed in applesauce. No other changes at this time.

## 2020-09-01 NOTE — CARE PLAN
Problem: Safety  Goal: Will remain free from injury  Outcome: PROGRESSING AS EXPECTED  Bed alarm on for high fall risk and confusion  Problem: Discharge Barriers/Planning  Goal: Patient's continuum of care needs will be met  Outcome: PROGRESSING SLOWER THAN EXPECTED  Awaiting placement

## 2020-09-02 NOTE — PROGRESS NOTES
2 RN skin check complete with Michael RN  Devices in place mepilex to heels  Confirmed pressure ulcers found on na.  New potential pressure ulcers noted on na.   Wound consult placed na.     Skin Assessment    Bilat heels red/blanching/ boggy, mepilex in use  Sacral are excoriated and  blanching.      The following interventions in place 2 RN skin check, turning, barrier paste, waffle mattress, mepilex for protection, frequent incontinence checks.

## 2020-09-02 NOTE — PROGRESS NOTES
2 RN skin check complete. bouchra  Devices in place mepilex and moon boots when she wants to keep them on  Skin assessed under devices yes  Confirmed pressure ulcers found on na.  New potential pressure ulcers noted on na Wound consult placed na.  The following interventions in placeq2 hr turns, pillows in use for support/ positioning, 2 RN skin checks, frequent incontinence checks and linen changes as needed, barrier cream, waffle overlay, mepilex, heel float boots.      Sacral area pink and blanching; barrier cream applied  bilat heels boggy/red/blanching; mepilex in place/moon boots

## 2020-09-02 NOTE — PROGRESS NOTES
into visit her this evening. Confused but not impulsive to get out of bed, bed alarm in place. Cont plan of care, call light within reach

## 2020-09-03 NOTE — PROGRESS NOTES
2 RN skin check complete. bouchra  Devices in place mepilex placed, moon boots when she doesn't remove  Skin assessed under devices yes  Confirmed pressure ulcers found on na.  New potential pressure ulcers noted on na. Wound consult placed na.  The following interventions in place place q2 hr turns, pillows in use for support/ positioning, 2 RN skin checks, frequent incontinence checks and linen changes as needed, barrier cream, waffle overlay, mepilex, heel float boots.       Sacral area pink and blanching; barrier cream applied  bilat heels boggy/red/blanching; new mepilex placed/moon boots  bilat leg bruising in yellow/green colors

## 2020-09-03 NOTE — CARE PLAN
Problem: Safety  Goal: Will remain free from injury  Outcome: PROGRESSING AS EXPECTED  Bed alarm on for safety and assistance into wheelchair  Problem: Discharge Barriers/Planning  Goal: Patient's continuum of care needs will be met  Outcome: PROGRESSING SLOWER THAN EXPECTED  Awaiting difficult placement

## 2020-09-03 NOTE — PROGRESS NOTES
Yelling this evening and disturbing other residents. Placed her into the wheelchair and brought her to the nurses station. Calmed down and snacks provided. Cont plan of care, call light within reach

## 2020-09-04 NOTE — PROGRESS NOTES
A&O to self only. Confused, requires frequent reorientation. Irritable at times. Refused skin check, preferred to rest. Education provided. Fluids offered. Bed in lowest and locked position. Bed alarm on. Call and belongings in reach.

## 2020-09-04 NOTE — CARE PLAN
Problem: Safety  Goal: Will remain free from injury  Outcome: PROGRESSING AS EXPECTED       Problem: Psychosocial Needs:  Goal: Level of anxiety will decrease  Outcome: PROGRESSING AS EXPECTED

## 2020-09-05 NOTE — PROGRESS NOTES
Pt alert to self only. VSS. Lungs clear diminished on RA. No signs of pain/distress at this time. Pt requires frequent reorientation, and often yells out. Pt refused skin check, after education given. Irritable at times. Pt is turn and repositioned every 2 hours, and incontinence care provided as needed. Bed low and locked, call light within reach, safety precautions in place, hourly rounding, bed alarm on, WCM.

## 2020-09-05 NOTE — PROGRESS NOTES
Hospital Medicine Twice Weekly Progress Note    Date of Service  9/5/2020    Chief Complaint  shortness of breath    Hospital Course    Ms. Bennett is a wheelchair bound 75-year-old female with a PMH of arthritis, DVT, Parkinson's, and dementia who presented 5/14/2020 with complaints of shortness of breath, nonproductive cough, fevers, and chills for 5 days. She is confused at baseline and was sent by her  since he was unable to care for her. An EKG was done in the ED and was unremarkable, though a chest xray showed bilateral interstitial infiltrates. COVID was ruled out in the ED. She was evaluated by PT/OT who recommended 24/7 supervision. Her  also stated he is no longer able to care for her either. She is now pending placement to SNF vs group home, though medicaid must be obtained first as she has limited income.          Interval Problem Update  -Patient sitting upright at communal table, pleasant and cooperative.  She is disoriented to exact date and place, however otherwise fully oriented.  She denies pain, nausea and any other problems at this time.  -Vital signs within normal limits  -All systems reviewed and exam is unchanged from prior exam.  -No changes to plan of care at this time    Consultants/Specialty  None    Code Status  DNAR/DNI    Disposition  TBD -pending placement to SNF versus group home- patient does have verbal outbursts. spouse to assess assets, spend down.  Patient needs to apply for institutional Medicaid.  Daughter is involved.    Assessment/Plan  Dementia (HCC)- (present on admission)  Assessment & Plan  - Chronic and stable.  - Frequent re-orientation, reestablish circadian rhythm, encourage familiar faces/family in room, avoid or minimize narcotics/sedatives.   - Continue on seroquel and prozac.   - PRN Haldol available if needed. Last administered 8/22    Discharge planning issues  Assessment & Plan  -Patient not eligible for Medicaid due to income.  Social work has  requested PFA screen her for institutional Medicaid.  Will need placement after that is obtained.  -Patient's  needs to spend down or file for separation of assets.      Lower extremity pain, bilateral- (present on admission)  Assessment & Plan  -PRN Tylenol available if needed.  -PT/OT recommending SNF placement, but will eventually need group home placement with 24/7 care after SNF making it a difficult discharge.  -Mobilize as tolerated.    Parkinson's disease (HCC)- (present on admission)  Assessment & Plan  -Chronic and stable.  -Continue sinemet.  -PT/OT recommending SNF placement. Will require 24/7 care at a group home afterward.    JOE Kelley         I certify that the patient requires continued medically necessary hospital services for the treatment of Dementia and will remain in the hospital for 30 days.  Discharge plan is to be determined once institutional medicaid is processed, will need SNF placement.

## 2020-09-06 NOTE — PROGRESS NOTES
Pt alert to self only. VSS. Lungs clear diminished on RA. No signs of pain/distress at this time. Pt requires frequent reorientation, and often yells out. Irritable at times. Pt is turn and repositioned every 2 hours, and incontinence care provided as needed. Bed low and locked, call light within reach, safety precautions in place, hourly rounding, bed alarm on, WCM.

## 2020-09-06 NOTE — PROGRESS NOTES
2 RN Skin Check    2 RN skin check complete Hortencia RN  Devices in place: Mepilex  Skin assessed under devices: yes.  Confirmed pressure ulcers found on: n/a.  New potential pressure ulcers noted on n/a. Wound consult placed N/A.  The following interventions in place pillows for repositioning and support, waffle overlay, frequent incontinence checks, barrier paste and powder, 2RN skin check and Q2 turns. Mepilex for prevention    Skin Assessment:   Bilateral heels are red/boggy and blanching. Left heel has small purple discoloration that is slow to obed  BLE are dusky and edematous   Sacrum is red and excoriated, blanching   Bilateral elbows are pink and blanching  Slight redness noted under breasts, blanching. Powder applied.   Generalized bruising throughout abdomen and arms.

## 2020-09-06 NOTE — PROGRESS NOTES
"Pt is alert to self and place at times. Pt was OOB w/ 2 assist to the wheelchair. Pt became agitated at lunch time. Pt was frustrated she could not have food from other patients trays or a sandwich. She purposefully spilt her coffee. Her tray was removed from in front of her. Pt began yelling and swearing at staff and other patients. Emotional support provided attempted redirecting without success. Pt was assisted back to her room to bed. Pt was still yelling out. PRN haldol given. Pt attempted to get out of bed unassisted and was found laying on the side of the bed with her legs off the bed when the patient safety alarm went off. Pt stated, \" I was trying to fall off the bed.\" Pt was assisted back to bed and staff sat outside the door to assure patient's safety. Pt calmed down and slept for about a half an hour. When patient awoke she was assisted with eating. Unable to complete two RN skin assessment because of agitation, charge RN notified. Fall prevention education provided. Pt safety alarms in use. Call bell within reach. Hourly rounding completed. Pt currently sitting in wheelchair at nurses station.    "

## 2020-09-06 NOTE — PROGRESS NOTES
Received bedside report and assumed care of pt at 0700. She is alert to self only and is on RA. Pt is resting with eyes closed in wheelchair near nurses station. No signs of distress or needs. Breathing is unlabored. Q2 turns and 2RN skin check both in place. Chair and bed alarm both on and activated. No other needs at this time.

## 2020-09-06 NOTE — PROGRESS NOTES
2 RN skin check complete with CURT Mosher  Devices in place mepilex to heels.  Confirmed pressure ulcers found on n/a.  New potential pressure ulcers noted on n/a.   Wound consult placed n/a.     Skin Assessment:     -Bilat heels: red/blanching/ boggy, mepilex in use  -Sacrum: excoriated and  blanching.   -Bilat ears: pink/blanching  -Generalized: bruising/fragile     The following interventions in place 2 RN skin check, Q2 turns, barrier paste, waffle mattress, mepilex for protection, frequent incontinence checks.

## 2020-09-07 NOTE — PROGRESS NOTES
Received bedside report and assumed care of pt at 0700. She is A&O to self only and on RA. She is chairfast/bedbound at this time. Assessment was completed and stated no pain at this time. Discussed POC for the day and answered all questions. Bed is locked and in lowest position with water and call light in reach.     Speech came and evaluated this morning to evaluate for effectiveness of diet and need for discharge speech follow up.

## 2020-09-07 NOTE — PROGRESS NOTES
2 RN Skin Check     2 RN skin check complete Hortencia RN  Devices in place: Mepilex  Skin assessed under devices: yes.  Confirmed pressure ulcers found on: n/a.  New potential pressure ulcers noted on n/a. Wound consult placed N/A.  The following interventions in place pillows for repositioning and support, waffle overlay, frequent incontinence checks, barrier paste and powder, 2RN skin check and Q2 turns. Mepilex for prevention     Skin Assessment:   Bilateral heels are red/boggy and blanching. Left heel has small purple discoloration that blanches, notice the spot appears darker when she is up in chair for extended amount of time, likely from mottling. Mepilex in place bilaterally  BLE are dusky and edematous, however improves greatly when back in bed with feel elevated (continues to refuse lovenox)  Sacrum is red & blanching   Bilateral elbows are pink and blanching  Slight redness noted under breasts, blanching. Powder applied.   Generalized bruising throughout abdomen and arms.

## 2020-09-07 NOTE — PROGRESS NOTES
Pt alert to self only. VSS. Lungs clear diminished on RA. No signs of pain/distress at this time. Pt refused skin check, after education given. Irritable at times. Pt is turn and repositioned every 2 hours, and incontinence care provided as needed. Bed low and locked, call light within reach, safety precautions in place, hourly rounding, bed alarm on, WCM.       Pt refused skin assessment. Pt irritable and yelling at staff to leave. Pt spit medication out of mouth.

## 2020-09-07 NOTE — THERAPY
Speech Language Pathology  Daily Treatment     Patient Name: Carmenza Bennett  Age:  75 y.o., Sex:  female  Medical Record #: [id number]  Today's Date: 9/7/2020     Precautions  Precautions: Fall Risk, Swallow Precautions ( See Comments)  Comments: Parkinson's, dementia    Assessment    Patient seen for dysphagia follow-up and presents AAO x self only, confabulating regarding needed to 'go out and beg for food.'  Pt reassured that her meals are provided and she is easily re-directed.  Trials of dry solid tested, with 1x cough post swallow.  Pt will intermittently attempt self-feeding with verbal cues, however assisted feeding is required due to dementia and reduced self-care.  No overts s/s of aspiration with alternating thin liquids and soft/bite-sized solids.  SLP to discharge skilled services at this time.  CNA verbalizes knowledge of pt's required care during meals.    Plan    Discharge secondary to goals met.  Pt is tolerating a SB6/thin liquid diet with assisted feeding during meals with posted strategies.    Discharge Recommendations: (P) Anticipate that the patient will have no further speech therapy needs after discharge from the hospital(has persistent Parkinson's dementia, is tolerating diet) Patient is not being actively followed for therapy services at this time, however may be seen if requested by attending provider for 1 more visit within 30 days to address any discharge needs or if the patient has a change in status.         Objective       09/07/20 0723   Voice   Comments Clear   Dysphagia    Dysphagia X   Positioning / Behavior Modification Modulate Rate or Bite Size;Alternate Solids and Liquids   Other Treatments Trial upgraded texture, + self-feeding and diet toleration of current diet   Diet / Liquid Recommendation Soft & Bite-Sized (6) - (Dysphagia III);Thin (0)   Nutritional Liquid Intake Rating Scale Non thickened beverages   Nutritional Food Intake Rating Scale Total oral diet with multiple  "consistencies without special preparation but with specific food limitations   Nursing Communication Swallow Precaution Sign Posted at Head of Bed   Skilled Intervention Compensatory Strategies;Verbal Cueing   Comments Collaborated with CNA regarding current assitance needed during meal times.   Recommended Route of Medication Administration   Medication Administration  Crush all Medications in Puree   Patient / Family Goals   Patient / Family Goal #1 \"I have to go out and beg for food today\"   Goal #1 Outcome Progressing as expected  (pt has dementia and is redirected w/ reassurance, verbal cue)   Short Term Goals   Short Term Goal # 2 8/31 revised: Pt will consume a SB6/thin liquid diet without s/s of aspiration with 1:1 assisted feeding   Goal Outcome # 2  Progressing as expected   Education Group   Additional Comments pt attempts minimal self-feeding with encouragement, but still needs 1:1 to eat for nutritional needs.   Interdisciplinary Plan of Care Collaboration   Collaboration Comments will d/c SLP with pt tolerating a mildly modified diet with ongoing feeding assist required due to dementia   Session Information   Date / Session Number 6, 9/7/20 (2/3-9/7 CW)   Priority 0  (Tolerating SB6/thin liquids w/ 1:1 assist due to dementia)         "
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
## 2020-09-08 NOTE — PROGRESS NOTES
Pt alert to self only. VSS. Lungs clear diminished on RA. No signs of pain/distress at this time. Pt is turn and repositioned every 2 hours, and incontinence care provided as needed. Bed low and locked, call light within reach, safety precautions in place, hourly rounding, bed alarm on, WCM.

## 2020-09-08 NOTE — PROGRESS NOTES
Received bedside report and assumed care of pt at 0700. Pt is A&O to self only and on RA. Pt is bed bound and Q2 turns and 2RN skin check are in place.  Assessment was completed and stated having no pain at this time. Discussed POC for the day and answered all questions. Bed is locked and in lowest position with water and call light in reach.     Pt is still refusing lovenox, education provided

## 2020-09-08 NOTE — PROGRESS NOTES
Hospital Medicine Twice Weekly Progress Note    Date of Service  9/8/2020    Chief Complaint  shortness of breath    Hospital Course    Ms. Bennett is a wheelchair bound 75-year-old female with a PMH of arthritis, DVT, Parkinson's, and dementia who presented 5/14/2020 with complaints of shortness of breath, nonproductive cough, fevers, and chills for 5 days. She is confused at baseline and was sent by her  since he was unable to care for her. An EKG was done in the ED and was unremarkable, though a chest xray showed bilateral interstitial infiltrates. COVID was ruled out in the ED. She was evaluated by PT/OT who recommended 24/7 supervision. Her  also stated he is no longer able to care for her either. She is now pending placement to SNF vs group home, though medicaid must be obtained first as she has limited income.          Interval Problem Update  No clinical changes. VSS. No labs. Taking PO. Assistance w meals  Sleeping comfortably.    Consultants/Specialty  None    Code Status  DNAR/DNI    Disposition  TBD -pending placement to SNF versus group home- patient does have verbal outbursts. spouse to assess assets, spend down.  Patient needs to apply for institutional Medicaid.  Daughter is involved.    Assessment/Plan  Dementia (HCC)- (present on admission)  Assessment & Plan  - Chronic and stable.  - Frequent re-orientation, reestablish circadian rhythm, encourage familiar faces/family in room, avoid or minimize narcotics/sedatives.   - Continue on seroquel and prozac.   - PRN Haldol available if needed. Last administered 8/22    Discharge planning issues  Assessment & Plan  -Patient not eligible for Medicaid due to income.  Social work has requested [facility] screen her for institutional Medicaid.  Will need placement after that is obtained.  -Patient's  needs to spend down or file for separation of assets.      Lower extremity pain, bilateral- (present on admission)  Assessment & Plan  -PRN Tylenol  available if needed.  -PT/OT recommending SNF placement, but will eventually need group home placement with 24/7 care after SNF making it a difficult discharge.  -Mobilize as tolerated.    Parkinson's disease (HCC)- (present on admission)  Assessment & Plan  -Chronic and stable.  -Continue sinemet.  -PT/OT recommending SNF placement. Will require 24/7 care at a group home afterward.    LUCIA GarciaN.         I certify that the patient requires continued medically necessary hospital services for the treatment of Dementia and will remain in the hospital for 30 days.  Discharge plan is to be determined once institutional medicaid is processed, will need SNF placement.

## 2020-09-09 NOTE — PROGRESS NOTES
Pt refusing 2RN skin check. Attempted to educate and she began getting upset and yelling again. Charge notified     Observations made were sacrum is red with no visible changes to area. Heels were red boggy but blanching. Small purple spot was no longer found when changing mepilex. Elbows looked to be red/pink no obvious changes noted from yesterdays skin check.

## 2020-09-09 NOTE — PROGRESS NOTES
Pt was sitting at community table at change of shift. Pt AOx 1 to self only, on RA, and able to sit in WC with assist into chair.  VSS.  Showing no signs of pain at distress at assessment.  2 RN skin checks and Q2 turns in place. Call light and pt belongings in reach, bed locked and in lowest position, and pt now resting quietly.

## 2020-09-09 NOTE — PROGRESS NOTES
2 RN Skin Check     2 RN skin check complete Hortencia RN  Devices in place: Mepilex  Skin assessed under devices: yes.  Confirmed pressure ulcers found on: n/a.  New potential pressure ulcers noted on n/a. Wound consult placed N/A.  The following interventions in place pillows for repositioning and support, waffle overlay, frequent incontinence checks, barrier paste and powder, 2RN skin check and Q2 turns. Mepilex for prevention     Skin Assessment:   Bilateral heels are red/boggy and blanching. Bilateral legs are mottled however pt was up in chair most of day. Mepilex in place bilaterally   Sacrum is red & blanching   Bilateral elbows are pink and blanching  Slight redness noted under breasts, blanching. Powder applied.   Generalized bruising throughout abdomen and arms.

## 2020-09-09 NOTE — CARE PLAN
Problem: Safety  Goal: Will remain free from falls  Outcome: PROGRESSING AS EXPECTED  Intervention: Implement fall precautions  Flowsheets  Taken 9/9/2020 0256  Chair/Bed Strip Alarm: Yes - Alarm On  Taken 9/8/2020 2200  Environmental Precautions:   Personal Belongings, Wastebasket, Call Bell etc. in Easy Reach   Report Given to Other Health Care Providers Regarding Fall Risk   Bed in Low Position   Communication Sign for Patients & Families   Mobility Assessed & Appropriate Sign Placed     Problem: Skin Integrity  Goal: Risk for impaired skin integrity will decrease  Outcome: PROGRESSING AS EXPECTED  Intervention: Implement precautions to protect skin integrity in collaboration with the interdisciplinary team  Flowsheets  Taken 9/9/2020 0256  Skin Preventative Measures:   Heel Float Boots in Use   Pillows in Use for Support / Positioning   Seat Cushion in Use on Chair when Out of Bed   Waffle Cushion  Patient Turns / Repositioning: (Q2 turns in place) --  Taken 9/8/2020 2200  Bed Types: Pressure Redistribution Mattress (Atmosair)  Moisturizers/Barriers: Barrier Cream  Protocols: Incontinence Associated Dermatitis Protocol in Place  Activity:   Bed   Chair

## 2020-09-09 NOTE — PROGRESS NOTES
Received bedside report and assumed care of pt at 0700. Pt is A&O to self only and on RA. Pt is bed bound and Q2 turns and 2RN skin check are in place.  Assessment was completed and stated having no pain at this time. Discussed POC for the day and answered all questions. Bed is locked and in lowest position with water and call light in reach. Pt is currently sitting out at community table with no signs of distress.

## 2020-09-09 NOTE — PROGRESS NOTES
2 RN Skin Check     2 RN skin check completed with CURT Lan.  Devices in place: heel float boots  Skin assessed under devices: yes  Confirmed pressure ulcers found on: N/A  New potential pressure ulcers noted on N/A Wound consult placed N/A      The following interventions in place: pillows for support/positioning, waffle overlay, frequent incontinence checks and clean dry linens, barrier paste, powder, 2RN skin checks and Q2 turns, Mepilex, heel float boots       Skin Assessment    Bilateral ears pink, blanching.  BUE and abdomen has bruising.  Sacrum pink/red, blanching.  BLE have swelling. Bruising on right lower leg.  Bilateral heels red, boggy, and blanching. Mepilex and heel float boots in place bilaterally.

## 2020-09-10 NOTE — PROGRESS NOTES
2 RN skin check complete with CURT Burnett.   Devices in place Mepilex.  Skin assessed under devices yes.  Confirmed pressure ulcers found on N/A.  New potential pressure ulcers noted on N/A. Wound consult placed N/A.  The following interventions in place pillows in place for support and repositioning, waffle overlay in place, frequently incontinence checks, barrier paste and powder, q 2 turns in place, Mepilex for prevention.    Bilateral heels are red, boggy and blanching. Bilateral legs are mottled. Pt is up in wheelchair for most of day. Mepilex in place on bilateral heels. Sacrum is red and blanching. Bilateral elbows are pink and blanching. Sacrum is red and blanching. Slight redness noted under breasts, blanching. Powder applied. Generalized bruising throughout abdomen and arms.

## 2020-09-10 NOTE — CARE PLAN
Problem: Safety  Goal: Will remain free from falls  Outcome: PROGRESSING AS EXPECTED  Intervention: Implement fall precautions  Flowsheets  Taken 9/10/2020 0237  Chair/Bed Strip Alarm: Yes - Alarm On  Taken 9/9/2020 2105  Environmental Precautions:   Treaded Slipper Socks on Patient   Personal Belongings, Wastebasket, Call Bell etc. in Easy Reach   Report Given to Other Health Care Providers Regarding Fall Risk   Bed in Low Position   Communication Sign for Patients & Families   Mobility Assessed & Appropriate Sign Placed     Problem: Skin Integrity  Goal: Risk for impaired skin integrity will decrease  Outcome: PROGRESSING AS EXPECTED  Intervention: Implement precautions to protect skin integrity in collaboration with the interdisciplinary team  Flowsheets  Taken 9/10/2020 0120 by Lori Black, C.N.A.  Skin Preventative Measures:   Heel Float Boots in Use   Pillows in Use for Support / Positioning   Waffle Cushion  Taken 9/9/2020 2105 by Mercedez Porras R.N.  Moisturizers/Barriers: Barrier Cream  Protocols: Incontinence Associated Dermatitis Protocol in Place  Activity:   Bed   Chair  Note: 2RN skin check and Q2 turns in place.

## 2020-09-10 NOTE — PROGRESS NOTES
Bedside report received.Pt is A&Ox1 on room air. Pt is sitting up in wheelchair at nurse's station for most of the day. Pt is cooperative with nursing care.  POC discussed with pt; all questions answered at this time.

## 2020-09-10 NOTE — PROGRESS NOTES
Received report and assumed care. Pt AOx 1, on RA, and up in WC. VSS. Pt up to table by nursing station in WC for several hours. No signs of pain or distress. 2RN and Q2 turns in place. Call light and pt belongings in reach, bed locked and in lowest position, and pt now resting quietly in bed.

## 2020-09-11 NOTE — CARE PLAN
Problem: Safety  Goal: Will remain free from falls  Outcome: PROGRESSING AS EXPECTED  Intervention: Implement fall precautions  Flowsheets  Taken 9/11/2020 0046  Chair/Bed Strip Alarm: Yes - Alarm On  Taken 9/10/2020 2040  Environmental Precautions:   Treaded Slipper Socks on Patient   Personal Belongings, Wastebasket, Call Bell etc. in Easy Reach   Report Given to Other Health Care Providers Regarding Fall Risk   Bed in Low Position   Communication Sign for Patients & Families   Mobility Assessed & Appropriate Sign Placed     Problem: Skin Integrity  Goal: Risk for impaired skin integrity will decrease  Outcome: PROGRESSING AS EXPECTED  Intervention: Implement precautions to protect skin integrity in collaboration with the interdisciplinary team  Flowsheets  Taken 9/10/2020 2320 by Lori Black, C.N.A.  Skin Preventative Measures:   Pillows in Use for Support / Positioning   Waffle Cushion  Taken 9/10/2020 2040 by Mercedez Porras R.N.  Moisturizers/Barriers: Barrier Cream  Protocols: Incontinence Associated Dermatitis Protocol in Place  Note: 2 RN skin checks and Q2 turns

## 2020-09-11 NOTE — PROGRESS NOTES
Received report and assumed care. Pt AOx 1, on RA, and up to WC with assist.  VSS. 2RN skin checks and Q2 turns in place. Pt cooperative with care early in shift but later became agitated with staff during turns. Call light and pt belongings in reach, bed locked and in lowest position, and pt now resting in bed.

## 2020-09-11 NOTE — PROGRESS NOTES
2 RN Skin Check     2 RN skin check completed with CURT Lan.  Devices in place: heel float boots  Skin assessed under devices: yes  Confirmed pressure ulcers found on: N/A  New potential pressure ulcers noted on N/A Wound consult placed N/A        The following interventions in place: pillows for support/positioning, waffle overlay, frequent incontinence checks and clean dry linens, barrier paste, 2RN skin checks and Q2 turns, Mepilex, heel float boots        Skin Assessment     Bilateral ears pink, blanching.  BUE and abdomen has bruising.  Sacrum pink/red, blanching.  BLE have swelling. Bruising on right lower leg.  Bilateral heels red, boggy, and blanching. Mepilex in place. Pt refused heel float boots through part of the night and then allowed them to be put on later.

## 2020-09-12 NOTE — PROGRESS NOTES
Hospital Medicine Twice Weekly Progress Note    Date of Service  9/12/2020    Chief Complaint  shortness of breath    Hospital Course    Ms. Bennett is a wheelchair bound 75-year-old female with a PMH of arthritis, DVT, Parkinson's, and dementia who presented 5/14/2020 with complaints of shortness of breath, nonproductive cough, fevers, and chills for 5 days. She is confused at baseline and was sent by her  since he was unable to care for her. An EKG was done in the ED and was unremarkable, though a chest xray showed bilateral interstitial infiltrates. COVID was ruled out in the ED. She was evaluated by PT/OT who recommended 24/7 supervision. Her  also stated he is no longer able to care for her either. She is now pending placement to SNF vs group home, though medicaid must be obtained first as she has limited income.          Interval Problem Update  No clinical changes. VSS. No labs. Taking PO. Assistance w meals  Last discharge note months ago  Sitting at communal table today, NAD; no complaints    Consultants/Specialty  None    Code Status  DNAR/DNI    Disposition  TBD -pending placement to SNF versus group home- patient does have verbal outbursts. spouse to assess assets, spend down.  Patient needs to apply for institutional Medicaid.  Daughter is involved.    Assessment/Plan  Dementia (HCC)- (present on admission)  Assessment & Plan  - Chronic and stable.  - Frequent re-orientation, reestablish circadian rhythm, encourage familiar faces/family in room, avoid or minimize narcotics/sedatives.   - Continue on seroquel and prozac.   - PRN Haldol available if needed. Last administered 8/22    Discharge planning issues  Assessment & Plan  -Patient not eligible for Medicaid due to income.  Social work has requested [facility] screen her for institutional Medicaid.  Will need placement after that is obtained.  -Patient's  needs to spend down or file for separation of assets.      Lower extremity pain,  bilateral- (present on admission)  Assessment & Plan  -PRN Tylenol available if needed.  -PT/OT recommending SNF placement, but will eventually need group home placement with 24/7 care after SNF making it a difficult discharge.  -Mobilize as tolerated.    Parkinson's disease (HCC)- (present on admission)  Assessment & Plan  -Chronic and stable.  -Continue sinemet.  -PT/OT recommending SNF placement. Will require 24/7 care at a group home afterward.    Liang Allen, A.P.MAYRA.         I certify that the patient requires continued medically necessary hospital services for the treatment of Dementia and will remain in the hospital for 30 days.  Discharge plan is to be determined once institutional medicaid is processed, will need SNF placement.    I have performed a physical exam and reviewed and updated ROS and Plan today (9/12/2020). In review of previous note there are no changes except as documented above.

## 2020-09-12 NOTE — PROGRESS NOTES
2 RN Skin Check     2 RN skin check completed with CURT Rivera.  Devices in place: heel float boots  Skin assessed under devices: yes  Confirmed pressure ulcers found on: N/A  New potential pressure ulcers noted on N/A Wound consult placed N/A        The following interventions in place: pillows for support/positioning, waffle overlay, frequent incontinence checks and clean dry linens, barrier paste, 2RN skin checks and Q2 turns, Mepilex, heel float boots, powder        Skin Assessment     Bilateral ears pink, blanching.  Slight redness under breasts. Powder applied.  BUE and abdomen has bruising.  Sacrum pink/red, blanching. Barrier cream applied.  BLE have swelling and scattered bruising.  Bilateral heels red, boggy, and blanching. Right heel slower to obed. Mepilex applied. Heel float boots in place.

## 2020-09-12 NOTE — CARE PLAN
Problem: Safety  Goal: Will remain free from falls  Outcome: PROGRESSING AS EXPECTED  Intervention: Implement fall precautions  Flowsheets  Taken 9/12/2020 0537  Chair/Bed Strip Alarm: Yes - Alarm On  Taken 9/11/2020 2050  Environmental Precautions:   Treaded Slipper Socks on Patient   Personal Belongings, Wastebasket, Call Bell etc. in Easy Reach   Report Given to Other Health Care Providers Regarding Fall Risk   Bed in Low Position   Communication Sign for Patients & Families   Mobility Assessed & Appropriate Sign Placed     Problem: Skin Integrity  Goal: Risk for impaired skin integrity will decrease  Outcome: PROGRESSING AS EXPECTED  Intervention: Implement precautions to protect skin integrity in collaboration with the interdisciplinary team  Flowsheets  Taken 9/12/2020 0250 by Lori Black, C.N.A.  Skin Preventative Measures:   Heel Float Boots in Use   Pillows in Use for Support / Positioning   Waffle Cushion   Gray Foam for Oxygen Tubing (Pad ear protector)  Taken 9/11/2020 2050 by Mercedez Porras R.N.  Moisturizers/Barriers: Barrier Cream  Protocols: Incontinence Associated Dermatitis Protocol in Place  Activity:   Bed   Chair  Note: 2RN skin checks and Q2 turns in place.

## 2020-09-12 NOTE — PROGRESS NOTES
Pt sitting in WC at community table at change of shift and eating dinner. Pt AOx 1, shows no signs of pain, and on RA.  VSS. 2 RN skin checks and Q2 turns in place. Pt cooperative and pleasant tonight with staff. Call light and pt belongings in reach, bed locked and in lowest position, and pt now resting quietly.

## 2020-09-12 NOTE — PROGRESS NOTES
2 RN skin check completed with CURT Victoria.  Devices in place: heel float boots  Skin assessed under devices: yes  Confirmed pressure ulcers found on: N/A  New potential pressure ulcers noted on N/A Wound consult placed N/A        The following interventions in place: pillows for support/positioning, waffle overlay, frequent incontinence checks and clean dry linens, barrier paste, 2RN skin checks and Q2 turns, Mepilex, heel float boots        Skin Assessment     Bilateral ears pink, blanching.  BUE and abdomen has bruising.  BLE have swelling. Bruising on right lower leg.  Bilateral heels red, and blanching. Mepilex in place.

## 2020-09-12 NOTE — PROGRESS NOTES
2 RN skin check completed with CURT Victoria.  Devices in place: heel float boots  Skin assessed under devices: yes  Confirmed pressure ulcers found on: N/A  New potential pressure ulcers noted on N/A Wound consult placed N/A        The following interventions in place: pillows for support/positioning, waffle overlay, frequent incontinence checks and clean dry linens, barrier paste, 2RN skin checks and Q2 turns, Mepilex, heel float boots        Skin Assessment     Bilateral ears pink, blanching.  BUE and abdomen has bruising.  Sacrum pink/red, blanching.  BLE have swelling. Bruising on right lower leg.  Bilateral heels red, boggy, and blanching. Mepilex in place.

## 2020-09-13 NOTE — CARE PLAN
Problem: Safety  Goal: Will remain free from injury  Outcome: PROGRESSING AS EXPECTED  Goal: Will remain free from falls  Outcome: PROGRESSING AS EXPECTED     Problem: Pain Management  Goal: Pain level will decrease to patient's comfort goal  Outcome: PROGRESSING AS EXPECTED     Problem: Respiratory:  Goal: Respiratory status will improve  Description:   Outcome: PROGRESSING AS EXPECTED

## 2020-09-13 NOTE — PROGRESS NOTES
Pt was up in wheelchair at start of shift from [id number]. Pt assist x2 back to bed. Pt is alert to self only. Pt is confused, forgetful, and often irritable. Pt yells out at staff, and at times to patients. No signs of pain or distress. VSS. Lungs clear diminished on RA. Bed low and locked, call light within reach, safety precautions in place, hourly rounding, WCM.

## 2020-09-13 NOTE — PROGRESS NOTES
2 RN skin check completed with CURT Rivera.  Devices in place: Mepilex  Skin assessed under devices: yes  Confirmed pressure ulcers found on: N/A  New potential pressure ulcers noted on N/A Wound consult placed N/A        The following interventions in place: pillows for support/positioning, waffle overlay, frequent incontinence checks and clean dry linens, barrier paste, 2RN skin checks and Q2 turns, Mepilex, heel float boots, powder        Skin Assessment     -Bilat ears: pink, blanching.  -Slight redness under breasts. Powder applied.  -BUE and abdomen has bruising.  -Sacrum: pink/red, blanching. Barrier cream applied.  -BLE: have swelling and scattered bruising.  -Bilat heels: red, boggy, and blanching. Right heel slower to obed. Mepilex applied. Pt refuses to wear heel float boots. Education given with no evidence of learning.

## 2020-09-14 NOTE — PROGRESS NOTES
Pt was up in wheelchair at start of shift from [phone]. Pt assist x2 back to bed. Pt is alert to self only. Pt is confused, forgetful, and often irritable. Pt yells out at staff, and at times to patients. No signs of pain or distress. VSS. Lungs clear diminished on RA. Bed low and locked, call light within reach, safety precautions in place, hourly rounding, WCM.

## 2020-09-14 NOTE — CARE PLAN
Problem: Communication  Goal: The ability to communicate needs accurately and effectively will improve  9/13/2020 2229 by Yovani Lazcano R.N.  Outcome: PROGRESSING AS EXPECTED     Problem: Safety  Goal: Will remain free from injury  9/13/2020 2229 by Yovani Lazcano R.N.  Outcome: PROGRESSING AS EXPECTED       Problem: Safety  Goal: Will remain free from falls  9/13/2020 2229 by Yovani Lazcano R.N.  Outcome: PROGRESSING AS EXPECTED     Problem: Pain Management  Goal: Pain level will decrease to patient's comfort goal  9/13/2020 2229 by Yovani Lazcano R.N.  Outcome: PROGRESSING AS EXPECTED

## 2020-09-14 NOTE — PROGRESS NOTES
2 RN skin check completed with CURT Mosher.  Devices in place: Mepilex  Skin assessed under devices: yes  Confirmed pressure ulcers found on: N/A  New potential pressure ulcers noted on N/A Wound consult placed N/A        The following interventions in place: pillows for support/positioning, waffle overlay, frequent incontinence checks and clean dry linens, barrier paste, 2RN skin checks and Q2 turns, Mepilex, heel float boots, powder        Skin Assessment     -Bilat ears: pink, blanching.  -Slight redness under breasts. Powder applied.  -BUE and abdomen has bruising.  -Sacrum: pink/red, blanching. Barrier cream applied.  -BLE: have swelling and scattered bruising. Blanching redness to the posterior upper legs.  -Bilat heels: red, boggy, and blanching. Right heel slower to obed. Mepilex applied. Pt refuses to wear heel float boots. Education given with no evidence of learning.   -Perineal: redness to perineal area from incontinence.

## 2020-09-14 NOTE — PROGRESS NOTES
Pt is alert to person and place at times. Pt was OOB to the wheelchair w/ one assist. Patient sat at the nurses station for meal times. Call bell within reach. Fall prevention education provided. Hourly rounding completed. Will continue to monitor.

## 2020-09-14 NOTE — PROGRESS NOTES
2 RN skin check completed with CURT Espana.  Devices in place: Mepilex  Skin assessed under devices: yes  Confirmed pressure ulcers found on: N/A  New potential pressure ulcers noted on N/A Wound consult placed N/A        The following interventions in place: pillows for support/positioning, waffle mattress overlay, frequent incontinent care provided, pt refused heel float boots education provided, mepilex on heels, and barrier paste to orlin area.     Skin Assessment    Skin is generally loose and fragile w/ scattered ecchymotic areas. Ears pink and blanching, no respiratory device in use. Sacrum is intact pink and blanching. BLE have trace swelling. Bilateral heels are boggy and red. Mepilex to heels were changed.

## 2020-09-15 NOTE — PROGRESS NOTES
"Ms. Bennett is a wheelchair bound 75-year-old female with a PMH of arthritis, DVT, Parkinson's, and dementia who presented 5/14/2020 with complaints of shortness of breath, nonproductive cough, fevers, and chills for 5 days. She is confused at baseline and was sent by her  since he was unable to care for her. An EKG was done in the ED and was unremarkable, though a chest xray showed bilateral interstitial infiltrates. COVID was ruled out in the ED. She was evaluated by PT/OT who recommended 24/7 supervision. Her  also stated he is no longer able to care for her either. She is now pending placement to SNF vs group home, though medicaid must be obtained first as she has limited income.    9/15:  Sitting at nurses station in no acute distress.  She is worried about \"not having anything nice to wear\".  She is otherwise stable with stable vital signs.  No acute needs.         "

## 2020-09-15 NOTE — PROGRESS NOTES
Pt is alert to self only. Pt is confused, forgetful, and often irritable. No signs of pain or distress. VSS. Lungs clear diminished on RA. Assist x2 OOB to wheelchair. Pt is incontinent and orlin care provided as needed. Turned and repositioned every 2 hours. Bed low and locked, call light within reach, safety precautions in place, hourly rounding, WCM.

## 2020-09-15 NOTE — PROGRESS NOTES
2 RN skin check completed with CURT Machuca.  Devices in place: Mepilex  Skin assessed under devices: yes  Confirmed pressure ulcers found on: N/A  New potential pressure ulcers noted on N/A Wound consult placed N/A        The following interventions in place: pillows for support/positioning, waffle overlay, frequent incontinence checks and clean dry linens, barrier paste, 2RN skin checks and Q2 turns, Mepilex, heel float boots, powder        Skin Assessment     -Bilat ears: pink, blanching.  -Slight redness under breasts. Powder applied.  -BUE and abdomen has bruising.  -Sacrum: pink/red, blanching. Barrier cream applied.  -BLE: have swelling and scattered bruising. Blanching redness to the posterior upper legs.  -Bilat heels: red, boggy, and blanching. Right heel slower to obed. Mepilex applied. Pt refuses to wear heel float boots. Education given with no evidence of learning.   -Perineal: redness to perineal area from incontinence.

## 2020-09-15 NOTE — PROGRESS NOTES
2 RN skin check completed with CURT Velazco.  Devices in place: Mepilex  Skin assessed under devices: yes  Confirmed pressure ulcers found on: N/A  New potential pressure ulcers noted on N/A   Wound consult placed N/A        The following interventions in place: pillows for support/positioning, waffle mattress overlay, frequent incontinent care provided, pt refused heel float boots education provided, mepilex on heels, and barrier paste to orlin area.      Skin Assessment    Skin is generally loose and fragile w/ scattered ecchymotic areas. Ears pink and blanching, no respiratory device in use. Elbows pink and blanching. Sacrum is intact pink and blanching. BLE have trace swelling. Bilateral heels are boggy and red. Mepilex in place. Will attempt to elevate heels on pillows while in bed if patient is agreeable. Will try to see if patient will wear protective boots while in the wheelchair.

## 2020-09-15 NOTE — PROGRESS NOTES
Pt is alert to person and place at times. Pt was OOB to the wheelchair w/ one assist. Patient sat at the nurses station for meal times. Pt refused her lovenox injection again today. Attempted to provide education but patient wasn't receptive to it.  Call bell within reach. Fall prevention education provided. Hourly rounding completed. Will continue to monitor

## 2020-09-16 NOTE — PROGRESS NOTES
2 RN skin check completed with Areli ELIAS.  Devices in place: Heel float boots  Skin assessed under devices: yes  Confirmed pressure ulcers found on: N/A  New potential pressure ulcers noted on N/A Wound consult placed N/A      Skin assessment:  -bilateral heels redness, boggy but blanching  -BLE has some swelling with scattered bruising with some redness but all blanching  -Sacrum/buttocks pink slow to obed including gluteal cleft fissure and coccyx area skin is intact and very slow to obed at this time  -BUE and abdomen has some bruising  -bilateral elbows pink/red but blanching  -back of bilateral ears pink and blanching    The following interventions in place:  2 RN skin checking, waffle overlay, float heel boots on, mepilexes on pressure points, pillows for repositioning, monitoring for incontinence routinely, mepilex on sacrum at night initially for prevention with jass cream in use and monitoring if mepilex is soiled routinely. Q2 turns in place, pt. On prevalon boots but refuses most of the time-encouraged her and tried to convinced multiple times she kept kicking the boots. She also refused mepilex on both her elbows despite multiple education.

## 2020-09-16 NOTE — CARE PLAN
Problem: Safety  Goal: Will remain free from injury  Outcome: PROGRESSING AS EXPECTED  Goal: Will remain free from falls  Outcome: PROGRESSING AS EXPECTED  Bed alarm in place, call light placed within reach.      Problem: Skin Integrity  Goal: Risk for impaired skin integrity will decrease  Outcome: PROGRESSING AS EXPECTED  2 RN skin check done, ensured incontinence monitoring routinely.

## 2020-09-16 NOTE — PROGRESS NOTES
Pt became verbally aggressive while eating breakfast at the nurses' station. Pt started calling other patient's names. Attempted to deescalate patient without success. Pt was wheeled back in to her room where she continued to yell out and attempt to get out of her chair. Pt given prn IM haldol. Pt currently resting in bed and was agreeable to taking some of her medications. Will continue to monitor.

## 2020-09-16 NOTE — PROGRESS NOTES
2 RN skin check completed with CURT Prabhakar.  Devices in place: Mepilex  Skin assessed under devices: yes  Confirmed pressure ulcers found on: N/A  New potential pressure ulcers noted on N/A   Wound consult placed N/A        The following interventions in place: pillows for support/positioning, waffle mattress overlay, frequent incontinent care provided, pt refused heel float boots education provided, mepilex on heels, and barrier paste to kristy area.      Skin Assessment    Skin is generally loose and fragile w/ scattered ecchymotic areas. Ears pink and blanching, no respiratory device in use. Elbows pink and blanching. Sacrum is intact pink and blanching. Kristy area is reddened from moisture, barrier paste applied. BLE have trace swelling. Bilateral heels are reddened and blanching. Mepilex in place. Will attempt to elevate heels on pillows while in bed if patient is agreeable. Patient wore protective boots while in the wheelchair for breakfast.

## 2020-09-17 NOTE — PROGRESS NOTES
2 RN skin check completed with Brigette ELIAS.   Devices in place heel float boots.  Skin assessed under devices yes.  Confirmed pressure ulcers found on N/A.  New potential pressure ulcers noted on N/A. Wound consult placed N/A.    The following interventions in place Q2 hour turns, 2 RN skin checks, waffle mattress overlay, pillows for repositioning/support, barrier paste, refused heel float boots, floated heels on pillow. *

## 2020-09-17 NOTE — PROGRESS NOTES
Received report and assumed care at shift change. A&O only to self, no complain of pain or distress. Was up on chair at the common area during shift change. Fall precautions in place, bed locked and in lowest position. Patient refused bedtime medications despite several attempts. Needs attended to.

## 2020-09-17 NOTE — PROGRESS NOTES
Pt is alert to person. Pt was OOB to the wheelchair w/ one assist. Patient sat at the nurses station for meal times. Pt refused her lovenox injection again today. Attempted to provide education but patient wasn't receptive to it.  Call bell within reach. Fall prevention education provided. Hourly rounding completed. Will continue to monitor

## 2020-09-18 NOTE — PROGRESS NOTES
2 RN skin check complete with CURT Prabhakar   Devices in place: heel float boots, mepilex .  Skin assessed under devices: yes.  Confirmed pressure ulcers found on: N/A.  New potential pressure ulcers noted on: N/A.    The following interventions in place: Q2h turns, mepilex, pillows for support/positioning, heel float boots (when tolerated by pt), incontinence care, barrier cream    Current skin abnormalities:  Elbows: pink, blanching  Sacrum: pink, blanching (barrier cream)  Kristy area: pink, edema (barrier cream  Bilateral heels: pink, blanching

## 2020-09-18 NOTE — PROGRESS NOTES
2 RN skin check complete with CURT Griffin   Devices in place: heel float boots, mepilex .  Skin assessed under devices: yes.  Confirmed pressure ulcers found on: N/A.  New potential pressure ulcers noted on: N/A.     The following interventions in place: Q2h turns, mepilex, pillows for support/positioning, heel float boots (when tolerated by pt), incontinence care, barrier cream, out of bed when tolerated     Current skin abnormalities:  Elbows: pink, blanching  Sacrum: pink, blanching (barrier cream)  Kristy area: pink, edema (barrier cream)  Bilateral heels: pink, blanching

## 2020-09-18 NOTE — PROGRESS NOTES
2 RN skin check completed with Judy ELIAS.   Devices in place heel float boots.  Skin assessed under devices yes.  Confirmed pressure ulcers found on N/A.  New potential pressure ulcers noted on N/A. Wound consult placed N/A.     The following interventions in place Q2 hour turns, 2 RN skin checks, waffle mattress overlay, pillows for repositioning/support, barrier paste, refused heel float boots, floated heels on pillow

## 2020-09-18 NOTE — PROGRESS NOTES
Received report and assumed care at shift change. Patient up by the common area with other patients. Alert only to self. No complain of pain or distress. Continue on Q 2 hour turns. Fall precautions in place, bed locked and in lowest position. Needs attended to.

## 2020-09-19 NOTE — PROGRESS NOTES
2 RN skin check complete with CURT Liz   Devices in place: mepilex .  Skin assessed under devices: yes.  Confirmed pressure ulcers found on: N/A.  New potential pressure ulcers noted on: N/A.     The following interventions in place: Q2h turns, mepilex, pillows for support/positioning, heel float boots (when tolerated by pt), incontinence care, barrier cream, out of bed when tolerated     Current skin abnormalities:  Elbows: pink, blanching  Sacrum: pink, blanching (barrier cream)  Kristy area: pink, edema (barrier cream)  Bilateral heels: pink, blanching

## 2020-09-19 NOTE — PROGRESS NOTES
2 RN skin check completed with CURT Kim.   Devices in place none.  Skin assessed under devices N/A.  Confirmed pressure ulcers found on none.  New potential pressure ulcers noted on none. Wound consult placed none.  The following interventions in place:  Q2 hour turns, 2 RN skin checks, waffle cushion mattress overlay, Mepilex on both heels, floated heels on pillow (refused heel float boots) and incontinence care with barrier paste.         Sacrum is pink and blanching.  Bilateral heels are pink and blanching.  Back of head is red and intact.

## 2020-09-19 NOTE — CARE PLAN
Problem: Safety  Goal: Will remain free from falls  Outcome: PROGRESSING AS EXPECTED     Problem: Skin Integrity  Goal: Risk for impaired skin integrity will decrease  Outcome: PROGRESSING AS EXPECTED  Note: Q2 turns, incontinence care and Mepilex on heels

## 2020-09-19 NOTE — PROGRESS NOTES
Pt sitting at nurses station today, pleasant mood  Incontinence care provided  Complaints of pain today- pt could not articulate where the pain was. It was apparent that pt had large hard stool in rectum that was causing her discomfort. Pt was able to pass large BM in bed, stated relief of pain  Eating meals at nurses station 1:1, appetite excellent  All needs met at this time

## 2020-09-19 NOTE — PROGRESS NOTES
Alert and orientedx1.  Denied any pain.  Incontinence care provided.  Call light with in reach.  Calls for assistance.  Hourly rounding per protocol.

## 2020-09-20 NOTE — PROGRESS NOTES
Assumed care of patient at 0700. Pt is A+Ox1, at times pleasant other times yelling out and confused. Ax2 to the wheelchair and pt is calm while sitting at nurses station table. Skin precautions maintained and waffle cushion present in wheelchair/bed. All needs met at this time, no other changes.

## 2020-09-20 NOTE — CARE PLAN
Problem: Safety  Goal: Will remain free from falls  Outcome: PROGRESSING AS EXPECTED   Continues to have bed alarm    Problem: Skin Integrity  Goal: Risk for impaired skin integrity will decrease  Outcome: PROGRESSING AS EXPECTED   2 RN skin check, incontinence care, and Mepilex on heels

## 2020-09-20 NOTE — PROGRESS NOTES
2 RN skin check completed with CURT Kim.   Devices in place none.  Skin assessed under devices N/A.  Confirmed pressure ulcers found on none.  New potential pressure ulcers noted on none. Wound consult placed none.  The following interventions in place:  Q2 hour turns if pt allows, 2 RN skin checks, waffle cushion mattress overlay, Mepilex on both heels, floated heels on pillow (refused heel float boots) and incontinence care with barrier paste.           Sacrum is pink and blanching.  Bilateral heels are pink and blanching.  Back of head is red and intact.

## 2020-09-20 NOTE — PROGRESS NOTES
2 RN skin check complete with Sandra RN  Devices in place none.  Skin assessed under devices yes.  Confirmed pressure ulcers found on none.  New potential pressure ulcers noted on none. Wound consult placed n/a.  The following interventions in place waffle cushion in place, frequent incontinence care, qth turning and repositioning while in bed, mepilexes to bilateral heels/    Sacrum: deep red however blanching and otherwise intact.    Bilateral heels: Intact beneath mepilexes, no boggyness noted.    Bilateral elbows: in tact.

## 2020-09-21 NOTE — PROGRESS NOTES
Around 0025, patient's alarm went off. Patient was found on the side of her bed, kneeling on the ground with her right knee.  leaning on her right side over the bed and right arm holding on the bed rail. Patient noted to have a bowel movement. Patient was unable to explain what happened as she is confused and alert only to self. She denies having any pain at that time. Patient was put back to bed, head to toe assessment done with Irene ELIAS. Patient's right flank and back has blanching redness, with small area of minor rug burn. No redness to right knee. Patient able to move BUE. BLE normally unable to bear weight, no complain of pain when extremities are moved. Vital signs taken, notified charge nurse and on call hospitalist, Brenda HURT. Will continue to monitor.

## 2020-09-21 NOTE — PROGRESS NOTES
I certify that the patient requires continued medically necessary hospital services for the treatment of Dementia and will remain in the hospital for 30 days.  Discharge plan is to be determined once institutional medicaid is processed, will need SNF placement.

## 2020-09-21 NOTE — PROGRESS NOTES
2 RN skin check complete with Sandra RN  Devices in place mepilex, waffle cushion in bed and chair.  Skin assessed under devices yes.  Confirmed pressure ulcers found on none.  New potential pressure ulcers noted on none. Wound consult placed not needed.  The following interventions in place q2h turning and repositioning, waffle cushion in chair and bed, 2RN skin check, incontinence care PRN, baby powder and barrier cream.      Sacrum/Perineal area: reddened but blanching, moisture noted and baby powder applied to front while barrier cream was applied to backside.    Bilateral Heels: Both are boggy and slow to obed, but in tact. Mepilexes present.    Bilateral elbows: Intact, redness but blanchable. No mepilexes present.

## 2020-09-21 NOTE — PROGRESS NOTES
"2 RN skin check completed with Irene ELIAS.   Devices in place Mepilex.  Skin assessed under devices yes.  Confirmed pressure ulcers found on N/A.  New potential pressure ulcers noted on N/A. Wound consult placed N/A.    The following interventions in place Q 2 hour turns, 2 RN skin check, waffle mattress overlay, pillow to float heels (patient refused heel float boots), pillows for repositioning, barrier paste, frequent incontinence care.     Skin assessment:  Bilateral elbows pink and blanching.  Bilateral groin area intact.  Sacrum pink and blanching*  BLE trace edema  Bilateral heels boggy, blanching redness  Right flank blanching redness  Small area to right flank with minor \"rug burn\" redness.   "

## 2020-09-21 NOTE — PROGRESS NOTES
Assumed care of patient at 0700. Pt is irritated today, extremely confused and yelling out, even when sittting at the nurses station table in her wheelchair. Calming and distraction techniques used with some mild success. Incontinence care as needed. All needs met at this time.

## 2020-09-21 NOTE — CARE PLAN
Problem: Safety  Goal: Will remain free from injury  Outcome: PROGRESSING AS EXPECTED     Problem: Bowel/Gastric:  Goal: Will not experience complications related to bowel motility  Outcome: PROGRESSING AS EXPECTED     Problem: Skin Integrity  Goal: Risk for impaired skin integrity will decrease  Outcome: PROGRESSING AS EXPECTED

## 2020-09-22 NOTE — PROGRESS NOTES
Received report and assumed care at shift change. Patient eating dinner at common area. Alert only to self, no complain of pain or distress. Fall precautions in place, bed locked and in lowest position. Continue with hourly rounding. Needs attended to.

## 2020-09-22 NOTE — PROGRESS NOTES
Assumed care of patient at 0700. A+Ox1. Reports pain 0/10 today. Pt up in wheelchair most of the day, participating in meal times around table at nurses station. Mood more pleasant today than previous encounters. Skin precautions in place. Safety precautions in place, bed in lowest and locked position with call light in reach. Needs met at this time.

## 2020-09-22 NOTE — PROGRESS NOTES
2 RN skin check complete Vanna RN  Devices in place mepilexes to heels, waffle cushion.  Skin assessed under devices yes.  Confirmed pressure ulcers found on none.  New potential pressure ulcers noted on none. Wound consult placed not needed.  The following interventions in place q2h turning and repositioning, 2RN skin check, waffle cushion, pillows for positioning, barrier cream and baby powder for moisture control.    Left Breast Fold: moisture associated fissure noted.  - baby powder applied    Bilateral heels: Boggy and reddened however still blanching  - mepilexes intact and replaced.    Medial side of Left Great Toe: redness noted, blanching  - mepilex applied    Sacrum: reddened however blanchable.  - barrier cream applied.

## 2020-09-22 NOTE — PROGRESS NOTES
2 RN skin check completed with Areli ELIAS.   Devices in place Mepilex.  Skin assessed under devices yes.  Confirmed pressure ulcers found on N/A.  New potential pressure ulcers noted on N/A. Wound consult placed N/A.     The following interventions in place Q 2 hour turns, 2 RN skin check, waffle mattress overlay, pillow to float heels (patient refused heel float boots), pillows for repositioning, barrier paste, frequent incontinence care.      Skin assessment:  Bilateral elbows pink and blanching.  Bilateral groin area intact.  Sacrum pink and blanching*  BLE trace edema  Bilateral heels boggy, blanching redness.

## 2020-09-22 NOTE — PROGRESS NOTES
Hospital Medicine Twice Weekly Progress Note    Date of Service  9/22/2020    Chief Complaint  shortness of breath    Hospital Course    Ms. Bennett is a wheelchair bound 75-year-old female with a PMH of arthritis, DVT, Parkinson's, and dementia who presented 5/14/2020 with complaints of shortness of breath, nonproductive cough, fevers, and chills for 5 days. She is confused at baseline and was sent by her  since he was unable to care for her. An EKG was done in the ED and was unremarkable, though a chest xray showed bilateral interstitial infiltrates. COVID was ruled out in the ED. She was evaluated by PT/OT who recommended 24/7 supervision. Her  also stated he is no longer able to care for her either. She is now pending placement to SNF vs group home, though medicaid must be obtained first as she has limited income.          Interval Problem Update  9/22- Patient up in wheelchair sitting at communal table for meals. Is cooperative and in pleasant mood today. Needs assistance by staff to eat. Complains that it is cold. Awaiting placement.     Consultants/Specialty  None    Code Status  DNAR/DNI    Review of Systems   Unable to perform ROS: Mental status change     Physical Exam   Constitutional: Vital signs are normal. She appears unhealthy.   HENT:   Head: Normocephalic.   Eyes: Lids are normal.   Neck: Neck supple.   Cardiovascular: Normal rate.   Pulmonary/Chest: Effort normal. No respiratory distress.   Abdominal: She exhibits no distension.   Musculoskeletal:      Comments: Lower extremity weakness- wheel chair bound    Neurological: She is alert.   Not oriented    Skin: Skin is warm and dry.   Psychiatric: Her affect is labile. She expresses impulsivity. She exhibits abnormal new learning ability and abnormal recent memory. She has a flat affect.   Nursing note and vitals reviewed.    Disposition  TBD -pending placement to SNF versus group home- patient does have verbal outbursts. spouse to assess  assets, spend down.  Patient needs to apply for institutional Medicaid.  Daughter is involved.    Assessment/Plan  Dementia (HCC)- (present on admission)  Assessment & Plan  - Chronic and stable.  - Frequent re-orientation, reestablish circadian rhythm, encourage familiar faces/family in room, avoid or minimize narcotics/sedatives.   - Continue on seroquel and prozac.   - PRN Haldol available if needed. Last administered 8/22    Discharge planning issues  Assessment & Plan  -Patient not eligible for Medicaid due to income.  Social work has requested [facility] screen her for institutional Medicaid.  Will need placement after that is obtained.  -Patient's  needs to spend down or file for separation of assets.      Lower extremity pain, bilateral- (present on admission)  Assessment & Plan  -PRN Tylenol available if needed.  -PT/OT recommending SNF placement, but will eventually need group home placement with 24/7 care after SNF making it a difficult discharge.  -Mobilize as tolerated.    Parkinson's disease (HCC)- (present on admission)  Assessment & Plan  -Chronic and stable.  -Continue sinemet.  -PT/OT recommending SNF placement. Will require 24/7 care at a group home afterward.    TAWANDA PattonPCHUCK.         I certify that the patient requires continued medically necessary hospital services for the treatment of Dementia and will remain in the hospital for foreseeable future.  Discharge plan is to be determined once institutional medicaid is processed, will need SNF placement.    I have performed a physical exam and reviewed and updated ROS and Plan today (9/22/2020). In review of previous note there are no changes except as documented above.

## 2020-09-23 NOTE — CARE PLAN
Problem: Communication  Goal: The ability to communicate needs accurately and effectively will improve  Outcome: PROGRESSING AS EXPECTED     Problem: Safety  Goal: Will remain free from injury  Outcome: PROGRESSING AS EXPECTED  Goal: Will remain free from falls  Outcome: PROGRESSING AS EXPECTED     Problem: Safety  Goal: Will remain free from falls  Outcome: PROGRESSING AS EXPECTED     Problem: Respiratory:  Goal: Respiratory status will improve  Description: Pt here with PNA. Pt was on IV/PO abx. Pt has been placed on room air. Pt not in any respiratory distress. Pt with clear lung sounds, but diminished to the bases. Will continue to monitor for changes.   Outcome: PROGRESSING AS EXPECTED

## 2020-09-23 NOTE — PROGRESS NOTES
Urine color noted to be red tinged/brown on the brief and pad. MD made aware, awaiting response.    1603: per APRN, staff is to encourage hydration/fluids and watch for now.

## 2020-09-23 NOTE — PROGRESS NOTES
2 RN skin check complete with Vanna RN  Devices in place mepilexes.  Skin assessed under devices yes.  Confirmed pressure ulcers found on none.  New potential pressure ulcers noted on none. Wound consult placed not needed.  The following interventions in place q2h turning. Waffle cushion in chair and on bed, pillows for positioning, mepilexes on bony prominences and heels.    Bilateral heels: Blanching, cool to touch but reddened.   - mepilexes present    Medial side of left foot: Reddedned and slow to obed  - mepilex present    Sacrum/perineum: Redness present, slow to obed but intact  - barrier cream applied    Bilateral elbows: intact, blanching    Left Breast fold: intact  - baby powder present

## 2020-09-23 NOTE — PROGRESS NOTES
2 RN skin check completed with Areli ELIAS.   Devices in place Mepilex.  Skin assessed under devices yes.  Confirmed pressure ulcers found on N/A.  New potential pressure ulcers noted on N/A. Wound consult placed N/A.     The following interventions in place Q 2 hour turns, 2 RN skin check, waffle mattress overlay, pillow to float heels (patient refused heel float boots), pillows for repositioning, barrier paste, frequent incontinence care.      Skin assessment:  Bilateral elbows pink and blanching.  Bilateral groin area intact.  Sacrum pink and blanching*  BLE  edema  Bilateral heels boggy, blanching redness with mepilex in placed

## 2020-09-23 NOTE — PROGRESS NOTES
Assumed care of patient at 0700. A+Ox1, mood pleasant today. Reports pain 0/10 today. Up in wheelchair and participating in meals at the nurses station. Safety precautions in place, bed in lowest and locked position with call light in reach, chair and bed alarm used. Needs met at this time.

## 2020-09-24 NOTE — CARE PLAN
Problem: Safety  Goal: Will remain free from injury  Outcome: PROGRESSING AS EXPECTED  Note: Pt education provided regarding importance of fall prevention.  Safety precautions in place, bed locked and in lowest position, call light and personal belongings within reach. Hourly rounding.  Goal: Will remain free from falls  Outcome: PROGRESSING AS EXPECTED     Problem: Skin Integrity  Goal: Risk for impaired skin integrity will decrease  Outcome: PROGRESSING AS EXPECTED  Note: 2RN skin assessment, 2Q turns, barrier cream applied.

## 2020-09-24 NOTE — PROGRESS NOTES
Received report from night shift and assumed care. Pt is A&OX1, oriented to self only. Pt currently sitting up in wheelchair at nurse's station. Medications given per MAR. All other needs met. Safety precautions and hourly rounding in place.

## 2020-09-24 NOTE — PROGRESS NOTES
Assumed pt care at 1900.  Pt denies pain, sitting in wheelchair nurses station table.  Safety precautions in place, bed/chair alarm on, bed locked and in lowest position, call light and personal belongings within reach.  No further needs at this time.

## 2020-09-24 NOTE — PROGRESS NOTES
2 RN skin check complete with CURT Prabhakar  Devices in place: mepilex,   Skin assessed under devices: yes  Confirmed pressure ulcers found: n/a  New potential pressure ulcers noted: n/a  The following interventions in place: Q2 turns, up to chair, barrier cream, mepilex, incontinence checks     Notes: bilateral ears pink and blanching, bilateral elbows red and blanching, bilateral heels red and slow to obed, sacrum red and blanching, groin red and blanching

## 2020-09-24 NOTE — PROGRESS NOTES
2 RN Skin Check      2 RN skin check complete with: Areli ELIAS  Devices in place: mepilex  Skin assess under devices: yes   Confirmed pressure ulcers found on: n/a  New potential pressure ulcers noted on: n/a  Wound consult placed: n/a    The following interventions in place: 2RN skin chec, Q2 turns, pillows in place for positioning, mepilex    Skin assessment:  General: fragile  Ears: intact, blanching  Elbows: bilateral intact, slow to obed  Heels:  Bilateral red, intact, slow to obed, cool to touch  Feet:  Medial Left foot red, intact, slow to obed, cool to touch  Sacrum: red, slow to obed, intact  Chest: breast folds intact

## 2020-09-25 NOTE — PROGRESS NOTES
2 RN skin check completed with Mandi ELIAS.   Devices in place Mepilex.  Skin assessed under devices yes.  Confirmed pressure ulcers found on N/A.  New potential pressure ulcers noted on N/A. Wound consult placed N/A.     The following interventions in place Q 2 hour turns, 2 RN skin check, waffle mattress overlay, pillow to float heels (patient refused heel float boots), pillows for repositioning, barrier paste, frequent incontinence care.      Skin assessment:  Bilateral elbows pink and blanching.  Bilateral groin area intact.  Sacrum pink and blanching*  BLE trace edema  Bilateral heels boggy, blanching redness.

## 2020-09-25 NOTE — PROGRESS NOTES
Received report and assumed care at shift change. Patient was up on wheelchair by nurses station during shift change. A&O x 1, no complain of pain or distress. Continue with Q2 hour turns. Fall precautions in place, bed locked and in lowest position. Needs attended to.

## 2020-09-25 NOTE — PROGRESS NOTES
Pt is A&Ox1 to self only. Denies any pain at this moment; shows no signs of distress or discomfort. Pt is 2x assist to wc; and enjoys frequently siting at nurses station. J0stcoo and 2 RN skin check in place along with frequent incontinence checks. Pt is compliant with medications; no issues. Pt currently sitting at nurses station resting in wc. POC discussed with pt. Fall education provided, and fall precautions in place. Hourly rounding in practice.

## 2020-09-25 NOTE — PROGRESS NOTES
2 RN skin check completed with Esme ELIAS.   Devices in place: mepilex.  Skin assessed under devices: yes.  Confirmed pressure ulcers found on N/A.  New potential pressure ulcers noted on N/A. Wound consult placed N/A.     The following interventions in place:  Q 2 turns, 2 RN skin check, waffle mattress overlay, pillow to float heels (patient refused heel float boots), pillows for positioning, barrier paste, frequent incontinence care.      Skin assessment:   Bilateral ears pink and blanching  BUE intact  Bilateral elbows pink and blanching  Sacrum pink with slight redness but blanching (barrier cream applied)  Groin area intact.  BLE trace edema  Bilateral heels boggy with blanching redness.

## 2020-09-26 NOTE — PROGRESS NOTES
2 RN skin check completed with Esme ELIAS.   Devices in place: mepilex.  Skin assessed under devices: yes.  Confirmed pressure ulcers found on N/A.  New potential pressure ulcers noted on N/A. Wound consult placed N/A.     The following interventions in place:  Q 2 turns, 2 RN skin check, waffle mattress overlay, pillow to float heels (patient refused heel float boots), pillows for positioning, barrier paste, frequent incontinence care.      Skin assessment:   Bilateral ears pink and blanching  BUE intact  Bilateral elbows pink and blanching  Sacrum pink but blanching (barrier cream applied)  Groin area intact/pink and blanching.  BLE trace edema  Bilateral heels boggy with blanching redness (mepliex in place).

## 2020-09-26 NOTE — PROGRESS NOTES
Bedside report received from night shift nurse and assumed pt care. Pt is A&Ox1 to self only which has been baseline. Denies any pain, and shows no indications of distress or discomfort. Currently resting in bed with all needs met. Will be brought to nurses station to eat breakfast per usual. Pt frequently sits at nurses station throughout the day and enjoys doing so. Pt is wc bound at baseline with 2x assistance OOB to WC. L1oujth and 2 RN skin check in place along with frequent incontinence checks. POC discussed with pt. Pt is compliant with medications; no issues. Fall education provided, and fall precautions in place such as bed/wheelchair alarm. Hourly rounding in practice. All needs met at this moment.

## 2020-09-26 NOTE — PROGRESS NOTES
Hospital Medicine Twice Weekly Progress Note    Date of Service  9/26/2020    Chief Complaint  shortness of breath    Hospital Course    Ms. Bennett is a wheelchair bound 75-year-old female with a PMH of arthritis, DVT, Parkinson's, and dementia who presented 5/14/2020 with complaints of shortness of breath, nonproductive cough, fevers, and chills for 5 days. She is confused at baseline and was sent by her  since he was unable to care for her. An EKG was done in the ED and was unremarkable, though a chest xray showed bilateral interstitial infiltrates. COVID was ruled out in the ED. She was evaluated by PT/OT who recommended 24/7 supervision. Her  also stated he is no longer able to care for her either. She is now pending placement to SNF vs group home, though medicaid must be obtained first as she has limited income.          Interval Problem Update  9/22- Patient up in wheelchair sitting at communal table for meals. Is cooperative and in pleasant mood today. Needs assistance by staff to eat. Complains that it is cold. Awaiting placement.     9/26- Patient sitting at communal table eating breakfast. Pleasant and cooperative this am. States she is cold and requesting blanket. Patient falls asleep frequently while sitting in chair.      Consultants/Specialty  None    Code Status  DNAR/DNI    Review of Systems   Unable to perform ROS: Mental status change     Physical Exam   Constitutional: Vital signs are normal. She appears unhealthy.   HENT:   Head: Normocephalic.   Eyes: Lids are normal.   Neck: Neck supple.   Cardiovascular: Normal rate.   Pulmonary/Chest: Effort normal. No respiratory distress.   Abdominal: She exhibits no distension.   Musculoskeletal:      Comments: Lower extremity weakness- wheel chair bound    Neurological: She is alert.   Not oriented    Skin: Skin is warm and dry.   Psychiatric: Her affect is labile. She expresses impulsivity. She exhibits abnormal new learning ability and  abnormal recent memory. She has a flat affect.   Nursing note and vitals reviewed.    Disposition  TBD -pending placement to SNF versus group home- patient does have verbal outbursts. spouse to assess assets, spend down.  Patient needs to apply for institutional Medicaid.  Daughter is involved.    Assessment/Plan  Dementia (HCC)- (present on admission)  Assessment & Plan  - Chronic and stable.  - Frequent re-orientation, reestablish circadian rhythm, encourage familiar faces/family in room, avoid or minimize narcotics/sedatives.   - Continue on seroquel and prozac.   - PRN Haldol available if needed. Last administered 8/22    Discharge planning issues  Assessment & Plan  -Patient not eligible for Medicaid due to income.  Social work has requested [facility] screen her for institutional Medicaid.  Will need placement after that is obtained.  -Patient's  needs to spend down or file for separation of assets.      Lower extremity pain, bilateral- (present on admission)  Assessment & Plan  -PRN Tylenol available if needed.  -PT/OT recommending SNF placement, but will eventually need group home placement with 24/7 care after SNF making it a difficult discharge.  -Mobilize as tolerated.    Parkinson's disease (HCC)- (present on admission)  Assessment & Plan  -Chronic and stable.  -Continue sinemet.  -PT/OT recommending SNF placement. Will require 24/7 care at a group home afterward.    Caitlin Mcfadden, A.P.RStormyN.         I certify that the patient requires continued medically necessary hospital services for the treatment of Dementia and will remain in the hospital for foreseeable future.  Discharge plan is to be determined once institutional medicaid is processed, will need SNF placement.    I have performed a physical exam and reviewed and updated ROS and Plan today (9/26/2020). In review of previous note there are no changes except as documented above.

## 2020-09-26 NOTE — PROGRESS NOTES
"Received report and assumed care at shift change. Patient up on wheelchair at the nurse' station. A&O x 1, yells \"leave me alone\" to other patients. No complain of pain or distress. Fall precautions in place, bed locked and in lowest position. Needs attended to.   "

## 2020-09-26 NOTE — PROGRESS NOTES
2 RN skin check completed with Ginger ELIAS.   Devices in place Mepilex.  Skin assessed under devices yes.  Confirmed pressure ulcers found on N/A.  New potential pressure ulcers noted on N/A. Wound consult placed N/A.     The following interventions in place Q 2 hour turns, 2 RN skin check, waffle mattress overlay, pillow to float heels (patient refused heel float boots), pillows for repositioning, barrier paste, frequent incontinence care.      Skin assessment:  Bilateral elbows pink and blanching.  Bilateral groin area intact.  Sacrum pink and blanching*  BLE trace edema  Bilateral heels boggy, blanching redness.

## 2020-09-27 NOTE — PROGRESS NOTES
Received report and assumed care at shift change. Patient up on wheelchair at the communal table. Alert only to self, no complain of pain or distress. Patient is irritable and yells at other patients at the table. Continue with Q 2 hour turns while in bed. Fall precautions in place. Needs attended to.

## 2020-09-28 NOTE — CARE PLAN
Problem: Safety  Goal: Will remain free from falls  Outcome: PROGRESSING AS EXPECTED  Note: Fall precautions in place: bed locked and in the lowest position, call light and belongings within reach, fall risk ID band on, treaded socks on, bed alarm in use.     Problem: Skin Integrity  Goal: Risk for impaired skin integrity will decrease  Outcome: PROGRESSING AS EXPECTED  Note: Skin interventions in place: Q2hr turns, pillows in use for support/ positioning, frequent incontinent checks and linen changes PRN, barrier paste, waffle overly, 2RN skin checks Q shift, mepilex bilateral heels.

## 2020-09-28 NOTE — PROGRESS NOTES
2 RN skin check completed with: Ginger ELIAS.   Devices in place: Mepilex.  Skin assessed under devices: yes.  Confirmed pressure ulcers found on: N/A.  New potential pressure ulcers noted on: N/A.   Wound consult placed:N/A.     The following interventions in place: Q 2 hour turns, 2 RN skin check, waffle mattress overlay, pillow to float heels (patient refused heel float boots), pillows for repositioning, barrier paste, frequent incontinence checks and linen changes PRN.      Skin assessment:  Bilateral elbows: pink and blanching.  Bilateral groin: area intact, pink and blanching  Sacrum: pink and blanching, barrier paste applied  BLE: trace edema  Bilateral heels: boggy, blanching, redness.

## 2020-09-28 NOTE — PROGRESS NOTES
Assumed care of pt from day RN. Pt sitting at nurse's station conversing with staff and other pts, A&Ox1, oriented to self only. Pt denies any pain at this time. Call light and belongings within reach, will continue to monitor.

## 2020-09-28 NOTE — PROGRESS NOTES
2 RN skin check complete with: CURT Tanner.   Devices in place: N/A.  Skin assessed under devices: N/A.  Confirmed pressure ulcers found on: N/A.  New potential pressure ulcers noted on: N/A. Wound consult placed: No.  The following interventions in place: Q2 turns, 2 RN skin checks, waffle cushion overlay, mepilex to heels, incontinence dermatitis protocols in place, pillows for support and positioning    Ears: intact  Elbows: intact  Chest/Back: intact  Sacrum: pink and blanching, intact  Lower Extremities: intact  Heels: pink and blanching; mepilex for protection

## 2020-09-28 NOTE — PROGRESS NOTES
Received report and assumed care of patient (pt) at change of shift. Pt is A&Ox1. Oriented to self only. Pt requires continuous reorientation. Pt has no complaints of pain. Pt refused medication the first time when it was crushed in applesauce. While pt sat at table, RN tried floating pills one at a time in applesauce. Pt again spit them out. RN floated chocolate ice cream, and finally pt swallowed all medications except the Senna. Pt refused 2 RN skin check and would not allow us to change her this morning Safety precautions in place and all needs met at this time.
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
## 2020-09-29 NOTE — PROGRESS NOTES
2 RN skin check complete with: CURT Espana.   Devices in place: N/A.  Skin assessed under devices: N/A.  Confirmed pressure ulcers found on: N/A.  New potential pressure ulcers noted on: N/A. Wound consult placed: no.  The following interventions in place: Q2 Turns, 2RN skin checks, pressure redistribution mattress, waffle cushion overlay, incontinence protocols in place, barrier cream as necessary,      Ears: intact  Elbows:intact  Chest/Back:intact  Sacrum: intact, pink and blanching  Lower Extremities: intact  Heels: boggy, pink, blanching, mepilex in place

## 2020-09-29 NOTE — PROGRESS NOTES
2 RN skin check complete with: CURT Tanner.   Devices in place: N/A.  Skin assessed under devices: N/A.  Confirmed pressure ulcers found on: N/A.  New potential pressure ulcers noted on: N/A. Wound consult placed: no.  The following interventions in place: Q2 Turns, 2RN skin checks, pressure redistribution mattress, waffle cushion overlay, incontinence protocols in place, barrier cream as necessary,     Ears: intact  Elbows:intact  Chest/Back:intact  Sacrum: intact, pink and blanching  Lower Extremities: intact  Heels: boggy, pink, blanching, mepilex in place

## 2020-09-29 NOTE — PROGRESS NOTES
Pt pleasant this AM, laying in bed. Pt took medications without issue and prefers to take pills whole with thickened chocolate milk.

## 2020-09-29 NOTE — CARE PLAN
Problem: Mobility  Goal: Risk for activity intolerance will decrease  Outcome: PROGRESSING AS EXPECTED  Note: Pt up to wheelchair for dinner and remained sitting at table at nurse's station for 3 hours.      Problem: Psychosocial Needs:  Goal: Level of anxiety will decrease  Outcome: PROGRESSING AS EXPECTED  Note: Therapeutic communication utilized when providing pt care.

## 2020-09-29 NOTE — PROGRESS NOTES
Assumed care of pt from day RN. Pt sitting at nurse's station conversing with staff and other pts, A&Ox2, oriented to self and place. Pt denies any pain at this time. Took pills whole with chocolate milk. Call light and belongings within reach, will continue to monitor.

## 2020-09-29 NOTE — PROGRESS NOTES
2 RN skin check completed with: Mercedez ELIAS.   Devices in place: None  Skin assessed under devices: N/A  Confirmed pressure ulcers found on: N/A.  New potential pressure ulcers noted on: N/A.   Wound consult placed: N/A.     The following interventions in place: Q 2 hour turns, 2 RN skin checks q shift, waffle mattress overlay, pillow to float heels (patient refused heel float boots), pillows for repositioning, barrier paste, frequent incontinence checks and linen changes PRN.      Skin assessment:  Bilateral elbows: pink and blanching. (left elbow slower to obed)  Bilateral groin: area intact, pink and blanching  Sacrum: pink and blanching, barrier paste applied  Left buttocks: small friction area  BLE: trace edema  Bilateral heels: boggy, redness, blanching

## 2020-09-29 NOTE — PROGRESS NOTES
"Hospital Medicine Twice Weekly Progress Note    Date of Service  9/30/2020    Chief Complaint  SOB and cough    Hospital Course   Ms. Bennett is a wheelchair bound 75-year-old female with a PMH of arthritis, DVT, Parkinson's, and dementia who presented 5/14/2020 with complaints of shortness of breath, nonproductive cough, fevers, and chills for 5 days. She is confused at baseline and was sent by her  who has been unable to care for her.      12-lead EKG was done in the ED and was unremarkable, though a chest xray showed bilateral interstitial infiltrates. COVID was ruled out.     She was evaluated by PT/OT who recommended 24/7 supervision. She is now pending placement to SNF vs group home, though medicaid must be obtained first as she has limited income.      Interval Problem Update  -sitting at nurses station \"what am I supposed to be doing?\". No behavioral issues.     Consultants/Specialty  None    Code Status  DNAR/DNI    Disposition  SNF versus     Review of Systems  Review of Systems   Unable to perform ROS: Mental acuity        Physical Exam  Temp:  [36.5 °C (97.7 °F)-36.8 °C (98.2 °F)] 36.8 °C (98.2 °F)  Pulse:  [75-98] 75  Resp:  [16-18] 17  BP: ()/(54-65) 105/65  SpO2:  [90 %-92 %] 90 %    Physical Exam  Vitals signs and nursing note reviewed.   Constitutional:       General: She is not in acute distress.     Appearance: Normal appearance. She is normal weight. She is ill-appearing. She is not diaphoretic.      Comments: Sitting up in wheelchair   HENT:      Head: Normocephalic and atraumatic.      Mouth/Throat:      Mouth: Mucous membranes are moist.      Pharynx: No oropharyngeal exudate or posterior oropharyngeal erythema.   Eyes:      General: No scleral icterus.     Extraocular Movements: Extraocular movements intact.      Conjunctiva/sclera: Conjunctivae normal.      Pupils: Pupils are equal, round, and reactive to light.   Neck:      Musculoskeletal: Normal range of motion and neck " supple. No neck rigidity or muscular tenderness.   Cardiovascular:      Rate and Rhythm: Normal rate and regular rhythm.      Heart sounds: Normal heart sounds. No murmur.   Pulmonary:      Effort: Pulmonary effort is normal. No respiratory distress.      Breath sounds: Normal breath sounds. No stridor. No wheezing, rhonchi or rales.   Chest:      Chest wall: No tenderness.   Abdominal:      General: Bowel sounds are normal. There is no distension.      Palpations: Abdomen is soft. There is no mass.      Tenderness: There is no abdominal tenderness. There is no guarding or rebound.   Musculoskeletal: Normal range of motion.         General: No swelling.      Right lower leg: Edema present.      Left lower leg: Edema present.   Lymphadenopathy:      Cervical: No cervical adenopathy.   Skin:     General: Skin is warm and dry.      Coloration: Skin is not jaundiced.      Findings: No rash.   Neurological:      General: No focal deficit present.      Mental Status: She is alert.      Cranial Nerves: No cranial nerve deficit.      Comments: Oriented to herself.   Psychiatric:         Mood and Affect: Mood normal.         Behavior: Behavior is slowed.         Cognition and Memory: Cognition is impaired. Memory is impaired.         Fluids    Intake/Output Summary (Last 24 hours) at 9/30/2020 1320  Last data filed at 9/30/2020 0900  Gross per 24 hour   Intake 720 ml   Output --   Net 720 ml       Laboratory                        Imaging  DX-CHEST-PORTABLE (1 VIEW)   Final Result      No acute cardiopulmonary abnormality.      US-EXTREMITY VENOUS LOWER UNILAT RIGHT   Final Result      DX-HIP-UNILATERAL-WITH PELVIS-1 VIEW RIGHT   Final Result      1.  Bony pelvis and the hip joint appear normal      2.  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  No acute intracranial findings.      2.  Diffuse atrophy  and periventricular white matter changes, consistent with chronic small vessel disease.         DX-CHEST-PORTABLE (1 VIEW)   Final Result      Perihilar interstitial markings are increased and suggestive of mild pulmonary edema.           Assessment/Plan  Dementia (HCC)- (present on admission)  Assessment & Plan  - Chronic and stable.  - Frequent re-orientation, reestablish circadian rhythm, encourage familiar faces/family in room, avoid or minimize narcotics/sedatives.   - Continue on seroquel and prozac.   - PRN Haldol available if needed. Last administered 9/27    Discharge planning issues  Assessment & Plan  -Patient not eligible for Medicaid due to income.  Social work has requested [facility] screen her for institutional Medicaid.  Will need placement after that is obtained.  -Patient's  needs to spend down or file for separation of assets.      Lower extremity pain, bilateral- (present on admission)  Assessment & Plan  -PRN Tylenol available if needed.  -PT/OT recommending SNF placement, but will eventually need group home placement with 24/7 care after SNF making it a difficult discharge.  -Mobilize as tolerated.    Parkinson's disease (HCC)- (present on admission)  Assessment & Plan  -Chronic and stable.  -Continue sinemet.  -PT/OT recommending SNF placement. Will require 24/7 care at a group home afterward.       I have performed a physical exam and reviewed and updated ROS and Assessment/Plan today (09/30/20). In review of the previous note there are no changes except as documented above    I certify the patient requires continued medically necessary hospital services for the treatment of dementia. The patient will remain in the hospital for the foreseeable future.  Discharge may or may not occur in the next 20 days due to ongoing discharge delays due to having no medically acceptable discharge options.    TESSY Najera.      Please note that this dictation was created using voice recognition  software. I have made every reasonable attempt to correct obvious errors, but there may be errors of grammar and possibly content that I did not discover before finalizing the note.

## 2020-09-30 NOTE — PROGRESS NOTES
2 RN skin check complete with: CURT Whitley.   Devices in place: N/A.  Skin assessed under devices: N/A.  Confirmed pressure ulcers found on: N/A.  New potential pressure ulcers noted on: N/A. Wound consult placed: No.  The following interventions in place: Q2 Turns, 2RN skin checks, pressure redistribution mattress, waffle cushion overlay, incontinence care, barrier cream, pillows for positioning.     Bilateral ears: pink and blanching.  Bilateral elbows: intact.  Sacrum: intact, pink and blanching.  BLE: intact.  Bilateral heels: boggy, pink, blanching, mepilex in place.

## 2020-09-30 NOTE — CARE PLAN
Problem: Safety  Goal: Will remain free from falls  Outcome: PROGRESSING AS EXPECTED  Note: Fall precautions in place: bed locked and in the lowest position, call light within reach, fall risk ID band on, treaded socks on, bed/ chair alarm in use.     Problem: Skin Integrity  Goal: Risk for impaired skin integrity will decrease  Outcome: PROGRESSING AS EXPECTED  Note: Skin interventions in place: q 2 hr turns, 2 RN skin checks, frequent incontinent checks and linen changes PRN, mepilex, barrier paste, pillows in use for support/ positioning.

## 2020-09-30 NOTE — PROGRESS NOTES
Assumed care of pt from day RN. Pt sitting at table at nurse's station conversing with staff and other pts, A&Ox1, oriented to self only. Pt denies any pain at this time. Took pills whole with chocolate milk. Call light and belongings within reach, will continue to monitor.

## 2020-09-30 NOTE — PROGRESS NOTES
2 RN skin check completed with: Mercedez ELIAS.   Devices in place: None  Skin assessed under devices: N/A  Confirmed pressure ulcers found on: N/A.  New potential pressure ulcers noted on: N/A.   Wound consult placed: N/A.     The following interventions in place: Q 2 hour turns, 2 RN skin checks q shift, waffle mattress overlay, pillow to float heels (patient refused heel float boots), pillows for repositioning, barrier paste, frequent incontinence checks and linen changes PRN.      Skin assessment:  Bilateral elbows: pink and blanching. (left elbow slower to obed)  Bilateral groin: area intact, pink and blanching  Sacrum: pink and blanching, barrier paste applied  Left buttocks: small friction area  BLE: trace edema  Bilateral heels: boggy, redness, blanching           patient refused

## 2020-09-30 NOTE — PROGRESS NOTES
Due to pt moving arms towards face and in order to protect pt's medical devices currently in place, bilateral soft wrist restraints applied and secured to bed frame   Received report and assumed care of pt. Assessment complete on RA. Pt A&OX1, to self only. Denies any pain or discomfort at this time. Pt currently up in wheelchair at nurses station. All pt needs met at this time. Safety precautions and hourly rounding in place.

## 2020-09-30 NOTE — PROGRESS NOTES
Pt extremely agitated and confused, insisting that she is in the wrong room. Pt refusing to take her evening dose of Sinemet, pt educated.

## 2020-10-01 NOTE — PROGRESS NOTES
Pt AxO x1 at time of assessment, reoriented pt.  Pt was in wheelchair in common area and was verbalizing want to go back to bed so gave evening meds and assisted pt into bed.  Pt denying pain at time of assessment. Pt cooperative with all pt care and declined any further needs.  Frequent rounding in place.

## 2020-10-01 NOTE — PROGRESS NOTES
Received report and assumed care of pt. Assessment complete on RA. Pt A&OX1, to self only. Denies any pain or discomfort at this time. Pt sitting in WC at nurses station. All pt needs met at this time. Safety precautions and hourly rounding in place.

## 2020-10-01 NOTE — PROGRESS NOTES
2 RN skin check complete with: CURT Newsome.   Devices in place: N/A.  Skin assessed under devices: N/A.  Confirmed pressure ulcers found on: N/A.  New potential pressure ulcers noted on: N/A.   Wound consult placed: N/A       Bilateral ears: pink/blanching  Bilateral elbows: intact/blanching  Sacrum: pink/blanching/intact  BLE: intact.  Bilateral heels: pink/blanching/boggy      The following interventions in place: q2 hr pt turns, 2 RN skin check, waffle cushion overlay, incontinence protocol care, barrier cream, pillows for positioning/support, mepilex on both heels

## 2020-10-01 NOTE — CARE PLAN
Problem: Communication  Goal: The ability to communicate needs accurately and effectively will improve  Outcome: PROGRESSING AS EXPECTED   Pt communicates needs/wants, I.e. when she was ready to go to bed.     Problem: Pain Management  Goal: Pain level will decrease to patient's comfort goal  Outcome: PROGRESSING AS EXPECTED   Pt denying pain

## 2020-10-02 NOTE — PROGRESS NOTES
Received report and assumed care of pt. Assessment complete on RA. Pt A&OX1, to self only. Denies any pain or discomfort at this time. Pt currently sitting up in bed eating. All pt needs met at this time. Safety precautions and hourly rounding in place.

## 2020-10-02 NOTE — PROGRESS NOTES
2 RN skin check complete with: Jade Espana RN.   Devices in place: N/A.  Skin assessed under devices: N/A.  Confirmed pressure ulcers found on: N/A.  New potential pressure ulcers noted on: N/A.   Wound consult placed: N/A        Bilateral ears: pink/blanching  Bilateral elbows: intact/blanching  Sacrum: pink/blanching/intact  BLE: intact.  Bilateral heels: pink/blanching/boggy        The following interventions in place: q2 hr pt turns, 2 RN skin check, waffle cushion overlay, incontinence protocol care, barrier cream, pillows for positioning/support, mepilex on both heels

## 2020-10-02 NOTE — CARE PLAN
Problem: Safety  Goal: Will remain free from falls  Outcome: PROGRESSING AS EXPECTED   Pt has not sustained a fall on this shift, fall precautions in place.     Problem: Psychosocial Needs:  Goal: Level of anxiety will decrease  Outcome: PROGRESSING AS EXPECTED   Pt not displaying any s/s of anxiety.

## 2020-10-03 NOTE — PROGRESS NOTES
2 RN skin check complete with: CURT Osman.   Devices in place: N/A.  Skin assessed under devices: N/A.  Confirmed pressure ulcers found on: N/A.  New potential pressure ulcers noted on: N/A. Wound consult placed: n/a  The following interventions in place: Q 2 Turns,  2 RN skin checks, pressure redistribution mattress, waffle cushion overlay, incontinence checks/care, barrier cream, pillows for positioning.     Bilateral ears: pink and blanching.  Bilateral elbows: intact.  Sacrum: intact, pink and blanching.  BLE: intact.  Bilateral heels: boggy, pink, blanching, mepilex in place.

## 2020-10-03 NOTE — CARE PLAN
Problem: Pain Management  Goal: Pain level will decrease to patient's comfort goal  Outcome: PROGRESSING AS EXPECTED   Pt denying pain     Problem: Skin Integrity  Goal: Risk for impaired skin integrity will decrease  Outcome: PROGRESSING AS EXPECTED   Q2 turns, 2 RN skin check, incontinence care, pillows in use for support/positioning.

## 2020-10-03 NOTE — PROGRESS NOTES
Bedside report received. Pt is A&Ox1 resting in bed most of the day listening to music. Pt is eating majority of breakfast and lunch tray. Pt does not want to get up to wheelchair today, pt was encouraged to consider in 1 hour getting out of bed up to wheelchair. POC discussed with pt; all questions answered at this time.

## 2020-10-03 NOTE — PROGRESS NOTES
"Hospital Medicine Twice Weekly Progress Note    Date of Service  10/3/2020    Chief Complaint  SOB and cough    Hospital Course   Ms. Bennett is a wheelchair bound 75-year-old female with a PMH of arthritis, DVT, Parkinson's, and dementia who presented 5/14/2020 with complaints of shortness of breath, nonproductive cough, fevers, and chills for 5 days. She is confused at baseline and was sent by her  who has been unable to care for her.      12-lead EKG was done in the ED and was unremarkable, though a chest xray showed bilateral interstitial infiltrates. COVID was ruled out.     She was evaluated by PT/OT who recommended 24/7 supervision. She is now pending placement to SNF vs group home, though medicaid must be obtained first as she has limited income.      Interval Problem Update  10/3 - oriented to herself at baseline. Refusing to get up to wheelchair today. Sleeps when undisturbed.   9/29 - sitting at nurses station \"what am I supposed to be doing?\". No behavioral issues.     Consultants/Specialty  None    Code Status  DNAR/DNI    Disposition  SNF versus     Review of Systems  Review of Systems   Unable to perform ROS: Mental acuity        Physical Exam  Temp:  [36.2 °C (97.2 °F)-37.1 °C (98.8 °F)] 36.2 °C (97.2 °F)  Pulse:  [67-87] 72  Resp:  [16-18] 17  BP: ()/(48-61) 98/61  SpO2:  [91 %-97 %] 97 %    Physical Exam  Vitals signs and nursing note reviewed.   Constitutional:       General: She is not in acute distress.     Appearance: Normal appearance. She is normal weight. She is ill-appearing. She is not diaphoretic.      Comments: Sitting up in wheelchair   HENT:      Head: Normocephalic and atraumatic.      Mouth/Throat:      Mouth: Mucous membranes are moist.      Pharynx: No oropharyngeal exudate or posterior oropharyngeal erythema.   Eyes:      General: No scleral icterus.     Extraocular Movements: Extraocular movements intact.      Conjunctiva/sclera: Conjunctivae normal.      Pupils: " Pupils are equal, round, and reactive to light.   Neck:      Musculoskeletal: Normal range of motion and neck supple. No neck rigidity or muscular tenderness.   Cardiovascular:      Rate and Rhythm: Normal rate and regular rhythm.      Heart sounds: Normal heart sounds. No murmur.   Pulmonary:      Effort: Pulmonary effort is normal. No respiratory distress.      Breath sounds: Normal breath sounds. No stridor. No wheezing, rhonchi or rales.   Chest:      Chest wall: No tenderness.   Abdominal:      General: Bowel sounds are normal. There is no distension.      Palpations: Abdomen is soft. There is no mass.      Tenderness: There is no abdominal tenderness. There is no guarding or rebound.   Musculoskeletal: Normal range of motion.         General: No swelling.      Right lower leg: Edema present.      Left lower leg: Edema present.   Lymphadenopathy:      Cervical: No cervical adenopathy.   Skin:     General: Skin is warm and dry.      Coloration: Skin is not jaundiced.      Findings: No rash.   Neurological:      General: No focal deficit present.      Mental Status: She is alert.      Cranial Nerves: No cranial nerve deficit.      Comments: Oriented to herself.   Psychiatric:         Mood and Affect: Mood normal.         Behavior: Behavior is slowed.         Cognition and Memory: Cognition is impaired. Memory is impaired.         Fluids    Intake/Output Summary (Last 24 hours) at 10/3/2020 1524  Last data filed at 10/3/2020 1300  Gross per 24 hour   Intake 1380 ml   Output --   Net 1380 ml       Laboratory                        Imaging  DX-CHEST-PORTABLE (1 VIEW)   Final Result      No acute cardiopulmonary abnormality.      US-EXTREMITY VENOUS LOWER UNILAT RIGHT   Final Result      DX-HIP-UNILATERAL-WITH PELVIS-1 VIEW RIGHT   Final Result      1.  Bony pelvis and the hip joint appear normal      2.  osteoarthritis of lumbar spine facet joint and both sacroiliac joint      EC-ECHOCARDIOGRAM COMPLETE W/O CONT    Final Result      CT-HEAD W/O   Final Result      1.  No acute intracranial findings.      2.  Diffuse atrophy and periventricular white matter changes, consistent with chronic small vessel disease.         DX-CHEST-PORTABLE (1 VIEW)   Final Result      Perihilar interstitial markings are increased and suggestive of mild pulmonary edema.           Assessment/Plan  Dementia (HCC)- (present on admission)  Assessment & Plan  - Chronic and stable.  - Frequent re-orientation, reestablish circadian rhythm, encourage familiar faces/family in room, avoid or minimize narcotics/sedatives.   - Continue on seroquel and prozac.   - PRN Haldol available if needed. Last administered 9/27    Discharge planning issues  Assessment & Plan  -Patient not eligible for Medicaid due to income.  Social work has requested [facility] screen her for institutional Medicaid.  Will need placement after that is obtained.  -Patient's  needs to spend down or file for separation of assets.      Lower extremity pain, bilateral- (present on admission)  Assessment & Plan  -PRN Tylenol available if needed.  -PT/OT recommending SNF placement, but will eventually need group home placement with 24/7 care after SNF making it a difficult discharge.  -Mobilize as tolerated.    Parkinson's disease (HCC)- (present on admission)  Assessment & Plan  -Chronic and stable.  -Continue sinemet.  -PT/OT recommending SNF placement. Will require 24/7 care at a group home afterward.       I have performed a physical exam and reviewed and updated ROS and Assessment/Plan today (10/03/20). In review of the previous note there are no changes except as documented above    I certify the patient requires continued medically necessary hospital services for the treatment of dementia. The patient will remain in the hospital for the foreseeable future.  Discharge may or may not occur in the next 20 days due to ongoing discharge delays due to having no medically acceptable discharge  options.    TESSY Najera.      Please note that this dictation was created using voice recognition software. I have made every reasonable attempt to correct obvious errors, but there may be errors of grammar and possibly content that I did not discover before finalizing the note.

## 2020-10-03 NOTE — PROGRESS NOTES
2 RN skin check complete with,  RN.   Devices in place N/A.  Skin assessed under devices N/A.  Confirmed pressure ulcers found on N/A.  New potential pressure ulcers noted on N/A. Wound consult placed n/a.  The following interventions in place Pillows in use for support and positioning, q2 hr turns in place, waffle cushion overlay, frequent checks for incontinence. Barrier cream applied to sacrum,  mepilex in place on bilateral heels.     Bilateral ears are pink and blanching, bilateral elbows intact and blanching, sacrum is pink and blanching, bilateral heels are pink, boggy and blanching.

## 2020-10-03 NOTE — PROGRESS NOTES
Pt AxO x1 at time of assessment, denying any pain or discomfort.  Pt compliant with evening meds.  Pt on RA and displaying no s/s of labored breathing. Pt affect is calm.  All needs met at this time, call light within reach, frequent rounding in place.

## 2020-10-03 NOTE — PROGRESS NOTES
2 RN skin check complete with: CURT Bonilla.   Devices in place: N/A.  Skin assessed under devices: N/A.  Confirmed pressure ulcers found on: N/A.  New potential pressure ulcers noted on: N/A.   Wound consult placed: N/A        Bilateral ears: pink/blanching  Bilateral elbows: intact/blanching  Sacrum: pink/blanching/intact  BLE: intact.  Bilateral heels: pink/blanching/boggy        The following interventions in place: q2 hr pt turns, 2 RN skin check, waffle cushion overlay, incontinence protocol care, barrier cream, pillows for positioning/support, mepilex on both heels

## 2020-10-04 NOTE — PROGRESS NOTES
· 2 RN skin check complete.   · Devices in place na.  · Skin assessed under devices na.  · Confirmed pressure ulcers found on na.  · New potential pressure ulcers noted on na. Wound consult placed? na. Photo uploaded? na.   · The following interventions are in place q2 turns, pillows, incontinence care.

## 2020-10-04 NOTE — PROGRESS NOTES
Alert and oriented x1. Offered fluids and snacks. Assisted to turn to sides. Safety precautions in placed. Bed in lowest position. Upper side rails up. Treaded socks on. Reinforced the use of call light when needing assistance.

## 2020-10-04 NOTE — PROGRESS NOTES
Bedside report received. Pt is A&Ox1 resting in bed most of the day listening to music. Pt is eating majority of breakfast and lunch tray. Pt is up in wheelchair at nursing station, pt was able to take a shower today.

## 2020-10-04 NOTE — PROGRESS NOTES
2 RN skin check complete with CUTR Bonilla   Devices in place Mepilex.  Skin assessed under devices Yes; intact and blanching.  Confirmed pressure ulcers found on N/A.  New potential pressure ulcers noted on n/A. Wound consult placed N/A.    Noted sacral area intact, pink and blanching; bilateral heels pinkish to red slow obed    The following interventions in place; mepilex in placed in bilateral heels; provided incontinence care; assisted to turn to sides; on pressure redistribution mattress

## 2020-10-05 NOTE — PROGRESS NOTES
Alert and oriented x1, only to self. Pt's  is at bedside. Offered fluids and snacks. Safety precautions in placed. Bed in lowest position. Upper side rails up. Treaded socks on. Reinforced the use of call light when needing assistance.

## 2020-10-05 NOTE — PROGRESS NOTES
2 RN skin check complete with CURT Bonilla   Devices in place Mepilex.  Skin assessed under devices Yes; intact and blanching.  Confirmed pressure ulcers found on N/A.  New potential pressure ulcers noted on n/A. Wound consult placed N/A.     Noted sacral area intact, pink and blanching; bilateral heels pinkish to red slow obed     The following interventions in place; mepilex in placed in bilateral heels; provided incontinence care; assisted to turn to sides; on pressure redistribution mattress

## 2020-10-05 NOTE — PROGRESS NOTES
Pt is alert to self. Pt was OOB w/ 1-2 assist to the wheelchair. Incontinent care provided. Fall prevention education provided. Fall precautions maintained. Call bell within reach. Hourly rounding completed. Will continue to monitor.

## 2020-10-05 NOTE — PROGRESS NOTES
2 RN skin check complete with CURT Diaz   Devices in place n/a.  Skin assessed under devices n/a  Confirmed pressure ulcers found on N/A.  New potential pressure ulcers noted on n/A. Wound consult placed N/A.     Skin assessment: Bilateral feet are dry heels are pink and blanching.  Frequent incontinent care provided, moisture barrier ointment applied. Pt turn and repo q2 hrs. Heels elevated on pillows. Waffle mattress overlay in use.

## 2020-10-06 NOTE — PROGRESS NOTES
Hospital Medicine Twice Weekly Progress Note    Date of Service  10/6/2020    Chief Complaint  SOB and cough    Hospital Course   Ms. Bennett is a wheelchair bound 75-year-old female with a PMH of arthritis, DVT, Parkinson's, and dementia who presented 5/14/2020 with complaints of shortness of breath, nonproductive cough, fevers, and chills for 5 days. She is confused at baseline and was sent by her  who has been unable to care for her.      12-lead EKG was done in the ED and was unremarkable, though a chest xray showed bilateral interstitial infiltrates. COVID was ruled out.     She was evaluated by PT/OT who recommended 24/7 supervision. She is now pending placement to SNF vs group home, though medicaid must be obtained first as she has limited income.      Interval Problem Update  10/6:  Flu vaccine ordered.  She is sitting at nurses station.  Calm mood.  Denies acute complaints.  VSS.  No acute needs.  Pending placement.     Consultants/Specialty  None    Code Status  DNAR/DNI    Disposition  SNF versus     Review of Systems  Review of Systems   Unable to perform ROS: Mental acuity        Physical Exam  Temp:  [36.3 °C (97.3 °F)-36.8 °C (98.2 °F)] 36.8 °C (98.2 °F)  Pulse:  [62-71] 71  Resp:  [18] 18  BP: ()/(54-63) 93/54  SpO2:  [94 %-98 %] 94 %    Physical Exam  Vitals signs and nursing note reviewed.   Constitutional:       General: She is not in acute distress.     Appearance: Normal appearance. She is normal weight. She is not ill-appearing.      Comments: Sitting up in wheelchair   HENT:      Head: Normocephalic and atraumatic.      Mouth/Throat:      Mouth: Mucous membranes are moist.      Pharynx: No oropharyngeal exudate or posterior oropharyngeal erythema.   Eyes:      General: No scleral icterus.     Extraocular Movements: Extraocular movements intact.      Conjunctiva/sclera: Conjunctivae normal.      Pupils: Pupils are equal, round, and reactive to light.   Neck:      Musculoskeletal:  Normal range of motion and neck supple. No neck rigidity or muscular tenderness.   Cardiovascular:      Rate and Rhythm: Normal rate and regular rhythm.      Heart sounds: Normal heart sounds. No murmur.   Pulmonary:      Effort: Pulmonary effort is normal. No respiratory distress.      Breath sounds: Normal breath sounds. No stridor. No wheezing, rhonchi or rales.   Chest:      Chest wall: No tenderness.   Abdominal:      General: Bowel sounds are normal. There is no distension.      Palpations: Abdomen is soft.      Tenderness: There is no abdominal tenderness. There is no guarding.   Musculoskeletal: Normal range of motion.         General: No tenderness.      Right lower leg: Edema present.      Left lower leg: Edema present.   Lymphadenopathy:      Cervical: No cervical adenopathy.   Skin:     General: Skin is warm and dry.      Coloration: Skin is not jaundiced or pale.   Neurological:      General: No focal deficit present.      Mental Status: She is alert.      Cranial Nerves: No cranial nerve deficit.      Comments: Oriented to herself.   Psychiatric:         Mood and Affect: Mood normal.         Behavior: Behavior is slowed.         Cognition and Memory: Cognition is impaired. Memory is impaired.         Fluids    Intake/Output Summary (Last 24 hours) at 10/6/2020 0916  Last data filed at 10/6/2020 0844  Gross per 24 hour   Intake 1100 ml   Output --   Net 1100 ml       Laboratory                        Imaging  DX-CHEST-PORTABLE (1 VIEW)   Final Result      No acute cardiopulmonary abnormality.      US-EXTREMITY VENOUS LOWER UNILAT RIGHT   Final Result      DX-HIP-UNILATERAL-WITH PELVIS-1 VIEW RIGHT   Final Result      1.  Bony pelvis and the hip joint appear normal      2.  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  No acute intracranial findings.      2.  Diffuse atrophy and  periventricular white matter changes, consistent with chronic small vessel disease.         DX-CHEST-PORTABLE (1 VIEW)   Final Result      Perihilar interstitial markings are increased and suggestive of mild pulmonary edema.           Assessment/Plan  Dementia (HCC)- (present on admission)  Assessment & Plan  - Chronic and stable.  - Frequent re-orientation, reestablish circadian rhythm, encourage familiar faces/family in room, avoid or minimize narcotics/sedatives.   - Continue on seroquel and prozac.   - PRN Haldol available if needed. Last administered 9/27    Discharge planning issues  Assessment & Plan  -Patient not eligible for Medicaid due to income.  Social work has requested [facility] screen her for institutional Medicaid.  Will need placement after that is obtained.  -Patient's  needs to spend down or file for separation of assets.      Lower extremity pain, bilateral- (present on admission)  Assessment & Plan  -PRN Tylenol available if needed.  -PT/OT recommending SNF placement, but will eventually need group home placement with 24/7 care after SNF making it a difficult discharge.  -Mobilize as tolerated.    Parkinson's disease (HCC)- (present on admission)  Assessment & Plan  -Chronic and stable.  -Continue sinemet.  -PT/OT recommending SNF placement. Will require 24/7 care at a group home afterward.       I have performed a physical exam and reviewed and updated ROS and Assessment/Plan today (10/06/20). In review of the previous note there are no changes except as documented above    I certify the patient requires continued medically necessary hospital services for the treatment of dementia. The patient will remain in the hospital for the foreseeable future.  Discharge may or may not occur in the next 20 days due to ongoing discharge delays due to having no medically acceptable discharge options.    TESSY Whaley.

## 2020-10-06 NOTE — PROGRESS NOTES
2 RN skin check complete with CURT Sandoval   Devices in place n/a.  Skin assessed under devices n/a  Confirmed pressure ulcers found on N/A.  New potential pressure ulcers noted on n/A.   Wound consult placed N/A.    Interventions in place turn q2, waffle, incontinence care, barrier cream.    Skin intact  Heels: dry, pink, blanching

## 2020-10-07 NOTE — PROGRESS NOTES
Pt. Received in bed awake, alert. Appears to be in a pleasant mood, denies any complaint of pain at time of assessment. Educated about fall risks and precautions. Pt. Tolerated meds with water. Call light placed within reach, bed alarm on. Needs attended.

## 2020-10-07 NOTE — PROGRESS NOTES
Patient refused two RN skin check, educated regarding importance.  Education reinforced by CURT Landa.  Patient continues to refuse.

## 2020-10-07 NOTE — PROGRESS NOTES
2 RN skin check complete with: CURT Diaz.   Devices in place: N/A.  Skin assessed under devices: N/A.  Confirmed pressure ulcers found on: N/A.  New potential pressure ulcers noted on: N/A.   Wound consult placed: N/A      Skin assessment: Sacrum is pink intact and blanching. Left heel is pink and blanching. Right heel is reddened boggy and blanching. The nursing unit is currently out of heel mepilex will order more.     The following interventions in place:    q2 hr repositioning, waffle mattress overlay, frequent incontinence care provided, moistiure barrier cream in use and heels are elevated off bed using pillows.

## 2020-10-07 NOTE — PROGRESS NOTES
2 RN skin check complete with: CURT Juares.   Devices in place: N/A.  Skin assessed under devices: N/A.  Confirmed pressure ulcers found on: N/A.  New potential pressure ulcers noted on: N/A.   Wound consult placed: N/A        Bilateral ears: pink/blanching  Bilateral elbows: intact/blanching  Sacrum: pink/blanching/intact  BLE: intact.  Bilateral heels: pink/blanching/boggy        The following interventions in place:   q2 hr pt turns, 2 RN skin check, waffle cushion overlay, incontinence protocol care, barrier cream, pillows for positioning/support, mepilex on both heels

## 2020-10-08 NOTE — PROGRESS NOTES
2 RN skin check completed with Irene ELIAS.   Devices in place Heel Mepilex.  Skin assessed under devices yes.  Confirmed pressure ulcers found on N/A.  New potential pressure ulcers noted on N/A. Wound consult placed N/A.    The following interventions in place Q2 hour turns, 2 RN skin check, waffle mattress overlay, Mepilex to bilateral heels, pillow for positioning/support, barrier cream to orlin area.*    Skin assessment:  Bilateral elbows pink and blanching  Sacrum pink and blanching  Edema to bilateral feet and ankles  Bilateral heels pink, boggy and blanching  Orlin area redness but intact.

## 2020-10-08 NOTE — PROGRESS NOTES
Received bedside report and assumed care of pt at 0700. She is alert to self only and on RA. She states no pain at this time. Bed is locked and in low position with belongings in reach. 2RN skin check and Q2 turns in place

## 2020-10-08 NOTE — PROGRESS NOTES
Received report and assumed care of patient at shift change. Patient on wheelchair, sitting at the communal table. No complain of pain or distress. Fall precautions in place, bed locked and in lowest position. Needs attended to.

## 2020-10-09 NOTE — PROGRESS NOTES
Hospital Medicine Twice Weekly Progress Note    Date of Service  10/9/2020    Chief Complaint  SOB and cough    Hospital Course   Ms. Bennett is a wheelchair bound 75-year-old female with a PMH of arthritis, DVT, Parkinson's, and dementia who presented 5/14/2020 with complaints of shortness of breath, nonproductive cough, fevers, and chills for 5 days. She is confused at baseline and was sent by her  who has been unable to care for her.      12-lead EKG was done in the ED and was unremarkable, though a chest xray showed bilateral interstitial infiltrates. COVID was ruled out.     She was evaluated by PT/OT who recommended 24/7 supervision. She is now pending placement to SNF vs group home, though medicaid must be obtained first as she has limited income.      Interval Problem Update  10/6:  Flu vaccine ordered.  She is sitting at nurses station.  Calm mood.  Denies acute complaints.  VSS.  No acute needs.  Pending placement.   10/9:  Resting comfortably in bed.  Pleasant mood.  Denies any acute needs.  Clinical exam unchanged.     Consultants/Specialty  None    Code Status  DNAR/DNI    Disposition  SNF versus     Review of Systems  Review of Systems   Unable to perform ROS: Mental acuity        Physical Exam  Temp:  [36.3 °C (97.4 °F)-36.9 °C (98.5 °F)] 36.9 °C (98.5 °F)  Pulse:  [78-80] 78  Resp:  [17-18] 18  BP: (101-109)/(63-67) 109/67  SpO2:  [93 %] 93 %    Physical Exam  Vitals signs and nursing note reviewed.   Constitutional:       General: She is not in acute distress.     Appearance: Normal appearance. She is normal weight. She is not ill-appearing or toxic-appearing.      Comments: Sitting up in wheelchair   HENT:      Head: Normocephalic and atraumatic.      Nose: Nose normal.      Mouth/Throat:      Mouth: Mucous membranes are moist.      Pharynx: No oropharyngeal exudate or posterior oropharyngeal erythema.   Eyes:      General:         Right eye: No discharge.         Left eye: No discharge.       Conjunctiva/sclera: Conjunctivae normal.      Pupils: Pupils are equal, round, and reactive to light.   Neck:      Musculoskeletal: Normal range of motion and neck supple. No neck rigidity or muscular tenderness.   Cardiovascular:      Rate and Rhythm: Normal rate and regular rhythm.      Heart sounds: Normal heart sounds.   Pulmonary:      Effort: Pulmonary effort is normal. No respiratory distress.      Breath sounds: Normal breath sounds. No wheezing or rales.   Abdominal:      General: Bowel sounds are normal. There is no distension.      Palpations: Abdomen is soft.      Tenderness: There is no abdominal tenderness.   Musculoskeletal: Normal range of motion.         General: No tenderness.      Right lower leg: Edema present.      Left lower leg: Edema present.   Skin:     General: Skin is warm and dry.      Coloration: Skin is not jaundiced.   Neurological:      General: No focal deficit present.      Mental Status: She is alert.      Cranial Nerves: No cranial nerve deficit.      Comments: Oriented to herself.   Psychiatric:         Mood and Affect: Mood normal.         Behavior: Behavior is slowed.         Cognition and Memory: Cognition is impaired. Memory is impaired.         Fluids    Intake/Output Summary (Last 24 hours) at 10/9/2020 0854  Last data filed at 10/9/2020 0750  Gross per 24 hour   Intake 450 ml   Output --   Net 450 ml       Laboratory                        Imaging  DX-CHEST-PORTABLE (1 VIEW)   Final Result      No acute cardiopulmonary abnormality.      US-EXTREMITY VENOUS LOWER UNILAT RIGHT   Final Result      DX-HIP-UNILATERAL-WITH PELVIS-1 VIEW RIGHT   Final Result      1.  Bony pelvis and the hip joint appear normal      2.  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  No acute intracranial findings.      2.  Diffuse atrophy and periventricular white matter changes,  consistent with chronic small vessel disease.         DX-CHEST-PORTABLE (1 VIEW)   Final Result      Perihilar interstitial markings are increased and suggestive of mild pulmonary edema.           Assessment/Plan  Dementia (HCC)- (present on admission)  Assessment & Plan  - Chronic and stable.  - Frequent re-orientation, reestablish circadian rhythm, encourage familiar faces/family in room, avoid or minimize narcotics/sedatives.   - Continue on seroquel and prozac.   - PRN Haldol available if needed. Last administered 9/27    Discharge planning issues  Assessment & Plan  -Patient not eligible for Medicaid due to income.  Social work has requested [facility] screen her for institutional Medicaid.  Will need placement after that is obtained.  -Patient's  needs to spend down or file for separation of assets.      Lower extremity pain, bilateral- (present on admission)  Assessment & Plan  -PRN Tylenol available if needed.  -PT/OT recommending SNF placement, but will eventually need group home placement with 24/7 care after SNF making it a difficult discharge.  -Mobilize as tolerated.    Parkinson's disease (HCC)- (present on admission)  Assessment & Plan  -Chronic and stable.  -Continue sinemet.  -PT/OT recommending SNF placement. Will require 24/7 care at a group home afterward.       I have performed a physical exam and reviewed and updated ROS and Assessment/Plan today (10/09/20). In review of the previous note there are no changes except as documented above    I certify the patient requires continued medically necessary hospital services for the treatment of dementia. The patient will remain in the hospital for the foreseeable future.  Discharge may or may not occur in the next 20 days due to ongoing discharge delays due to having no medically acceptable discharge options.    TESSY Whaley.

## 2020-10-09 NOTE — PROGRESS NOTES
RN skin check completed with Irene ELIAS.   Devices in place Heel Mepilex.  Skin assessed under devices yes.  Confirmed pressure ulcers found on N/A.  New potential pressure ulcers noted on N/A. Wound consult placed N/A.     The following interventions in place Q2 hour turns, 2 RN skin check, waffle mattress overlay, Mepilex to bilateral heels, pillow for positioning/support, barrier cream to orlin area.*     Skin assessment:  Bilateral elbows pink and blanching  Sacrum pink and blanching  Edema to bilateral feet and ankles  Bilateral heels pink, boggy and blanching  Orlin area redness but intact.

## 2020-10-09 NOTE — PROGRESS NOTES
Received report and assumed care at shift change. Patient sleeping in bed, wakes up easily. No complain of pain or distress. No negative behaviors noted at this time. Fall precautions in place, bed locked and in lowest position. Needs attended to.

## 2020-10-09 NOTE — PROGRESS NOTES
2RN SKIN CHECK:    2 RN skin check completed with Kecia ELIAS  Devices in place Mepilex.  Skin assessed under devices yes.  Confirmed pressure ulcers found on N/A.  New potential pressure ulcers noted on N/A. Wound consult placed N/A.     The following interventions in place Q 2 hour turns, 2 RN skin check, waffle mattress overlay, pillows for support and repositioning, barrier paste, frequent incontinence care, pillows used to float heels due to pt refusal of float boots      Skin assessment:  Bilateral elbows pink and blanching.  Bilateral groin area intact, red blanching, barrier cream used   Sacrum pink and blanching  BLE trace edema with some discoloration  Bilateral heels are boggy with redness and are slow to obed. Bilateral mepilex in place on heels

## 2020-10-09 NOTE — PROGRESS NOTES
2RN SKIN CHECK:     2 RN skin check completed with Kecia ELIAS  Devices in place Mepilex.  Skin assessed under devices yes.  Confirmed pressure ulcers found on N/A.  New potential pressure ulcers noted on N/A. Wound consult placed N/A.     The following interventions in place Q 2 hour turns, 2 RN skin check, waffle mattress overlay, pillows for support and repositioning, barrier paste, frequent incontinence care, pillows used to float heels due to pt refusal of float boots, ordered barrier cloths to use for incontinence care    Skin assessment:  Bilateral elbows pink and blanching.  Groin area intact, red blanching, barrier cream used with barrier wipes  Sacrum pink and blanching  BLE trace edema with some discoloration  Bilateral heels are boggy with redness and are slow to obed. Bilateral mepilex in place on both heels.

## 2020-10-09 NOTE — PROGRESS NOTES
Received bedside report and assumed care of pt at change of shift. Pt is currently sleeping in bed no signs of distress. No events overnight. Bed is locked and in lowest position with water and call light in reach. Q2 turns and 2RN skin check in place.

## 2020-10-10 NOTE — CARE PLAN
Problem: Communication  Goal: The ability to communicate needs accurately and effectively will improve  10/10/2020 0355 by RAKESH MercadoNStormy  Outcome: PROGRESSING AS EXPECTED  10/10/2020 0351 by LUIS Mercado.N.  Outcome: PROGRESSING AS EXPECTED     Problem: Infection  Goal: Will remain free from infection  10/10/2020 0355 by RAKESH MercadoN.  Outcome: PROGRESSING AS EXPECTED  10/10/2020 0351 by Manuela Fishman R.N.  Outcome: PROGRESSING AS EXPECTED     Problem: Skin Integrity  Goal: Risk for impaired skin integrity will decrease  Outcome: PROGRESSING AS EXPECTED

## 2020-10-10 NOTE — PROGRESS NOTES
RN skin check completed with Michael ELIAS.   Devices in place Heel Mepilex.  Skin assessed under devices yes.  Confirmed pressure ulcers found on N/A.  New potential pressure ulcers noted on N/A.   Wound consult placed N/A.     The following interventions in place: 2 RN skin check, Q2 turns, waffle mattress overlay, pillow for positioning/support, barrier cream, Mepilex to bilateral heels,  Incontinence care provided as needed.      Skin assessment:  Bilateral pink and blanching  Bilateral elbows pink and blanching  Sacrum pink and blanching   Bilateral feet and ankles: edema  Bilateral heels: Red, boggy and blanching, mepilex in use  Perineum: red and intact.

## 2020-10-10 NOTE — PROGRESS NOTES
RN skin check completed with Blanquita ELIAS.   Devices in place Heel Mepilex.  Skin assessed under devices yes.  Confirmed pressure ulcers found on N/A.  New potential pressure ulcers noted on N/A. Wound consult placed N/A.     The following interventions in place Q2 hour turns, 2 RN skin check, waffle mattress overlay, Mepilex to bilateral heels, pillow for positioning/support, barrier cream to orlin area.*     Skin assessment:  Bilateral elbows pink and blanching  Sacrum pink and blanching  Edema to bilateral feet and ankles  Bilateral heels red, boggy and blanching  Orlin area redness but intact.

## 2020-10-10 NOTE — PROGRESS NOTES
Received report and assumed care at shift change. Patient in bed at change of shift, asleep but easily awakens. Took medications without difficulty. No complain of pain or distress. Fall precautions in place, bed locked and in lowest position. Needs attended to.

## 2020-10-10 NOTE — PROGRESS NOTES
Assumed care of pt at shift change. Pt is lying in bed on RA with no signs of acute distress. A&Ox1 to self only Denies any pain at the moment. Morning medications administered.  All comfort measures in place. Call light and personal belongings by bedside. Bed locked and in lowest position. Hourly rounding in place.

## 2020-10-11 NOTE — PROGRESS NOTES
Received report and assumed care at shift change. Patient in bed, asleep. A&O x 2. No complain of pane or distress. Need attended to.

## 2020-10-11 NOTE — PROGRESS NOTES
Assumed care of pt at shift change. Pt is lying in bed on RA with no signs of acute distress. A&Ox1 to self only.  Denies any pain.  All comfort measures in place. Call light and personal belongings by bedside. Bed locked and in lowest position. Hourly rounding in place

## 2020-10-11 NOTE — PROGRESS NOTES
RN skin check completed with Sandra ELIAS.   Devices in place Heel Mepilex.  Skin assessed under devices yes.  Confirmed pressure ulcers found on N/A.  New potential pressure ulcers noted on N/A.   Wound consult placed N/A.     The following interventions in place: 2 RN skin check, Q2 turns, waffle mattress overlay, pillow for positioning/support, barrier cream, Mepilex to bilateral heels,  Incontinence care provided as needed.      Skin assessment:  Bilateral pink and blanching  Bilateral elbows pink and blanching  Sacrum pink and blanching   Bilateral feet and ankles: edema  Bilateral heels: Red, boggy and blanching, mepilex in use  Perineum: pink and intact.

## 2020-10-12 NOTE — PROGRESS NOTES
Received report from day shift RN and assumed care of patient. Assessment completed, POC discussed. Pt is currently sitting in wheelchair at nurses' station, A&Ox1, on RA, vitals stable. Denies pain or discomfort. Medications given per MAR. Bed is in lowest, locked position, call bell and belongings are in reach. No further needs at this time.

## 2020-10-12 NOTE — PROGRESS NOTES
2 RN skin check complete with: CURT Pina.   Devices in place: Mepilex on bilateral heels.  Skin assessed under devices: Yes.  Confirmed pressure ulcers found on: None.  New potential pressure ulcers noted on: None.  Wound consult placed: N/A.    The following interventions in place: 2 RN skin check, Q2 turns, waffle mattress overlay, pillows for support and repositioning, frequent incontinence checks and care, mepilex on bilateral heels, barrier cream, body powder.    Ears: Intact, pink, blanching.  Chest: Intact, pink, blanching.  Back: Intact, red, blanching.  Elbows: Intact, pink, blanching.  Sacrum: Red, blanching, barrier cream in place.  Perineum: Red, blanching, body powder in place.  Lower Extremities: Bilateral feet edematous.  Bilateral heels: Red, boggy, blanching, mepilex in place.

## 2020-10-12 NOTE — PROGRESS NOTES
RN skin check completed with CURT Burnett.   Devices in place Heel Mepilex.  Skin assessed under devices yes.  Confirmed pressure ulcers found on N/A.  New potential pressure ulcers noted on N/A.   Wound consult placed N/A.     The following interventions in place: 2 RN skin check, Q2 turns, waffle mattress overlay, pillow for positioning/support, barrier cream, Mepilex to bilateral heels,  Incontinence care provided as needed.      Skin assessment:  Bilateral pink and blanching  Bilateral elbows pink and blanching  Sacrum pink and blanching   Bilateral feet and ankles: edema  Bilateral heels: Red, boggy and blanching, mepilex in use  Perineum: pink and intact.

## 2020-10-12 NOTE — CARE PLAN
Problem: Communication  Goal: The ability to communicate needs accurately and effectively will improve  Outcome: PROGRESSING AS EXPECTED  Note: Patient encouraged to express feelings, voice concerns and ask questions regarding plan of care.      Problem: Infection  Goal: Will remain free from infection  Outcome: PROGRESSING AS EXPECTED

## 2020-10-12 NOTE — PROGRESS NOTES
Assumed care of pt at shift change. Pt is resting in bed, on RA with no signs of acute distress. A&Ox1 to self only. Denies any pain. All comfort measures in place. Call light and personal belongings by bedside. Bed locked and in lowest position. Hourly rounding in place

## 2020-10-13 NOTE — PROGRESS NOTES
"Received report from night shift and assumed care.  Pt is A&OX 1, oriented only to self. Resting in bed watching television. She is confused, forgetful, yells out \"hello!\" repeatedly, but then denies any needs. Denies pain. Medication given per MAR without issue. Safety precautions and hourly rounding in place. Bed alarm on.  "

## 2020-10-13 NOTE — PROGRESS NOTES
[facility] Medicine LONG TERM PATIENT Progress Note    Date of Service  10/13/2020    Chief Complaint  SOB and cough    Hospital Course   Ms. Bennett is a wheelchair bound 75-year-old female with a PMH of arthritis, DVT, Parkinson's, and dementia who presented 5/14/2020 with complaints of shortness of breath, nonproductive cough, fevers, and chills for 5 days. She is confused at baseline and was sent by her  who has been unable to care for her.      12-lead EKG was done in the ED and was unremarkable, though a chest xray showed bilateral interstitial infiltrates. COVID was ruled out.     She was evaluated by PT/OT who recommended 24/7 supervision. She is now pending placement to SNF vs group home, though medicaid must be obtained first as she has limited income.      Interval Problem Update  10/13 -sitting up in bed, smiling and pleasant - being fed spaghetti by CNA.  Eats 100% of her meals with assistance.  Does not do well with feeding herself (e.g. spills and does not eat much).     Consultants/Specialty  None    Code Status  DNAR/DNI    Disposition  SNF versus     Review of Systems  Review of Systems   Unable to perform ROS: Mental acuity        Physical Exam  Temp:  [36.3 °C (97.4 °F)-36.8 °C (98.2 °F)] 36.8 °C (98.2 °F)  Pulse:  [68-76] 68  Resp:  [17-18] 17  BP: (104-124)/(64-68) 124/64  SpO2:  [92 %-93 %] 93 %    Physical Exam  Vitals signs and nursing note reviewed. Exam conducted with a chaperone present.   Constitutional:       General: She is not in acute distress.     Appearance: Normal appearance. She is normal weight. She is ill-appearing. She is not diaphoretic.   HENT:      Head: Normocephalic and atraumatic.      Mouth/Throat:      Mouth: Mucous membranes are moist.      Pharynx: No oropharyngeal exudate or posterior oropharyngeal erythema.   Eyes:      General: No scleral icterus.     Extraocular Movements: Extraocular movements intact.      Conjunctiva/sclera: Conjunctivae normal.       Pupils: Pupils are equal, round, and reactive to light.   Neck:      Musculoskeletal: Normal range of motion and neck supple. No neck rigidity or muscular tenderness.   Cardiovascular:      Rate and Rhythm: Normal rate and regular rhythm.      Heart sounds: Normal heart sounds. No murmur.   Pulmonary:      Effort: Pulmonary effort is normal. No respiratory distress.      Breath sounds: Normal breath sounds. No stridor. No wheezing, rhonchi or rales.   Chest:      Chest wall: No tenderness.   Abdominal:      General: Bowel sounds are normal. There is no distension.      Palpations: Abdomen is soft. There is no mass.      Tenderness: There is no abdominal tenderness. There is no guarding or rebound.   Musculoskeletal: Normal range of motion.         General: No swelling.      Right lower leg: Edema present.      Left lower leg: Edema present.   Lymphadenopathy:      Cervical: No cervical adenopathy.   Skin:     General: Skin is warm and dry.      Coloration: Skin is not jaundiced.      Findings: No rash.   Neurological:      General: No focal deficit present.      Mental Status: She is alert.      Cranial Nerves: No cranial nerve deficit.      Comments: Oriented to herself.   Psychiatric:         Mood and Affect: Mood normal.         Behavior: Behavior is slowed.         Cognition and Memory: Cognition is impaired. Memory is impaired.      Comments: Yells out at times         Fluids    Intake/Output Summary (Last 24 hours) at 10/13/2020 1519  Last data filed at 10/13/2020 1233  Gross per 24 hour   Intake 1020 ml   Output --   Net 1020 ml       Laboratory                        Imaging  DX-CHEST-PORTABLE (1 VIEW)   Final Result      No acute cardiopulmonary abnormality.      US-EXTREMITY VENOUS LOWER UNILAT RIGHT   Final Result      DX-HIP-UNILATERAL-WITH PELVIS-1 VIEW RIGHT   Final Result      1.  Bony pelvis and the hip joint appear normal      2.  osteoarthritis of lumbar spine facet joint and both sacroiliac joint       EC-ECHOCARDIOGRAM COMPLETE W/O CONT   Final Result      CT-HEAD W/O   Final Result      1.  No acute intracranial findings.      2.  Diffuse atrophy and periventricular white matter changes, consistent with chronic small vessel disease.         DX-CHEST-PORTABLE (1 VIEW)   Final Result      Perihilar interstitial markings are increased and suggestive of mild pulmonary edema.           Assessment/Plan  Dementia (HCC)- (present on admission)  Assessment & Plan  - Chronic and stable.  - Frequent re-orientation, reestablish circadian rhythm, encourage familiar faces/family in room, avoid or minimize narcotics/sedatives.   - Continue on seroquel and prozac.   - PRN Haldol available if needed. Last administered 9/27    Discharge planning issues  Assessment & Plan  -Patient not eligible for Medicaid due to income.  Social work has requested [facility] screen her for institutional Medicaid.  Will need placement after that is obtained.  -Patient's  needs to spend down or file for separation of assets.      Lower extremity pain, bilateral- (present on admission)  Assessment & Plan  -PRN Tylenol available if needed.  -PT/OT recommending SNF placement, but will eventually need group home placement with 24/7 care after SNF making it a difficult discharge.  -Mobilize as tolerated.    Parkinson's disease (HCC)- (present on admission)  Assessment & Plan  -Chronic and stable.  -Continue sinemet.  -24/7 supervision at [facility]  -Sleep hygiene  -Frequent reorientation  -Avoid narcotics, benzodiazepines and hypnotics       I have performed a physical exam and reviewed and updated ROS and Assessment/Plan today (10/13/20). In review of the previous note there are no changes except as documented above    I certify the patient requires continued medically necessary hospital services for the treatment of dementia. The patient will remain in the hospital for the foreseeable future.  Discharge may or may not occur in the next 20 days due to  ongoing discharge delays due to having no medically acceptable discharge options.    TESSY Najera.      Please note that this dictation was created using voice recognition software. I have made every reasonable attempt to correct obvious errors, but there may be errors of grammar and possibly content that I did not discover before finalizing the note.

## 2020-10-13 NOTE — CARE PLAN
Problem: Communication  Goal: The ability to communicate needs accurately and effectively will improve  Outcome: PROGRESSING AS EXPECTED  Note: Patient encouraged to express feelings, voice concerns and ask questions regarding plan of care.      Problem: Infection  Goal: Will remain free from infection  Outcome: PROGRESSING AS EXPECTED  Note: Patient is not showing any signs or symptoms of infection.

## 2020-10-14 NOTE — PROGRESS NOTES
"Patient extremely agitated. This RN and CNAs changed patient and patient is screaming \"you're raping me. Help!\"     Patient refusing am medication. Charge RN aware.  "

## 2020-10-14 NOTE — PROGRESS NOTES
Pharmacy Pharmacotherapy Consult for LOS >30 days    Admit Date: 5/14/2020      Medications were reviewed for appropriateness and ongoing need.     Current Facility-Administered Medications   Medication Dose Route Frequency Provider Last Rate Last Dose   • acetaminophen (TYLENOL) tablet 650 mg  650 mg Oral Q8HRS PRN Liang M Olde, A.P.N.   650 mg at 09/13/20 1231   • haloperidol (HALDOL) tablet 1 mg  1 mg Oral Q6HRS PRN Liang M Olde, A.P.N.   Stopped at 08/25/20 2114   • haloperidol lactate (HALDOL) injection 2-5 mg  2-5 mg Intramuscular QPM PRN Liang M Olde, A.P.N.   5 mg at 10/08/20 1419   • carbidopa-levodopa (SINEMET)  MG tablet 1 Tab  1 Tab Oral Q6HRS Liang M Olde, A.P.N.   1 Tab at 10/14/20 0941   • QUEtiapine (SEROQUEL) tablet 25 mg  25 mg Oral BID Brenda A Heglar, A.P.R.N.   25 mg at 10/14/20 0941   • FLUoxetine (PROZAC) capsule 40 mg  40 mg Oral DAILY Brenda A Heglar, A.P.R.N.   40 mg at 10/14/20 0941   • senna-docusate (PERICOLACE or SENOKOT S) 8.6-50 MG per tablet 2 Tab  2 Tab Oral BID Brenda A Heglar, A.P.R.N.   2 Tab at 10/14/20 0941       Recommendations:    Consider changing Senokot to once daily as it has been held for loose stool or is being refused.     Recommend getting repeat EKG as patients baseline Qtc was 486 ms and she is on medications that have prolongation potential. (Prozac, Haldol, and Seroquel.)     Can consider switching Seroquel from BID to QHS to promote better sleep/wake cycle. It also carries a warning for increased mortality in elderly patients with dementia related psychosis.       Mora Hinton  Pharmacy Intern

## 2020-10-14 NOTE — PROGRESS NOTES
Patient pleasant this morning, she is A&O x 1. Patient transferred into wheelchair, medications taken with no problems. All needs met, will continue to monitor pt. Waffle mattress on bed, bilateral mepilex on heels

## 2020-10-14 NOTE — PROGRESS NOTES
Received report from day shift RN and assumed care of patient. Assessment completed, POC discussed. Pt is currently resting in bed, A&O to self, on RA, VSS. Denies pain or discomfort. Bed is in lowest, locked position, call bell and belongings are in reach. No further needs at this time.

## 2020-10-14 NOTE — PROGRESS NOTES
2 RN skin check complete with: CURT Ware.   Devices in place: Mepilex on bilateral heels.  Skin assessed under devices: Yes.  Confirmed pressure ulcers found on: None.  New potential pressure ulcers noted on: None.  Wound consult placed: N/A.     The following interventions in place: 2 RN skin check, Q2 turns, waffle mattress overlay, pillows for support and repositioning, frequent incontinence checks and care, mepilex on bilateral heels and elbows, barrier cream, body powder.     Ears: Intact, pink, blanching.  Chest: Intact, pink, blanching.  Back: Intact, red, blanching.  Elbows: Intact, red, blanching, mepilex in place  Sacrum: Red, blanching, barrier cream in place.  Perineum: Red, blanching, body powder in place.    Bilateral heels: Red, boggy, blanching, mepilex in place.

## 2020-10-14 NOTE — PROGRESS NOTES
2 RN skin check complete with: CURT Singleton.   Devices in place: Mepilex on bilateral heels.  Skin assessed under devices: Yes.  Confirmed pressure ulcers found on: None.  New potential pressure ulcers noted on: None.  Wound consult placed: N/A.     The following interventions in place: 2 RN skin check, Q2 turns, waffle mattress overlay, pillows for support and repositioning, frequent incontinence checks and care, mepilex on bilateral heels, barrier cream, body powder.     Ears: Intact, pink, blanching.  Chest: Intact, pink, blanching.  Back: Intact, red, blanching.  Elbows: Intact, pink, blanching, bilateral mepilex in place.  Sacrum: Red, blanching, barrier cream in place.  Perineum: Red, blanching, body powder in place.  Lower Extremities: Bilateral feet edematous.  Bilateral heels: Red, boggy, blanching, mepilex in place.

## 2020-10-15 NOTE — PROGRESS NOTES
"Patient is extremely agitated screaming \"help\", \"get me out of here\", and attempting to get out of bed. IM Haldol given for agitation. Will continue to monitor.  "

## 2020-10-15 NOTE — PROGRESS NOTES
2 RN skin check david Ware RN  Devices in place mepilex on bilateral heels and elbows.  Skin assessed under devices ys.  Confirmed pressure ulcers found on n/a.  New potential pressure ulcers noted on n/a. Wound consult placed n/a.  The following interventions in place 2 RN skin check, incontinence checks, waffle overlay on be and chair, protective mepilex on elbows and heels       Bilateral heels pink boggy blanching   Sacrum pink blanching   Bilateral elbows pink blanching

## 2020-10-15 NOTE — PROGRESS NOTES
Patient sitting up in bed eating breakfast. She is A&O x 1. Patient has roxana having pinkish/red urine. Informed APRN about this and will try and get urine sample today. Will continue to monitor pt

## 2020-10-15 NOTE — CARE PLAN
Problem: Safety  Goal: Will remain free from injury  Outcome: PROGRESSING AS EXPECTED  Note: Fall precautions in place. Bed in lowest position. Non-skid socks in place. Personal possessions within reach. Mobility sign on door. Bed-alarm on.   Goal: Will remain free from falls  Outcome: PROGRESSING AS EXPECTED  Note: Fall precautions in place. Bed in lowest position. Non-skid socks in place. Personal possessions within reach. Mobility sign on door. Bed-alarm on.      Problem: Skin Integrity  Goal: Risk for impaired skin integrity will decrease  Outcome: PROGRESSING AS EXPECTED  Note: Pt turned and positioned every two hours. Incontinence care provided and barrier paste applied as needed.

## 2020-10-15 NOTE — PROGRESS NOTES
Received report from day shift RN and assumed care of patient. Assessment completed, POC discussed. Pt is currently sitting in wheelchair at the nurses' station, A&O to self, on RA, VSS. Denies pain or discomfort. Medications given per MAR. Bed is in lowest, locked position, call bell and belongings are in reach. No further needs at this time.

## 2020-10-15 NOTE — PROGRESS NOTES
2 RN skin check complete with: CURT Newsome.   Devices in place: Protective mepilex on bilateral heels and elbows.  Skin assessed under devices: Yes.  Confirmed pressure ulcers found on: None.  New potential pressure ulcers noted on: None.  Wound consult placed: N/A.     The following interventions in place: 2 RN skin check, Q2 turns, waffle mattress overlay, pillows for support and repositioning, frequent incontinence checks and care, mepilex on bilateral heels and elbows, barrier cream, powder.     Elbows: Intact, pink, blanching, mepilex in place.  Sacrum: Red, blanching, barrier cream in place.  Perineum: Red, blanching, body powder in place.  Lower Extremities: Bilateral feet edematous.  Bilateral heels: Red, boggy, blanching, mepilex in place.

## 2020-10-15 NOTE — PROGRESS NOTES
"Patient very confused and keeps yelling \"help\" but will not respond with what she needs help with. 2 mg of haldol given to patient. She is sitting up in chair at nurses station   "

## 2020-10-16 NOTE — PROGRESS NOTES
[facility] Medicine LONG TERM PATIENT Progress Note    Date of Service  10/16/2020    Chief Complaint  SOB and cough    Hospital Course   Ms. Bennett is a wheelchair bound 75-year-old female with a PMH of arthritis, DVT, Parkinson's, and dementia who presented 5/14/2020 with complaints of shortness of breath, nonproductive cough, fevers, and chills for 5 days. She is confused at baseline and was sent by her  who has been unable to care for her.      12-lead EKG was done in the ED and was unremarkable, though a chest xray showed bilateral interstitial infiltrates. COVID was ruled out.     She was evaluated by PT/OT who recommended 24/7 supervision. She is now pending placement to SNF vs group home, though medicaid must be obtained first as she has limited income.      Interval Problem Update  10/13 -sitting up in bed, smiling and pleasant - being fed spaghetti by CNA.  Eats 100% of her meals with assistance.  Does not do well with feeding herself (e.g. spills and does not eat much).   10/16 - nursing reports patient has been having pink-tinged urine.  Mild tachycardia this morning with no fevers. Difficult to assess symptoms due to dementia. UA pending -consider ABX as clinically appropriate    Consultants/Specialty  None    Code Status  DNAR/DNI    Disposition  SNF versus     Review of Systems  Review of Systems   Unable to perform ROS: Mental acuity        Physical Exam  Temp:  [36.1 °C (97 °F)-37.1 °C (98.7 °F)] 37.1 °C (98.7 °F)  Pulse:  [] 104  Resp:  [17] 17  BP: ()/(55-72) 92/55  SpO2:  [92 %-94 %] 92 %    Physical Exam  Vitals signs and nursing note reviewed. Exam conducted with a chaperone present.   Constitutional:       General: She is not in acute distress.     Appearance: Normal appearance. She is normal weight. She is ill-appearing. She is not diaphoretic.   HENT:      Head: Normocephalic and atraumatic.      Mouth/Throat:      Mouth: Mucous membranes are moist.      Pharynx: No  oropharyngeal exudate or posterior oropharyngeal erythema.   Eyes:      General: No scleral icterus.     Extraocular Movements: Extraocular movements intact.      Conjunctiva/sclera: Conjunctivae normal.      Pupils: Pupils are equal, round, and reactive to light.   Neck:      Musculoskeletal: Normal range of motion and neck supple. No neck rigidity or muscular tenderness.   Cardiovascular:      Rate and Rhythm: Normal rate and regular rhythm.      Heart sounds: Normal heart sounds. No murmur.   Pulmonary:      Effort: Pulmonary effort is normal. No respiratory distress.      Breath sounds: Normal breath sounds. No stridor. No wheezing, rhonchi or rales.   Chest:      Chest wall: No tenderness.   Abdominal:      General: Bowel sounds are normal. There is no distension.      Palpations: Abdomen is soft. There is no mass.      Tenderness: There is no abdominal tenderness. There is no guarding or rebound.   Musculoskeletal: Normal range of motion.         General: No swelling.      Right lower leg: Edema present.      Left lower leg: Edema present.   Lymphadenopathy:      Cervical: No cervical adenopathy.   Skin:     General: Skin is warm and dry.      Coloration: Skin is not jaundiced.      Findings: No rash.   Neurological:      General: No focal deficit present.      Mental Status: She is alert.      Cranial Nerves: No cranial nerve deficit.      Comments: Oriented to herself.   Psychiatric:         Mood and Affect: Mood normal.         Behavior: Behavior is slowed.         Cognition and Memory: Cognition is impaired. Memory is impaired.      Comments: Yells out at times         Fluids    Intake/Output Summary (Last 24 hours) at 10/16/2020 1444  Last data filed at 10/16/2020 1300  Gross per 24 hour   Intake 1260 ml   Output --   Net 1260 ml       Laboratory                        Imaging  DX-CHEST-PORTABLE (1 VIEW)   Final Result      No acute cardiopulmonary abnormality.      US-EXTREMITY VENOUS LOWER UNILAT RIGHT    Final Result      DX-HIP-UNILATERAL-WITH PELVIS-1 VIEW RIGHT   Final Result      1.  Bony pelvis and the hip joint appear normal      2.  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  No acute intracranial findings.      2.  Diffuse atrophy and periventricular white matter changes, consistent with chronic small vessel disease.         DX-CHEST-PORTABLE (1 VIEW)   Final Result      Perihilar interstitial markings are increased and suggestive of mild pulmonary edema.           Assessment/Plan  Dementia (HCC)- (present on admission)  Assessment & Plan  -Chronic   -Frequent re-orientation, reestablish circadian rhythm, encourage familiar faces/family in room, avoid or minimize narcotics/sedatives.   -Continue on seroquel and prozac.   -PRN Haldol available if needed. Last administered 10/15  -Twelve-lead EKG done 10/15 to evaluate QTc interval while on Seroquel and Haldol.   down from 486 from previous twelve-lead EKG    Discharge planning issues  Assessment & Plan  -Patient not eligible for Medicaid due to income.  Social work has requested [facility] screen her for institutional Medicaid.  Will need placement after that is obtained.  -Patient's  needs to spend down or file for separation of assets.      Lower extremity pain, bilateral- (present on admission)  Assessment & Plan  -PRN Tylenol available if needed.  -PT/OT recommending SNF placement, but will eventually need group home placement with 24/7 care after SNF making it a difficult discharge.  -Mobilize as tolerated.    Parkinson's disease (HCC)- (present on admission)  Assessment & Plan  -Chronic and stable.  -Continue sinemet.  -24/7 supervision at [place]  -Sleep hygiene  -Frequent reorientation  -Avoid narcotics, benzodiazepines and hypnotics       I have performed a physical exam and reviewed and updated ROS and Assessment/Plan today  (10/16/20). In review of the previous note there are no changes except as documented above    I certify the patient requires continued medically necessary hospital services for the treatment of dementia. The patient will remain in the hospital for the foreseeable future.  Discharge may or may not occur in the next 20 days due to ongoing discharge delays due to having no medically acceptable discharge options.    TESSY Najera.      Please note that this dictation was created using voice recognition software. I have made every reasonable attempt to correct obvious errors, but there may be errors of grammar and possibly content that I did not discover before finalizing the note.

## 2020-10-16 NOTE — PROGRESS NOTES
Patient sitting up in bd eating breakfast, pt only knows her first name and would not answer her last name or birthday. Patient took her medications crushed in pudding with no problems. Patient cleaned up and repositioned in bed. All needs met, will continue  to monitor pt. Bed alarm on

## 2020-10-16 NOTE — PROGRESS NOTES
Report received by day RN. Assumed care of pt. Assessment complete. RN at bedside. Pt A&Ox1. Pt resting, and comfortable. Pt in no apparent signs of distress. Plan of care discussed. Call light within reach, side rails x 2, bed locked bed in lowest position, and pt has no further questions at this time.

## 2020-10-16 NOTE — PROGRESS NOTES
2 RN skin check complete Shalonda RN  Devices in place mepilex on bilateral heels and elbows.  Skin assessed under devices yes.  Confirmed pressure ulcers found on n/a.  New potential pressure ulcers noted on n/a. Wound consult placed n/a.  The following interventions in place 2 RN skin check, incontinence checks, waffle overlay on be and chair, protective mepilex on elbows and heels         Bilateral heels red boggy blanching   Sacrum pink blanching   Bilateral elbows pink blanching

## 2020-10-17 NOTE — PROGRESS NOTES
Received bedside report from night shift RN. Patient is A&Ox1 (to self only). Patient sitting up eating breakfast with CNA at bedside. Patient does not appear in any distress. Bed is in lowest/locked position. Call light and belongings are within reach. No further needs at this time.

## 2020-10-17 NOTE — PROGRESS NOTES
2 RN skin check david Skaggs RN  Devices in place mepilex on bilateral heels and elbows.  Skin assessed under devices yes.  Confirmed pressure ulcers found on n/a.  New potential pressure ulcers noted on n/a. Wound consult placed n/a.  The following interventions in place 2 RN skin check, incontinence checks, waffle overlay on be and chair, protective mepilex on elbows and heels         Bilateral heels red boggy blanching   Sacrum pink blanching   Bilateral elbows pink blanching, right elbow more red more but blanching

## 2020-10-17 NOTE — PROGRESS NOTES
2 RN Skin Check    2 RN skin check complete with RN Randi  Devices in place: n/a.  Skin assessed under devices: N\A.  Confirmed pressure ulcers found on: n/a.  New potential pressure ulcers noted on n/a  . Wound consult placed n/a.  The following interventions in place Pillows.    Heels intact, pink, boggy. Pillows to float  Sacrum is pink and intact blanching  Trunk skin intact  Elbows covered with mepilex but intact and blanching

## 2020-10-17 NOTE — NON-PROVIDER
[facility] Medicine LONG TERM PATIENT Progress Note     Date of Service  10/16/2020     Chief Complaint  SOB and cough     Hospital Course  Ms. Bennett is a wheelchair bound 75-year-old female with a PMH of arthritis, DVT, Parkinson's, and dementia who presented 5/14/2020 with complaints of shortness of breath, nonproductive cough, fevers, and chills for 5 days. She is confused at baseline and was sent by her  who has been unable to care for her.      12-lead EKG was done in the ED and was unremarkable, though a chest xray showed bilateral interstitial infiltrates. COVID was ruled out.     She was evaluated by PT/OT who recommended 24/7 supervision. She is now pending placement to SNF vs group home, though medicaid must be obtained first as she has limited income.       Interval Problem Update  Nursing reports patient has been having pink-tinged urine.  Mild tachycardia this morning with no fevers. Difficult to assess symptoms due to dementia. UA pending -consider ABX as clinically appropriate  10/17- UA positive for large occult blood, -150, -150, moderate bacteria, and large leukocyte esterase. Ordered Macrobid 100 mg PO BID x 5 day course pending urine culture.     Consultants/Specialty  None     Code Status  DNAR/DNI     Disposition  SNF versus      Review of Systems  Review of Systems   Unable to perform ROS: Mental acuity         Physical Exam  Temp:  [36.1 °C (97 °F)-37.1 °C (98.7 °F)] 37.1 °C (98.7 °F)  Pulse:  [] 104  Resp:  [17] 17  BP: ()/(55-72) 92/55  SpO2:  [92 %-94 %] 92 %     Physical Exam  Vitals signs and nursing note reviewed. Exam conducted with a chaperone present.   Constitutional:       General: She is not in acute distress.     Appearance: Normal appearance. She is normal weight. She is ill-appearing. She is not diaphoretic.   HENT:      Head: Normocephalic and atraumatic.      Mouth/Throat:      Mouth: Mucous membranes are moist.      Pharynx: No oropharyngeal  exudate or posterior oropharyngeal erythema.   Eyes:      General: No scleral icterus.     Extraocular Movements: Extraocular movements intact.      Conjunctiva/sclera: Conjunctivae normal.      Pupils: Pupils are equal, round, and reactive to light.   Neck:      Musculoskeletal: Normal range of motion and neck supple. No neck rigidity or muscular tenderness.   Cardiovascular:      Rate and Rhythm: Normal rate and regular rhythm.      Heart sounds: Normal heart sounds. No murmur.   Pulmonary:      Effort: Pulmonary effort is normal. No respiratory distress.      Breath sounds: Normal breath sounds. No stridor. No wheezing, rhonchi or rales.   Chest:      Chest wall: No tenderness.   Abdominal:      General: Bowel sounds are normal. There is no distension.      Palpations: Abdomen is soft. There is no mass.      Tenderness: There is no abdominal tenderness. There is no guarding or rebound.   Musculoskeletal: Normal range of motion.         General: No swelling.      Right lower leg: Edema present.      Left lower leg: Edema present.   Lymphadenopathy:      Cervical: No cervical adenopathy.   Skin:     General: Skin is warm and dry.      Coloration: Skin is not jaundiced.      Findings: No rash.   Neurological:      General: No focal deficit present.      Mental Status: She is alert.      Cranial Nerves: No cranial nerve deficit.      Comments: Oriented to herself.   Psychiatric:         Mood and Affect: Mood normal.         Behavior: Behavior is slowed.         Cognition and Memory: Cognition is impaired. Memory is impaired.      Comments: Yells out at times            Fluids     Intake/Output Summary (Last 24 hours) at 10/16/2020 1444  Last data filed at 10/16/2020 1300      Gross per 24 hour   Intake 1260 ml   Output --   Net 1260 ml         Laboratory         Imaging  DX-CHEST-PORTABLE (1 VIEW)   Final Result       No acute cardiopulmonary abnormality.       US-EXTREMITY VENOUS LOWER UNILAT RIGHT   Final Result        DX-HIP-UNILATERAL-WITH PELVIS-1 VIEW RIGHT   Final Result       1.  Bony pelvis and the hip joint appear normal       2.  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  No acute intracranial findings.       2.  Diffuse atrophy and periventricular white matter changes, consistent with chronic small vessel disease.           DX-CHEST-PORTABLE (1 VIEW)   Final Result       Perihilar interstitial markings are increased and suggestive of mild pulmonary edema.          Assessment/Plan  Dementia (HCC)- (present on admission)  Assessment & Plan  -Chronic   -Frequent re-orientation, reestablish circadian rhythm, encourage familiar faces/family in room, avoid or minimize narcotics/sedatives.   -Continue on seroquel and prozac.   -PRN Haldol available if needed. Last administered 10/15  -Twelve-lead EKG done 10/15 to evaluate QTc interval while on Seroquel and Haldol.   down from 486 from previous twelve-lead EKG.     Urinary tract infection   Assessment & Plan  -Nursing staff noted pink tinged urine on 10/16  -UA positive for large occult blood, -150, -150, moderate bacteria, and large leukocyte esterase.   -Macrobid 100 mg PO BID x 5 days  -Urine culture pending    Discharge planning issues  Assessment & Plan  -Patient not eligible for Medicaid due to income.  Social work has requested [facility] screen her for institutional Medicaid.  Will need placement after that is obtained.  -Patient's  needs to spend down or file for separation of assets.     Lower extremity pain, bilateral- (present on admission)  Assessment & Plan  -PRN Tylenol available if needed.  -PT/OT recommending SNF placement, but will eventually need group home placement with 24/7 care after SNF making it a difficult discharge.  -Mobilize as tolerated.     Parkinson's disease (HCC)- (present on  admission)  Assessment & Plan  -Chronic and stable.  -Continue sinemet.  -24/7 supervision at [place]  -Sleep hygiene  -Frequent reorientation  -Avoid narcotics, benzodiazepines and hypnotics    I certify the patient requires continued medically necessary hospital services for the treatment of dementia. The patient will remain in the hospital for the foreseeable future.  Discharge may or may not occur in the next 20 days due to ongoing discharge delays due to having no medically acceptable discharge options.

## 2020-10-17 NOTE — PROGRESS NOTES
2 RN skin check completed with CURT Skaggs.   Devices in place: N/A.  Skin assessed under devices: N/A.  Confirmed pressure ulcers found: N/A.  New potential pressure ulcers noted: None.  Wound consult placed:  N/A.      Skin assessment:   Heels: red and boggy, blanching (patient refuses moon boots)  Elbows: red and blanching  Sacrum: red, boggy, blanching      The following interventions in place: 2 RN skin check, Q 2 hr turns, frequent incontinent checks, linen changes prn, mepliex.

## 2020-10-18 NOTE — PROGRESS NOTES
Received bedside report from night shift RN. Patient is A&Ox1 (to self only). Patient eating breakfast at nurses station in wheelchair. Patient took meds crushed in apple sauce. Patient does not appear in any distress. No further needs at this time.

## 2020-10-18 NOTE — PROGRESS NOTES
2 RN Skin Check    2 RN skin check complete with RN Areli  Devices in place: n/a  Skin assessed under devices: YES  Confirmed pressure ulcers found on: n/a.  New potential pressure ulcers noted on n/a. Wound consult placed N/A.  The following interventions in place Mepilex, pillows    elbows intact pink and blanching  Sacrum intact and blanching  Heels dry boggy and pink  Trunk skin intact

## 2020-10-19 NOTE — PROGRESS NOTES
2 RN skin check completed with CURT Tanner.   Devices in place: N/A.  Skin assessed under devices: N/A.  Confirmed pressure ulcers found: N/A.  New potential pressure ulcers noted: None.  Wound consult placed:  N/A.        Skin assessment:   Heels: red and boggy, blanching (patient refuses moon boots)  Elbows: red and blanching  Sacrum: red, boggy, blanching, small open spot on sacrum - z guard paste applied        The following interventions in place: 2 RN skin check, Q 2 hr turns, frequent incontinent checks, linen changes prn, mepliex.

## 2020-10-19 NOTE — CARE PLAN
Problem: Safety  Goal: Will remain free from falls  Outcome: PROGRESSING AS EXPECTED  Note: Patient is diagnosed w/ dementia, Aox1 and is confused. Needs to be redirected. Fall education provided, needs reinforcement. Has generalized weakness. Fall precaution in placed. Bed locked and placed in lowest position. Treaded socks on.Call lights w/in reach. Hourly rounds in place. Provided needs.      Problem: Infection  Goal: Will remain free from infection  Outcome: PROGRESSING AS EXPECTED  Intervention: Assess signs and symptoms of infection  Note: Patient currently diagnosed w/ UTI. Patient afebrile and did not complain of dysuria at this time. Assessed for blood in urine and urine retention, none noted at this time. On Macrobid antibiotic as ordered. Frequently checked for urine incontinence and care. Changed linens as needed. Will continue to monitor.

## 2020-10-19 NOTE — PROGRESS NOTES
Pt is A&Ox1, oriented to self. Pt is resting in bed, no signs of labored breathing or pain. Pt on RA. Call light & personal belongings within reach, bed in lowest position & locked, and bed alarm is on. Fall precautions in place and education provided on how to use call light. Pt updated on plan of care for the shift. Pt declines any additional needs at this time.

## 2020-10-19 NOTE — PROGRESS NOTES
Pt Aox1, to self only. No c/o pain at this time. Not in acute distress. Discussed plan of care, verbalized understanding. Compliant w/ care and taking medication. Provided needs. Bed locked and placed in lowest position. Treaded socks on. Call lights w/in reach. Hourly rounds in place.

## 2020-10-19 NOTE — PROGRESS NOTES
2 RN skin check complete with CURT Tanner  Devices in place N/A.  Skin assessed under devices N/A.  Confirmed pressure ulcers found N/A.  New potential pressure ulcers n/a. Wound consult placed n/a    The following interventions in place: 2RN skin check Q shift, Q2 turns, frequent incontinent checks and linen changes, mepilex applied to bilat heels and elbows, barrier cream applied    Skin Assessment:   Heels: red, boggy, blanching   Elbows: red and blanching  Back of head/neck area is red, small bruising spot  Sacrum: red, boggy, blanching, small open spot on sacrum, potential moisture fissure, barrier paste applied, pictures uploaded.

## 2020-10-19 NOTE — PROGRESS NOTES
2 RN skin check completed with CURT Singleton.   Devices in place: N/A.  Skin assessed under devices: N/A.  Confirmed pressure ulcers found: N/A.  New potential pressure ulcers noted: None.  Wound consult placed:  N/A.     The following interventions in place: 2 RN skin check, Q 2 hr turns, frequent incontinent checks, linen changes as needed, mepilex, barrier cream, pillows for support and positioning,  waffle overlay    Skin assessment:   Heels: red and boggy, blanching; placed mepilex and float heels on pillow  Elbows: red, pink and slightly boggy; placed mepliex  Sacrum: red, boggy, blanching; placed barrier cream  Groin area: pink and blanching  Bilateral ears: pink and blanching

## 2020-10-20 PROBLEM — N30.01 ACUTE CYSTITIS WITH HEMATURIA: Status: ACTIVE | Noted: 2020-01-01

## 2020-10-20 NOTE — PROGRESS NOTES
Pt is A&Ox1. Pt is resting in bed, no signs of labored breathing or pain. Pt on RA. Call light & personal belongings within reach, bed in lowest position & locked, and bed alarm is on. Fall precautions in place and education provided on how to use call light. Pt updated on plan of care for the shift. Pt declines any additional needs at this time.

## 2020-10-20 NOTE — PROGRESS NOTES
"Hospital Medicine LONG TERM PATIENT Progress Note    Date of Service  10/20/2020    Chief Complaint  SOB and cough    Hospital Course   Ms. Bennett is a wheelchair bound 75-year-old female with a PMH of arthritis, DVT, Parkinson's, and dementia who presented 5/14/2020 with complaints of shortness of breath, nonproductive cough, fevers, and chills for 5 days. She is confused at baseline and was sent by her  who has been unable to care for her.      12-lead EKG was done in the ED and was unremarkable, though a chest xray showed bilateral interstitial infiltrates. COVID was ruled out.     She was evaluated by PT/OT who recommended 24/7 supervision. She is now pending placement to SNF vs group home, though medicaid must be obtained first as she has limited income.      Interval Problem Update  10/20:  Sitting up in bed without any signs of acute distress. She oriented to self and \"[facility]\" only. Did not participate in ROS question otherwise. VS are stable on room air. Last BM today.     Consultants/Specialty  Palliative - signed off     Code Status  DNAR/DNI    Disposition  SNF versus     Review of Systems  Review of Systems   Unable to perform ROS: Mental acuity        Physical Exam  Temp:  [36.7 °C (98 °F)-36.9 °C (98.4 °F)] 36.7 °C (98 °F)  Pulse:  [66-89] 66  Resp:  [16-17] 17  BP: ()/(58-65) 101/60  SpO2:  [92 %-93 %] 93 %    Physical Exam  Vitals signs and nursing note reviewed.   Constitutional:       General: She is not in acute distress.     Appearance: Normal appearance. She is normal weight. She is not diaphoretic.      Comments: Frail, chronically ill appearing    HENT:      Head: Normocephalic and atraumatic.      Mouth/Throat:      Mouth: Mucous membranes are moist.   Eyes:      General: No scleral icterus.  Neck:      Musculoskeletal: Normal range of motion and neck supple. No neck rigidity or muscular tenderness.   Cardiovascular:      Rate and Rhythm: Normal rate.      Heart sounds: Normal " "heart sounds.   Pulmonary:      Effort: Pulmonary effort is normal. No respiratory distress.      Breath sounds: Normal breath sounds. No stridor. No wheezing, rhonchi or rales.   Chest:      Chest wall: No tenderness.   Abdominal:      General: Bowel sounds are normal. There is no distension.      Palpations: Abdomen is soft.      Tenderness: There is no abdominal tenderness. There is no guarding.   Musculoskeletal:      Right lower leg: Edema present.      Left lower leg: Edema present.   Skin:     General: Skin is warm and dry.      Coloration: Skin is pale. Skin is not jaundiced.      Findings: No rash.   Neurological:      General: No focal deficit present.      Mental Status: She is alert.      Comments: Oriented to herself and \"[facility]\"    Psychiatric:         Mood and Affect: Affect is flat.         Behavior: Behavior is slowed.         Cognition and Memory: Cognition is impaired. Memory is impaired.         Fluids    Intake/Output Summary (Last 24 hours) at 10/20/2020 1034  Last data filed at 10/20/2020 0833  Gross per 24 hour   Intake 1060 ml   Output --   Net 1060 ml       Laboratory      Recent Labs     10/19/20  0823   SODIUM 137   POTASSIUM 4.3   CHLORIDE 102   CO2 26   GLUCOSE 91   BUN 15   CREATININE 0.73   CALCIUM 9.2                   Imaging  DX-CHEST-PORTABLE (1 VIEW)   Final Result      No acute cardiopulmonary abnormality.      US-EXTREMITY VENOUS LOWER UNILAT RIGHT   Final Result      DX-HIP-UNILATERAL-WITH PELVIS-1 VIEW RIGHT   Final Result      1.  Bony pelvis and the hip joint appear normal      2.  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  No acute intracranial findings.      2.  Diffuse atrophy and periventricular white matter changes, consistent with chronic small vessel disease.         DX-CHEST-PORTABLE (1 VIEW)   Final Result      Perihilar interstitial markings " are increased and suggestive of mild pulmonary edema.           Assessment/Plan  Dementia (HCC)- (present on admission)  Assessment & Plan  -Chronic   -Frequent re-orientation, reestablish circadian rhythm, encourage familiar faces/family in room, avoid or minimize narcotics/sedatives.   -Continue on seroquel and prozac.   -PRN Haldol available if needed. Last administered 10/15  -Twelve-lead EKG done 10/15 to evaluate QTc interval while on Seroquel and Haldol.   down from 486 from previous twelve-lead EKG    Acute cystitis with hematuria  Assessment & Plan  - Apparently noted to have pink tinged urine   - UA consistent with UTI  - Urine cx + Group D enterococcus   - Started on Macrobid , last dose 10/21     Discharge planning issues  Assessment & Plan  -Patient not eligible for Medicaid due to income.  Social work has requested [facility] screen her for institutional Medicaid.  Will need placement after that is obtained.  -Patient's  needs to spend down or file for separation of assets.      Lower extremity pain, bilateral- (present on admission)  Assessment & Plan  -PRN Tylenol available if needed.  -PT/OT recommending SNF placement, but will eventually need group home placement with 24/7 care after SNF making it a difficult discharge.  -Mobilize as tolerated.    Parkinson's disease (HCC)- (present on admission)  Assessment & Plan  -Chronic and stable.  -Continue sinemet.  -24/7 supervision at [place]  -Sleep hygiene  -Frequent reorientation  -Avoid narcotics, benzodiazepines and hypnotics     VTE Prophylaxis : No anticoagulation due to patient refusal and intermittent aggressive episodes toward staff with administration.     I have performed a physical exam and reviewed and updated ROS and Plan today (10/20/2020). In review of previous note, there are no changes except as documented above.       TESSY Weaver.

## 2020-10-20 NOTE — PROGRESS NOTES
Pt Aox1. VSS, RA. No c/o pain at this time. Not in acute distress. Compliant w/ care and taking medication. Provided needs. Bed locked and placed in lowest position. Treaded socks on. Call lights w/in reach. Hourly rounds in place

## 2020-10-20 NOTE — PROGRESS NOTES
"Hospital Medicine LONG TERM PATIENT Progress Note    Date of Service  10/20/2020    Chief Complaint  SOB and cough    Hospital Course   Ms. Bennett is a wheelchair bound 75-year-old female with a PMH of arthritis, DVT, Parkinson's, and dementia who presented 5/14/2020 with complaints of shortness of breath, nonproductive cough, fevers, and chills for 5 days. She is confused at baseline and was sent by her  who has been unable to care for her.      12-lead EKG was done in the ED and was unremarkable, though a chest xray showed bilateral interstitial infiltrates. COVID was ruled out.     She was evaluated by PT/OT who recommended 24/7 supervision. She is now pending placement to SNF vs group home, though medicaid must be obtained first as she has limited income.      Interval Problem Update  10/20:  Sitting up in bed without any signs of acute distress. She oriented to self and \"[facility]\" only. Did not participate in ROS question otherwise. VS are stable on room air. Last BM today.     Consultants/Specialty  Palliative - signed off     Code Status  DNAR/DNI    Disposition  SNF versus     Review of Systems  Review of Systems   Unable to perform ROS: Mental acuity        Physical Exam  Temp:  [36.7 °C (98 °F)-36.9 °C (98.4 °F)] 36.7 °C (98 °F)  Pulse:  [66-89] 66  Resp:  [16-17] 17  BP: ()/(58-65) 101/60  SpO2:  [92 %-93 %] 93 %    Physical Exam  Vitals signs and nursing note reviewed.   Constitutional:       General: She is not in acute distress.     Appearance: Normal appearance. She is normal weight. She is not diaphoretic.      Comments: Frail, chronically ill appearing    HENT:      Head: Normocephalic and atraumatic.      Mouth/Throat:      Mouth: Mucous membranes are moist.   Eyes:      General: No scleral icterus.  Neck:      Musculoskeletal: Normal range of motion and neck supple. No neck rigidity or muscular tenderness.   Cardiovascular:      Rate and Rhythm: Normal rate.      Heart sounds: Normal " "heart sounds.   Pulmonary:      Effort: Pulmonary effort is normal. No respiratory distress.      Breath sounds: Normal breath sounds. No stridor. No wheezing, rhonchi or rales.   Chest:      Chest wall: No tenderness.   Abdominal:      General: Bowel sounds are normal. There is no distension.      Palpations: Abdomen is soft.      Tenderness: There is no abdominal tenderness. There is no guarding.   Musculoskeletal:      Right lower leg: Edema present.      Left lower leg: Edema present.   Skin:     General: Skin is warm and dry.      Coloration: Skin is pale. Skin is not jaundiced.      Findings: No rash.   Neurological:      General: No focal deficit present.      Mental Status: She is alert.      Comments: Oriented to herself and \"[facility]\"    Psychiatric:         Mood and Affect: Affect is flat.         Behavior: Behavior is slowed.         Cognition and Memory: Cognition is impaired. Memory is impaired.         Fluids    Intake/Output Summary (Last 24 hours) at 10/20/2020 1032  Last data filed at 10/20/2020 0833  Gross per 24 hour   Intake 1060 ml   Output --   Net 1060 ml       Laboratory      Recent Labs     10/19/20  0823   SODIUM 137   POTASSIUM 4.3   CHLORIDE 102   CO2 26   GLUCOSE 91   BUN 15   CREATININE 0.73   CALCIUM 9.2                   Imaging  DX-CHEST-PORTABLE (1 VIEW)   Final Result      No acute cardiopulmonary abnormality.      US-EXTREMITY VENOUS LOWER UNILAT RIGHT   Final Result      DX-HIP-UNILATERAL-WITH PELVIS-1 VIEW RIGHT   Final Result      1.  Bony pelvis and the hip joint appear normal      2.  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  No acute intracranial findings.      2.  Diffuse atrophy and periventricular white matter changes, consistent with chronic small vessel disease.         DX-CHEST-PORTABLE (1 VIEW)   Final Result      Perihilar interstitial markings " are increased and suggestive of mild pulmonary edema.           Assessment/Plan  Dementia (HCC)- (present on admission)  Assessment & Plan  -Chronic   -Frequent re-orientation, reestablish circadian rhythm, encourage familiar faces/family in room, avoid or minimize narcotics/sedatives.   -Continue on seroquel and prozac.   -PRN Haldol available if needed. Last administered 10/15  -Twelve-lead EKG done 10/15 to evaluate QTc interval while on Seroquel and Haldol.   down from 486 from previous twelve-lead EKG    Acute cystitis with hematuria  Assessment & Plan  - Apparently noted to have pink tinged urine   - UA consistent with UTI  - Urine cx + Group D enterococcus   - Started on Macrobid , last dose 10/21     Discharge planning issues  Assessment & Plan  -Patient not eligible for Medicaid due to income.  Social work has requested [facility] screen her for institutional Medicaid.  Will need placement after that is obtained.  -Patient's  needs to spend down or file for separation of assets.      Lower extremity pain, bilateral- (present on admission)  Assessment & Plan  -PRN Tylenol available if needed.  -PT/OT recommending SNF placement, but will eventually need group home placement with 24/7 care after SNF making it a difficult discharge.  -Mobilize as tolerated.    Parkinson's disease (HCC)- (present on admission)  Assessment & Plan  -Chronic and stable.  -Continue sinemet.  -24/7 supervision at [place]  -Sleep hygiene  -Frequent reorientation  -Avoid narcotics, benzodiazepines and hypnotics     VTE Prophylaxis : No anticoagulation due to patient refusal and intermittent aggressive episodes toward staff with administration.     I have performed a physical exam and reviewed and updated ROS and Plan today (10/20/2020). In review of yesterday's note (10/19/2020), there are no changes except as documented above.       TESSY Weaver.

## 2020-10-20 NOTE — PROGRESS NOTES
2 RN skin check completed with CURT Singleton.   Devices in place: N/A.  Skin assessed under devices: N/A.  Confirmed pressure ulcers found: N/A.  New potential pressure ulcers noted: None.  Wound consult placed:  N/A.     The following interventions in place: 2 RN skin check, Q 2 hr turns, frequent incontinent checks, linen changes as needed, mepilex, barrier cream, pillows for support and positioning,  waffle mattress overlay     Skin assessment:   Heels: red and boggy, blanching; placed mepilex and float heels on pillow  Elbows: red, pink and blanching; placed mepliex  Back of head/neck area is red, small bruising spot  Sacrum: red, boggy, blanching; potential moisture fissure, placed barrier cream  Groin area: pink and blanching  Bilateral ears: pink and blanching

## 2020-10-20 NOTE — CARE PLAN
Problem: Safety  Goal: Will remain free from falls  Outcome: PROGRESSING AS EXPECTED  Note: Patient is w/c bound and needs 2 persons assist in mobility and adls. Falls education provided, verbalized understanding. Provided needs. Bed locked and placed in lowest position. Treaded socks on. Call lights w/in reach. Hourly rounds in place     Problem: Skin Integrity  Goal: Risk for impaired skin integrity will decrease  Outcome: PROGRESSING AS EXPECTED  Note: Discussed POC to patient regarding doing frequent incontinence check and 2 RN skin check. Checked patient frequently during the shift. Patient became hostile when being changed and was uncooperative despite education given. Interventions done: Q2 turns, frequent incontinent checks and linen changes, mepilex applied to bilateral heels and elbows, barrier cream applied.

## 2020-10-20 NOTE — ASSESSMENT & PLAN NOTE
- Apparently noted to have pink tinged urine   - UA consistent with UTI  - Urine cx + Group D enterococcus   - Started on Macrobid , last dose 10/21

## 2020-10-20 NOTE — PROGRESS NOTES
2 RN skin check complete with CURT Griffin   Devices in place n/a.  Skin assessed under devices n/a.  Confirmed pressure ulcers found on n/a.  New potential pressure ulcers noted on n/a. Wound consult placed n/a.      The following interventions in place 2RN skin check Q shift, Q2 turns, frequent incontinent checks and linen changes, mepilex applied to bilat heels and elbows, barrier cream applied    Skin Assessment:   Heels: red, boggy, blanching   Elbows: red and blanching  Back of head/neck area is red, small bruising spot  Sacrum: red, boggy, blanching, small open spot on sacrum, potential moisture fissure, barrier paste applied, offloading, pt on waffle cushion when up to wheelchair, pictures uploaded 10/20
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
## 2020-10-21 NOTE — PROGRESS NOTES
Pt is A&Ox1, oriented to self. Pt is sitting up at nurses station in wheelchair, no signs of labored breathing or pain. Pt on RA. Call light & personal belongings within reach, bed in lowest position & locked, and bed alarm is on. Fall precautions in place and education provided on how to use call light. Pt updated on plan of care for the shift. Pt declines any additional needs at this time.

## 2020-10-21 NOTE — PROGRESS NOTES
2 RN Skin Check    2 RN skin check complete.   Devices in place: Mepilex.  Skin assessed under devices: yes.  Confirmed pressure ulcers found on: none  New potential pressure ulcers noted on none. Wound consult placed N/A.  The following interventions in place Pillows, Mepilex and Barrier cream.    BL elbow and heels pink and blanchable.  Sacrum: Fissure slightly open, pink, and blanchable.

## 2020-10-21 NOTE — PROGRESS NOTES
AOX1. Denies pained. Went to bed at 2100 and appear to be sleeping in between cares. Turn Q2H. Incontinent of urine and stool.

## 2020-10-21 NOTE — CARE PLAN
Problem: Safety  Goal: Will remain free from injury  Outcome: PROGRESSING AS EXPECTED  Note: No fall. Bed alarm in use.      Problem: Skin Integrity  Goal: Risk for impaired skin integrity will decrease  Outcome: PROGRESSING AS EXPECTED  Note: Turn Q2H.

## 2020-10-22 NOTE — CARE PLAN
Problem: Safety  Goal: Will remain free from injury  Outcome: PROGRESSING AS EXPECTED  Note: Bed alarm on. No fall.      Problem: Skin Integrity  Goal: Risk for impaired skin integrity will decrease  Outcome: PROGRESSING AS EXPECTED  Note: Turn Q2H.       Problem: Psychosocial Needs:  Goal: Level of anxiety will decrease  Outcome: PROGRESSING AS EXPECTED  Note: Offer snacks and make patient comfortable.

## 2020-10-22 NOTE — PROGRESS NOTES
AOX1. Denies pained. Patient ate 100% of her pudding. Went to bed at 2200 and appear to be sleeping in between cares. Turn Q2H. Incontinent of urine and stool.

## 2020-10-22 NOTE — PROGRESS NOTES
2 RN Skin Check    2 RN skin check complete.   Devices in place: None.  Skin assessed under devices: yes.  Confirmed pressure ulcers found on: None.  New potential pressure ulcers noted on None. Wound consult placed N/A.  The following interventions in place: pillow and barrrier cream.     BL elbow and heels pink and blanchable.  Sacrum: Fissure slightly open, pink, and blanchable. Barrier cream applied

## 2020-10-22 NOTE — PROGRESS NOTES
Assumed care of patient at 0700. Patient is alert and oriented to self. Turned and repositioned every 2 hours. Up with assist to wheelchair. Took medications in pudding. Incontinent of urine.  Fall prevention tactics in place, bed alarm on for safety.     Therese Morales R.N.

## 2020-10-23 NOTE — CARE PLAN
Problem: Safety  Goal: Will remain free from injury  Outcome: PROGRESSING AS EXPECTED      Problem: Safety  Goal: Will remain free from falls  Outcome: PROGRESSING AS EXPECTED

## 2020-10-23 NOTE — PROGRESS NOTES
2 RN skin check complete with CURT Caldwell   Devices in place n/a.  Skin assessed under devices n/a.  Confirmed pressure ulcers found on n/a.  New potential pressure ulcers noted on n/a. Wound consult placed n/a.        The following interventions in place: 2RN skin check Q shift, Q2 turns, frequent incontinent checks and linen changes, mepilex in place to bilat heels and elbows, barrier cream, and waffle cushion.     Skin Assessment:     Elbows: red and blanching  Lower extremities: Edematous  Sacrum: red, blanching, sacrum fissure; barrier paste applied  Heels: red, boggy, blanching

## 2020-10-23 NOTE — PROGRESS NOTES
2 RN skin check complete with CURT Hodge   Devices in place n/a.  Skin assessed under devices n/a.  Confirmed pressure ulcers found on n/a.  New potential pressure ulcers noted on n/a. Wound consult placed n/a.      Skin Assessment:  elbows red and blanching, pink and blanching healing moisture slit noted. Heels: red, boggy, blanching     The following interventions in place:  Q2 hr repositioning, frequent incontinent care provided, new mepilex applied to bilateral heels and elbows, barrier paste, and waffle mattress overlay in use.

## 2020-10-23 NOTE — PROGRESS NOTES
Pt is alert to self. Pt denied any complaints of pain throughout the shift. Pt was OOB w/ 2 assist to the wheelchair. Call bell within reach. Fall prevention education provided. Fall precautions maintained. Hourly rounding completed.

## 2020-10-23 NOTE — PROGRESS NOTES
"A&Ox1, to self only. Confused. RA. Was agitated and repetitively yelled out \"Help! Get me out of here\". Reassured patient that she is safe here and provided incontinence care. Pt has since remained calm and is currently sleeping. Bed alarm on. 3 rails on. Call light and belongings in reach. Needs currently met.  "

## 2020-10-24 NOTE — CARE PLAN
Problem: Safety  Goal: Will remain free from injury  Outcome: PROGRESSING AS EXPECTED   Fall precautions in place. Treaded socks on pt. Bedrails up. Bed in lowest position and locked.  Call light and phone within reach. Patient educated on importance of calling nurses before getting out of bed, verbalizes understanding. Bed alarm on.     Problem: Pain Management  Goal: Pain level will decrease to patient's comfort goal  Outcome: PROGRESSING AS EXPECTED   Pt denies any pain during this shift.

## 2020-10-24 NOTE — PROGRESS NOTES
2 RN skin check complete with CURT Caldwell.  Devices in place: N/A.   Skin assessed under devices: N/A.   Confirmed pressure ulcers found on: N/A.   New potential pressure ulcers noted on: N/A. Wound consult placed: N/A.    The following interventions in place: Q2 turns, mepilex in place to bilat heels and elbows, barrier cream, waffle overlay, and incontinence care provided with linen changes.      Skin Assessment:     Elbows: red and blanching. Mepilex dressings in place.   Sacrum: red, blanching, sacrum fissure; barrier paste applied.  Heels: red, boggy, blanching. Mepilex dressing in place.

## 2020-10-24 NOTE — PROGRESS NOTES
Received report from night shift and assumed care.  Pt is A&OX 1, oriented to self only.  Denies pain.  Medication given per MAR without issue, floated in pudding. All needs met. Safety precautions and hourly rounding in place. Bed alarm on.

## 2020-10-24 NOTE — PROGRESS NOTES
"Hospital Medicine LONG TERM PATIENT Progress Note    Date of Service  10/24/2020    Chief Complaint  SOB and cough    Hospital Course   Ms. Bennett is a wheelchair bound 75-year-old female with a PMH of arthritis, DVT, Parkinson's, and dementia who presented 5/14/2020 with complaints of shortness of breath, nonproductive cough, fevers, and chills for 5 days. She is confused at baseline and was sent by her  who has been unable to care for her.      12-lead EKG was done in the ED and was unremarkable, though a chest xray showed bilateral interstitial infiltrates. COVID was ruled out.     She was evaluated by PT/OT who recommended 24/7 supervision. She is now pending placement to SNF vs group home, though medicaid must be obtained first as she has limited income.      Interval Problem Update  10/20:  Sitting up in bed without any signs of acute distress. She oriented to self and \"[facility]\" only. Did not participate in ROS question otherwise. VS are stable on room air. Last BM today.     10/24: Laying in bed with eyes closed. Has not required Haldol for the past week. Oriented x 1-2 at baseline. Intermittently yells \"Hello\" and \"Help me\" from her room requiring reorientation.  SBPs 110-120s. No complaints of pain.     Consultants/Specialty  Palliative - signed off     Code Status  DNAR/DNI    Disposition  SNF versus      Review of Systems  Review of Systems   Unable to perform ROS: Mental acuity        Physical Exam  Temp:  [36.4 °C (97.6 °F)-37.1 °C (98.7 °F)] 37.1 °C (98.7 °F)  Pulse:  [73-81] 73  Resp:  [15-16] 15  BP: (109-117)/(58-67) 117/66  SpO2:  [91 %-95 %] 91 %    Physical Exam  Vitals signs and nursing note reviewed.   Constitutional:       General: She is not in acute distress.     Appearance: She is not diaphoretic.      Comments: Frail, chronically ill appearing    HENT:      Head: Normocephalic and atraumatic.      Mouth/Throat:      Mouth: Mucous membranes are moist.   Eyes:      General: No scleral " icterus.  Neck:      Musculoskeletal: Normal range of motion.   Pulmonary:      Effort: Pulmonary effort is normal. No respiratory distress.      Breath sounds: No stridor. No wheezing.   Abdominal:      General: There is no distension.      Tenderness: There is no guarding.   Skin:     Coloration: Skin is pale. Skin is not jaundiced.      Findings: No rash.   Neurological:      Mental Status: She is alert. She is disoriented.   Psychiatric:         Mood and Affect: Affect is flat.         Behavior: Behavior is slowed.         Cognition and Memory: Cognition is impaired. Memory is impaired.         Fluids    Intake/Output Summary (Last 24 hours) at 10/24/2020 1224  Last data filed at 10/24/2020 1000  Gross per 24 hour   Intake 1430 ml   Output --   Net 1430 ml       Laboratory                        Imaging  DX-CHEST-PORTABLE (1 VIEW)   Final Result      No acute cardiopulmonary abnormality.      US-EXTREMITY VENOUS LOWER UNILAT RIGHT   Final Result      DX-HIP-UNILATERAL-WITH PELVIS-1 VIEW RIGHT   Final Result      1.  Bony pelvis and the hip joint appear normal      2.  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  No acute intracranial findings.      2.  Diffuse atrophy and periventricular white matter changes, consistent with chronic small vessel disease.         DX-CHEST-PORTABLE (1 VIEW)   Final Result      Perihilar interstitial markings are increased and suggestive of mild pulmonary edema.           Assessment/Plan  Dementia (HCC)- (present on admission)  Assessment & Plan  -Chronic   -Frequent re-orientation, reestablish circadian rhythm, encourage familiar faces/family in room, avoid or minimize narcotics/sedatives.   -Continue on seroquel and prozac.   -PRN Haldol available if needed. Last administered 10/20  -Twelve-lead EKG done 10/15 to evaluate QTc interval while on Seroquel and  Haldol.       Acute cystitis with hematuria  Assessment & Plan  - Apparently noted to have pink tinged urine   - UA consistent with UTI  - Urine cx + Group D enterococcus   - Started on Macrobid , last dose 10/21     Discharge planning issues  Assessment & Plan  -Patient not eligible for Medicaid due to income.  Social work has requested PFA screen her for institutional Medicaid.  Will need placement after that is obtained.  -Patient's  needs to spend down or file for separation of assets.      Lower extremity pain, bilateral- (present on admission)  Assessment & Plan  -PRN Tylenol available if needed.  -PT/OT recommending SNF placement, but will eventually need group home placement with 24/7 care after SNF making it a difficult discharge.  -Mobilize as tolerated.    Parkinson's disease (HCC)- (present on admission)  Assessment & Plan  -Chronic and stable.  -Continue sinemet.  -24/7 supervision at [place]  -Sleep hygiene  -Frequent reorientation  -Avoid narcotics, benzodiazepines and hypnotics     VTE Prophylaxis : No anticoagulation due to patient refusal and intermittent aggressive episodes toward staff with administration.     I have performed a physical exam and reviewed and updated ROS and Plan today (10/24/2020). In review of previous note, there are no changes except as documented above.       TESSY Weaver.

## 2020-10-24 NOTE — PROGRESS NOTES
Report received by maite RN. Assumed care of pt. Assessment complete. Pt A&Ox1 to self only, VSS and on RA. Pt in no apparent signs of distress. Pt is wheelchair bound, incontinent x2. Bed alarm on for safety. Incontinence care provided throughout shift. Plan of care discussed. Call light within reach, bed locked and in lowest position, and hourly rounding in place.

## 2020-10-24 NOTE — PROGRESS NOTES
2 RN skin check complete with CURT Victoria.  Devices in place: mepilex  Skin assessed under devices: yes   Confirmed pressure ulcers found on: N/A.   New potential pressure ulcers noted on: N/A. Wound consult placed: N/A.     The following interventions in place: Q2 turns, mepilex in place to bilat heels and elbows, barrier cream, waffle overlay, and incontinence care provided with linen changes, heels offloaded with pillow     Skin Assessment:    Elbows: red/ blanching. Mepilex   Sacrum: red/ blanching, sacrum fissure; barrier paste applied.  Heels: red/boggy/blanching. Mepilex

## 2020-10-25 NOTE — DISCHARGE PLANNING
Anticipated Discharge Disposition: Skilled    Action: PC to patient's spouse, Gee [phone]: message left to call.  SW has left many messages with spouse, to discuss separation of assests with him.      PC to patient's daughter [facility] [phone]; message left to call SW.    Patient is wheelchair bound, parkinson's and dementia, orientated to self only, incontinent.  Patient will yell out help and get me out of here.  Patient takes medications, smashed in apple sauce.      Email to Nathalia Madden    Barriers to Discharge: spouse willingness to pay for  to separate assests, stating in past he does not have the income to pay for an .      Plan: continue to follow up with spouse and patients daughter for assitance.

## 2020-10-25 NOTE — PROGRESS NOTES
Bedside report received, pt care assumed. Pt A&Ox1 to self. VSS and on RA. Pt in bed early, resting comfortably. Incontinence care and Q2 turns performed throughout shift. Pt denies any additional needs at this time. Bed in lowest position, bed alarm on, call light within reach.

## 2020-10-25 NOTE — PROGRESS NOTES
Received report from night shift and assumed care.  Pt is A&OX 1, oriented to self only.  Denies pain.  Medication given per MAR without issue crushed with pudding. All needs met. Safety precautions and hourly rounding in place. Bed alarm on.

## 2020-10-26 NOTE — PALLIATIVE CARE
Palliative Care follow-up  Spoke with Unit GENO Rachel on pts case. Pt is currently DNR/DNI and has a POLST on file for DC in the future. GENO Rachel is actively working with pts spouse and reaching out to the their son to determine if he can assist his father with the paperwork for asset seperation.    Plan: Pt remain on Palliative Team intermittent monitoring list, please call for any needs.     Thank you for allowing Palliative Care to support this patient and family. Contact [phone] for additional assistance, change in patient status, or with any questions/concerns.

## 2020-10-26 NOTE — PROGRESS NOTES
2 RN skin check complete with CURT Juares.  Devices in place: N/A.   Skin assessed under devices: N/A.   Confirmed pressure ulcers found on: N/A.   New potential pressure ulcers noted on: N/A. Wound consult placed: N/A.    The following interventions in place: Q2 turns, mepilex in place to bilat heels and elbows, barrier cream, waffle overlay, and incontinence care provided with linen changes.      Skin Assessment:      Elbows: red and blanching. Mepilex dressings in place.   Sacrum: red, blanching, sacrum fissure; barrier paste applied.  Heels: red, boggy, blanching. Mepilex dressing in place.

## 2020-10-26 NOTE — PROGRESS NOTES
2 RN skin check complete with Michael RN  Devices in place mepilex to heels  Confirmed pressure ulcers found on na.  New potential pressure ulcers noted on na.   Wound consult placed na.     Skin Assessment    Bilat heels red/blanching/ boggy, mepilex in use  Sacral is pink  and  blanching.      The following interventions in place 2 RN skin check, turning, barrier paste, waffle mattress, mepilex for protection, frequent incontinence checks.

## 2020-10-26 NOTE — PROGRESS NOTES
Bedside report received, pt care assumed. Pt A&Ox1 to self. Pt in bed early, resting comfortably. Incontinence care and Q2 turns performed throughout shift. All needs met at this time. Bed in lowest position, bed alarm on, call light within reach.

## 2020-10-27 NOTE — PROGRESS NOTES
"[facility] Medicine LONG TERM PATIENT Progress Note    Date of Service  10/27/2020    Chief Complaint  SOB and cough    Hospital Course   Ms. Bennett is a wheelchair bound 75-year-old female with a PMH of arthritis, DVT, Parkinson's, and dementia who presented 5/14/2020 with complaints of shortness of breath, nonproductive cough, fevers, and chills for 5 days. She is confused at baseline and was sent by her  who has been unable to care for her.      12-lead EKG was done in the ED and was unremarkable, though a chest xray showed bilateral interstitial infiltrates. COVID was ruled out.     She was evaluated by PT/OT who recommended 24/7 supervision. She is now pending placement to SNF vs group home, though medicaid must be obtained first as she has limited income.      Interval Problem Update  10/27:  Patient seen and examined.  Resting comfortably in bed.  I asked if she is watching TV and she states \"I don't know if I am or not\".  She denies acute pain or discomfort.      Consultants/Specialty  Palliative - signed off     Code Status  DNAR/DNI    Disposition  SNF versus .  CM working spouse and daughter to establish financial support for placement.     Review of Systems  Review of Systems   Unable to perform ROS: Mental acuity   Respiratory: Negative for shortness of breath.    Gastrointestinal: Negative for abdominal pain.   Musculoskeletal: Negative for joint pain and myalgias.   Neurological: Positive for focal weakness.        Physical Exam  Temp:  [36.4 °C (97.6 °F)-37.6 °C (99.6 °F)] 36.4 °C (97.6 °F)  Pulse:  [68-87] 87  Resp:  [17-18] 18  BP: (100-113)/(56-86) 113/86  SpO2:  [93 %-100 %] 98 %    Physical Exam  Vitals signs and nursing note reviewed.   Constitutional:       General: She is not in acute distress.     Appearance: She is not ill-appearing.      Comments: Frail, chronically ill appearing    HENT:      Head: Normocephalic and atraumatic.      Nose: Nose normal. No congestion.      " Mouth/Throat:      Mouth: Mucous membranes are moist.      Pharynx: Oropharynx is clear. No oropharyngeal exudate.   Eyes:      General:         Right eye: No discharge.         Left eye: No discharge.      Conjunctiva/sclera: Conjunctivae normal.   Neck:      Musculoskeletal: Normal range of motion. No neck rigidity.   Pulmonary:      Effort: Pulmonary effort is normal. No respiratory distress.      Breath sounds: No wheezing.   Abdominal:      General: There is no distension.      Tenderness: There is no abdominal tenderness. There is no guarding.   Musculoskeletal:         General: No swelling or tenderness.      Comments: Generalized weakness and debility.    Skin:     Coloration: Skin is pale. Skin is not jaundiced.      Findings: No rash.   Neurological:      Mental Status: She is alert. She is disoriented.   Psychiatric:         Mood and Affect: Affect is flat.         Behavior: Behavior is slowed.         Cognition and Memory: Cognition is impaired. Memory is impaired.         Fluids    Intake/Output Summary (Last 24 hours) at 10/27/2020 0937  Last data filed at 10/27/2020 0800  Gross per 24 hour   Intake 796 ml   Output --   Net 796 ml       Laboratory                        Imaging  DX-CHEST-PORTABLE (1 VIEW)   Final Result      No acute cardiopulmonary abnormality.      US-EXTREMITY VENOUS LOWER UNILAT RIGHT   Final Result      DX-HIP-UNILATERAL-WITH PELVIS-1 VIEW RIGHT   Final Result      1.  Bony pelvis and the hip joint appear normal      2.  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  No acute intracranial findings.      2.  Diffuse atrophy and periventricular white matter changes, consistent with chronic small vessel disease.         DX-CHEST-PORTABLE (1 VIEW)   Final Result      Perihilar interstitial markings are increased and suggestive of mild pulmonary edema.            Assessment/Plan  Dementia (HCC)- (present on admission)  Assessment & Plan  -Chronic   -Frequent re-orientation, reestablish circadian rhythm, encourage familiar faces/family in room, avoid or minimize narcotics/sedatives.   -Continue on seroquel and prozac.   -PRN Haldol available if needed. Last administered 10/20  -Twelve-lead EKG done 10/15 to evaluate QTc interval while on Seroquel and Haldol.       Acute cystitis with hematuria  Assessment & Plan  - Apparently noted to have pink tinged urine   - UA consistent with UTI  - Urine cx + Group D enterococcus   - Started on Macrobid , last dose 10/21     Discharge planning issues  Assessment & Plan  -Patient not eligible for Medicaid due to income.  Social work has requested PFA screen her for institutional Medicaid.  Will need placement after that is obtained.  -Patient's  needs to spend down or file for separation of assets.      Lower extremity pain, bilateral- (present on admission)  Assessment & Plan  -PRN Tylenol available if needed.  -PT/OT recommending SNF placement, but will eventually need group home placement with 24/7 care after SNF making it a difficult discharge.  -Mobilize as tolerated.    Parkinson's disease (HCC)- (present on admission)  Assessment & Plan  -Chronic and stable.  -Continue sinemet.  -24/7 supervision at [place]  -Sleep hygiene  -Frequent reorientation  -Avoid narcotics, benzodiazepines and hypnotics     VTE Prophylaxis : No anticoagulation due to patient refusal and intermittent aggressive episodes toward staff with administration.     I have performed a physical exam and reviewed and updated ROS and Plan today (10/27/2020). In review of previous note, there are no changes except as documented above.       TESSY Whaley.

## 2020-10-27 NOTE — CARE PLAN
Problem: Skin Integrity  Goal: Risk for impaired skin integrity will decrease  Outcome: PROGRESSING AS EXPECTED  Note: 2RN skin check, Q2hr turns, incontinent care with appropriate linen changes, mepilex     Problem: Psychosocial Needs:  Goal: Level of anxiety will decrease  Outcome: PROGRESSING AS EXPECTED  Note: Pt allowed opportunity to voice feelings and concerns regarding POC.

## 2020-10-27 NOTE — PROGRESS NOTES
2 RN Skin Check      2 RN skin check complete with: Blanquita ELIAS  Devices in place: mepilex  Skin assess under devices: yes  Confirmed pressure ulcers found on: n/a  New potential pressure ulcers noted on: n/a  Wound consult placed: n/a    The following interventions in place: 2RN skin check, Q2hr turns, barrier paste, waffle overlay mattress, mepilex for protection, incontinent care with appropriate linen changes.      Skin assessment:  Ears: intact, pink and blanching  Elbows: bilateral intact, pink and blanching; mepilex on Right elbow  Heels:  Bilateral intact, red, blanching, and boggy; mepilex bilateral  Sacrum: intact, pink and blanching

## 2020-10-27 NOTE — PROGRESS NOTES
Assumed pt care at shift change.  Pt Alert/oriented x 1-2, room air, denies pain at this time, 1:1 feed, room air.  Safety precautions in place, bed locked and in lowest position, call light and personal belongings within reach.  No further needs at this time.

## 2020-10-28 NOTE — PROGRESS NOTES
Assumed pt care at shift change. Pt alert/oriented x1, self only.  Pt is up to chair at the nursing station, no signs of distress or discomfort.  Safety precautions in place, chair alarm on, chair cushion in place.  Pt educated on fall risk.  No further needs at this time.

## 2020-10-28 NOTE — CARE PLAN
Problem: Bowel/Gastric:  Goal: Normal bowel function is maintained or improved  Outcome: PROGRESSING AS EXPECTED  Note: Pt participating in bowel management per the MAR.       Problem: Skin Integrity  Goal: Risk for impaired skin integrity will decrease  Outcome: PROGRESSING AS EXPECTED  Note: 2RN skin checks, Q2hr turns, barrier cream/paste, mepilex, pillows for support and positioning, incontinent care with necessary linen changes.

## 2020-10-28 NOTE — PROGRESS NOTES
2 RN skin check complete with CURT Tanner  Devices in place mepilex.  Skin assessed under devices yes.  Confirmed pressure ulcers found on n/a.  New potential pressure ulcers noted on n/a. Wound consult placed n/a.    The following interventions in place 2RN skin check in place, q2hr turns, barrier paste, waffle mattress overlay, mepilex for protection, incontinent checks and linen changes    Skin assessment:   Ears are pink and blanching  Elbows are pink, blanching, intact  Abdomen is pink, intact  Heels are intact, red, blanching, boggy, mepliex in place  Sacrum is pink, and blanching, intact

## 2020-10-28 NOTE — PROGRESS NOTES
2 RN Skin Check      2 RN skin check complete with: Mandi RN  Devices in place: mepilex  Skin assess under devices: yes   Confirmed pressure ulcers found on: n/a  New potential pressure ulcers noted on: n/a  Wound consult placed: n/a    The following interventions in place:  2RN skin check, Q2hr turns, incontinent care with appropriate linen changes, barrier cream/paste, waffle mattress overlay, chair waffle overlay, mepilex    Skin assessment:  General: dry and fragile  Ears: bilateral pink and blanching  Elbows/Arm: Left elbow and arm--red, blanching; Right--pink blanching  Heels:  Bilateral, pink and red, blanching, boggy  Sacrum: intact, pink and blanching

## 2020-10-28 NOTE — DISCHARGE PLANNING
Medical Social Work  PC from patient's daughter Adamaris, called to say her step dad was admitted to [facility] on Monday.  Suggested SW speak to him, requested SW call her back after talking to him.

## 2020-10-28 NOTE — PROGRESS NOTES
2 RN skin check complete with CURT Hatch  Devices in place mepilex.  Skin assessed under devices yes.  Confirmed pressure ulcers found on n/a.  New potential pressure ulcers noted on n/a. Wound consult placed n/a.     The following interventions in place 2RN skin check in place, q2hr turns, barrier paste, waffle mattress overlay, mepilex for protection, incontinent checks and linen changes     Skin assessment:   Ears are pink and blanching  Elbows are pink, blanching, intact  Abdomen is pink, intact  Heels are intact, red, blanching, boggy, mepliex in place  Sacrum is pink, and blanching, intact

## 2020-10-29 NOTE — PROGRESS NOTES
Assumed pt care at shift change.  Pt alert/oriented x1, self only.  Pt is up to chair at nursing station, no signs of distress or discomfort, denies pain at this time.  Safety precautions in place, bed locked and in lowest position, call light and personal belongings within reach.  No further needs at this time.

## 2020-10-29 NOTE — DISCHARGE PLANNING
Anticipated Discharge Disposition: Group Home/Skilled    Action: Patient's spouse was admitted to [facility] on Monday 10/26/2020 and is on Tele 7    SW attempted to speak to spouse about his wife discharge plan. Spouset did not appear to understand what the SW was requesting, separation of assets and the procedure to have this done and why this needs to be done.  Spouse stated several times that they have tried to get Medicaid several times and they have denied. SW was told to contact his daughter as she is taking care of all of is finances and to call her.      PC to Coleen [phone]: message left to call GENO    Barriers to Discharge: cost of care    Plan: follow up with Coleen

## 2020-10-29 NOTE — PROGRESS NOTES
2 RN Skin Check      2 RN skin check complete with:  CURT Lan  Devices in place: mepilex  Skin assess under devices: yes   Confirmed pressure ulcers found on: n/a  New potential pressure ulcers noted on: n/a  Wound consult placed: n/a    The following interventions in place: 2RN skin check, Q2hr turns, frequent incontinent care with appropriate linen changes, barrier cream/paste, waffle mattress overlay, chair waffle overlay, mepilex    Skin assessment:  General: dry and fragile  Ears: bilateral pink and blanching  Elbows: bilateral pink and blanching  Heels:  Bilateral pink/red, blanching, and boggy  Feet:  Left 4th toe red and swollen  Sacrum: intact, pink and blanching

## 2020-10-29 NOTE — CARE PLAN
Problem: Safety  Goal: Will remain free from injury  Outcome: PROGRESSING AS EXPECTED     Problem: Skin Integrity  Goal: Risk for impaired skin integrity will decrease  Outcome: PROGRESSING AS EXPECTED  Note: 2RN skin checks, Q2hr turns, frequent incontinent care, mepilex, pillow for positioning and support.

## 2020-10-30 NOTE — PROGRESS NOTES
"Hospital Medicine LONG TERM PATIENT Progress Note    Date of Service  10/30/2020    Chief Complaint  SOB and cough    Hospital Course   Ms. Bennett is a wheelchair bound 75-year-old female with a PMH of arthritis, DVT, Parkinson's, and dementia who presented 5/14/2020 with complaints of shortness of breath, nonproductive cough, fevers, and chills for 5 days. She is confused at baseline and was sent by her  who has been unable to care for her.      12-lead EKG was done in the ED and was unremarkable, though a chest xray showed bilateral interstitial infiltrates. COVID was ruled out.     She was evaluated by PT/OT who recommended 24/7 supervision. She is now pending placement to SNF vs group home, though medicaid must be obtained first as she has limited income.      Interval Problem Update  10/27:  Patient seen and examined.  Resting comfortably in bed.  I asked if she is watching TV and she states \"I don't know if I am or not\".  She denies acute pain or discomfort.    10/30:  She is sitting at nurses station.  I asked if I can do anything for her and she states \"I don't think so.\"  No evident discomfort or acute needs.     Consultants/Specialty  Palliative - signed off     Code Status  DNAR/DNI    Disposition  SNF versus .  CM working spouse and daughter to establish financial support for placement.     Review of Systems  Review of Systems   Unable to perform ROS: Mental acuity   Respiratory: Negative for shortness of breath.    Gastrointestinal: Negative for abdominal pain.   Musculoskeletal: Negative for back pain, joint pain and myalgias.   Neurological: Positive for focal weakness.        Physical Exam  Temp:  [36.1 °C (97 °F)-37 °C (98.6 °F)] 36.7 °C (98.1 °F)  Pulse:  [72-96] 72  Resp:  [17] 17  BP: (100-121)/(60-76) 121/68  SpO2:  [91 %-94 %] 94 %    Physical Exam  Vitals signs and nursing note reviewed.   Constitutional:       General: She is not in acute distress.     Appearance: She is not " toxic-appearing.      Comments: Frail, chronically ill appearing    HENT:      Head: Normocephalic and atraumatic.      Nose: Nose normal.      Mouth/Throat:      Mouth: Mucous membranes are moist.      Pharynx: Oropharynx is clear.   Eyes:      General: No scleral icterus.     Conjunctiva/sclera: Conjunctivae normal.   Neck:      Musculoskeletal: Normal range of motion. No neck rigidity or muscular tenderness.   Cardiovascular:      Heart sounds: Normal heart sounds. No murmur.   Pulmonary:      Effort: Pulmonary effort is normal. No respiratory distress.      Breath sounds: No wheezing or rales.   Abdominal:      General: There is no distension.      Tenderness: There is no abdominal tenderness.   Musculoskeletal:         General: No swelling.      Comments: Generalized weakness and debility.    Skin:     Coloration: Skin is pale. Skin is not jaundiced.      Findings: No rash.   Neurological:      Mental Status: She is alert. She is disoriented.   Psychiatric:         Mood and Affect: Affect is flat.         Behavior: Behavior is slowed.         Cognition and Memory: Cognition is impaired. Memory is impaired.         Fluids    Intake/Output Summary (Last 24 hours) at 10/30/2020 0905  Last data filed at 10/29/2020 1744  Gross per 24 hour   Intake 440 ml   Output --   Net 440 ml       Laboratory                        Imaging  DX-CHEST-PORTABLE (1 VIEW)   Final Result      No acute cardiopulmonary abnormality.      US-EXTREMITY VENOUS LOWER UNILAT RIGHT   Final Result      DX-HIP-UNILATERAL-WITH PELVIS-1 VIEW RIGHT   Final Result      1.  Bony pelvis and the hip joint appear normal      2.  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  No acute intracranial findings.      2.  Diffuse atrophy and periventricular white matter changes, consistent with chronic small vessel disease.          DX-CHEST-PORTABLE (1 VIEW)   Final Result      Perihilar interstitial markings are increased and suggestive of mild pulmonary edema.           Assessment/Plan  Dementia (HCC)- (present on admission)  Assessment & Plan  -Chronic   -Frequent re-orientation, reestablish circadian rhythm, encourage familiar faces/family in room, avoid or minimize narcotics/sedatives.   -Continue on seroquel and prozac.   -PRN Haldol available if needed. Last administered 10/20  -Twelve-lead EKG done 10/15 to evaluate QTc interval while on Seroquel and Haldol.       Acute cystitis with hematuria  Assessment & Plan  - Apparently noted to have pink tinged urine   - UA consistent with UTI  - Urine cx + Group D enterococcus   - Started on Macrobid , last dose 10/21     Discharge planning issues  Assessment & Plan  -Patient not eligible for Medicaid due to income.  Social work has requested [facility] screen her for institutional Medicaid.  Will need placement after that is obtained.  -Patient's daughter is assisting with discharge.       Lower extremity pain, bilateral- (present on admission)  Assessment & Plan  -PRN Tylenol available if needed.  -PT/OT recommending SNF placement, but will eventually need group home placement with 24/7 care after SNF making it a difficult discharge.  -Mobilize as tolerated.    Parkinson's disease (HCC)- (present on admission)  Assessment & Plan  -Chronic and stable.  -Continue sinemet.  -24/7 supervision at [facility]  -Sleep hygiene  -Frequent reorientation  -Avoid narcotics, benzodiazepines and hypnotics     VTE Prophylaxis : No anticoagulation due to patient refusal and intermittent aggressive episodes toward staff with administration.     I have performed a physical exam and reviewed and updated ROS and Plan today (10/30/2020). In review of previous note, there are no changes except as documented above.       TESSY Whaley.

## 2020-10-30 NOTE — PROGRESS NOTES
Assumed care of patient at 0700. A+Ox1, pleasant at this time. Reports pain 0/10 today. ax2 up in wheelchair, sitting at nurses station. Skin precautions maintained. Safety precautions in place, bed in lowest and locked position with call light in reach. Needs met at this time.

## 2020-10-30 NOTE — PROGRESS NOTES
2 RN skin check complete with Gutierrez RN  Devices in place waffle cushion, and elbow mepilex.  Skin assessed under devices yes.  Confirmed pressure ulcers found on none.  New potential pressure ulcers noted on none. Wound consult placed not needed.  The following interventions in place q2 turn and reposition, 2 RN skin check qshift, waffle cushion in both chair and bed, PRN incontinence care and linen change    Bilateral heels: Intact and blanching however boggy, encouraging patient to elevate when in bed with pillow    Sacrum: intact, blanching, pink.    Bilateral elbows: In tact and blanching/pink, pillow provided while in chair to offload weight and mepilex intact.

## 2020-10-30 NOTE — PROGRESS NOTES
Report received by day RN. Assumed care of pt. Assessment complete. RN at bedside. Pt A&Ox1. Pt in bed, and sleeping. Pt in no apparent signs of distress. Plan of care discussed. Call light within reach, side rails x 3, bed locked, bed in lowest position, and pt has no further questions at this time.

## 2020-10-30 NOTE — PROGRESS NOTES
2 RN Skin Check    2 RN skin check complete with RN Edyta  Devices in place: n/a.  Skin assessed under devices: yes.  Confirmed pressure ulcers found on: n/a.  New potential pressure ulcers noted on n/a  . Wound consult placed n/a.  The following interventions in place   Turn q2, 2 rn skin checks, mepilex, pillows    Skin behind ears intact  Elbows intact, blanching, mepilex for protection  Groin intact  Heels are dry, pink, and slightly boggy  Sacrum is intact, blanching, pink

## 2020-10-30 NOTE — DISCHARGE PLANNING
Medical Social Work  PC from [place];  GENO went over conversation with her father and the need to separate her parents income. Process and the possible cost of this.   [place]en stated she will be here Saturday to see her parents and will talk to him about what is needed.  Coleen stated she isn't sure if her father can afford the cost. Will be going to their home prior to coming to the hospital, and will attempt to locate is bank statements to see how much income he has..  GENO went over her father's statement that she is handling all of his finances.  [place]en said no, that she hasn't been involved with her parents for about 8 years and is slowly working back into their life.  Will call GENO on Monday with an update.

## 2020-10-31 NOTE — PROGRESS NOTES
2 RN skin check complete with: CURT Sandoval.  Devices in place: Mepilex on elbows, waffle cushion.  Skin assessed under devices: Yes.  Confirmed pressure ulcers found on: No.  New potential pressure ulcers noted on: None.  Wound consult placed: No.    The following interventions are in place: 2 RN skin check, q2hr turns, pillows for turning and repositioning, frequent incontinence checks and care, waffle overlay, waffle cushion, linen changes.     Elbows: Intact, pink, blanching, mepilex in place.  Sacrum: Intact, pink, blanching.  Heels: Intact, boggy, blanching.

## 2020-10-31 NOTE — CARE PLAN
Problem: Communication  Goal: The ability to communicate needs accurately and effectively will improve  Outcome: PROGRESSING SLOWER THAN EXPECTED  Note: Patient encouraged to express feelings, voice concerns and ask questions regarding plan of care.      Problem: Safety  Goal: Will remain free from injury  Outcome: PROGRESSING AS EXPECTED  Goal: Will remain free from falls  Outcome: PROGRESSING AS EXPECTED     Problem: Pain Management  Goal: Pain level will decrease to patient's comfort goal  Outcome: PROGRESSING AS EXPECTED

## 2020-10-31 NOTE — PROGRESS NOTES
Assumed care of patient at 0700. A+Ox1, pleasant and cooperative today. Reports pain 0/10 today. Turning and repositioning maintained. Skin precautions maintained. 1:1 feeding observed and practiced.  Safety precautions in place, bed in lowest and locked position with call light in reach. Needs met at this time.     Palliative called this RN to express the pt's  is in the hospital as well, social work/case coordination made aware.

## 2020-10-31 NOTE — DISCHARGE PLANNING
This RN,  was informed by bedside RN that patient's spouse has been admitted to [facility], he is in room T734-02. Spouse has terminal cancer, patient Carmenza Bennett does not know that her spouse is in the hospital.     Toma Reinoso RN,

## 2020-10-31 NOTE — PROGRESS NOTES
Received report from day shift RN and assumed care of patient. Assessment completed, POC discussed. Pt is currently sitting up in wheelchair at nurses station, agitated, A&Ox1 (self), on RA, VSS. Denies pain or discomfort. Patient refused night medications. Bed is in lowest, locked position, call bell and belongings are in reach. No further needs at this time.

## 2020-11-01 NOTE — CARE PLAN
Problem: Communication  Goal: The ability to communicate needs accurately and effectively will improve  Outcome: PROGRESSING SLOWER THAN EXPECTED  Note: Patient encouraged to express feelings, voice concerns and ask questions regarding plan of care.      Problem: Safety  Goal: Will remain free from injury  Outcome: PROGRESSING AS EXPECTED  Note: Fall precautions in place. Bed in lowest position. Non-skid socks in place. Personal possessions within reach. Mobility sign on door. Bed-alarm on. Call light within reach. Pt educated regarding fall prevention and states understanding.    Goal: Will remain free from falls  Outcome: PROGRESSING AS EXPECTED

## 2020-11-01 NOTE — PROGRESS NOTES
2 RN skin check complete with Chiddy RN  Devices in place waffle cushion, mepilexes.  Skin assessed under devices yes.  Confirmed pressure ulcers found on none.  New potential pressure ulcers noted on none. Wound consult placed not needed.  The following interventions in place q2 turning and reposition, 2 RN skin check qshift, waffle cushion, mepilex to heels and one elbow, pillows for positioning, prevalon boots (pt does not like them, and remain off at times).    Bilateral heels: Reddened and slightly boggy, mepilexes replaced and prevalon boots tried (pt did not like).    Bilateral elbows: intact, pink and blanching. mepilex to right elbow.    Sacrum: in tact, pink and blanching    Perineum: Moist- powder applied.

## 2020-11-01 NOTE — PROGRESS NOTES
2 RN skin check complete with: CURT Sandoval.  Devices in place: Mepilex on elbow, prevalon boots, waffle cushion.  Skin assessed under devices: Yes.  Confirmed pressure ulcers found on: No.  New potential pressure ulcers noted on: None.  Wound consult placed: No.     The following interventions are in place: 2 RN skin check, q2hr turns, pillows for turning and repositioning, frequent incontinence checks and care, waffle overlay, waffle cushion, linen changes, powder for moisture.     Elbows: Intact, pink, blanching, mepilex in place on right elbow.  Perineum: Intact, moist, powder applied.  Sacrum: Intact, pink, blanching.  Heels: Intact, red, boggy, blanching, mepilex in place, prevalon boots for offloading.

## 2020-11-01 NOTE — PROGRESS NOTES
Received report from day shift RN and assumed care of patient. Assessment completed, POC discussed. Pt is currently laying in bed, A&Ox1 (self), on RA, VSS. Denies pain or discomfort. Bed is in lowest, locked position, call bell and belongings are in reach. No further needs at this time.

## 2020-11-02 NOTE — PROGRESS NOTES
Received report from day shift RN and assumed care of patient. Assessment completed, POC discussed. Pt is currently laying in bed, A&O x self, on RA, VSS. Denies pain or discomfort. Patient refusing scheduled medication. Bed is in lowest, locked position, call bell and belongings are in reach. No further needs at this time.

## 2020-11-02 NOTE — DISCHARGE PLANNING
Medical Social Work  Spouse of patient has terminal cancer, referral to [facility] has been made  PC to [facility]: requested assigned /rn contact SW

## 2020-11-02 NOTE — PROGRESS NOTES
2 RN skin check complete with: CURT Bonilla.  Devices in place: Mepilex on elbow and heels.  Skin assessed under devices: Yes.  Confirmed pressure ulcers found on: No.  New potential pressure ulcers noted on: None.  Wound consult placed: No.     The following interventions are in place: 2 RN skin check, q2hr turns, pillows for turning and repositioning, frequent incontinence checks and care, waffle overlay, waffle cushion, PRN linen changes, powder for moisture, barrier paste.     Elbows: Intact, pink, blanching, mepilex in place.  Perineum: Intact, moist.  Sacrum: Intact, pink, blanching.  Heels: Intact, red, boggy, blanching, mepilex in place.

## 2020-11-02 NOTE — CARE PLAN
Problem: Communication  Goal: The ability to communicate needs accurately and effectively will improve  Outcome: PROGRESSING AS EXPECTED  Note: Patient encouraged to express feelings, voice concerns and ask questions regarding plan of care.      Problem: Safety  Goal: Will remain free from injury  Outcome: PROGRESSING AS EXPECTED  Goal: Will remain free from falls  Outcome: PROGRESSING AS EXPECTED     Problem: Pain Management  Goal: Pain level will decrease to patient's comfort goal  Outcome: PROGRESSING AS EXPECTED     Problem: Psychosocial Needs:  Goal: Level of anxiety will decrease  Outcome: PROGRESSING SLOWER THAN EXPECTED

## 2020-11-02 NOTE — PROGRESS NOTES
2 RN skin check complete with Chiddy RN  Devices in place mepilex to heels  Confirmed pressure ulcers found on na.  New potential pressure ulcers noted on na.   Wound consult placed na.     Skin Assessment    Bilat heels red/blanching/ boggy, mepilex in use  Sacral is pink  and  blanching.      The following interventions in place 2 RN skin check, turning, barrier paste, waffle mattress, mepilex for protection, frequent incontinence checks.

## 2020-11-02 NOTE — PROGRESS NOTES
2 RN Skin Check     2 RN skin check complete with: CURT Burnett.  Devices in place: Mepilex  Skin assessed under devices: Yes.  Confirmed pressure ulcers found on: No.  New potential pressure ulcers noted on: None.  Wound consult placed: No.     The following interventions are in place: 2 RN skin check, turning and repositioning every 2 hours, pillows for turning and repositioning, frequent incontinence checks and care, waffle overlay, waffle cushion, powder for moisture, barrier paste.     Elbows: Pink, blanching, Mepilex in place.  Perineum: Intact, moist. Powder applied   Sacrum: Pink, blanching.   Heels: Blanchable redness, heel Mepilex in place.

## 2020-11-02 NOTE — PROGRESS NOTES
Assumed care of patient at 0700. Patient is alert and oriented only to self. VSS and on room air. Turned and repositioned every 2 hours, up with assist of 2 to stand and pivot to wheelchair. 2 RN skin check complete. Fall prevention tactics in place, bed alarm on for safety and call light within reach.     Therese Morales R.N.

## 2020-11-03 NOTE — PROGRESS NOTES
2 RN Skin Check      2 RN skin check complete with: CURT Ulloa.  Devices in place: Mepilex  Skin assessed under devices: Yes.  Confirmed pressure ulcers found on: No.  New potential pressure ulcers noted on: None.  Wound consult placed: No.     The following interventions are in place: 2 RN skin check, turning and repositioning every 2 hours, pillows for turning and repositioning, frequent incontinence checks and care, waffle overlay on bed, waffle cushion in wheelchair, powder for moisture, barrier paste.     Elbows: Pink, blanching, Mepilex in place.  Perineum: Intact, moist. Powder applied   Sacrum: Pink, blanching.   Heels: Blanchable redness, heel Mepilex in place.

## 2020-11-03 NOTE — CARE PLAN
Problem: Discharge Barriers/Planning  Goal: Patient's continuum of care needs will be met  Outcome: PROGRESSING SLOWER THAN EXPECTED  Note: Patient needs to have assets  from her 's and then needs placement.      Problem: Skin Integrity  Goal: Risk for impaired skin integrity will decrease  Outcome: PROGRESSING AS EXPECTED  Note: Patient is immobile and chair/bed bound. 2 RN skin checks, Q2 turns, mepilex to bony prominences, waffle cushion overlay, and frequent incontinence checks in place.

## 2020-11-03 NOTE — PROGRESS NOTES
Received report from night shift RN and assumed care of patient at change of shift. Patient is A&O x 1. Oriented to self only. Patient does not express any signs of pain at this time; lying in bed, calm, with even breathing. Patient refused 2100/2300 medications at 2300 and again at 0000. Patient scratched CNA when she tried to check for incontinence at 0000. Patient is fatigued, but allowed incontinence check and change at 0200, however the next medication due time was too close for 2300 Siminet to be given. No signs of distress at this time. Bed is in lowest, locked position, call light and belongings are within reach. Patient does not call for assistance and bed alarm is on.  All other needs met.

## 2020-11-03 NOTE — PROGRESS NOTES
2 RN skin check complete with: CURT Bonilla.   Devices in place: None.  Skin assessed under devices: N/A.  Confirmed pressure ulcers found on: N/A.  New potential pressure ulcers noted on: none. Wound consult placed: no.  The following interventions in place: 2RN skin check, Q2 turns, waffle cushion, frequent incontinence checks and changes, mepilex to heels and right elbow.     Ears: intact  Elbows: intact; mepilex to right  Chest/Back: intact,   Sacrum: intact  Lower Extremities: intact  Heels:intact; mepilex to heels

## 2020-11-04 PROBLEM — N30.01 ACUTE CYSTITIS WITH HEMATURIA: Status: RESOLVED | Noted: 2020-01-01 | Resolved: 2020-01-01

## 2020-11-04 NOTE — PROGRESS NOTES
2 RN skin check complete with: CURT Tanner.   Devices in place: None.  Skin assessed under devices: N/A.  Confirmed pressure ulcers found on: N/A.  New potential pressure ulcers noted on: none.   Wound consult placed: no.  The following interventions in place: 2RN skin check, Q2 turns, waffle cushion, frequent incontinence checks and changes, mepilex to heels and right elbow.      General skin assessment:  Elbows: intact; mepilex to right  Sacrum: red, blanching  Lower Extremities: intact  BLE: bilateral heels, boggy/ reddness/ blanching

## 2020-11-04 NOTE — CARE PLAN
Problem: Safety  Goal: Will remain free from falls  Outcome: PROGRESSING AS EXPECTED  Note: Fall precautions in place: bed locked and in the lowest position, call light and belongings within reach, treaded socks on, fall risk ID band on, pt educated on use of call light for needs.      Problem: Urinary Elimination:  Goal: Ability to reestablish a normal urinary elimination pattern will improve  Outcome: PROGRESSING SLOWER THAN EXPECTED  Note: Pt still experiencing episodes of incontinence, frequent incontinent checks and linen changes PRN.

## 2020-11-04 NOTE — PROGRESS NOTES
Assumed care of pt from day RN. Pt lying in bed, A&Ox1, oriented to self only. Pt denies any pain at this time. Took pills whole with chocolate milk. Call light and belongings within reach, will continue to monitor.

## 2020-11-04 NOTE — PROGRESS NOTES
Assumed care of patient at 0700. A+Ox1, pleasant but tired affect today. Reports pain 0/10 today. Skin precautions maintained and q2 turning in place. Safety precautions in place, bed in lowest and locked position with call light in reach, bed alarmed. Needs met at this time.

## 2020-11-05 NOTE — CARE PLAN
Problem: Safety  Goal: Will remain free from falls  Outcome: PROGRESSING AS EXPECTED  Note: Fall precautions in place: bed locked and in the lowest position, call light within reach, fall risk ID band on, treaded socks on.      Problem: Skin Integrity  Goal: Risk for impaired skin integrity will decrease  Outcome: PROGRESSING AS EXPECTED  Note: Skin interventions in place: q2hr turns, 2 RN skin checks, frequent incontinence checks and linen changes PRN, waffle overlay, mepilex on heels, barrier cream.

## 2020-11-05 NOTE — PROGRESS NOTES
2 RN skin check complete with: CURT Tanner.   Devices in place: None.  Skin assessed under devices: N/A.  Confirmed pressure ulcers found on: N/A.  New potential pressure ulcers noted on: N/A   Wound consult placed: N/A    The following interventions in place: 2RN skin check, Q2hr turns, waffle overlay on mattress, pillows in use for support/ positioning, frequent incontinence checks and linen changes PRN, mepilex to heels and right elbow.      General skin assessment:  Elbows: intact; mepilex to right  Sacrum: red, blanching  BLE: bilateral heels, boggy/ reddness/ blanching

## 2020-11-05 NOTE — PROGRESS NOTES
Assumed care of pt from day RN. Pt lying in bed, A&Ox1, oriented to self only, pt very pleasant with staff this evening. Pt denies any pain at this time. Took pills whole with chocolate milk. Call light and belongings within reach, will continue to monitor.

## 2020-11-05 NOTE — PROGRESS NOTES
Assumed care of patient at 0700. A+Ox1, mood labile today, at times pleasant other times agitated. ax2 into chair and sitting at table with nurses throughout the day.  Reports pain 0/10 today, compliant with medication. Safety precautions in place, bed in lowest and locked position with call light in reach, bed and chair alarmed. Needs met at this time.

## 2020-11-05 NOTE — PROGRESS NOTES
2 RN skin check complete with Laila RN  Devices in place waffle cushion, driflo pads, pillows for offloading.  Skin assessed under devices yes.  Confirmed pressure ulcers found on none.  New potential pressure ulcers noted on none. Wound consult placed not needed.  The following interventions in place q2 turning and repositioning, 2 rn skin check q shift, incontinence care prn and with linen changes, waffle cushion and pillows for positioning.    Bilateral heels: reddened and slow to obed, positioned elevated in bed to reduce noted swelling.    Sacrum: Red, slow to obed, intact    Bilateral elbows: in tact, mepilexes present as prophylactic.

## 2020-11-05 NOTE — PROGRESS NOTES
Hospital Medicine Twice Weekly Progress Note    Date of Service  11/4/2020    Chief Complaint  Shortness of breath, nonproductive cough, fevers, chills x5 days    Hospital Course  Ms. Bennett is a wheelchair bound 75-year-old female with a PMH of arthritis, DVT, Parkinson's, and dementia who presented 5/14/2020 with complaints of shortness of breath, nonproductive cough, fevers, and chills for 5 days. She is confused at baseline and was sent by her  since he was unable to care for her. An EKG was done in the ED and was unremarkable, though a chest xray showed bilateral interstitial infiltrates. COVID was ruled out in the ED. She was evaluated by PT/OT who recommended 24/7 supervision. Her  also stated he is no longer able to care for her either. She is now pending placement to SNF vs group home, though medicaid must be obtained first as she has limited income.     Interval Problem Update  Feeling tired today. Has no complaints, had no problems overnight. Denies pain.     VSS.     Consultants/Specialty  Palliative care    Code Status  DNAR/DNI    Disposition  SNF vs group home.    Review of Systems  Review of Systems   Constitutional: Positive for malaise/fatigue. Negative for fever.   HENT: Negative for nosebleeds.    Respiratory: Negative for cough and shortness of breath.    Cardiovascular: Negative for chest pain and leg swelling.   Gastrointestinal: Negative for abdominal pain, constipation, diarrhea, nausea and vomiting.   Genitourinary: Negative for dysuria, frequency and urgency.   Neurological: Positive for weakness. Negative for dizziness, sensory change, speech change, focal weakness and headaches.   Psychiatric/Behavioral: Negative for depression. The patient is not nervous/anxious and does not have insomnia.    All other systems reviewed and are negative.     Physical Exam  Temp:  [36.3 °C (97.4 °F)-37.1 °C (98.7 °F)] 36.6 °C (97.8 °F)  Pulse:  [72-80] 80  Resp:  [15-17] 17  BP:  (102-128)/(56-79) 128/74  SpO2:  [94 %] 94 %    Physical Exam  Vitals signs and nursing note reviewed.   Constitutional:       General: She is sleeping. She is not in acute distress.  HENT:      Head: Normocephalic and atraumatic.      Mouth/Throat:      Lips: Pink.      Mouth: Mucous membranes are moist.   Eyes:      Conjunctiva/sclera: Conjunctivae normal.      Pupils: Pupils are equal, round, and reactive to light.   Neck:      Musculoskeletal: Normal range of motion and neck supple.   Cardiovascular:      Rate and Rhythm: Normal rate and regular rhythm.      Pulses: Normal pulses.      Heart sounds: Murmur present.   Pulmonary:      Effort: Pulmonary effort is normal.      Breath sounds: Normal breath sounds.   Abdominal:      General: Bowel sounds are decreased. There is no distension or abdominal bruit.      Palpations: Abdomen is soft.      Tenderness: There is no abdominal tenderness.   Musculoskeletal:      Right lower leg: No edema.      Left lower leg: No edema.   Skin:     General: Skin is warm and dry.   Neurological:      Mental Status: She is easily aroused.   Psychiatric:         Attention and Perception: Attention and perception normal.         Mood and Affect: Mood and affect normal.         Speech: Speech normal.         Behavior: Behavior normal. Behavior is cooperative.     Fluids    Intake/Output Summary (Last 24 hours) at 11/4/2020 1740  Last data filed at 11/4/2020 1239  Gross per 24 hour   Intake 870 ml   Output no documentation   Net 870 ml     Laboratory    Imaging  DX-CHEST-PORTABLE (1 VIEW)   Final Result      No acute cardiopulmonary abnormality.      US-EXTREMITY VENOUS LOWER UNILAT RIGHT   Final Result      DX-HIP-UNILATERAL-WITH PELVIS-1 VIEW RIGHT   Final Result      1.  Bony pelvis and the hip joint appear normal      2.  osteoarthritis of lumbar spine facet joint and both sacroiliac joint      EC-ECHOCARDIOGRAM COMPLETE W/O CONT   Final Result      CT-HEAD W/O   Final Result       1.  No acute intracranial findings.      2.  Diffuse atrophy and periventricular white matter changes, consistent with chronic small vessel disease.         DX-CHEST-PORTABLE (1 VIEW)   Final Result      Perihilar interstitial markings are increased and suggestive of mild pulmonary edema.         Assessment/Plan  Dementia (HCC)- (present on admission)  Assessment & Plan  -Chronic, stable.   -Frequent re-orientation, reestablish circadian rhythm, encourage familiar faces/family in room, avoid or minimize narcotics/sedatives.   -Continue on seroquel and prozac.     Discharge planning issues  Assessment & Plan  -Patient not eligible for Medicaid due to income.  Social work has requested [facility] screen her for institutional medicaid.  Will need placement after that is obtained.  -Patient's daughter is assisting with discharge.     Lower extremity pain, bilateral- (present on admission)  Assessment & Plan  -PRN Tylenol available if needed.  -PT/OT recommending SNF placement, but will eventually need group home placement with 24/7 care after SNF making it a difficult discharge.  -Mobilize as tolerated.    Parkinson's disease (HCC)- (present on admission)  Assessment & Plan  -Chronic and stable.  -Continue sinemet.  -24/7 supervision at discharge.   -Frequent reorientation.  -Avoid narcotics, benzodiazepines and hypnotics.     VTE prophylaxis: Estrella León, MSN, RN, APRN, ACNPC-AG, CCRN  Nurse Practitioner, [facility]  [phone]    11/4/2020    5:40 PM

## 2020-11-05 NOTE — PROGRESS NOTES
I certify that the patient requires continued medically necessary hospital services for the treatment of dementia and failure to thrive. The patient will remain in the hospital for the foreseeable future.  Discharge may or may not occur in the next 20 days due to ongoing discharge delays due to no medically acceptable discharge option.

## 2020-11-05 NOTE — PROGRESS NOTES
2 RN skin check complete with Laila RN   Devices in place waffle cushion, treaded socks.  Skin assessed under devices yes.  Confirmed pressure ulcers found on none.  New potential pressure ulcers noted on none. Wound consult placed not needed.  The following interventions in place q2 turning while in bed. Waffle cushion in chair and bed, qshift 2RN skin check, incontinence care and prn linen changes    Bilateral heels: edematous, pink, some redness to left plantar foot that is slow to obed. Mepilexes in tact.    Sacrum: reddened, blanching and moist. Barrier cloths used and pt positioned off sacrum at this time.    Bilateral elbow: pink and intact

## 2020-11-06 NOTE — PROGRESS NOTES
Received report and assumed care at shift change. A&O x 1, no complain of pain or distress. Fall precautions in place, bed locked and in lowest position. Needs attended to,

## 2020-11-06 NOTE — PROGRESS NOTES
Pt is A&Ox1 to self only. Very confused and frequently yells for help and asks staff what she is suppose to do. Pt requires frequent reorientation and reassurance. Pt currently sitting up in wheelchair at nurses station. Denies any pain and shows no signs of distress. Fall education provided. Wheelchair alarm on. Hourly rounding in place.

## 2020-11-06 NOTE — PROGRESS NOTES
2 RN skin check completed with Javi ELIAS.   Devices in place Mepilex.  Skin assessed under devices yes.  Confirmed pressure ulcers found on None.  New potential pressure ulcers noted on N/A. Wound consult placed N/A.    The following interventions in place 2 RN skin check, Q 2 hour turns, waffle overlay mattress, pillows for repositioning, Mepilex for protection, barrier cream, incontinence care.    Skin assessment:  Bilateral elbows pink and blanching  Sacrum red and blanching  Bilateral hands edema  BLE non pitting edema  Bilateral heels red, boggy and blanching.   Bilateral groin area slight redness, intact.

## 2020-11-07 NOTE — PROGRESS NOTES
Received report and assumed care at shift change. Alert only to self. No complain of pain or distress. Fall precautions in place, bed locked and in lowest position. Needs attended to.

## 2020-11-07 NOTE — CARE PLAN
Problem: Skin Integrity  Goal: Risk for impaired skin integrity will decrease  Outcome: PROGRESSING AS EXPECTED     Problem: Safety  Goal: Will remain free from falls  Outcome: PROGRESSING SLOWER THAN EXPECTED

## 2020-11-07 NOTE — PROGRESS NOTES
Hospital Medicine Twice Weekly Progress Note    Date of Service  11/7/2020    Chief Complaint  Shortness of breath, nonproductive cough, fevers, chills x5 days    Hospital Course  Ms. Bennett is a wheelchair bound 75-year-old female with a PMH of arthritis, DVT, Parkinson's, and dementia who presented 5/14/2020 with complaints of shortness of breath, nonproductive cough, fevers, and chills for 5 days. She is confused at baseline and was sent by her  since he was unable to care for her. An EKG was done in the ED and was unremarkable, though a chest xray showed bilateral interstitial infiltrates. COVID was ruled out in the ED. She was evaluated by PT/OT who recommended 24/7 supervision. Her  also stated he is no longer able to care for her either. She is now pending placement to SNF vs group home, though medicaid must be obtained first as she has limited income.     Interval Problem Update  11/7: Denies any c/o pain. Her only complaint was that she is hungry and no one would feed her, although she refused previous attempts by staff to feed her. She had 1 low BP yesterday but asymptomatic. Her SBP have been stable at 100-120s otherwise.    Consultants/Specialty  Palliative care    Code Status  DNAR/DNI    Disposition  SNF vs group home.    Review of Systems  Review of Systems   Cardiovascular: Negative for chest pain and palpitations.   Gastrointestinal: Negative for abdominal pain, nausea and vomiting.   Genitourinary: Negative for dysuria, frequency and urgency.   Musculoskeletal: Negative.       Physical Exam  Temp:  [36.2 °C (97.2 °F)-36.8 °C (98.2 °F)] 36.2 °C (97.2 °F)  Pulse:  [65-77] 70  Resp:  [18-20] 18  BP: ()/(43-79) 104/61  SpO2:  [90 %-94 %] 90 %    Physical Exam  Vitals signs and nursing note reviewed.   Constitutional:       General: She is sleeping. She is not in acute distress.  HENT:      Head: Normocephalic and atraumatic.      Mouth/Throat:      Lips: Pink.      Mouth: Mucous  membranes are moist.   Eyes:      Conjunctiva/sclera: Conjunctivae normal.      Pupils: Pupils are equal, round, and reactive to light.   Neck:      Musculoskeletal: Normal range of motion and neck supple.   Cardiovascular:      Rate and Rhythm: Normal rate and regular rhythm.      Pulses: Normal pulses.      Heart sounds: Murmur present.   Pulmonary:      Effort: Pulmonary effort is normal.      Breath sounds: Normal breath sounds.   Abdominal:      General: Bowel sounds are decreased. There is no distension or abdominal bruit.      Palpations: Abdomen is soft.      Tenderness: There is no abdominal tenderness.   Musculoskeletal:      Right lower leg: No edema.      Left lower leg: No edema.   Skin:     General: Skin is warm and dry.   Neurological:      Mental Status: She is easily aroused. She is disoriented.   Psychiatric:         Attention and Perception: Attention and perception normal.         Mood and Affect: Mood and affect normal.         Speech: Speech normal.         Behavior: Behavior normal. Behavior is cooperative.     Fluids    Intake/Output Summary (Last 24 hours) at 11/7/2020 1301  Last data filed at 11/6/2020 1800  Gross per 24 hour   Intake 720 ml   Output --   Net 720 ml     Laboratory    Imaging  DX-CHEST-PORTABLE (1 VIEW)   Final Result      No acute cardiopulmonary abnormality.      US-EXTREMITY VENOUS LOWER UNILAT RIGHT   Final Result      DX-HIP-UNILATERAL-WITH PELVIS-1 VIEW RIGHT   Final Result      1.  Bony pelvis and the hip joint appear normal      2.  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  No acute intracranial findings.      2.  Diffuse atrophy and periventricular white matter changes, consistent with chronic small vessel disease.         DX-CHEST-PORTABLE (1 VIEW)   Final Result      Perihilar interstitial markings are increased and suggestive of mild  pulmonary edema.         Assessment/Plan  Dementia (HCC)- (present on admission)  Assessment & Plan  -Chronic, stable.   -Frequent re-orientation, reestablish circadian rhythm, encourage familiar faces/family in room, avoid or minimize narcotics/sedatives.   -Continue on seroquel and prozac.   -Required Haldol yesterday for agitation and aggressive behavior     Discharge planning issues  Assessment & Plan  -Patient not eligible for Medicaid due to income.  Social work has requested PFA screen her for institutional medicaid.  Will need placement after that is obtained.  -Patient's daughter is assisting with discharge.     Lower extremity pain, bilateral- (present on admission)  Assessment & Plan  -PRN Tylenol available if needed.  -PT/OT recommending SNF placement, but will eventually need group home placement with 24/7 care after SNF making it a difficult discharge.  -Mobilize as tolerated.    Parkinson's disease (HCC)- (present on admission)  Assessment & Plan  -Chronic and stable.  -Continue sinemet.  -24/7 supervision at discharge.   -Frequent reorientation.  -Avoid narcotics, benzodiazepines and hypnotics.     VTE prophylaxis: Lovenox

## 2020-11-07 NOTE — PROGRESS NOTES
2 RN skin check completed with Elias ELIAS.   Devices in place: heel mepliex; right elbow mepliex.  Skin assessed under devices: yes.  Confirmed pressure ulcers found on: none.  New potential pressure ulcers noted on: n/a.  Wound consult placed: N/A.     The following interventions in place:  2 RN skin check, Q 2 hour turns, waffle overlay mattress, pillows for repositioning, mepilex for protection, barrier cream, incontinence care.     Skin assessment:  Bilateral elbows: pink and blanching (mepliex on right elbow for preventive; slightly red)  Abdomen/under breast: intact  Perineal area: pink/red but blanching  Sacrum: red and blanching (barrier cream in use)  BLE: intact with non pitting edema  Bilateral heel: red, boggy and blanching (mepliex in place as preventive measure)

## 2020-11-07 NOTE — PROGRESS NOTES
Pt is A&Ox1 to self only. VSS on RA. Pt continues to require frequent reorientation and reassurance. Pt currently resting in bed watching tv. Denies any pain and shows no signs of distress. Fall education provided. Bed alarm on. Hourly rounding in place.

## 2020-11-07 NOTE — PROGRESS NOTES
2 RN skin check completed with Elias ELIAS.   Devices in place: heel mepliex; right elbow mepliex.  Skin assessed under devices: yes.  Confirmed pressure ulcers found on: none.  New potential pressure ulcers noted on: n/a.  Wound consult placed: N/A.     The following interventions in place:  2 RN skin check, Q 2 hour turns, waffle overlay mattress, pillows for repositioning, mepilex for protection, barrier cream, incontinence care.     Skin assessment:  Bilateral elbows: pink and blanching (mepliex on right elbow for preventive; slightly red); bilateral hand edema  Abdomen/under breast: intact  Sacrum: red and blanching (barrier cream in use)  BLE: intact with non pitting edema  Bilateral heel: red, boggy and blanching (mepliex in place as preventive measure)  Bilateral groin area: slight redness, intact.

## 2020-11-08 NOTE — PROGRESS NOTES
Pt is A&Ox1 to self only. VSS on RA. Pt is currently resting in bed. Pt calm and cooperative this morning. No issues giving medications this morning. Pt denies any pain and shows no signs of distress. 2 RN skin check and Q2turn in place. Fall education provided. Bed alarm on. Hurly rounding in place.

## 2020-11-09 NOTE — PROGRESS NOTES
Pt A&Ox1 to self only.  Sleeping in bed at shift change.  Q2 hour turns and skin checks.  No c/o pain this shift.  Meds need to be given with puree/applesuace.  Hourly rounding.

## 2020-11-10 NOTE — PROGRESS NOTES
Pt alert/oriented x1, self only, room air.  Pt denies pain and does not appear to be in distress or discomfort.  Safety precautions in place, bed locked and in lowest position, call light and personal belongings within reach, hourly rounding.

## 2020-11-11 NOTE — CARE PLAN
Problem: Safety  Goal: Will remain free from falls  Outcome: PROGRESSING AS EXPECTED  Note: Yellow socks, bed alarm on     Problem: Skin Integrity  Goal: Risk for impaired skin integrity will decrease  Outcome: PROGRESSING AS EXPECTED  Note: Waffle mattress overlay, mepilex in place, Q2 turns, skin checks

## 2020-11-11 NOTE — PROGRESS NOTES
Pt alert/oriented x1 self only, denies pain, VSS and room air.  Pt up to WC with 2 person assist. Pt is in a pleasant mood today, participated in music therapy.  Safety precautions in place, bed locked and in lowest position, call light and personal belongings within reach.  No further needs at this time.

## 2020-11-11 NOTE — PROGRESS NOTES
Pt AOx 1, denies pain, up with 2 assist to WC, on RA, and  VSS. Call light and pt belongings in reach, bed locked and in lowest position, and pt now resting quietly.

## 2020-11-11 NOTE — DISCHARGE PLANNING
Medical Social Work  PC to patient's daughter, Coleen [phone] message left to call [facility]    Chart review of patient's spouse, discharged on 11/2 declined Hospice stating he doesn't need it right now .    PC to patient's spouse Gee  PC to [phone]: unable to leave a message  PC to [phone]: appears to be [employer] phone, message left    PC to patient's son Jasbir Dorsey [phone]: message left to call [facility]

## 2020-11-12 NOTE — PROGRESS NOTES
Pt AOx 1, denies pain, on RA, and up to WC with 2 assist.  VSS. Q2 turns and skin checks in place. Call light and pt belongings in reach, bed locked and in lowest position, bed alarm on, and pt now resting quietly.

## 2020-11-12 NOTE — CARE PLAN
Problem: Safety  Goal: Will remain free from falls  Outcome: PROGRESSING AS EXPECTED     Problem: Skin Integrity  Goal: Risk for impaired skin integrity will decrease  Outcome: PROGRESSING AS EXPECTED  Note: Q2 turns, waffle overlay and in chair, mepilex

## 2020-11-12 NOTE — DISCHARGE PLANNING
Medical Social Work  PC from Coleen, she is aware that her father went home not being on Hospice.  Has spoken to him once since he got home, Monday.  Suggested SW contact Jasbir as Coleen believes he has the will, isn't sure if there is one though.  Again telling SW that she hasn't been in her parent's life for about 8 years.

## 2020-11-12 NOTE — PROGRESS NOTES
Bedside report received.  Pt is A&Ox1 pt was up in wheelchair most of the morning at nursing station. After lunch time, pt was agitated and yelling at fellow residents at the nursing station. Pt was reoriented, therapeutic communication was utilized. Behavior did not change.. Pt was still anxious and agitated, yelling at other patients. PRN Haldol was administered IM injection. Pt was escorted back to room, resting in bed at this time. Pt tolerated intervention well. POC discussed with pt; all questions answered at this time.

## 2020-11-13 NOTE — PROGRESS NOTES
2 RN skin check completed with Javi ELIAS.   Devices in place: N/A  Skin assessed under devices:N/A  Confirmed pressure ulcers found on: N/A  New potential pressure ulcers noted on: N/A  Wound consult placed: N/A.     The following interventions in place:  2 RN skin check, Q2 hour turns, waffle overlay mattress, pillows for support/repositioning, mepilex for protection, barrier cream, incontinence care     Skin assessment:  Bilateral elbows pink, blanching.    Perineal area pink/red, blanching.  Sacrum red, blanching. Barrier cream in use.  BLE intact with non pitting edema.  Bilateral heels red, boggy, and blanching. Mepilex changed, CDI.

## 2020-11-13 NOTE — CARE PLAN
Problem: Safety  Goal: Will remain free from falls  Note: Chair and bed alarms in use, non slip socks, arm band, sign outside door     Problem: Skin Integrity  Goal: Risk for impaired skin integrity will decrease  Note: Waffle overlay, mepilex, barrier cream, frequent incontinent checks

## 2020-11-13 NOTE — PROGRESS NOTES
Pt AOx 1, denies pain, up to WC with 2 assist, and on RA.  VSS.  Q2 turns in place. Call light and pt belongings in reach, bed locked and in lowest position, bed alarm on, and pt now resting quietly.

## 2020-11-14 NOTE — PROGRESS NOTES
Assumed care of patient this shift, patient alert to self. Up with 2 assist to wheelchair. Turning/repositioning every 2 hours. Incontinent, linen changes and skin care provided. Bed alarm on.

## 2020-11-14 NOTE — PROGRESS NOTES
2 RN skin check completed with Georgiana ELIAS.   Devices in place: N/A  Skin assessed under devices:N/A  Confirmed pressure ulcers found on: N/A  New potential pressure ulcers noted on: N/A  Wound consult placed: N/A.     The following interventions in place:  2 RN skin check, Q2 hour turns, waffle overlay mattress, pillows for support/repositioning, mepilex for protection, barrier cream, incontinence care     Skin assessment:  Posterior head has red spot, blanching. Z-flow in use.  Bilateral elbows pink, blanching.    Perineal area pink/red, blanching.  Sacrum red, blanching. Barrier cream in use.  BLE intact with some swelling.  Bilateral heels red, boggy, and blanching. Mepilex in use.

## 2020-11-14 NOTE — PROGRESS NOTES
Hospital Medicine Twice Weekly Progress Note    Date of Service  11/13/2020    Chief Complaint  Shortness of breath, nonproductive cough, fevers, chills x5 days    Hospital Course  Ms. Bennett is a wheelchair bound 75-year-old female with a PMH of arthritis, DVT, Parkinson's, and dementia who presented 5/14/2020 with complaints of shortness of breath, nonproductive cough, fevers, and chills for 5 days. She is confused at baseline and was sent by her  since he was unable to care for her. An EKG was done in the ED and was unremarkable, though a chest xray showed bilateral interstitial infiltrates. COVID was ruled out in the ED. She was evaluated by PT/OT who recommended 24/7 supervision. Her  also stated he is no longer able to care for her either. She is now pending placement to SNF vs group home, though medicaid must be obtained first as she has limited income.     Interval Problem Update  Patient is lying in bed in no apparent distress.  She is pleasant and cooperative.  She is oriented only to herself.  She denies any problems at this time.  -Staff reports she is eating well  -No changes to plan of care at this time    Consultants/Specialty  Palliative care    Code Status  DNAR/DNI    Disposition  SNF vs group home.    Review of Systems  Review of Systems   Cardiovascular: Negative for chest pain and palpitations.   Gastrointestinal: Negative for abdominal pain, nausea and vomiting.   Genitourinary: Negative for dysuria, frequency and urgency.   Musculoskeletal: Negative.       Physical Exam  Temp:  [36.1 °C (96.9 °F)-36.6 °C (97.8 °F)] 36.2 °C (97.2 °F)  Pulse:  [76-88] 83  Resp:  [18-20] 18  BP: ()/(50-55) 95/50  SpO2:  [92 %-96 %] 92 %    Physical Exam  Vitals signs and nursing note reviewed.   Constitutional:       General: She is sleeping. She is not in acute distress.  HENT:      Head: Normocephalic and atraumatic.      Mouth/Throat:      Lips: Pink.      Mouth: Mucous membranes are moist.    Eyes:      Conjunctiva/sclera: Conjunctivae normal.      Pupils: Pupils are equal, round, and reactive to light.   Neck:      Musculoskeletal: Normal range of motion and neck supple.   Cardiovascular:      Rate and Rhythm: Normal rate and regular rhythm.      Pulses: Normal pulses.      Heart sounds: Murmur present.   Pulmonary:      Effort: Pulmonary effort is normal.      Breath sounds: Normal breath sounds.   Abdominal:      General: Bowel sounds are decreased. There is no distension or abdominal bruit.      Palpations: Abdomen is soft.      Tenderness: There is no abdominal tenderness.   Musculoskeletal:      Right lower leg: No edema.      Left lower leg: No edema.   Skin:     General: Skin is warm and dry.   Neurological:      Mental Status: She is easily aroused. She is disoriented.   Psychiatric:         Attention and Perception: Attention and perception normal.         Mood and Affect: Mood and affect normal.         Speech: Speech normal.         Behavior: Behavior normal. Behavior is cooperative.     Fluids    Intake/Output Summary (Last 24 hours) at 11/13/2020 1646  Last data filed at 11/13/2020 1329  Gross per 24 hour   Intake 720 ml   Output --   Net 720 ml     Laboratory    Imaging  DX-CHEST-PORTABLE (1 VIEW)   Final Result      No acute cardiopulmonary abnormality.      US-EXTREMITY VENOUS LOWER UNILAT RIGHT   Final Result      DX-HIP-UNILATERAL-WITH PELVIS-1 VIEW RIGHT   Final Result      1.  Bony pelvis and the hip joint appear normal      2.  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  No acute intracranial findings.      2.  Diffuse atrophy and periventricular white matter changes, consistent with chronic small vessel disease.         DX-CHEST-PORTABLE (1 VIEW)   Final Result      Perihilar interstitial markings are increased and suggestive of mild pulmonary edema.          Assessment/Plan  Dementia (HCC)- (present on admission)  Assessment & Plan  -Chronic, stable.   -Frequent re-orientation, reestablish circadian rhythm, encourage familiar faces/family in room, avoid or minimize narcotics/sedatives.   -Continue on seroquel and prozac.   -Requires occasional Haldol for agitation and aggressive behavior     Discharge planning issues  Assessment & Plan  -Patient not eligible for Medicaid due to income.  Social work has requested [facility] screen her for institutional medicaid.  Will need placement after that is obtained.  -Patient's daughter is assisting with discharge.     Lower extremity pain, bilateral- (present on admission)  Assessment & Plan  -PRN Tylenol available if needed.  -PT/OT recommending SNF placement, but will eventually need group home placement with 24/7 care after SNF making it a difficult discharge.  -Mobilize as tolerated.    Parkinson's disease (HCC)- (present on admission)  Assessment & Plan  -Chronic and stable.  -Continue sinemet.  -24/7 supervision at discharge.   -Frequent reorientation.  -Avoid narcotics, benzodiazepines and hypnotics.     VTE prophylaxis: Lovenox    JOE Kelley

## 2020-11-14 NOTE — PROGRESS NOTES
Pt sleeping at change of shift. Pt AOx 1, no signs of pain or distress, on RA, and u to WC with 2 assist.  VSS. 2RN skin checks and Q2 turns in place. Call light/belongings in reach, bed locked/lowest position, bed alarm on, and pt now resting quietly.

## 2020-11-14 NOTE — CARE PLAN
Problem: Safety  Goal: Will remain free from falls  Outcome: PROGRESSING AS EXPECTED  Note: Bed and chair alarm, non slip socks, wristband, sign outside door     Problem: Skin Integrity  Goal: Risk for impaired skin integrity will decrease  Outcome: PROGRESSING AS EXPECTED  Note: Mepilex, waffle overlay, Z flow, 2 RN skin checks, and Q2 turns, barrier cream

## 2020-11-14 NOTE — PROGRESS NOTES
Pharmacy Pharmacotherapy Consult for LOS >30 days    Admit Date: 5/14/2020      Medications were reviewed for appropriateness and ongoing need.     Current Facility-Administered Medications   Medication Dose Route Frequency Provider Last Rate Last Admin   • enoxaparin (LOVENOX) inj 40 mg  40 mg Subcutaneous DAILY Remedios Lynch, A.P.R.N.   40 mg at 11/13/20 0909   • QUEtiapine (Seroquel) tablet 25 mg  25 mg Oral Nightly Maryann Mccabe, A.P.R.N.   25 mg at 11/13/20 1622   • QUEtiapine (Seroquel) tablet 12.5 mg  12.5 mg Oral QAM Remedios Lynch A.P.R.N.   12.5 mg at 11/14/20 1029   • haloperidol lactate (HALDOL) injection 2-5 mg  2-5 mg Intramuscular Q3HRS PRN Maryann Mccabe, A.P.R.N.   5 mg at 11/12/20 1316   • carbidopa-levodopa (SINEMET)  MG tablet 1 Tab  1 Tab Oral Q6HRS Maryann Mccabe, A.P.R.N.   1 Tab at 11/14/20 1036   • FLUoxetine (PROZAC) capsule 40 mg  40 mg Oral DAILY Maryann Mccabe, A.P.R.N.   40 mg at 11/14/20 1029   • senna-docusate (PERICOLACE or SENOKOT S) 8.6-50 MG per tablet 2 Tab  2 Tab Oral BID Maryann Mccabe, A.P.R.N.   2 Tab at 11/14/20 1029       Recommendations:  1. Can consider giving/ordering Prevnar Vaccine.   2. No further recommendations. All medications appear appropriate.       Roseanne Watkins, PharmD., BCPS

## 2020-11-15 NOTE — CARE PLAN
Problem: Communication  Goal: The ability to communicate needs accurately and effectively will improve  Outcome: PROGRESSING SLOWER THAN EXPECTED  Note: Patient encouraged to express feelings, voice concerns and ask questions regarding plan of care.      Problem: Safety  Goal: Will remain free from injury  Outcome: PROGRESSING AS EXPECTED  Note: Fall precautions in place. Bed in lowest position. Non-skid socks in place. Personal possessions within reach. Mobility sign on door. Bed-alarm on. Call light within reach. Patient educated regarding fall prevention and states understanding.    Goal: Will remain free from falls  Outcome: PROGRESSING AS EXPECTED     Problem: Psychosocial Needs:  Goal: Level of anxiety will decrease  Outcome: PROGRESSING SLOWER THAN EXPECTED

## 2020-11-15 NOTE — PROGRESS NOTES
2 RN skin check complete with: CURT Caldwell.  Devices in place: N/A.  Skin assessed under devices: N/A.  Confirmed pressure ulcers found on: N/A.  New potential pressure ulcers noted on: N/A.  Wound consult placed: N/A.    The following interventions are in place: 2 RN skin check, q2hr turns, waffle overlay, pillows for support and repositioning, frequent incontinence checks and care, prn linen changes, prophylactic mepilex, barrier cream, heel float boots.     General: Fragile, loose skin.  Elbows: Intact, pink, blanching.  Perineum: Intact, red, blanching, barrier cream applied.  Sacrum: Intact, red, blanching, barrier cream applied.  Lower Extremities: Intact, edematous.  Heels: Intact, red, boggy, blanching, mepilex in place.

## 2020-11-15 NOTE — PROGRESS NOTES
Hospital Medicine Twice Weekly Progress Note    Date of Service  11/15/2020    Chief Complaint  Shortness of breath, nonproductive cough, fevers, chills x5 days    Hospital Course  Ms. Bennett is a wheelchair bound 75-year-old female with a PMH of arthritis, DVT, Parkinson's, and dementia who presented 5/14/2020 with complaints of shortness of breath, nonproductive cough, fevers, and chills for 5 days. She is confused at baseline and was sent by her  since he was unable to care for her. An EKG was done in the ED and was unremarkable, though a chest xray showed bilateral interstitial infiltrates. COVID was ruled out in the ED. She was evaluated by PT/OT who recommended 24/7 supervision. Her  also stated he is no longer able to care for her either. She is now pending placement to SNF vs group home, though medicaid must be obtained first as she has limited income.     Interval Problem Update  Patient is sitting upright at communal table in no apparent distress.  She is pleasant and cooperative.  She is oriented only to herself.  She denies any problems at this time.  -Staff reports she is eating well  -No changes to plan of care at this time    Consultants/Specialty  Palliative care    Code Status  DNAR/DNI    Disposition  SNF vs group home.    Review of Systems  Review of Systems   Unable to perform ROS: Dementia   Cardiovascular: Negative for chest pain and palpitations.   Gastrointestinal: Negative for abdominal pain, nausea and vomiting.   Genitourinary: Negative for dysuria, frequency and urgency.   Musculoskeletal: Negative.       Physical Exam  Temp:  [36.3 °C (97.4 °F)-36.8 °C (98.2 °F)] 36.8 °C (98.2 °F)  Pulse:  [77-92] 77  Resp:  [16-18] 18  BP: ()/(57-67) 127/67  SpO2:  [91 %-93 %] 92 %    Physical Exam  Vitals signs and nursing note reviewed.   Constitutional:       General: She is sleeping. She is not in acute distress.  HENT:      Head: Normocephalic and atraumatic.      Mouth/Throat:       Lips: Pink.      Mouth: Mucous membranes are moist.   Eyes:      Conjunctiva/sclera: Conjunctivae normal.      Pupils: Pupils are equal, round, and reactive to light.   Neck:      Musculoskeletal: Normal range of motion and neck supple.   Cardiovascular:      Rate and Rhythm: Normal rate and regular rhythm.      Pulses: Normal pulses.      Heart sounds: Murmur present.   Pulmonary:      Effort: Pulmonary effort is normal.      Breath sounds: Normal breath sounds.   Abdominal:      General: Bowel sounds are decreased. There is no distension or abdominal bruit.      Palpations: Abdomen is soft.      Tenderness: There is no abdominal tenderness.   Musculoskeletal:      Right lower leg: No edema.      Left lower leg: No edema.   Skin:     General: Skin is warm and dry.   Neurological:      Mental Status: She is easily aroused. She is disoriented.   Psychiatric:         Attention and Perception: Attention and perception normal.         Mood and Affect: Mood and affect normal.         Speech: Speech normal.         Behavior: Behavior normal. Behavior is cooperative.     All systems reviewed and exam is unchanged from prior exam.    Fluids    Intake/Output Summary (Last 24 hours) at 11/15/2020 1125  Last data filed at 11/14/2020 1939  Gross per 24 hour   Intake 368 ml   Output --   Net 368 ml     Laboratory    Imaging  DX-CHEST-PORTABLE (1 VIEW)   Final Result      No acute cardiopulmonary abnormality.      US-EXTREMITY VENOUS LOWER UNILAT RIGHT   Final Result      DX-HIP-UNILATERAL-WITH PELVIS-1 VIEW RIGHT   Final Result      1.  Bony pelvis and the hip joint appear normal      2.  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  No acute intracranial findings.      2.  Diffuse atrophy and periventricular white matter changes, consistent with chronic small vessel disease.         DX-CHEST-PORTABLE (1  VIEW)   Final Result      Perihilar interstitial markings are increased and suggestive of mild pulmonary edema.         Assessment/Plan  Dementia (HCC)- (present on admission)  Assessment & Plan  -Chronic, stable.   -Frequent re-orientation, reestablish circadian rhythm, encourage familiar faces/family in room, avoid or minimize narcotics/sedatives.   -Continue on seroquel and prozac.   -Requires occasional Haldol for agitation and aggressive behavior     Discharge planning issues  Assessment & Plan  -Patient not eligible for Medicaid due to income.  Social work has requested PFA screen her for institutional medicaid.  Will need placement after that is obtained.  -Patient's daughter is assisting with discharge.     Lower extremity pain, bilateral- (present on admission)  Assessment & Plan  -PRN Tylenol available if needed.  -PT/OT recommending SNF placement, but will eventually need group home placement with 24/7 care after SNF making it a difficult discharge.  -Mobilize as tolerated.    Parkinson's disease (HCC)- (present on admission)  Assessment & Plan  -Chronic and stable.  -Continue sinemet.  -24/7 supervision at discharge.   -Frequent reorientation.  -Avoid narcotics, benzodiazepines and hypnotics.     VTE prophylaxis: Lovenox    JOE Kelley

## 2020-11-15 NOTE — PROGRESS NOTES
Received report from day shift RN and assumed care of patient. Assessment completed, POC discussed. Pt is currently sleeping in bed, A&Ox1, on RA, VSS. In report was told patient was agitated earlier in the day and received PRN dose of Haldol and has been sleeping since. Patient asleep so scheduled medications were held. Bed is in lowest, locked position, call bell and belongings are in reach. No further needs at this time.

## 2020-11-16 NOTE — PROGRESS NOTES
Assumed pt care at shift change.  Pt alert/oriented x1, self only, room air, VSS, denies pain at this time.  Pt up to nursing station for meals.  Safety precautions in place, bed locked and in lowest position, call light and personal belongings within reach, hourly rounding in place.  No further needs at this time..

## 2020-11-16 NOTE — PROGRESS NOTES
Received report from day shift RN and assumed care of patient. Assessment completed. Pt is currently sitting up in wheelchair at nurses' station, A&Ox1, on RA, VSS. Denies pain or discomfort. Scheduled medications given per MAR. Messaged pharmacy to reschedule Q6 Sinemet so patient does not have to be woken up in the middle of the night to receive medication. Bed is in lowest, locked position, call bell and belongings are in reach. No further needs at this time.

## 2020-11-16 NOTE — PROGRESS NOTES
2 RN Skin Check      2 RN skin check complete with: CURT Tanner  Devices in place: mepilex  Skin assess under devices: yes   Confirmed pressure ulcers found on: n/a  New potential pressure ulcers noted on: n/a  Wound consult placed: n/a    The following interventions in place:  2RN skin check, Q2hr turns, waffle overlay, pillows for support/repositioning, incontinent care with appropriate linen changes, mepilex, barrier cream    Skin assessment:  General: intact, fragile, loose  Ears: intact, red/pink and blanching  Elbows: intact, pink and blanching  Feet/heels:  Intact, red/pink, and blanching  Sacrum:  Intact, pink/red and blanching  Perineum:  Intact, pink and blanching

## 2020-11-16 NOTE — PROGRESS NOTES
2 RN Skin Check      2 RN skin check complete with:  CURT Victoria  Devices in place:  N/A  Skin assess under devices: N/A   Confirmed pressure ulcers found on: N/A  New potential pressure ulcers noted on: n/a  Wound consult placed: n/a    The following interventions in place: 2RN skin check, Q2hr turns, Q2hr turns, pillows in place for support and repositioning, waffle overlay, incontinent care with appropriate linen changes, mepilex bilateral heels, barrier cream    Skin assessment:  General: fragile, loose skin  Ears:  Bilateral intact  Elbows: bilateral intact, pink and blanching  Feet/ heels:  Bilateral heels red, boggy and blanching; feet dry and flaky  Sacrum: red and blanching, intact  Perineum:  Intact, red and blanching

## 2020-11-16 NOTE — PROGRESS NOTES
Assumed pt care at shift change.  Pt alert/oriented x1, self only, room air, denies pain.  Safety precautions in place, bed locked and in lowest position, call light and personal belongings within reach, hourly rounding.  No further needs at this time.

## 2020-11-16 NOTE — CARE PLAN
Problem: Safety  Goal: Will remain free from injury  Outcome: PROGRESSING AS EXPECTED  Note: Fall precautions in place. Bed in lowest position. Non-skid socks in place. Personal possessions within reach. Mobility sign on door. Bed-alarm on. Call light within reach. Patient educated regarding fall prevention.  Goal: Will remain free from falls  Outcome: PROGRESSING AS EXPECTED     Problem: Skin Integrity  Goal: Risk for impaired skin integrity will decrease  Outcome: PROGRESSING AS EXPECTED  Note: Patient turned and positioned every two hours. Incontinence care provided and barrier paste applied as needed.

## 2020-11-17 NOTE — PROGRESS NOTES
Assumed pt care at shift change.  Pt alert/oriented x1, denies pain, room air.  Pt up to nursing station in  for meals.  Pt encouraged to drink fluids.  Safety precautions in place, bed locked and in lowest position, call light and personal belongings within reach.

## 2020-11-17 NOTE — PROGRESS NOTES
2 RN skin check complete with: CURT Lan.  Devices in place: Mepilex.  Skin assessed under devices: Yes.  Confirmed pressure ulcers found on: N/A.  New potential pressure ulcers noted on: N/A.  Wound consult placed: N/A.    The following interventions are in place: 2 RN skin check, q2hr turns, waffle overlay, pillows for support and repositioning, frequent incontinence checks and care, prn linen changes, mepilex, barrier cream.     General: Fragile, loose skin.  Elbows: Intact, pink, blanching.  Perineum: Intact, red, blanching, barrier cream applied.  Sacrum: Intact, red, blanching, barrier cream applied.  Lower Extremities: Intact, edematous.  Heels: Intact, red, boggy, blanching, mepilex in place.

## 2020-11-17 NOTE — PROGRESS NOTES
Pt was woken up to be changed and was very agitated and combative. Pt refused to take morning dose of Sinemet.

## 2020-11-17 NOTE — PROGRESS NOTES
Received report from day shift RN and assumed care of patient. Assessment completed, POC discussed. Pt is currently laying in bed, A&Ox1, on RA, VSS. Denies pain or discomfort. Scheduled medications given per MAR. Bed is in lowest, locked position, call bell and belongings are in reach. No further needs at this time.

## 2020-11-18 NOTE — PROGRESS NOTES
Hospital Medicine Twice Weekly Progress Note    Date of Service  11/18/2020    Chief Complaint  Shortness of breath, nonproductive cough, fevers, chills x5 days    Hospital Course  Ms. Bennett is a wheelchair bound 75-year-old female with a PMH of arthritis, DVT, Parkinsons, and dementia who presented 5/14/2020 with complaints of shortness of breath, nonproductive cough, fevers, and chills for 5 days. She is confused at baseline and was sent by her  since he was unable to care for her. An EKG was done in the ED and was unremarkable, though a chest xray showed bilateral interstitial infiltrates. COVID was ruled out in the ED. She was evaluated by PT/OT who recommended 24/7 supervision. Her  also stated he is no longer able to care for her either. She is now pending placement to SNF vs group home, though medicaid must be obtained first, as she has limited income.     Interval Problem Update  No complaints today.  Denies pain.    Afebrile overnight, HR 70s, SBP 100s-teens, O2 saturations within normal limits on room air.    Consultants/Specialty  Palliative care    Code Status  DNAR/DNI    Disposition  SNF vs group home.    Review of Systems  Review of Systems   Constitutional: Positive for malaise/fatigue. Negative for fever.   Respiratory: Negative for cough and shortness of breath.    Cardiovascular: Negative for chest pain and leg swelling.   Gastrointestinal: Negative for abdominal pain, constipation, diarrhea, nausea and vomiting.   Genitourinary: Negative for dysuria.   Neurological: Positive for weakness. Negative for dizziness, sensory change, speech change, focal weakness and headaches.   Psychiatric/Behavioral: Negative for depression. The patient is not nervous/anxious and does not have insomnia.    All other systems reviewed and are negative.     Physical Exam  Temp:  [36.3 °C (97.3 °F)-36.6 °C (97.8 °F)] 36.6 °C (97.8 °F)  Pulse:  [78-87] 87  Resp:  [16-19] 19  BP: (104-124)/(40-69)  124/40  SpO2:  [93 %-98 %] 98 %    Physical Exam  Vitals signs and nursing note reviewed.   Constitutional:       General: She is awake. She is not in acute distress.     Appearance: She is ill-appearing (Chronically ill-appearing).   HENT:      Head: Normocephalic and atraumatic.      Mouth/Throat:      Lips: Pink.      Mouth: Mucous membranes are moist.   Eyes:      Conjunctiva/sclera: Conjunctivae normal.      Pupils: Pupils are equal, round, and reactive to light.   Neck:      Musculoskeletal: Normal range of motion and neck supple.   Cardiovascular:      Rate and Rhythm: Normal rate and regular rhythm.      Pulses: Normal pulses.      Heart sounds: Murmur present.   Pulmonary:      Effort: Pulmonary effort is normal.      Breath sounds: Normal breath sounds.   Abdominal:      General: Bowel sounds are normal. There is no distension or abdominal bruit.      Palpations: Abdomen is soft.      Tenderness: There is no abdominal tenderness.   Musculoskeletal:      Right lower leg: No edema.      Left lower leg: No edema.   Skin:     General: Skin is warm and dry.   Neurological:      Mental Status: She is alert.   Psychiatric:         Attention and Perception: Attention and perception normal.         Mood and Affect: Mood and affect normal.         Speech: Speech normal.         Behavior: Behavior normal. Behavior is cooperative.     Fluids    Intake/Output Summary (Last 24 hours) at 11/18/2020 1558  Last data filed at 11/18/2020 1200  Gross per 24 hour   Intake 1080 ml   Output no documentation   Net 1080 ml     Laboratory    Imaging  DX-CHEST-PORTABLE (1 VIEW)   Final Result      No acute cardiopulmonary abnormality.      US-EXTREMITY VENOUS LOWER UNILAT RIGHT   Final Result      DX-HIP-UNILATERAL-WITH PELVIS-1 VIEW RIGHT   Final Result      1.  Bony pelvis and the hip joint appear normal      2.  osteoarthritis of lumbar spine facet joint and both sacroiliac joint      EC-ECHOCARDIOGRAM COMPLETE W/O CONT   Final  Result      CT-HEAD W/O   Final Result      1.  No acute intracranial findings.      2.  Diffuse atrophy and periventricular white matter changes, consistent with chronic small vessel disease.         DX-CHEST-PORTABLE (1 VIEW)   Final Result      Perihilar interstitial markings are increased and suggestive of mild pulmonary edema.         Assessment/Plan  Dementia (HCC)- (present on admission)  Assessment & Plan  -Chronic, stable.   -Frequent re-orientation, reestablish circadian rhythm, encourage familiar faces/family in room, avoid or minimize narcotics/sedatives.   -Continue seroquel and prozac.   -Requires occasional haldol for agitation and aggressive behavior.    Lower extremity pain, bilateral- (present on admission)  Assessment & Plan  -PRN tylenol available if needed.  -PT/OT recommending SNF placement, but will eventually need group home placement with 24/7 care after SNF making it a difficult discharge.  -Mobilize as tolerated.    Discharge planning issues  Assessment & Plan  -Patient not eligible for medicaid due to income.  Social work has requested P[facility] screen her for institutional medicaid.  Will need placement after that is obtained.  -Patient's daughter is assisting with discharge.     Parkinson's disease (HCC)- (present on admission)  Assessment & Plan  -Chronic and stable.  -Continue sinemet.  -24/7 supervision at discharge.   -Frequent reorientation.  -Avoid narcotics, benzodiazepines and hypnotics.     VTE prophylaxis: Lovenox      Brenda León, MSN, RN, APRN, ACNPC-AG, CCRN  Nurse Practitioner, [facility]  [phone]    11/18/2020    3:58 PM

## 2020-11-18 NOTE — NON-PROVIDER
"Date of Service:  11/18/2020      Chief Complaint:  Chief Complaint   Patient presents with   • Fall   • Shortness of Breath   • Chest Pain        Hospital Course:  Ms. Bennett is a wheelchair bound 75-year-old female with a PMH of arthritis, DVT, Parkinson's, and dementia who presented 5/14/2020 with complaints of shortness of breath, nonproductive cough, fevers, and chills for 5 days. She is confused at baseline and was sent by her  since he was unable to care for her. An EKG was done in the ED and was unremarkable, though a chest xray showed bilateral interstitial infiltrates. COVID was ruled out in the ED. She was evaluated by PT/OT who recommended 24/7 supervision. Her  also stated he is no longer able to care for her either. She is now pending placement to SNF vs [facility] home, though medicaid must be obtained first as she has limited income.     Interval Problem Update:  11/18 Patient in pleasant mood, no c/o pain and no complaints in general.  Lying in bed, unable to sit up or roll-over for PE.  VSS. Afebrile.    Consultations/Specialty:  Palliative     Code Status:  DNR/DNI    Disposition:  SNF vs     Review of Systems:  Review of Systems   Constitutional: Negative.    HENT: Negative.    Eyes: Negative.    Respiratory: Negative.    Cardiovascular: Negative.    Gastrointestinal: Negative.    Genitourinary: Negative.    Musculoskeletal: Negative.    Skin: Negative.    Neurological: Negative.    Endo/Heme/Allergies: Negative.    Psychiatric/Behavioral: Negative.    All other systems reviewed and are negative.         Physical Exam:  /69   Pulse 79   Temp 36.3 °C (97.3 °F) (Temporal)   Resp 18   Ht 1.727 m (5' 8\")   Wt 64.6 kg (142 lb 6.7 oz)   SpO2 98%   BMI 21.65 kg/m²    Physical Exam   Constitutional: She is well-developed, well-nourished, and in no distress.   HENT:   Head: Normocephalic and atraumatic.   Right Ear: External ear normal.   Eyes: Pupils are equal, round, and reactive " to light. Conjunctivae and EOM are normal.   Neck: Normal range of motion. Neck supple.   Cardiovascular: Normal rate, regular rhythm and normal heart sounds.   Pulmonary/Chest: Effort normal and breath sounds normal.   Abdominal: Soft. Bowel sounds are normal.   Musculoskeletal:      Comments: Wheelchair bound at baseline. Unable to move herself in bed for exam   Neurological: She is alert.   Skin: Skin is warm and dry.   Psychiatric: Affect normal. She exhibits disordered thought content.            Fluids:    Intake/Output Summary (Last 24 hours) at 11/18/2020 0916  Last data filed at 11/17/2020 1735  Gross per 24 hour   Intake 480 ml   Output --   Net 480 ml          Laboratory:                          Imaging:    DX-CHEST-PORTABLE (1 VIEW)   Final Result      No acute cardiopulmonary abnormality.      US-EXTREMITY VENOUS LOWER UNILAT RIGHT   Final Result      DX-HIP-UNILATERAL-WITH PELVIS-1 VIEW RIGHT   Final Result      1.  Bony pelvis and the hip joint appear normal      2.  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  No acute intracranial findings.      2.  Diffuse atrophy and periventricular white matter changes, consistent with chronic small vessel disease.         DX-CHEST-PORTABLE (1 VIEW)   Final Result      Perihilar interstitial markings are increased and suggestive of mild pulmonary edema.         Assessment/Plan:     Dementia (HCC)- (present on admission)  Assessment & Plan  -Chronic, stable.   -Frequent re-orientation, reestablish circadian rhythm, encourage familiar faces/family in room, avoid or minimize narcotics/sedatives.   -Continue on seroquel and prozac.      Discharge planning issues  Assessment & Plan  -Patient not eligible for Medicaid due to income.  Social work has requested PFA screen her for institutional medicaid.  Will need placement after that is  obtained.  -Patient's daughter is assisting with discharge.      Lower extremity pain, bilateral- (present on admission)  Assessment & Plan  -PRN Tylenol available if needed.  -PT/OT recommending SNF placement, but will eventually need group home placement with 24/7 care after SNF making it a difficult discharge.  -Mobilize as tolerated.     Parkinson's disease (HCC)- (present on admission)  Assessment & Plan  -Chronic and stable.  -Continue sinemet.  -24/7 supervision at discharge.   -Frequent reorientation.  -Avoid narcotics, benzodiazepines and hypnotics.     VTE prophylaxis: Lovenox

## 2020-11-20 NOTE — PROGRESS NOTES
Received report from day shift RN and assumed care of patient. Pt is currently asleep in bed, A&Ox1, on RA, VSS. Scheduled medications held due to patient being asleep. Bed is in lowest, locked position. No further needs at this time.

## 2020-11-20 NOTE — PROGRESS NOTES
2 RN skin check complete with: CURT Sandoval.  Devices in place: Mepilex.  Skin assessed under devices: Yes.  Confirmed pressure ulcers found on: N/A.  New potential pressure ulcers noted on: N/A.  Wound consult placed: N/A.     The following interventions are in place: 2 RN skin check, q2hr turns, waffle overlay, pillows for support and repositioning, frequent incontinence checks and care, prn linen changes, mepilex, barrier cream.      General: Fragile, loose skin.  Elbows: Intact, pink, blanching.  Perineum: Intact, red, blanching, barrier cream applied.  Sacrum: Intact, red, blanching, barrier cream applied.  Lower Extremities: Intact, edematous.  Heels: Intact, red, boggy, blanching, mepilex in place.

## 2020-11-21 NOTE — PROGRESS NOTES
2 RN skin check completed with CURT Kitchen.   Devices in place Mepilex.  Skin assessed under devices yes.  Confirmed pressure ulcers found on N/A.  New potential pressure ulcers noted on N/A. Wound consult placed N/A.    The following interventions in place [id number], waffle overlay, pillows for support and comfort, mepilex, barrier paste, incontinence checks    Skin assessment:  General- skin is overall intact, loose, fragile skin  Ears- intact, blanching  Chest and abdomen- intact, blanching  Upper extremities- intact, blanching  Elbows- intact, blanching, pink  Sacrum and Groin- red, intact, barrier paste applied  Lower extremities- intact  Heels- intact, boggy, mepilex in place

## 2020-11-21 NOTE — PROGRESS NOTES
2 RN skin check complete with: CURT Sandoval.  Devices in place: Mepilex.  Skin assessed under devices: Yes, intact.   Confirmed pressure ulcers found on: N/A.  New potential pressure ulcers noted on: N/A.  Wound consult placed: N/A.   The following interventions are in place: q2hr turns, waffle overlay, pillows for support and repositioning, mepilex, barrier cream, and incontinence care provided with linen changes.      Skin Assessment:    General: Fragile, loose skin.  Elbows: Intact, pink, blanching.  Perineum: Intact, red, blanching, barrier cream applied.  Sacrum: Intact, red, blanching, barrier cream applied.  Lower Extremities: Intact, trace edema.  Heels: Intact, red, boggy, slow to obed. New mepilex dressing applied to heels.

## 2020-11-21 NOTE — PROGRESS NOTES
Report received by maite RN. Assumed care of pt. Assessment complete. Pt A&Ox1 to self, VSS and on RA. Pt in no apparent signs of distress, denies pain. Pt was cooperative with care, took meds without difficulty. Pt is bed bound, uses wheelchair up to nurses station, and incontinent x2. Plan of care discussed. Call light within reach, bed in lowest position, and pt has no further questions at this time.

## 2020-11-21 NOTE — PROGRESS NOTES
Hospital Medicine Twice Weekly Progress Note    Date of Service  11/21/2020    Chief Complaint  Shortness of breath, nonproductive cough, fevers, chills x5 days    Hospital Course  Ms. Bennett is a wheelchair bound 75-year-old female with a PMH of arthritis, DVT, Parkinsons, and dementia who presented 5/14/2020 with complaints of shortness of breath, nonproductive cough, fevers, and chills for 5 days. She is confused at baseline and was sent by her  since he was unable to care for her. An EKG was done in the ED and was unremarkable, though a chest xray showed bilateral interstitial infiltrates. COVID was ruled out in the ED. She was evaluated by PT/OT who recommended 24/7 supervision. Her  also stated he is no longer able to care for her either. She is now pending placement to SNF vs group home, though medicaid must be obtained first, as she has limited income.     Interval Problem Update  Eating breakfast with assistance of the CNA. Tolerating well. No e/o aspiration. No complaints, feeling good.     Afebrile overnight, HR 70s-80s, SBP 90s-100s, O2 saturations within normal limits on room air.    Consultants/Specialty  Palliative care    Code Status  DNAR/DNI    Disposition  SNF vs group home.    Review of Systems  Review of Systems   Constitutional: Positive for malaise/fatigue. Negative for fever.   Respiratory: Negative for cough and shortness of breath.    Cardiovascular: Negative for chest pain and leg swelling.   Gastrointestinal: Negative for abdominal pain, constipation, diarrhea, nausea and vomiting.   Genitourinary: Negative for dysuria, frequency and urgency.   Neurological: Positive for weakness. Negative for dizziness, focal weakness and headaches.   Psychiatric/Behavioral: Negative for depression. The patient is not nervous/anxious and does not have insomnia.    All other systems reviewed and are negative.     Physical Exam  Temp:  [36.2 °C (97.2 °F)-36.4 °C (97.5 °F)] 36.3 °C (97.4  °F)  Pulse:  [70-80] 70  Resp:  [16-18] 16  BP: ()/(54-63) 109/63  SpO2:  [90 %-91 %] 91 %    Physical Exam  Vitals signs and nursing note reviewed.   Constitutional:       General: She is awake. She is not in acute distress.     Appearance: She is ill-appearing (Chronically ill-appearing).   HENT:      Head: Normocephalic and atraumatic.      Mouth/Throat:      Lips: Pink.      Mouth: Mucous membranes are moist.   Eyes:      Conjunctiva/sclera: Conjunctivae normal.      Pupils: Pupils are equal, round, and reactive to light.   Neck:      Musculoskeletal: Normal range of motion and neck supple.   Cardiovascular:      Rate and Rhythm: Normal rate and regular rhythm.      Pulses: Normal pulses.      Heart sounds: Murmur present.   Pulmonary:      Effort: Pulmonary effort is normal.      Breath sounds: Examination of the right-lower field reveals decreased breath sounds. Examination of the left-lower field reveals decreased breath sounds. Decreased breath sounds present.   Abdominal:      General: Bowel sounds are decreased. There is no distension or abdominal bruit.      Palpations: Abdomen is soft.      Tenderness: There is no abdominal tenderness.   Musculoskeletal:      Right lower leg: No edema.      Left lower leg: No edema.   Skin:     General: Skin is warm and dry.   Neurological:      General: No focal deficit present.      Mental Status: She is alert.   Psychiatric:         Attention and Perception: Attention and perception normal.         Mood and Affect: Mood and affect normal.         Speech: Speech normal.         Behavior: Behavior normal. Behavior is cooperative.         Cognition and Memory: Cognition is impaired.         Judgment: Judgment normal.     Fluids    Intake/Output Summary (Last 24 hours) at 11/21/2020 1239  Last data filed at 11/20/2020 2000  Gross per 24 hour   Intake 540 ml   Output no documentation   Net 540 ml     Laboratory    Imaging  DX-CHEST-PORTABLE (1 VIEW)   Final Result       No acute cardiopulmonary abnormality.      US-EXTREMITY VENOUS LOWER UNILAT RIGHT   Final Result      DX-HIP-UNILATERAL-WITH PELVIS-1 VIEW RIGHT   Final Result      1.  Bony pelvis and the hip joint appear normal      2.  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  No acute intracranial findings.      2.  Diffuse atrophy and periventricular white matter changes, consistent with chronic small vessel disease.         DX-CHEST-PORTABLE (1 VIEW)   Final Result      Perihilar interstitial markings are increased and suggestive of mild pulmonary edema.         Assessment/Plan  Dementia (HCC)- (present on admission)  Assessment & Plan  -Chronic, stable.   -Frequent re-orientation, reestablish circadian rhythm, encourage familiar faces/family in room, avoid or minimize narcotics/sedatives.   -Continue seroquel and prozac.   -Requires occasional haldol for agitation and aggressive behavior. Has not needed any since 11/14.     Lower extremity pain, bilateral- (present on admission)  Assessment & Plan  -PRN tylenol available if needed.  -PT/OT recommending SNF placement, but will eventually need group home placement with 24/7 care after SNF making it a difficult discharge.  -Mobilize as tolerated.    Discharge planning issues  Assessment & Plan  -Patient not eligible for medicaid due to income.  Social work has requested PFA screen her for institutional medicaid.  Will need placement after that is obtained.  -Patient's daughter is assisting with discharge.     Parkinson's disease (HCC)- (present on admission)  Assessment & Plan  -Chronic and stable.  -Continue sinemet.  -24/7 supervision at discharge.   -Frequent reorientation.     VTE prophylaxis: Estrella León, MSN, RN, APRN, ACNPC-AG, CCRN  Nurse Practitioner, [facility]  [phone]    11/21/2020    12:39 PM

## 2020-11-22 NOTE — PROGRESS NOTES
Pt sleeping at change of shift. Pt AOx 1, no signs of pain or distress, on RA, and up to WC with 2 assist. VSS. 2RN skin checks and Q2 turns in place. Call light/belongings in reach, bed locked/lowest position, bed alarm on, and pt now resting quietly.

## 2020-11-22 NOTE — PROGRESS NOTES
2 RN skin check complete with: CURT Juares.  Devices in place: Mepilex.  Skin assessed under devices: Yes, intact.   Confirmed pressure ulcers found on: N/A.  New potential pressure ulcers noted on: N/A.  Wound consult placed: N/A.   The following interventions are in place: q2hr turns, waffle overlay, pillows for support and repositioning, mepilex, barrier cream, and incontinence care provided with linen changes.      Skin Assessment:     General: Fragile, loose skin.  Elbows: Intact, pink, blanching.  Perineum: Intact, red, blanching, barrier cream applied.  Sacrum: Intact, red, blanching, barrier cream applied.  Lower Extremities: Intact, trace edema.  Heels: Intact, red, boggy, slow to obed. Mepilex dressing applied to heels. Changed 11/21.

## 2020-11-22 NOTE — CARE PLAN
Problem: Safety  Goal: Will remain free from injury  Outcome: PROGRESSING AS EXPECTED     Problem: Skin Integrity  Goal: Risk for impaired skin integrity will decrease  Outcome: PROGRESSING AS EXPECTED     Problem: Infection  Goal: Will remain free from infection  Outcome: PROGRESSING AS EXPECTED

## 2020-11-23 NOTE — PROGRESS NOTES
2 RN skin check completed with CURT Kitchen.   Devices in place Mepilex.  Skin assessed under devices yes.  Confirmed pressure ulcers found on N/A.  New potential pressure ulcers noted on N/A. Wound consult placed N/A.    The following interventions in place [id number], waffle overlay, pillows for support and comfort, mepilex, barrier paste, incontinence checks, promote nutrition    Skin assessment:    Sacrum and Groin- red, intact, barrier paste applied  Heels- intact, boggy, mepilex in place

## 2020-11-23 NOTE — DISCHARGE PLANNING
Medical Social Work  PC to patient's daughterColeen [phone]: message left to franci LORA.    PC to patient's spouse, Gee [phone]; voice mail box has not been set up yet.    PC to patient's son Jasbir Dorsey [phone]: message left to franci LORA

## 2020-11-23 NOTE — PROGRESS NOTES
2 RN skin check complete with: CURT Tanner.  Devices in place: Mepilex.  Skin assessed under devices: Yes.  Confirmed pressure ulcers found on: None.  New potential pressure ulcers noted on: None.  Wound consult placed: N/A.     The following interventions are in place: 2 RN skin check qshift, q2hr turns, waffle mattress overlay, pillows for support and repositioning, frequent incontinence checks and care, prn linen changes, mepilex, barrier cream, and barrier wipes.      General: Fragile, loose skin.  Elbows: Intact, pink, blanching.  Perineum: Intact, red, blanching, barrier cream applied.  Sacrum: Intact, red, blanching, barrier cream applied.  Lower Extremities: Intact, edematous.  Heels: Intact, red, boggy, blanching, new mepilex in place.

## 2020-11-23 NOTE — CARE PLAN
Problem: Safety  Goal: Will remain free from falls  Outcome: PROGRESSING AS EXPECTED  Note: Patient will have no falls during shift. She is considered a high fall risk. Bed low/locked w/ alarm active and audible. Hourly rounding in place. Fall band in place.     Problem: Venous Thromboembolism (VTW)/Deep Vein Thrombosis (DVT) Prevention:  Goal: Patient will participate in Venous Thrombosis (VTE)/Deep Vein Thrombosis (DVT)Prevention Measures  Outcome: PROGRESSING AS EXPECTED  Intervention: Assess and monitor for anticoagulation complications  Note: Patient will not develop any DVT/PE during shift. She refuses HOLA hose application but is agreeable at times to receiving her lovenox injection. Continue to educate pt on compliance.

## 2020-11-23 NOTE — PROGRESS NOTES
Received report and assumed care of pt .Pt A&OX4. Denies any pain or discomfort at this time. Pt currently resting in bed. All pt needs met at this time. Safety precautions and hourly rounding in place.

## 2020-11-23 NOTE — DISCHARGE PLANNING
Medical Social Work  PC from patient's son Jasbir.   GENO explained reason for call, and how his father has not been assisting in discharging patient.  Jasbir stated he father is stubborn and does things his own way.  GENO asked if he is aware of a will in the event his father passes.  Jasbir stated yes, that he has been told he is.  Not sure if just medical or financial, suggested SW contact his sister. Telling GENO that she has stated she has copies of a will or something like that. Jasbir is aware that his mother is not able to make her own decisions. Jasbir stated he is willing to work with SW to help discharge her. GENO stated he will follow up with Coleen and get back to him.

## 2020-11-23 NOTE — PROGRESS NOTES
Patient A/Ox1, VSS, RA. Pt is BB. Incontinent x2. Compliant with meds and pleasant and laughing w/ staff. Bed low/locked with alarm active/audible. No complaints of pain. Pt is safe with needs met. Hourly rounding provided. No signs of acute distress.

## 2020-11-24 NOTE — PROGRESS NOTES
2 RN skin check complete with: CURT Sandoval.  Devices in place: Mepilex.  Skin assessed under devices: Yes.  Confirmed pressure ulcers found on: None.  New potential pressure ulcers noted on: None.  Wound consult placed: N/A.     The following interventions are in place: 2 RN skin check qshift, q2hr turns, waffle mattress overlay, pillows for support and repositioning, frequent incontinence checks and care, prn linen changes, mepilex, barrier cream, and barrier wipes.      General: Fragile, loose skin.  Elbows: Intact, pink, blanching.  Perineum: Intact, red, blanching, barrier cream applied.  Sacrum: Intact, red, blanching, barrier cream applied.  Lower Extremities: Intact, edematous.  Heels: Intact, red, boggy, blanching, new mepilex in place.

## 2020-11-24 NOTE — PROGRESS NOTES
Patient A/Ox1, VSS, RA. Pt is BB. Incontinent x2. Compliant with meds and pleasant/cooperative w/ staff. Bed low/locked with alarm active/audible. No complaints of pain. Pt is safe with needs met. Hourly rounding provided. No signs of acute distress. Last recorded BM was on 11/20/20 which was only a smear.

## 2020-11-24 NOTE — PROGRESS NOTES
2 RN skin check completed with CURT Kitchen.   Devices in place: Mepilex.  Skin assessed under devices: yes.  Confirmed pressure ulcers found on N/A.  New potential pressure ulcers noted on N/A. Wound consult placed N/A.  The following interventions in place: Q 2 Turns, 2 RN skin checks, waffle overlay, pillows for support and comfort, mepilex, barrier paste, incontinence checks, promote nutrition.     Assessed:  Sacrum and Groin: red, intact, barrier paste applied.  Heels: intact, boggy, mepilex in place.

## 2020-11-25 NOTE — PROGRESS NOTES
"Assumed care of patient this shift, patient alert to self. Frequently yells out \"hello\". Incontinence care provided. Utilizes wheelchair when out of bed as patient is bed bound. Bed/chair alarm at all times.   "

## 2020-11-25 NOTE — CARE PLAN
Problem: Bowel/Gastric:  Goal: Normal bowel function is maintained or improved  Outcome: PROGRESSING AS EXPECTED  Note: Patient will establish a more normal bowel regimen. Pt had not had a BM for several days. She continued to take stool softeners during this time. Oral fluids were also offered regularly. When pt was given warm shower and then sat in w/c, she was able to have a very large BM. This should be part of her plan every 2 days. Continue to monitor bowel activity.     Problem: Skin Integrity  Goal: Risk for impaired skin integrity will decrease  Outcome: PROGRESSING AS EXPECTED  Note: Patient is chair bound but in bed most days. Waffle cushion in place. Barrier wipes and cream utilized with every changing. Frequent repositioning being completed. Pt on appropriate diet so skin is not as fragile. Continue with 2 RN skin checks per protocol.

## 2020-11-25 NOTE — PROGRESS NOTES
2 RN skin check completed with CURT Kitchen.   Devices in place: n/a  Skin assessed under devices: yes.  Confirmed pressure ulcers found on n/a  New potential pressure ulcers noted on: none   Wound consult placed: none   The following interventions in place: turning/repositoining every 2 hours, waffle overlay for bed and wheelchair, pillows for support/repositioning, incontinence/linen checks      Assessment:    Buttocks: pink and blanching    Heels: intact

## 2020-11-25 NOTE — PROGRESS NOTES
Assumed care of patient this shift. Patient in wheelchair at nurses station for most of the day. Had shower today. Safety precautions in place.

## 2020-11-25 NOTE — PROGRESS NOTES
Patient A/Ox1, VSS, RA. Pt is BB. Incontinent x2. Compliant with meds and pleasant/cooperative w/ staff. Bed low/locked with alarm active/audible. No complaints of pain. Pt is safe with needs met. Hourly rounding provided. No signs of acute distress.

## 2020-11-25 NOTE — PROGRESS NOTES
Hospital Medicine Twice Weekly Progress Note    Date of Service  11/25/2020    Chief Complaint  75 y.o. female admitted 5/14/2020 with SOB    Hospital Course  Ms. Bennett is a wheelchair bound 75-year-old female with a PMH of arthritis, DVT, Parkinsons, and dementia who presented 5/14/2020 with complaints of shortness of breath, nonproductive cough, fevers, and chills for 5 days. She is confused at baseline and was sent by her  since he was unable to care for her. An EKG was done in the ED and was unremarkable, though a chest xray showed bilateral interstitial infiltrates. COVID was ruled out in the ED. She was evaluated by PT/OT who recommended 24/7 supervision. Her  also stated he is no longer able to care for her either. She is now pending placement to SNF vs group home, though medicaid must be obtained first, as she has limited income.     Interval Problem Update  -Patient seen and examined.  Patient laying comfortably in bed watching TV.  Patient denies any pain or discomfort at this time.  Encourage patient to get up and socialize with other patients on the floor.  Patient given an update in plan of care.  -POC: Continue supportive care; monitor for safety; monitor for signs of aspiration; pending placement to SNF vs group home  -Lab work: Reviewed, last obtained on 10/20/2020, unremarkable  -VSS at this time  -We will order labs as warranted    ==================================================================================================  VIET PATTON, MSN, APRN, FNP-C, certify that the patient requires continued medically necessary hospital services for the treatment of dementia and needs long-term placement. The patient will remain in the hospital for the foreseeable future.  Discharge may or may not occur in the next 20 days due to ongoing discharge delays due to no medically acceptable discharge  option.    ==================================================================================================    Consultants/Specialty  Palliative    Code Status  DNAR/DNI    Disposition  TBD -SNF vs     Review of Systems  Review of Systems   Unable to perform ROS: Dementia        Physical Exam  Temp:  [36.1 °C (97 °F)-36.7 °C (98.1 °F)] 36.7 °C (98.1 °F)  Pulse:  [71-74] 71  Resp:  [16-17] 16  BP: ()/(54-57) 103/57  SpO2:  [93 %-95 %] 95 %    Physical Exam  Vitals signs and nursing note reviewed.   HENT:      Head: Normocephalic.      Nose: Nose normal.      Mouth/Throat:      Mouth: Mucous membranes are dry.   Eyes:      Pupils: Pupils are equal, round, and reactive to light.   Neck:      Musculoskeletal: Normal range of motion.   Cardiovascular:      Rate and Rhythm: Normal rate and regular rhythm.      Pulses: Normal pulses.      Heart sounds: Normal heart sounds.   Pulmonary:      Effort: Pulmonary effort is normal.      Breath sounds: Normal breath sounds.   Abdominal:      General: Bowel sounds are normal.   Musculoskeletal:         General: Tenderness present.   Skin:     General: Skin is warm.      Capillary Refill: Capillary refill takes 2 to 3 seconds.   Neurological:      Mental Status: She is alert. Mental status is at baseline.         Fluids    Intake/Output Summary (Last 24 hours) at 11/25/2020 1340  Last data filed at 11/25/2020 1237  Gross per 24 hour   Intake 1480 ml   Output --   Net 1480 ml       Laboratory                        Imaging  DX-CHEST-PORTABLE (1 VIEW)   Final Result      No acute cardiopulmonary abnormality.      US-EXTREMITY VENOUS LOWER UNILAT RIGHT   Final Result      DX-HIP-UNILATERAL-WITH PELVIS-1 VIEW RIGHT   Final Result      1.  Bony pelvis and the hip joint appear normal      2.  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  No acute intracranial  findings.      2.  Diffuse atrophy and periventricular white matter changes, consistent with chronic small vessel disease.         DX-CHEST-PORTABLE (1 VIEW)   Final Result      Perihilar interstitial markings are increased and suggestive of mild pulmonary edema.           Assessment/Plan  Dementia (HCC)- (present on admission)  Assessment & Plan  -Chronic, stable.   -Frequent re-orientation, reestablish circadian rhythm, encourage familiar faces/family in room, avoid or minimize narcotics/sedatives.   -Continue seroquel and prozac.   -Requires occasional haldol for agitation and aggressive behavior. Has not needed any since 11/14.     Lower extremity pain, bilateral- (present on admission)  Assessment & Plan  -PRN tylenol available if needed.  -PT/OT recommending SNF placement, but will eventually need group home placement with 24/7 care after SNF making it a difficult discharge.  -Mobilize as tolerated.    Discharge planning issues  Assessment & Plan  -Patient not eligible for medicaid due to income.  Social work has requested PFA screen her for institutional medicaid.  Will need placement after that is obtained.  -Patient's daughter is assisting with discharge.     Parkinson's disease (HCC)- (present on admission)  Assessment & Plan  -Chronic and stable.  -Continue sinemet.  -24/7 supervision at discharge.   -Frequent reorientation.         VTE prophylaxis: Lovenox    ==================================================================================================Please note that this dictation was created using voice recognition software. I have made every reasonable attempt to correct obvious errors, but there may be errors of grammar and possibly content that I did not discover before finalizing the note.    Electronically signed by:  VIET Salazar, MSN, APRN, FNP-C  Hospitalist Services  [facility]  [phone]  [email or web address]  11/25/20    [id number]

## 2020-11-26 NOTE — PROGRESS NOTES
Oriented to self only.  Denied any pain.  Incontinent of urine.  Cleaned.  Barrier paste applied.  Continues to have bed alarm.

## 2020-11-26 NOTE — CARE PLAN
Problem: Safety  Goal: Will remain free from falls  Outcome: PROGRESSING AS EXPECTED   Continues to have bed alarm.      Problem: Communication  Goal: The ability to communicate needs accurately and effectively will improve  Outcome: PROGRESSING SLOWER THAN EXPECTED   Confused.  Oriented to environments prn

## 2020-11-26 NOTE — PROGRESS NOTES
2 RN skin check completed with CURT Roy.   Coccyx/Sacrum has small area which is peeling.  Wound consult ordered.    The following interventions in place:  Q2 turns, waffle mattress overlay, heels floated on pillow and incontinence care with barrier paste.           Coccyx/Sacrum has small area which is pink and peeling.  Bilateral heels are boggy.

## 2020-11-26 NOTE — PROGRESS NOTES
Assumed care of patient, patient alert to self. Pleasant this morning. 1:1 feeding, meds crushed with pudding. Incontinence checks/linen changes as needed. Denies pain or discomfort. Refused 2RN skin check. Bed alarm in place.

## 2020-11-27 NOTE — CARE PLAN
Problem: Safety  Goal: Will remain free from falls  Outcome: PROGRESSING AS EXPECTED   Continues to have bed alarm.      Problem: Bowel/Gastric:  Goal: Normal bowel function is maintained or improved  Outcome: PROGRESSING AS EXPECTED  Note: Last BM 11/26

## 2020-11-27 NOTE — PROGRESS NOTES
Received bedside report and assumed care of pt at change of shift. Pt is in bed sleeping at this time with no signs of distress. VSS on RA. Bed alarm is on with call light in reach.

## 2020-11-27 NOTE — PROGRESS NOTES
Pt was already sleeping when this RN went to give night medication.  Pt woke up but falls right back to sleep.  Turned light on and sat her up in bed.  Pt got mad.

## 2020-11-28 NOTE — PROGRESS NOTES
2 RN skin check completed with CURT Hale.   Coccyx/Sacrum has small area which is peeling.  Wound consult ordered and awaiting for eval.      The following interventions in place:  Q2 turns, waffle mattress overlay, heels floated on pillow and incontinence care with barrier paste.             Coccyx/Sacrum has small area which is pink and peeling.  Bilateral heels are boggy.

## 2020-11-28 NOTE — CARE PLAN
Problem: Safety  Goal: Will remain free from falls  Outcome: PROGRESSING AS EXPECTED   Continues to have bed alarm.      Problem: Bowel/Gastric:  Goal: Normal bowel function is maintained or improved  Outcome: PROGRESSING AS EXPECTED   Last BM yesterday

## 2020-11-28 NOTE — PROGRESS NOTES
Oriented to self only.  Was pleasant and took her night medications.  Incontinent.  Gets upset with incontinence care.

## 2020-11-28 NOTE — PROGRESS NOTES
Received bedside report and assumed care of pt at change of shift. Pt is sleeping in bed with no signs of distress. VSS and on RA. Bed alarm is on. Bed is locked and in lowest position with water and call light in reach.

## 2020-11-28 NOTE — PROGRESS NOTES
2RN skin check refused multiple times throughout today. Observed sacrum during incontinence change, area is intact with some redness however blanches. Heels are blanching, left heel is red with mepilex in place and right heel is pink mepilex in place. Bilateral elbows are intact. Kristy area is intact, blanching redness

## 2020-11-28 NOTE — PROGRESS NOTES
Hospital Medicine Twice Weekly Progress Note    Date of Service  11/28/2020    Chief Complaint  75 y.o. female admitted 5/14/2020 with SOB    Hospital Course  Ms. Bennett is a wheelchair bound 75-year-old female with a PMH of arthritis, DVT, Parkinsons, and dementia who presented 5/14/2020 with complaints of shortness of breath, nonproductive cough, fevers, and chills for 5 days. She is confused at baseline and was sent by her  since he was unable to care for her. An EKG was done in the ED and was unremarkable, though a chest xray showed bilateral interstitial infiltrates. COVID was ruled out in the ED. She was evaluated by PT/OT who recommended 24/7 supervision. Her  also stated he is no longer able to care for her either. She is now pending placement to SNF vs group home, though medicaid must be obtained first, as she has limited income.     Interval Problem Update  -Patient seen and examined.  Patient laying comfortably in bed watching TV.  Ivette is known to yell from time to time and wants someone in her room at all times.  Reoriented patient. Patient given an update.  -POC: Continue supportive care; monitor for safety; monitor for signs of aspiration; pending placement to SNF vs group home  -Lab work: Reviewed, last obtained on 10/20/2020, unremarkable  -VSS at this time  -We will order labs as warranted  -There are no significant changes my previous ROS/PE, patient previous note for further details.    ==================================================================================================  VIET PATTON, MSN, APRN, FNP-C, certify that the patient requires continued medically necessary hospital services for the treatment of dementia and needs long-term placement. The patient will remain in the hospital for the foreseeable future.  Discharge may or may not occur in the next 20 days due to ongoing discharge delays due to no medically acceptable discharge  option.    ==================================================================================================    Consultants/Specialty  Palliative    Code Status  DNAR/DNI    Disposition  TBD -SNF vs     Review of Systems  Review of Systems   Unable to perform ROS: Dementia        Physical Exam  Temp:  [36.1 °C (97 °F)-36.8 °C (98.3 °F)] 36.6 °C (97.8 °F)  Pulse:  [71-86] 71  Resp:  [16-18] 18  BP: (100-111)/(54-67) 111/54  SpO2:  [90 %-97 %] 90 %    Physical Exam  Vitals signs and nursing note reviewed.   HENT:      Head: Normocephalic.      Nose: Nose normal.      Mouth/Throat:      Mouth: Mucous membranes are dry.   Eyes:      Pupils: Pupils are equal, round, and reactive to light.   Neck:      Musculoskeletal: Normal range of motion.   Cardiovascular:      Rate and Rhythm: Normal rate and regular rhythm.      Pulses: Normal pulses.      Heart sounds: Normal heart sounds.   Pulmonary:      Effort: Pulmonary effort is normal.      Breath sounds: Normal breath sounds.   Abdominal:      General: Bowel sounds are normal.   Musculoskeletal:         General: Tenderness present.   Skin:     General: Skin is warm.      Capillary Refill: Capillary refill takes 2 to 3 seconds.   Neurological:      Mental Status: She is alert. Mental status is at baseline.         Fluids    Intake/Output Summary (Last 24 hours) at 11/28/2020 1033  Last data filed at 11/28/2020 0850  Gross per 24 hour   Intake 270 ml   Output --   Net 270 ml       Laboratory                        Imaging  DX-CHEST-PORTABLE (1 VIEW)   Final Result      No acute cardiopulmonary abnormality.      US-EXTREMITY VENOUS LOWER UNILAT RIGHT   Final Result      DX-HIP-UNILATERAL-WITH PELVIS-1 VIEW RIGHT   Final Result      1.  Bony pelvis and the hip joint appear normal      2.  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  No acute intracranial  findings.      2.  Diffuse atrophy and periventricular white matter changes, consistent with chronic small vessel disease.         DX-CHEST-PORTABLE (1 VIEW)   Final Result      Perihilar interstitial markings are increased and suggestive of mild pulmonary edema.           Assessment/Plan  Dementia (HCC)- (present on admission)  Assessment & Plan  -Chronic, stable  -Frequent re-orientation, reestablish circadian rhythm, encourage familiar faces/family in room, avoid or minimize narcotics/sedatives.   -Continue seroquel and prozac   -Requires occasional haldol for agitation and aggressive behavior. Has not needed any since 11/14    Lower extremity pain, bilateral- (present on admission)  Assessment & Plan  -PRN tylenol available if needed  -PT/OT recommending SNF placement, but will eventually need group home placement with 24/7 care after SNF making it a difficult discharge  -Mobilize as tolerated    Discharge planning issues  Assessment & Plan  -Patient not eligible for medicaid due to income.  Social work has requested PFA screen her for institutional medicaid.  Will need placement after that is obtained.  -Patient's daughter is assisting with discharge    Parkinson's disease (HCC)- (present on admission)  Assessment & Plan  -Chronic and stable  -Continue sinemet  -24/7 supervision at discharge  -Frequent reorientation       VTE prophylaxis: Lovenox    ==================================================================================================Please note that this dictation was created using voice recognition software. I have made every reasonable attempt to correct obvious errors, but there may be errors of grammar and possibly content that I did not discover before finalizing the note.    Electronically signed by:  VIET Salazar, MSN, APRN, FNP-C  Hospitalist Services  [facility]  [phone]  [email or web address]  11/28/20    1035

## 2020-11-29 NOTE — PROGRESS NOTES
Assumed care of pt at shift change, report received. Pt resting in bed comfortably. Denies pain, no s/s of distress. BP low, scheduled Sinemet hold per pharmacy. Bed alarm on. Safety precautions in place.

## 2020-11-30 NOTE — PROGRESS NOTES
Received bedside report and assumed care of pt at change of shift. Pt is sleeping in bed with no signs of distress. VSS and on RA. Bed alarm is on. Bed is locked and in lowest position with water and call light in reach. Pts BP has improved since starting new medication yesterday

## 2020-11-30 NOTE — WOUND TEAM
[facility]  Inpatient Services  Wound and Skin Care Progress note    Admission Date: 5/14/2020     Last order of IP CONSULT TO WOUND CARE was found on 11/26/2020 from Hospital Encounter on 5/14/2020     HPI, PMH, SH: Reviewed    Unit where seen by Wound Team: [id number]/[id number]     WOUND CONSULT/FOLLOW-UP RELATED TO:  coccyx    Self Report / Pain Level:  0/10       OBJECTIVE:  On pressure redistribution mattress with waffle overlay, barrier wipes and barrier paste in use    WOUND TYPE, LOCATION, CHARACTERISTICS (Pressure Injuries: location, stage, POA or date identified)  Wound 11/25/20 Partial Thickness Wound Coccyx (Active)      11/29/20 1130   Site Assessment Pink    Periwound Assessment Intact    Margins Defined edges    Drainage Amount None    Treatments Cleansed    Wound Cleansing Normal Saline Irrigation    Periwound Protectant Barrier Paste    Dressing Cleansing/Solutions Not Applicable    Dressing Options Open to Air    Dressing Change/Treatment Frequency Every Shift, and As Needed    NEXT Dressing Change/Treatment Date 11/29/20    NEXT Weekly Photo (Inpatient Only) 12/06/20    Non-staged Wound Description Partial thickness    Wound Length (cm) 0.5 cm 11/29/20 1130   Wound Width (cm) 1 cm 11/29/20 1130   Wound Surface Area (cm^2) 0.5 cm^2 11/29/20 1130   Tunneling (cm) 0 cm 11/29/20 1130   Undermining (cm) 0 cm 11/29/20 1130   Shape half arvizu    Wound Odor None    Exposed Structures None    Number of days: 4          Vascular:    LENNIE:   No results found.      Lab Values:    Lab Results   Component Value Date/Time    WBC 5.6 05/26/2020 01:24 AM    RBC 4.18 (L) 05/26/2020 01:24 AM    HEMOGLOBIN 12.5 05/26/2020 01:24 AM    HEMATOCRIT 40.2 05/26/2020 01:24 AM            Culture Results show:  No results found for this or any previous visit (from the past 720 hour(s)).      INTERVENTIONS BY WOUND TEAM:  Pt turned to L side, assessed wound, left LENEA with barrier paste. Pt repositioned.     Interdisciplinary  consultation: Patient, Bedside RN      EVALUATION: pt has small partial thickness wound with peeled skin, no depth. Barrier paste and pt incontinent.     Goals: Steady decrease in wound area and depth weekly.    NURSING PLAN OF CARE ORDERS (X):     Dressing changes: Continue previous Dressing Maintenance orders:        See new Dressing Maintenance orders:       Skin care: See Skin Care orders:  cont      Rectal tube care: See Rectal Tube Care orders:      Other orders:           WOUND TEAM PLAN OF CARE:   Dressing changes by wound team:                   Follow up 3 times weekly:                NPWT change 3 times weekly:     Follow up 1-2 times weekly:      Follow up Bi-Monthly:                  Follow up as needed:    PRN if wound worsens, Nsg to notify  Other (explain):     Anticipated discharge plans:   LTACH:        SNF/Rehab:                  Home Care:           Outpatient Wound Center:            Self Care:           Other: no advanced wound care needs

## 2020-11-30 NOTE — PROGRESS NOTES
Pt assisted back to bed with two person assist. Denies pain or discomfort. No s/s of distress noted. Skin red but blanching over sacrum and bilateral heels. Bed alarm on. Safety precautions in place.

## 2020-11-30 NOTE — DISCHARGE PLANNING
Medical Social Work  Voice from Coleen, requested SW call her at [phone]  PC to Coleen, [phone]: Coleen was able to verify that she has something and thinks it is a will.  Coleen stated she will forward it to her brother today/tomorrow.  Coleen requested that when her mother is placed it be closer to her in [place].

## 2020-12-01 NOTE — PROGRESS NOTES
2 RN skin check complete with: CURT Stoner.   Devices in place: N/A.  Skin assessed under devices: N/A.  Confirmed pressure ulcers found on: N/A.  New potential pressure ulcers noted on: N/A. Wound consult placed: no.  The following interventions in place: Q2 turns, 2RN skin check, frequent incontinence checks and changes, waffle mattress overlay, pressure redistribution mattress, barrier cream, and mepilex to heels    Ears: intact  Elbows: intact  Chest/Back:intact  Sacrum: intact  Lower Extremities: intact  Heels: pink, blanching, mepilex for protection

## 2020-12-01 NOTE — PROGRESS NOTES
Pt sitting on the wheel chair at the nursing station. A&Ox1, self only. Denies pain or discomfort. VSS. Assisted with feeding, no s/s of aspiration. Chair alarm in use. Safety precautions in place.

## 2020-12-01 NOTE — PROGRESS NOTES
Bedside report received and assumed pt care. Pt is A&Ox1 to self only. Up in wheelchair at nurses station. Pt is calm and pleasant. No issues taking medications. POC discussed with pt. Hourly rounding in practice.

## 2020-12-02 NOTE — PROGRESS NOTES
Hospital Medicine Twice Weekly Progress Note    Date of Service  12/2/2020    Chief Complaint  75 y.o. female admitted 5/14/2020 with SOB    Hospital Course  Ms. Bennett is a wheelchair bound 75-year-old female with a PMH of arthritis, DVT, Parkinsons, and dementia who presented 5/14/2020 with complaints of shortness of breath, nonproductive cough, fevers, and chills for 5 days. She is confused at baseline and was sent by her  since he was unable to care for her. An EKG was done in the ED and was unremarkable, though a chest xray showed bilateral interstitial infiltrates. COVID was ruled out in the ED. She was evaluated by PT/OT who recommended 24/7 supervision. Her  also stated he is no longer able to care for her either. She is now pending placement to SNF vs group home, though medicaid must be obtained first, as she has limited income.     Interval Problem Update  VSS and WNL  Tolerating a diet  Voiding/stooling  Awaits placement    Consultants/Specialty  Palliative    Code Status  DNAR/DNI    Disposition  TBD -SNF vs     Review of Systems  Review of Systems   Unable to perform ROS: Dementia        Physical Exam  Temp:  [36 °C (96.8 °F)-36.6 °C (97.8 °F)] 36.6 °C (97.8 °F)  Pulse:  [70-72] 71  Resp:  [17-18] 18  BP: (104-112)/(62-67) 112/64  SpO2:  [91 %-92 %] 92 %    Physical Exam  Vitals signs and nursing note reviewed.   HENT:      Head: Normocephalic.      Nose: Nose normal.   Eyes:      Pupils: Pupils are equal, round, and reactive to light.   Neck:      Musculoskeletal: Normal range of motion.   Cardiovascular:      Rate and Rhythm: Normal rate and regular rhythm.      Pulses: Normal pulses.      Heart sounds: Normal heart sounds.   Pulmonary:      Effort: Pulmonary effort is normal.      Breath sounds: Normal breath sounds.   Abdominal:      General: Bowel sounds are normal.   Skin:     General: Skin is warm.      Capillary Refill: Capillary refill takes 2 to 3 seconds.   Neurological:       Mental Status: She is alert. Mental status is at baseline.         Fluids    Intake/Output Summary (Last 24 hours) at 12/2/2020 0827  Last data filed at 12/1/2020 1735  Gross per 24 hour   Intake 180 ml   Output no documentation   Net 180 ml       Laboratory                        Imaging       Assessment/Plan  Dementia (HCC)- (present on admission)  Assessment & Plan  -Chronic, stable  -Frequent re-orientation, reestablish circadian rhythm, encourage familiar faces/family in room, avoid or minimize narcotics/sedatives.   -Continue seroquel and prozac   -Requires occasional haldol for agitation and aggressive behavior. Has not needed any since 11/14    Lower extremity pain, bilateral- (present on admission)  Assessment & Plan  -PRN tylenol available if needed  -PT/OT recommending SNF placement, but will eventually need group home placement with 24/7 care after SNF making it a difficult discharge  -Mobilize as tolerated    Discharge planning issues  Assessment & Plan  -Patient not eligible for medicaid due to income.  Social work has requested PFA screen her for institutional medicaid.  Will need placement after that is obtained.  -Patient's daughter is assisting with discharge    Parkinson's disease (HCC)- (present on admission)  Assessment & Plan  -Chronic and stable  -Continue sinemet  -24/7 supervision at discharge  -Frequent reorientation       VTE prophylaxis: Lovenox    I have performed a physical exam and reviewed and updated ROS and Plan today (12/2/2020). In review of yesterday's note (12/1/2020), there are no changes except as documented above.     I certify that the patient requires continued medically necessary hospital services for the treatment of dementia. The patient will remain in the hospital for the foreseeable future.  Discharge may or may not occur in the next 20 days due to ongoing discharge delays due to no medically acceptable discharge option.

## 2020-12-02 NOTE — PROGRESS NOTES
"Pt resting in bed, yelling \"hello\". Oriented to self only. Denies pain, no s/s of discomfort noted. VSS. No further needs at this time. Safety precautions in place.   "

## 2020-12-02 NOTE — PROGRESS NOTES
Pt is A&Ox1 to self only. VSS on RA. Denies any pain, and shows no signs of distress. Pt took morning medication with no issues. Pt is currently resting bed trying to sleep a bit longer still she states. All needs met at this moment. Hourly rounding in place.

## 2020-12-03 NOTE — PROGRESS NOTES
Assumed care of pt from day RN. Pt lying in bed, A&Ox1, oriented to self only, pt very pleasant with staff this evening. Pt denies any pain at this time. Call light and belongings within reach, will continue to monitor.

## 2020-12-03 NOTE — PROGRESS NOTES
Pt is A&Ox1 to self only. VSS on RA. Denies any pain at this moment, and shows no signs of distress. Pt is currently calm and pleasant, and resting in bed. Compliant with medication. All needs met at this time. Fall and safety precautions in place. Hourly rounding in practice.

## 2020-12-04 NOTE — PROGRESS NOTES
2 RN skin check completed w/ Ayesha RN.   Devices in place n/a.  Skin assessed under devices n/a.  Confirmed pressure ulcers found on n/a.  New potential pressure ulcers noted on n/a. Wound consult placed n/a.    Skin assessment: elbows pink and blanching. Bilateral heels are boggy. Sacrum/ coccyx intact and blanching.   The following interventions in place heels elevated on pillows, mepilex to bilateral heels, frequent incontinent care provided, barrier past applied and Q2 hr turning.

## 2020-12-04 NOTE — CARE PLAN
Problem: Safety  Goal: Will remain free from falls  Outcome: PROGRESSING AS EXPECTED  Note: Fall precautions in place: bed locked and in the lowest position, call light and belongings within reach, treaded socks on, fall risk ID band on, bed alarm in use, pt educated on use of call light for needs.      Problem: Skin Integrity  Goal: Risk for impaired skin integrity will decrease  Outcome: PROGRESSING AS EXPECTED  Note: Skin interventions in use: 2 RN skin checks, q2 hr turns, frequent incontinence checks and linen changes PRN, barrier paste in use, pillows in use for support/ positioning, waffle overlay on bed and in chair, encouraged intake of food/ fluids.

## 2020-12-04 NOTE — PROGRESS NOTES
2 RN skin check complete with: Melissa RN  Devices in place: none  Skin assessed under devices: n/a  Confirmed pressure ulcers found on: none  New potential pressure ulcers noted on: n/a   Wound consult placed: n/a  The following interventions in place: 2 RN skin checks, q2 hr turns, frequent incontinence checks and linen changes PRN, pillows in use for support/ positioning, barrier wipes, barrier paste.    Skin assessment:   Sacrum: red/ blanching, offloading and barrier paste applied  Bilateral heels: red/ blanching, mepilex for protection  Bilateral elbows: pink/ blanching

## 2020-12-04 NOTE — PROGRESS NOTES
Assumed care of pt from day RN. Pt lying in bed, A&Ox1, oriented to self only, pt very pleasant with staff this evening. Took meds whole in chocolate pudding. Pt denies any pain at this time. Call light and belongings within reach, all needs met at this time.

## 2020-12-04 NOTE — PROGRESS NOTES
Pt is alert to self. Pt was very fatigued today. Pt refused to get OOB and only took a few bites of lunch.  Fall prevention education provided. Fall precautions maintained. Call bell within reach. Hourly rounding completed.

## 2020-12-05 NOTE — PROGRESS NOTES
Hospital Medicine Twice Weekly Progress Note    Date of Service  12/5/2020    Chief Complaint  75 y.o. female admitted 5/14/2020 with SOB    Hospital Course  Ms. Bennett is a wheelchair bound 75-year-old female with a PMH of arthritis, DVT, Parkinsons, and dementia who presented 5/14/2020 with complaints of shortness of breath, nonproductive cough, fevers, and chills for 5 days. She is confused at baseline and was sent by her  since he was unable to care for her. An EKG was done in the ED and was unremarkable, though a chest xray showed bilateral interstitial infiltrates. COVID was ruled out in the ED. She was evaluated by PT/OT who recommended 24/7 supervision. Her  also stated he is no longer able to care for her either. She is now pending placement to SNF vs group home, though medicaid must be obtained first, as she has limited income.     Interval Problem Update  VSS and WNL  Tolerating a diet  Voiding/stooling  Awaits placement  RN reports she was lethargic yesterday    Consultants/Specialty  Palliative    Code Status  DNAR/DNI    Disposition  TBD -SNF vs     Review of Systems  Review of Systems   Unable to perform ROS: Dementia        Physical Exam  Temp:  [36.5 °C (97.7 °F)-37.1 °C (98.8 °F)] 36.5 °C (97.7 °F)  Pulse:  [82-89] 82  Resp:  [18-20] 18  BP: ()/(48-63) 95/48  SpO2:  [90 %-91 %] 91 %    Physical Exam  Vitals signs and nursing note reviewed.   HENT:      Head: Normocephalic.      Nose: Nose normal.   Eyes:      Pupils: Pupils are equal, round, and reactive to light.   Cardiovascular:      Rate and Rhythm: Normal rate and regular rhythm.      Pulses: Normal pulses.      Heart sounds: Normal heart sounds.   Pulmonary:      Effort: Pulmonary effort is normal.      Breath sounds: Normal breath sounds.   Abdominal:      General: Bowel sounds are normal.   Skin:     General: Skin is warm.   Neurological:      Mental Status: She is alert. Mental status is at baseline. She is confused.          Fluids    Intake/Output Summary (Last 24 hours) at 12/5/2020 0817  Last data filed at 12/4/2020 1805  Gross per 24 hour   Intake 340 ml   Output no documentation   Net 340 ml       Laboratory                        Imaging       Assessment/Plan  Dementia (HCC)- (present on admission)  Assessment & Plan  -Chronic, stable  -Frequent re-orientation, reestablish circadian rhythm, encourage familiar faces/family in room, avoid or minimize narcotics/sedatives.   -Continue seroquel and prozac   -Requires occasional haldol for agitation and aggressive behavior. Has not needed any since 11/14    Lower extremity pain, bilateral- (present on admission)  Assessment & Plan  -PRN tylenol available if needed  -PT/OT recommending SNF placement, but will eventually need group home placement with 24/7 care after SNF making it a difficult discharge  -Mobilize as tolerated    Discharge planning issues  Assessment & Plan  -Patient not eligible for medicaid due to income.  Social work has requested PFA screen her for institutional medicaid.  Will need placement after that is obtained.  -Patient's daughter is assisting with discharge    Parkinson's disease (HCC)- (present on admission)  Assessment & Plan  -Chronic and stable  -Continue sinemet  -24/7 supervision at discharge  -Frequent reorientation       VTE prophylaxis: Lovenox    I have performed a physical exam and reviewed and updated ROS and Plan today (12/5/2020). In review of previous notes, there are no changes except as documented above.     I certify that the patient requires continued medically necessary hospital services for the treatment of dementia. The patient will remain in the hospital for the foreseeable future.  Discharge may or may not occur in the next 20 days due to ongoing discharge delays due to no medically acceptable discharge option.

## 2020-12-05 NOTE — PROGRESS NOTES
2 RN skin check complete with: Fidelina ELIAS  Devices in place: none  Skin assessed under devices: n/a  Confirmed pressure ulcers found on: none  New potential pressure ulcers noted on: n/a   Wound consult placed: n/a     Skin assessment:   Bilateral ears: Pink and blanching  Bilateral elbows: Pink and blanching  Sacrum: red/ blanching, offloading and barrier paste applied  Bilateral heels: red/ blanching, mepilex for protection    The following interventions in place: 2 RN skin checks, q2 hr turns, frequent incontinence checks and linen changes PRN, pillows in use for support/ positioning, barrier wipes, barrier paste.

## 2020-12-05 NOTE — PROGRESS NOTES
Assumed care of pt at shift change. Pt is on RA with no signs of acute distress. A&Ox1 to self only. Denies any pain.  All comfort measures in place. Call light and personal belongings by bedside. Bed locked and in lowest position. Hourly rounding in place.

## 2020-12-06 NOTE — PROGRESS NOTES
2 RN skin check complete with: Malorie RN  Devices in place: none  Skin assessed under devices: n/a  Confirmed pressure ulcers found on: none  New potential pressure ulcers noted on: n/a   Wound consult placed: n/a     Skin assessment:   Bilateral ears: Pink and blanching  Bilateral elbows: Pink and blanching  Sacrum: red/blanching  Groin: pink/intact  Bilateral heels: red/blanching    The following interventions in place: 2 RN skin checks, q2 hr turns, frequent incontinence checks and linen changes PRN, pillows in use for support/positioning, barrier wipes, barrier paste, heel mepilex.

## 2020-12-06 NOTE — PROGRESS NOTES
RN skin check complete with: Fidelina ELIAS  Devices in place: none  Skin assessed under devices: n/a  Confirmed pressure ulcers found on: none  New potential pressure ulcers noted on: n/a   Wound consult placed: n/a     Skin assessment:   Bilateral ears: Pink and blanching  Bilateral elbows: Pink and blanching  Sacrum: red/ blanching, offloading and barrier paste applied  Bilateral heels: red/ blanching, mepilex for protection     The following interventions in place: 2 RN skin checks, q2 hr turns, frequent incontinence checks and linen changes PRN, pillows in use for support/ positioning, barrier wipes, barrier paste.

## 2020-12-06 NOTE — PROGRESS NOTES
Assumed care of pt at shift change. Pt is on RA with no signs of acute distress. A&Ox1 to self only. Denies any pain. Morning medications administered crushed in pudding. All comfort measures in place. Call light and personal belongings by bedside. Bed locked and in lowest position. Hourly rounding in place.

## 2020-12-07 NOTE — PROGRESS NOTES
2 RN skin check complete w/ Mora RN.   Devices in place: None.  Skin assessed under devices: N/A.  Confirmed pressure ulcers found: N/A.  New potential pressure ulcers noted: N/A. Wound consult placed: N/A  The following interventions in place: Waffle mattress overlay, Q2 turns, 2 RN skin checks, mepilex in place to heels and elbows, frequent checks for incontinence.     Skin Check  Sacrum: Red & blanching.  Elbows: Red, boggy, & blanching.  Heels: Red, boggy, & blanching.

## 2020-12-07 NOTE — PROGRESS NOTES
AAOx1, self. Full bed bath completed by CNA this AM. Patient currently sitting out at nurses station in wheelchair. C/o 0/10 pain, declining intervention at this time. Medications taken w/o difficulty. -N/V. -N/T. Denies new onset of chest pain/SOB. +BS in all 4 quadrants, last BM yesterday. 2 person assist to wheelchair. POC discussed, denies further needs at this time. Fall precautions in place. Bed alarm on, call light within reach & hourly rounding in place.

## 2020-12-07 NOTE — PROGRESS NOTES
Pt oriented to self. Declines pain.  Pt states that she is tired tonight.  Medication provided crushed in chocolate pudding with no issue.  All current needs met at this time. Q2 turns in place. Bed alarm is on.

## 2020-12-07 NOTE — PROGRESS NOTES
RN skin check completed.  Devices in place: n/a  Skin assessed under devices: n/a  Confirmed pressure ulcers found on: n/a  New potential pressure ulcers noted on: n/a  Wound consult placed: n/a     q2 turns, frequent incontinence checks with linen changes as needed, barrier paste and barrier wipes in use, waffle overlay, mepilexi used on bilateral heels    - Skin intact.  - Sacrum is red and blanching.  - Bilateral heels are red and blanching.

## 2020-12-08 NOTE — PROGRESS NOTES
2 RN skin check complete. zelda  Devices in place mepilex, moon boots placed  Skin assessed under devices yes  Confirmed pressure ulcers found on na  New potential pressure ulcers noted on na Wound consult placed na.  The following interventions in place Waffle mattress overlay, Q2 turns, 2 RN skin checks, mepilex in place to heels , frequent checks for incontinence.      Elbows red and blanching; removed mepilex  Heels red and blanching; mepilex in place and moon boots placed  Barrier cream to sacral/groin area

## 2020-12-09 NOTE — PROGRESS NOTES
Hospital Medicine Daily Progress Note    Date of Service  12/9/2020    Chief Complaint  Fevers, chills    Hospital Course  Ms. Bennett is a wheelchair bound 75-year-old female with a PMH of arthritis, DVT, Parkinson's, and dementia who presented 5/14/2020 with complaints of shortness of breath, nonproductive cough, fevers, and chills for 5 days. She is confused at baseline and was sent by her  who has been unable to care for her.      12-lead EKG was done in the ED and was unremarkable, though a chest xray showed bilateral interstitial infiltrates. COVID was ruled out.     She was evaluated by PT/OT who recommended 24/7 supervision. She is now pending placement to SNF vs group home, though medicaid must be obtained first as she has limited income.    Interval Problem Update  -sleepy this morning. Up in wheelchair at nurses station for lunch.  -no acute events overnight. Has not been requiring Haldol > 3 weeks now.    Code Status  DNAR/DNI    Disposition  TBD    Review of Systems  Review of Systems   Unable to perform ROS: Dementia        Physical Exam  Temp:  [36.1 °C (97 °F)-36.7 °C (98 °F)] 36.7 °C (98 °F)  Pulse:  [80-92] 80  Resp:  [15-18] 18  BP: (100-116)/(56-71) 100/56  SpO2:  [90 %-94 %] 90 %    Physical Exam  Vitals signs and nursing note reviewed.   Constitutional:       General: She is not in acute distress.     Appearance: She is normal weight. She is ill-appearing.   HENT:      Head: Normocephalic and atraumatic.      Right Ear: Hearing normal.      Left Ear: Hearing normal.      Nose: Nose normal.      Mouth/Throat:      Mouth: Mucous membranes are dry.   Cardiovascular:      Rate and Rhythm: Normal rate.      Pulses: Normal pulses.      Heart sounds: Normal heart sounds.   Pulmonary:      Effort: Pulmonary effort is normal. No respiratory distress.      Breath sounds: Decreased breath sounds present.   Abdominal:      General: Bowel sounds are normal.      Palpations: Abdomen is soft.       "Tenderness: There is no abdominal tenderness.   Musculoskeletal:      Comments: ALISON  Generalized weakness.  Wheelchair and bed bound at her baseline   Skin:     General: Skin is warm and dry.   Neurological:      Mental Status: She is alert. Mental status is at baseline. She is disoriented.   Psychiatric:         Mood and Affect: Affect is flat.         Cognition and Memory: Cognition is impaired. Memory is impaired.         Fluids    Intake/Output Summary (Last 24 hours) at 12/9/2020 1436  Last data filed at 12/9/2020 1200  Gross per 24 hour   Intake 1080 ml   Output --   Net 1080 ml       Laboratory                        Imaging  DX-CHEST-PORTABLE (1 VIEW)   Final Result      No acute cardiopulmonary abnormality.      US-EXTREMITY VENOUS LOWER UNILAT RIGHT   Final Result      DX-HIP-UNILATERAL-WITH PELVIS-1 VIEW RIGHT   Final Result      1.  Bony pelvis and the hip joint appear normal      2.  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  No acute intracranial findings.      2.  Diffuse atrophy and periventricular white matter changes, consistent with chronic small vessel disease.         DX-CHEST-PORTABLE (1 VIEW)   Final Result      Perihilar interstitial markings are increased and suggestive of mild pulmonary edema.           Assessment/Plan  Dementia (HCC)- (present on admission)  Assessment & Plan  -Chronic, stable  -yells out \"help\" at times  -Frequent re-orientation, reestablish circadian rhythm, encourage familiar faces/family in room, avoid or minimize narcotics/sedatives.   -Continue seroquel and prozac   -Requires occasional haldol for agitation and aggressive behavior. Has not needed any in over 3 weeks    Lower extremity pain, bilateral- (present on admission)  Assessment & Plan  -PRN tylenol available if needed  -PT/OT recommending SNF placement, but will eventually need group home " placement with 24/7 care after SNF making it a difficult discharge  -Mobilize as tolerated    Discharge planning issues  Assessment & Plan  -Patient not eligible for medicaid due to income.  Social work has requested PFA screen her for institutional medicaid.  Will need placement after that is obtained.  -Patient's daughter is assisting with discharge    Parkinson's disease (HCC)- (present on admission)  Assessment & Plan  -Chronic and stable  -Continue sinemet  -24/7 supervision at discharge  -Frequent reorientation       VTE prophylaxis: Enoxaparin      I have performed a physical exam and reviewed and updated ROS and Assessment/Plan today (12/09/20). In review of the previous note there are no changes except as documented above    I certify the patient requires continued medically necessary hospital services for the treatment of cognitive impairment.  The patient will remain in the hospital for the foreseeable future.  Discharge may or may not occur in the next 20 days due to ongoing discharge delays due to having no medically acceptable discharge options.    Please note that this dictation was created using voice recognition software. I have made every reasonable attempt to correct obvious errors, but there may be errors of grammar and possibly content that I did not discover before finalizing the note.    TESSY Najera.

## 2020-12-09 NOTE — PROGRESS NOTES
Pt is up to wheelchair at the nurses station, no signs of labored breathing or pain. Pt on RA. Wheelchair is locked. Pt declines any additional needs at this time.

## 2020-12-09 NOTE — PROGRESS NOTES
2 RN skin check complete. [email or web address]  Devices in place mepilex , moon boots placed.  Skin assessed under devices yes  Confirmed pressure ulcers found on na  New potential pressure ulcers noted on na. Wound consult placed na  The following interventions in place Waffle mattress overlay, Q2 turns, 2 RN skin checks, mepilex in place to heels , frequent checks for incontinence    Elbows red and blanching  Heels red and blanching; mepilex in place and moon boots placed  Barrier cream to sacral/groin area

## 2020-12-09 NOTE — PROGRESS NOTES
Bed alarm in place for confusion and safety. Turning in progress through the night along with 2 RN skin checks. Cont plan of care, call light within reach

## 2020-12-10 NOTE — PROGRESS NOTES
Confused, bed alarm in place. Took her medication crushed without a problem. Cont plan of care, call light within reach

## 2020-12-10 NOTE — PROGRESS NOTES
Pt is resting in bed, no signs of labored breathing or pain. Pt on RA. Call light & personal belongings within reach, bed in lowest position and locked. Pt declines any additional needs at this time.

## 2020-12-11 NOTE — PROGRESS NOTES
2 RN skin check completed with Melissa ELIAS.   Devices in place Mepilex, heel float boots.  Skin assessed under devices yes.  Confirmed pressure ulcers found on N/A.  New potential pressure ulcers noted on none.   Wound consult placed N/A.    The following interventions in place 2 RN skin check, Q 2 hour turns, waffle mattress overlay, heel float boots, heel Mepilex, barrier paste, pillow for support/repositioning.    Skin assessment:  Bilateral elbows pink and blanching  Groin area moist, intact  Sacrum pink and blanching, intact  Bilateral heels boggy, pink, and blanching.  Generalized skin dryness.  *

## 2020-12-11 NOTE — PROGRESS NOTES
Received report and assumed care at shift change. Patient alert only to self, confused and irritable. No complain of pain or distress. Fall precautions in place, bed locked and in lowest position. Needs attended to.

## 2020-12-12 NOTE — PROGRESS NOTES
Hospital Medicine Daily Progress Note    Date of Service  12/12/2020    Chief Complaint  Fevers, chills    Hospital Course  Ms. Bennett is a wheelchair bound 75-year-old female with a PMH of arthritis, DVT, Parkinson's, and dementia who presented 5/14/2020 with complaints of shortness of breath, nonproductive cough, fevers, and chills for 5 days. She is confused at baseline and was sent by her  who has been unable to care for her.      12-lead EKG was done in the ED and was unremarkable, though a chest xray showed bilateral interstitial infiltrates. COVID was ruled out.     She was evaluated by PT/OT who recommended 24/7 supervision. She is now pending placement to SNF vs group home, though medicaid must be obtained first as she has limited income.    Interval Problem Update  12/12 -seen and examined.  Lying in bed in no acute distress.  Oriented to herself at baseline.  Irritable at times at her baseline.  No behavior outburst  12/9 - sleepy this morning. Up in wheelchair at nurses station for lunch.  -no acute events overnight. Has not been requiring Haldol > 3 weeks now.    Code Status  DNAR/DNI    Disposition  TBD    Review of Systems  Review of Systems   Unable to perform ROS: Dementia        Physical Exam  Temp:  [36.2 °C (97.2 °F)-36.3 °C (97.4 °F)] 36.3 °C (97.4 °F)  Pulse:  [72-75] 72  Resp:  [16] 16  BP: ()/(58-75) 113/75  SpO2:  [91 %] 91 %    Physical Exam  Vitals signs and nursing note reviewed.   Constitutional:       General: She is not in acute distress.     Appearance: She is normal weight. She is ill-appearing.   HENT:      Head: Normocephalic and atraumatic.      Right Ear: Hearing normal.      Left Ear: Hearing normal.      Nose: Nose normal.      Mouth/Throat:      Mouth: Mucous membranes are dry.   Cardiovascular:      Rate and Rhythm: Normal rate.      Pulses: Normal pulses.      Heart sounds: Normal heart sounds.   Pulmonary:      Effort: Pulmonary effort is normal. No respiratory  distress.      Breath sounds: Decreased breath sounds present.   Abdominal:      General: Bowel sounds are normal.      Palpations: Abdomen is soft.      Tenderness: There is no abdominal tenderness.   Musculoskeletal:      Comments: ALISON  Generalized weakness.  Wheelchair and bed bound at her baseline   Skin:     General: Skin is warm and dry.   Neurological:      Mental Status: She is alert. Mental status is at baseline. She is disoriented.   Psychiatric:         Mood and Affect: Affect is flat.         Cognition and Memory: Cognition is impaired. Memory is impaired.         Fluids    Intake/Output Summary (Last 24 hours) at 12/12/2020 1157  Last data filed at 12/12/2020 0903  Gross per 24 hour   Intake 600 ml   Output --   Net 600 ml       Laboratory                        Imaging  DX-CHEST-PORTABLE (1 VIEW)   Final Result      No acute cardiopulmonary abnormality.      US-EXTREMITY VENOUS LOWER UNILAT RIGHT   Final Result      DX-HIP-UNILATERAL-WITH PELVIS-1 VIEW RIGHT   Final Result      1.  Bony pelvis and the hip joint appear normal      2.  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  No acute intracranial findings.      2.  Diffuse atrophy and periventricular white matter changes, consistent with chronic small vessel disease.         DX-CHEST-PORTABLE (1 VIEW)   Final Result      Perihilar interstitial markings are increased and suggestive of mild pulmonary edema.           Assessment/Plan  Dementia (HCC)- (present on admission)  Assessment & Plan  -Chronic, stable  -yells out at times  -Frequent re-orientation, reestablish circadian rhythm, encourage familiar faces/family in room, avoid or minimize narcotics/sedatives.   -Continue seroquel and prozac   -Requires occasional haldol for agitation and aggressive behavior. Has not needed any in over a month    Lower extremity pain, bilateral-  (present on admission)  Assessment & Plan  -PRN tylenol available if needed  -PT/OT recommending SNF placement, but will eventually need group home placement with 24/7 care after SNF making it a difficult discharge  -Mobilize as tolerated    Discharge planning issues  Assessment & Plan  -Patient not eligible for medicaid due to income.  Social work has requested PFA screen her for institutional medicaid.  Will need placement after that is obtained.  -Patient's daughter is assisting with discharge    Parkinson's disease (HCC)- (present on admission)  Assessment & Plan  -Chronic and stable  -Continue sinemet  -24/7 supervision at discharge  -Frequent reorientation       VTE prophylaxis: Enoxaparin      I have performed a physical exam and reviewed and updated ROS and Assessment/Plan today (12/12/20). In review of the previous note there are no changes except as documented above    I certify the patient requires continued medically necessary hospital services for the treatment of cognitive impairment.  The patient will remain in the hospital for the foreseeable future.  Discharge may or may not occur in the next 20 days due to ongoing discharge delays due to having no medically acceptable discharge options.    Please note that this dictation was created using voice recognition software. I have made every reasonable attempt to correct obvious errors, but there may be errors of grammar and possibly content that I did not discover before finalizing the note.    TESSY Najera.

## 2020-12-12 NOTE — CARE PLAN
Problem: Psychosocial Needs:  Goal: Level of anxiety will decrease  Flowsheets (Taken 12/11/2020 2200)  Patient Behaviors:   Confused   Irritable     Problem: Skin Integrity  Goal: Risk for impaired skin integrity will decrease  Outcome: PROGRESSING AS EXPECTED

## 2020-12-12 NOTE — PROGRESS NOTES
Pt. Received in bed awake, orientedx1, irritable at this time and refused to take med. Pt. Withdrawn and confused, reoriented pt. Provided safety measures. VS noted. Needs attended at this time.

## 2020-12-13 NOTE — PROGRESS NOTES
Assumed care of patient at 0700. A+Ox1, sleepy today. Pt denies pain today. q2 turning maintained, incontinence care PRN with linen changes. 1:1 feed for meals, intake adequate. Bed alarmed and Safety precautions in place, bed in lowest and locked position with call light in reach. Needs met at this time.

## 2020-12-13 NOTE — CARE PLAN
Problem: Safety  Goal: Will remain free from injury  Outcome: PROGRESSING AS EXPECTED  Goal: Will remain free from falls  Outcome: PROGRESSING AS EXPECTED  Pt. Bed alarm on and call light with hourly rounding       Problem: Psychosocial Needs:  Goal: Level of anxiety will decrease  Outcome: PROGRESSING AS EXPECTED   Pt. Calm and relax tolerated her meds tonight with no issue. Smiled at RN and cooperative.

## 2020-12-13 NOTE — PROGRESS NOTES
Skin check completed.  Devices in place: prevalon boots.  Skin assessed under devices yes.  Confirmed pressure ulcers found on n/a.  Skin assessment:  -bilateral heels red and blanching  -redness to sacral/groin area  -elbows pink/red and blanching  -some redness on both ears    The following interventions in place   -waffle mattress in place  -Q2 turns in place  -mepilex placed on both heels and elbows  -barrier cream applied to sacral/groin area  -prevalon boots adjusted  -routine incontinence checks

## 2020-12-14 NOTE — DISCHARGE PLANNING
Medical Social Work  PC to Jasbir [phone]: stated he has not received any documentation from his sister.  But, does know he is his mother Medical and Financial Power of .  Jasbir stated he does not have documentation of this, and is hopefull his sister will forward this to him.  Jasbir requested SW call him back tomorrow around 2:00

## 2020-12-14 NOTE — PROGRESS NOTES
Pharmacy Pharmacotherapy Consult for LOS >30 days    Admit Date: 5/14/2020      Medications were reviewed for appropriateness and ongoing need.     Current Facility-Administered Medications   Medication Dose Route Frequency Provider Last Rate Last Admin   • acetaminophen (Tylenol) tablet 650 mg  650 mg Oral Q6HRS PRN Liang Allen A.P.N.       • midodrine (PROAMATINE) tablet 5 mg  5 mg Oral TID WITH MEALS Navya Salazar, A.P.R.N.   5 mg at 12/14/20 0836   • enoxaparin (LOVENOX) inj 40 mg  40 mg Subcutaneous DAILY Remedios Lynch A.P.R.N.   40 mg at 12/14/20 0836   • QUEtiapine (Seroquel) tablet 25 mg  25 mg Oral Nightly Maryann Mccabe, A.P.R.N.   25 mg at 12/13/20 1657   • QUEtiapine (Seroquel) tablet 12.5 mg  12.5 mg Oral QAM Remedios Lynch A.P.R.N.   12.5 mg at 12/14/20 0836   • haloperidol lactate (HALDOL) injection 2-5 mg  2-5 mg Intramuscular Q3HRS PRN Maryann Mccabe, A.P.R.N.   5 mg at 11/14/20 1507   • carbidopa-levodopa (SINEMET)  MG tablet 1 Tab  1 Tab Oral Q6HRS Maryann Mccabe A.P.R.N.   1 Tab at 12/14/20 0633   • FLUoxetine (PROZAC) capsule 40 mg  40 mg Oral DAILY Maryann Mccabe, A.P.R.N.   40 mg at 12/14/20 0836   • senna-docusate (PERICOLACE or SENOKOT S) 8.6-50 MG per tablet 2 Tab  2 Tab Oral BID Maryann Mccabe A.P.R.N.   2 Tab at 12/14/20 0836       Recommendations:  No medication recommendations at this time. Midodrine need persists with recent events of hypotension within past 72hrs.  Will reorder all medication per protocol.    Mika Mccabe, RaynaD, BCPS

## 2020-12-14 NOTE — CARE PLAN
Problem: Mobility  Goal: Risk for activity intolerance will decrease  Outcome: PROGRESSING SLOWER THAN EXPECTED  Note: Pt refusing to get OOB during the day and up to her wheelchair, encouraged pt to mobilize.      Problem: Skin Integrity  Goal: Risk for impaired skin integrity will decrease  Outcome: PROGRESSING AS EXPECTED  Note: Skin interventions in place: q 2hr turns, 2 RN skin checks, frequent incontinence checks with linen checks PRN, pillows in use for support/ positioning, encouraged intake of food/ fluids, barrier paste in use, waffle overlay on mattress and chair.

## 2020-12-14 NOTE — PROGRESS NOTES
Assumed care of patient at 0700. A+Ox1, flat affect today. No signs of nonverbal pain present. Skin precautions maintained and incontinence care provided with linen changes prn. Bed alarmed, Safety precautions in place, bed in lowest and locked position with call light in reach. Needs met at this time.

## 2020-12-14 NOTE — PROGRESS NOTES
Assumed care of pt from day RN. Pt lying in bed, A&Ox1, oriented to self only. Pt denies any pain at this time. Call light and belongings within reach, all needs met at this time.

## 2020-12-15 NOTE — PROGRESS NOTES
2 RN skin check complete with Flores RN  Devices in place prevalon boots, waffle cushion.  Skin assessed under devices yes.  Confirmed pressure ulcers found on none.  New potential pressure ulcers noted on none. Wound consult placed not needed at this time.  The following interventions in place q2 turning and repositioning, 2RN skin check qshift, waffle cushion, incontinence care and linen changes PRN, prevalon boots for offloading, barrier cream and powder.    Sacrum: pink but moist, barrier cream applied    Bilateral heels: Boggy but prevalon boots applied    Bilateral elbows: intact.     Perineum: Pink and moist, powder applied.

## 2020-12-15 NOTE — PROGRESS NOTES
2 RN skin check complete with Flores RN  Devices in place waffle cushion, prevalon boots.  Skin assessed under devices yes.  Confirmed pressure ulcers found on none.  New potential pressure ulcers noted on none. Wound consult placed not needed at this time.  The following interventions in place q2 turning and repositioning, 2RN skin check qshift, incontinence care and linen changes PRN, waffle cushion, barrier cream/baby powder to crevices, prevalon boots to off load heels.      Sacrum: reddened, blanching. Barrier cream applied    Groin/perineum: reddened, blanching. Powder applied.    Bilateral heels: Boggy, reddened but blanching. Prevalon boots applied.

## 2020-12-15 NOTE — PROGRESS NOTES
Assumed care of pt from day RN. Pt lying in bed, A&Ox1, oriented to self only. Pt denies any pain at this time. Pt having a hard time following the command to open her mouth to take meds but was able to get them down with some encouragement. Call light and belongings within reach, all needs met at this time.

## 2020-12-15 NOTE — PROGRESS NOTES
Hospital Medicine Daily Progress Note    Date of Service  12/15/2020    Chief Complaint  Fevers, chills    Hospital Course  Ms. Bennett is a wheelchair bound 75-year-old female with a PMH of arthritis, DVT, Parkinson's, and dementia who presented 5/14/2020 with complaints of shortness of breath, nonproductive cough, fevers, and chills for 5 days. She is confused at baseline and was sent by her  who has been unable to care for her.      12-lead EKG was done in the ED and was unremarkable, though a chest xray showed bilateral interstitial infiltrates. COVID was ruled out.     She was evaluated by PT/OT who recommended 24/7 supervision. She is now pending placement to SNF vs group home, though medicaid must be obtained first as she has limited income.    Interval Problem Update  12/15- Patient laying in bed, states she feels lousy but will not respond more. Patient calm and in no distress. Continuing to work on placement with SW/CM.     Code Status  DNAR/DNI    Disposition  TBD    Review of Systems  Review of Systems   Unable to perform ROS: Dementia        Physical Exam  Temp:  [36.1 °C (97 °F)-36.6 °C (97.8 °F)] 36.4 °C (97.6 °F)  Pulse:  [73-82] 73  Resp:  [16] 16  BP: (104-105)/(61-63) 105/61  SpO2:  [90 %-92 %] 92 %    Physical Exam  Vitals signs and nursing note reviewed.   Constitutional:       General: She is not in acute distress.     Appearance: She is normal weight. She is ill-appearing.   HENT:      Head: Normocephalic and atraumatic.      Right Ear: Hearing normal.      Left Ear: Hearing normal.      Nose: Nose normal.      Mouth/Throat:      Mouth: Mucous membranes are dry.   Cardiovascular:      Rate and Rhythm: Normal rate.      Pulses: Normal pulses.      Heart sounds: Normal heart sounds.   Pulmonary:      Effort: Pulmonary effort is normal. No respiratory distress.      Breath sounds: Decreased breath sounds present.   Abdominal:      General: Bowel sounds are normal.      Palpations: Abdomen is  soft.      Tenderness: There is no abdominal tenderness.   Musculoskeletal:      Comments: ROSAS  Generalized weakness.  Wheelchair and bed bound at her baseline   Skin:     General: Skin is warm and dry.   Neurological:      Mental Status: She is alert. Mental status is at baseline. She is disoriented.   Psychiatric:         Mood and Affect: Affect is flat.         Cognition and Memory: Cognition is impaired. Memory is impaired.         Fluids    Intake/Output Summary (Last 24 hours) at 12/15/2020 1314  Last data filed at 12/15/2020 1300  Gross per 24 hour   Intake 600 ml   Output --   Net 600 ml       Laboratory                        Imaging  DX-CHEST-PORTABLE (1 VIEW)   Final Result      No acute cardiopulmonary abnormality.      US-EXTREMITY VENOUS LOWER UNILAT RIGHT   Final Result      DX-HIP-UNILATERAL-WITH PELVIS-1 VIEW RIGHT   Final Result      1.  Bony pelvis and the hip joint appear normal      2.  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  No acute intracranial findings.      2.  Diffuse atrophy and periventricular white matter changes, consistent with chronic small vessel disease.         DX-CHEST-PORTABLE (1 VIEW)   Final Result      Perihilar interstitial markings are increased and suggestive of mild pulmonary edema.           Assessment/Plan  Dementia (HCC)- (present on admission)  Assessment & Plan  -Chronic, stable  -yells out at times  -Frequent re-orientation, reestablish circadian rhythm, encourage familiar faces/family in room, avoid or minimize narcotics/sedatives.   -Continue seroquel and prozac   -Requires occasional haldol for agitation and aggressive behavior. Has not needed any in over a month    Lower extremity pain, bilateral- (present on admission)  Assessment & Plan  -PRN tylenol available if needed  -PT/OT recommending SNF placement, but will eventually need group  home placement with 24/7 care after SNF making it a difficult discharge  -Mobilize as tolerated    Discharge planning issues  Assessment & Plan  -Patient not eligible for medicaid due to income.  Social work has requested PFA screen her for institutional medicaid.  Will need placement after that is obtained.  -Patient's daughter is assisting with discharge    Parkinson's disease (HCC)- (present on admission)  Assessment & Plan  -Chronic and stable  -Continue sinemet  -24/7 supervision at discharge  -Frequent reorientation       VTE prophylaxis: Enoxaparin      I have performed a physical exam and reviewed and updated ROS and Assessment/Plan today 12/15/2020. In review of the previous note there are no changes except as documented above    I certify the patient requires continued medically necessary hospital services for the treatment of cognitive impairment.  The patient will remain in the hospital for the foreseeable future.  Discharge may or may not occur in the next 20 days due to ongoing discharge delays due to having no medically acceptable discharge options.        TESSY Patton.

## 2020-12-15 NOTE — PROGRESS NOTES
Assumed care of patient at 0700. A+Ox1, labile mood today. Denies pain at this time. q2 turning maintained. Encouraged pt to get out of bed, but pt did not want to get to chair today. Bed alarmed, Safety precautions in place, bed in lowest and locked position with call light in reach. Needs met at this time.

## 2020-12-16 NOTE — PROGRESS NOTES
Assumed care of pt from day RN. Pt lying in bed, A&Ox1, oriented to self only. Pt denies any pain at this time. Pt having a hard time following the command to open her mouth to take meds but was able to get them down with some encouragement. Pt also having a hard time tonight drinking through a straw. Call light and belongings within reach, all needs met at this time.

## 2020-12-17 NOTE — PROGRESS NOTES
Received report from day shift RN and assumed care of patient. Assessment completed. Pt is currently laying in bed, A&Ox1, on RA, VSS. Denies pain or discomfort. Refused night medications. Bed is in lowest, locked position, call bell and belongings are in reach. Hourly rounding in place. No further needs at this time.

## 2020-12-18 NOTE — PROGRESS NOTES
2 RN skin check complete with Vanna RN  Devices in place: waffle cushion, mepilex.  Skin assessed under devices yes.  Confirmed pressure ulcers found on none.  New potential pressure ulcers noted on none.   Wound consult placed not needed at this time.      The following interventions in place q2 turning and repositioning, 2RN skin check qshift, waffle cushion, incontinence care and linen changes PRN, barrier cream and powder.    Sacrum: pink, moist, blanching    Bilateral heels: Boggy, pink, red, blanching     Bilateral elbows: intact.     Perineum: Pink and moist, powder applied.

## 2020-12-18 NOTE — PROGRESS NOTES
Received report from day shift RN and assumed care of patient. Assessment completed. Pt is currently resting in bed, A&Ox1, on RA, VSS. Denies pain or discomfort. Scheduled medications given per MAR with applesauce. Bed is in lowest, locked position, call bell and belongings are in reach. Hourly rounding in place. No further needs at this time.

## 2020-12-18 NOTE — PROGRESS NOTES
Hospital Medicine Daily Progress Note    Date of Service  12/18/2020    Chief Complaint  Fevers, chills    Hospital Course  Ms. Bennett is a wheelchair bound 75-year-old female with a PMH of arthritis, DVT, Parkinson's, and dementia who presented 5/14/2020 with complaints of shortness of breath, nonproductive cough, fevers, and chills for 5 days. She is confused at baseline and was sent by her  who has been unable to care for her.      12-lead EKG was done in the ED and was unremarkable, though a chest xray showed bilateral interstitial infiltrates. COVID was ruled out.     She was evaluated by PT/OT who recommended 24/7 supervision. She is now pending placement to SNF vs group home, though medicaid must be obtained first as she has limited income.    Interval Problem Update  12/15- Patient laying in bed, states she feels lousy but will not respond more. Patient calm and in no distress. Continuing to work on placement with SW/CM.   12/18- Patient laying in bed, states no needs. Appears comfortable in no acute distress.     Code Status  DNAR/DNI    Disposition  TBD    Review of Systems  Review of Systems   Unable to perform ROS: Dementia        Physical Exam  Temp:  [36.4 °C (97.6 °F)-36.7 °C (98.1 °F)] 36.7 °C (98.1 °F)  Pulse:  [78-88] 78  Resp:  [16-18] 18  BP: (104-112)/(54-93) 112/93  SpO2:  [93 %-94 %] 94 %    Physical Exam  Vitals signs and nursing note reviewed.   Constitutional:       General: She is not in acute distress.     Appearance: She is normal weight. She is ill-appearing.   HENT:      Head: Normocephalic and atraumatic.      Right Ear: Hearing normal.      Left Ear: Hearing normal.      Nose: Nose normal.      Mouth/Throat:      Mouth: Mucous membranes are dry.   Cardiovascular:      Rate and Rhythm: Normal rate.      Pulses: Normal pulses.      Heart sounds: Normal heart sounds.   Pulmonary:      Effort: Pulmonary effort is normal. No respiratory distress.      Breath sounds: Decreased breath  sounds present.   Abdominal:      General: Bowel sounds are normal.      Palpations: Abdomen is soft.      Tenderness: There is no abdominal tenderness.   Musculoskeletal:      Comments: ALISON  Generalized weakness.  Wheelchair and bed bound at her baseline   Skin:     General: Skin is warm and dry.   Neurological:      Mental Status: She is alert. Mental status is at baseline. She is disoriented.   Psychiatric:         Mood and Affect: Affect is flat.         Cognition and Memory: Cognition is impaired. Memory is impaired.         Fluids    Intake/Output Summary (Last 24 hours) at 12/18/2020 1229  Last data filed at 12/18/2020 0900  Gross per 24 hour   Intake 260 ml   Output --   Net 260 ml       Laboratory                        Imaging  DX-CHEST-PORTABLE (1 VIEW)   Final Result      No acute cardiopulmonary abnormality.      US-EXTREMITY VENOUS LOWER UNILAT RIGHT   Final Result      DX-HIP-UNILATERAL-WITH PELVIS-1 VIEW RIGHT   Final Result      1.  Bony pelvis and the hip joint appear normal      2.  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  No acute intracranial findings.      2.  Diffuse atrophy and periventricular white matter changes, consistent with chronic small vessel disease.         DX-CHEST-PORTABLE (1 VIEW)   Final Result      Perihilar interstitial markings are increased and suggestive of mild pulmonary edema.           Assessment/Plan  Dementia (HCC)- (present on admission)  Assessment & Plan  -Chronic, stable  -yells out at times  -Frequent re-orientation, reestablish circadian rhythm, encourage familiar faces/family in room, avoid or minimize narcotics/sedatives.   -Continue seroquel and prozac   -Requires occasional haldol for agitation and aggressive behavior. Has not needed any in over a month    Lower extremity pain, bilateral- (present on admission)  Assessment & Plan  -PRN  tylenol available if needed  -PT/OT recommending SNF placement, but will eventually need group home placement with 24/7 care after SNF making it a difficult discharge  -Mobilize as tolerated    Discharge planning issues  Assessment & Plan  -Patient not eligible for medicaid due to income.  Social work has requested [facility] screen her for institutional medicaid.  Will need placement after that is obtained.  -Patient's daughter is assisting with discharge    Parkinson's disease (HCC)- (present on admission)  Assessment & Plan  -Chronic and stable  -Continue sinemet  -24/7 supervision at discharge  -Frequent reorientation       VTE prophylaxis: Enoxaparin      I have performed a physical exam and reviewed and updated ROS and Assessment/Plan today 12/18/2020. In review of the previous note there are no changes except as documented above    I certify the patient requires continued medically necessary hospital services for the treatment of cognitive impairment.  The patient will remain in the hospital for the foreseeable future.  Discharge may or may not occur in the next 20 days due to ongoing discharge delays due to having no medically acceptable discharge options.        Caitlin Mcfadden, TAWANDAPCHUCK.

## 2020-12-19 NOTE — PROGRESS NOTES
2 RN skin check complete with Ginger RN  Devices in place: Mepilex.  Skin assessed under devices yes.  Confirmed pressure ulcers found on none.  New potential pressure ulcers noted on none.   Wound consult placed: no         The following interventions in place Q2 turns, 2RN skin check Qshift, waffle overlay, incontinence care and linen changes PRN, barrier paste. Mepilex to bilateral heels and bilateral elbows, floating heels with pillow.      Sacrum: pink, moist, blanching, barrier paste applied     Bilateral heels: Boggy, pink, red, slow to obed      Bilateral elbows: intact, slow to obed.      Perineum: Pink

## 2020-12-19 NOTE — PROGRESS NOTES
Received bedside report from RN, pt care assumed, pt assessment 0/10 pain at this time. No signs of acute distress noted at this time.  Pt denies any additional needs at this time. Bed in lowest position, call light within reach, hourly rounding initiated.

## 2020-12-20 NOTE — PROGRESS NOTES
2 RN skin check complete with Ginger RN  Devices in place: Mepilex.  Skin assessed under devices yes.  Confirmed pressure ulcers found on none.  New potential pressure ulcers noted on none.   Wound consult placed: no         The following interventions in place Q2 turns, 2RN skin check Qshift, waffle overlay, incontinence care and linen changes PRN, barrier paste. Mepilex to bilateral heels and bilateral elbows,       Sacrum: pink, blanching,      Bilateral heels: Boggy, pink, red, slow to obed, mepilex in place     Bilateral elbows: intact, slow to obed, mepilex in place

## 2020-12-20 NOTE — PROGRESS NOTES
Received bedside report from RN, pt care assumed, VSS, 0/10 pain at this time. No signs of acute distress noted at this time, patient is resting comfortably in bed. Pt denies any additional needs at this time. Bed in lowest position, call light within reach, hourly rounding initiated.

## 2020-12-21 NOTE — PROGRESS NOTES
"Received bedside report from day shift RN, pt care assumed, VSS, 0/10 pain at this time. No signs of acute distress noted at this time. Pt stated \"I'm a lazy bum because I sleeps all day.\" I encouraged her to get up out of bed in the morning so she's not laying in bed all day. call light within reach, hourly rounding initiated.   "

## 2020-12-21 NOTE — PROGRESS NOTES
Received bedside report from night shift RN.  Patient is A&O1, no complaints of pain.  No signs of distress or discomfort.  Patient is in bed resting.

## 2020-12-21 NOTE — DISCHARGE PLANNING
Anticipated Discharge Disposition: Group Home/Skilled    Action: PC from patient's son, Jasbir.  Called to say that his father passed.  Jasbir stated that he is the patient's medical and financial power of .  Has paper work, GENO requested copies to be sent to GENO.  SW asked about patient's income, Jasbir stated his mother will get her spouse's SS of about 2100 or 2200.  GENO asked about other assets, home, saving accounts, car etc.  Jasbir stated that he will have to wait to see how much in the saving account, as he needs a certified death certificate to obtain this.  Jasbir stated there is a car, unsure of the value.  As for the house it was in his father's name and his name.  SW given permission to have the patient assessed for a group home for a skilled.     Barriers to Discharge: placement/cost of care    Plan: follow up with [facility] on placement recommendations, follow up with Jasbir.

## 2020-12-22 NOTE — PROGRESS NOTES
Received bedside report from night shift RN.  Patient is A&O1, no complaints of pain.  No signs of distress or discomfort.  Patient is sitting up in bed for breakfast.  Patient took morning meds crushed in cream of wheat.

## 2020-12-22 NOTE — PROGRESS NOTES
Alert and oriented to self only. Denied pain. Currently resting in bed, no signs of distress. Pt denied further needs. Fall precautions in place. Prn incontinence checks.

## 2020-12-23 NOTE — PROGRESS NOTES
Assuming care of pt at this time. Pt in bed, asleep. No signs of distress at this time. Bed in lowest position, call light within reach.

## 2020-12-23 NOTE — PROGRESS NOTES
"Hospital Medicine Daily Progress Note    Date of Service  12/23/2020    Chief Complaint  Fevers, chills    Hospital Course  Ms. Bennett is a wheelchair bound 75-year-old female with a PMH of arthritis, DVT, Parkinson's, and dementia who presented 5/14/2020 with complaints of shortness of breath, nonproductive cough, fevers, and chills for 5 days. She is confused at baseline and was sent by her  who has been unable to care for her.      12-lead EKG was done in the ED and was unremarkable, though a chest xray showed bilateral interstitial infiltrates. COVID was ruled out.     She was evaluated by PT/OT who recommended 24/7 supervision. She is now pending placement to SNF vs group home, though medicaid must be obtained first as she has limited income.    Interval Problem Update  12/23: Patient resting in bed.  She reports having \"pain all over\".  Refuses any medication for pain.  She also states \"I am pretty happy here\".  No other acute needs.    Code Status  DNAR/DNI    Disposition  TBD    Review of Systems  Review of Systems   Unable to perform ROS: Dementia   Musculoskeletal: Positive for myalgias.        Physical Exam  Temp:  [36.1 °C (97 °F)-36.6 °C (97.8 °F)] 36.6 °C (97.8 °F)  Pulse:  [60-86] 85  Resp:  [16-17] 16  BP: (103-140)/(61-95) 111/71  SpO2:  [92 %-98 %] 93 %    Physical Exam  Vitals signs and nursing note reviewed.   Constitutional:       General: She is not in acute distress.     Appearance: She is normal weight. She is ill-appearing.   HENT:      Head: Normocephalic and atraumatic.      Right Ear: Hearing normal.      Left Ear: Hearing normal.      Nose: Nose normal.      Mouth/Throat:      Mouth: Mucous membranes are dry.   Cardiovascular:      Rate and Rhythm: Normal rate.      Pulses: Normal pulses.      Heart sounds: Normal heart sounds.   Pulmonary:      Effort: Pulmonary effort is normal. No respiratory distress.      Breath sounds: Decreased breath sounds present.   Abdominal:      " General: Bowel sounds are normal.      Palpations: Abdomen is soft.      Tenderness: There is no abdominal tenderness.   Musculoskeletal:      Comments: ALISON  Generalized weakness.  Wheelchair and bed bound at her baseline   Skin:     General: Skin is warm and dry.   Neurological:      Mental Status: She is alert. Mental status is at baseline. She is disoriented.   Psychiatric:         Mood and Affect: Affect is flat.         Cognition and Memory: Cognition is impaired. Memory is impaired.         Fluids    Intake/Output Summary (Last 24 hours) at 12/23/2020 1442  Last data filed at 12/23/2020 1200  Gross per 24 hour   Intake 650 ml   Output --   Net 650 ml       Laboratory                        Imaging  DX-CHEST-PORTABLE (1 VIEW)   Final Result      No acute cardiopulmonary abnormality.      US-EXTREMITY VENOUS LOWER UNILAT RIGHT   Final Result      DX-HIP-UNILATERAL-WITH PELVIS-1 VIEW RIGHT   Final Result      1.  Bony pelvis and the hip joint appear normal      2.  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  No acute intracranial findings.      2.  Diffuse atrophy and periventricular white matter changes, consistent with chronic small vessel disease.         DX-CHEST-PORTABLE (1 VIEW)   Final Result      Perihilar interstitial markings are increased and suggestive of mild pulmonary edema.           Assessment/Plan  Dementia (HCC)- (present on admission)  Assessment & Plan  -Chronic, stable  -yells out at times  -Frequent re-orientation, reestablish circadian rhythm, encourage familiar faces/family in room, avoid or minimize narcotics/sedatives.   -Continue seroquel and prozac   -Requires occasional haldol for agitation and aggressive behavior. Has not needed any in over a month    Lower extremity pain, bilateral- (present on admission)  Assessment & Plan  -PRN tylenol available if  needed  -PT/OT recommending SNF placement, but will eventually need group home placement with 24/7 care after SNF making it a difficult discharge  -Mobilize as tolerated    Discharge planning issues  Assessment & Plan  -Patient not eligible for medicaid due to income.  Social work has requested [facility] screen her for institutional medicaid.  Will need placement after that is obtained.  -Patient's daughter is assisting with discharge    Parkinson's disease (HCC)- (present on admission)  Assessment & Plan  -Chronic and stable  -Continue sinemet  -24/7 supervision at discharge  -Frequent reorientation       VTE prophylaxis: Enoxaparin      I have performed a physical exam and reviewed and updated ROS and Assessment/Plan today 12/23/2020. In review of the previous note there are no changes except as documented above    TESSY Whaley.

## 2020-12-23 NOTE — PROGRESS NOTES
2 RN skin check completed with CURT Raymond  Devices in place: glasses,.  Skin assessed under devices: yes.  Confirmed pressure ulcers found on none.  New potential pressure ulcers noted on none. Wound consult placed already placed.     The following interventions in place pillows in place, waffle, q2 turns, 2 rn skin checks,  barrier cream     Behind ears/nose bridge pink/intact  Heels/elbows red; blanchable; foam dressing replaced  Sacrum intact

## 2020-12-24 NOTE — PROGRESS NOTES
2 RN Skin Check    2 RN skin check complete withMora Davenport RN.  Devices in place: no devices in place..  Skin assessed under devices: N\A.  Confirmed pressure ulcers found on: No pressure ulceration found on patient.  New potential pressure ulcers noted on No potential pressure ulceration identified at the time of this assessment. Wound consult placed N/A.  The following interventions in place Mepilex, Barrier cream and Waffle bed overlay.

## 2020-12-24 NOTE — CARE PLAN
Problem: Safety  Goal: Will remain free from injury  Outcome: PROGRESSING AS EXPECTED  Note: Patient is on a turn schedule every two hours. She also haw mepilex to her heels and elbows. No skin breakdown noted.     Problem: Urinary Elimination:  Goal: Ability to reestablish a normal urinary elimination pattern will improve  Outcome: PROGRESSING SLOWER THAN EXPECTED  Flowsheets (Taken 12/24/2020 6285)  Urinary Elimination: Incontinence  Note: Patient is incontinent. Staff check and change patient every two hours and as needed.

## 2020-12-24 NOTE — PROGRESS NOTES
This writer received report from off going staff. Assumed care of the patient. Around 2051, patient medication was administered and assessment completed. 2 RN skin check completed with Mora Davenport RN. Patient has no skin concerns and mepilexes are intact to bilateral heels and elbows.

## 2020-12-25 NOTE — PROGRESS NOTES
"Hospital Medicine Daily Progress Note    Date of Service  12/25/2020    Chief Complaint  Fevers, chills    Hospital Course  Ms. Bennett is a wheelchair bound 75-year-old female with a PMH of arthritis, DVT, Parkinson's, and dementia who presented 5/14/2020 with complaints of shortness of breath, nonproductive cough, fevers, and chills for 5 days. She is confused at baseline and was sent by her  who has been unable to care for her.      12-lead EKG was done in the ED and was unremarkable, though a chest xray showed bilateral interstitial infiltrates. COVID was ruled out.     She was evaluated by PT/OT who recommended 24/7 supervision. She is now pending placement to SNF vs group home, though medicaid must be obtained first as she has limited income.    Interval Problem Update  12/23: Patient resting in bed.  She reports having \"pain all over\".  Refuses any medication for pain.  She also states \"I am pretty happy here\".  No other acute needs.  12/25: Patient at her baseline.  She does not have any evidence to support acute discomfort or distress.  She does have labile moods and can be verbally aggressive with staff.  Overall moods are stable.    Code Status  DNAR/DNI    Disposition  TBD    Review of Systems  Review of Systems   Unable to perform ROS: Dementia        Physical Exam  Temp:  [36.7 °C (98.1 °F)-36.8 °C (98.3 °F)] 36.8 °C (98.3 °F)  Pulse:  [78-89] 78  Resp:  [17-18] 18  BP: ()/(56-69) 122/63  SpO2:  [91 %] 91 %    Physical Exam  Vitals signs and nursing note reviewed.   Constitutional:       General: She is not in acute distress.     Appearance: She is normal weight. She is ill-appearing.   HENT:      Head: Normocephalic and atraumatic.      Right Ear: Hearing normal.      Left Ear: Hearing normal.      Nose: Nose normal.      Mouth/Throat:      Mouth: Mucous membranes are dry.   Cardiovascular:      Rate and Rhythm: Normal rate.      Pulses: Normal pulses.      Heart sounds: Normal heart " sounds.   Pulmonary:      Effort: Pulmonary effort is normal. No respiratory distress.      Breath sounds: Decreased breath sounds present.   Abdominal:      General: Bowel sounds are normal.      Palpations: Abdomen is soft.      Tenderness: There is no abdominal tenderness.   Musculoskeletal:      Comments: ALISON  Generalized weakness.  Wheelchair and bed bound at her baseline   Skin:     General: Skin is warm and dry.   Neurological:      Mental Status: She is alert. Mental status is at baseline. She is disoriented.   Psychiatric:         Mood and Affect: Affect is labile and flat.         Cognition and Memory: Cognition is impaired. Memory is impaired.         Fluids    Intake/Output Summary (Last 24 hours) at 12/25/2020 1151  Last data filed at 12/25/2020 0800  Gross per 24 hour   Intake 720 ml   Output --   Net 720 ml       Laboratory                        Imaging  DX-CHEST-PORTABLE (1 VIEW)   Final Result      No acute cardiopulmonary abnormality.      US-EXTREMITY VENOUS LOWER UNILAT RIGHT   Final Result      DX-HIP-UNILATERAL-WITH PELVIS-1 VIEW RIGHT   Final Result      1.  Bony pelvis and the hip joint appear normal      2.  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  No acute intracranial findings.      2.  Diffuse atrophy and periventricular white matter changes, consistent with chronic small vessel disease.         DX-CHEST-PORTABLE (1 VIEW)   Final Result      Perihilar interstitial markings are increased and suggestive of mild pulmonary edema.           Assessment/Plan  Dementia (HCC)- (present on admission)  Assessment & Plan  -Chronic, stable  -yells out at times  -Frequent re-orientation, reestablish circadian rhythm, encourage familiar faces/family in room, avoid or minimize narcotics/sedatives.   -Continue seroquel and prozac   -Requires occasional haldol for agitation and  aggressive behavior. Has not needed any in over a month    Lower extremity pain, bilateral- (present on admission)  Assessment & Plan  -PRN tylenol available if needed  -PT/OT recommending SNF placement, but will eventually need group home placement with 24/7 care after SNF making it a difficult discharge  -Mobilize as tolerated    Discharge planning issues  Assessment & Plan  -Patient not eligible for medicaid due to income.  Social work has requested [facility] screen her for institutional medicaid.  Will need placement after that is obtained.  -Patient's daughter is assisting with discharge    Parkinson's disease (HCC)- (present on admission)  Assessment & Plan  -Chronic and stable  -Continue sinemet  -24/7 supervision at discharge  -Frequent reorientation       VTE prophylaxis: Enoxaparin      I have performed a physical exam and reviewed and updated ROS and Assessment/Plan today 12/25/2020. In review of the previous note there are no changes except as documented above    TESSY Whaley.

## 2020-12-25 NOTE — PROGRESS NOTES
Received bedside report and assumed pt care. Pt is A&Ox1 to self only. Re orientated as needed. VSS on RA. Denies any pain at this time, and shows no signs of distress. Pt very pleasant and cooperative during interaction. Pt vocalized being very tired and hoping to sleep. All needs met at this time. Pt currently resting in bed trying to take a nap. Fall and safety precautions in place. Hourly rounding I place.

## 2020-12-25 NOTE — PROGRESS NOTES
Assumed care of this patient after receiving report from nurse. Nursing assessment completed. Bed in lowest position. Will continue to follow care plan.

## 2020-12-26 NOTE — PROGRESS NOTES
Alert and oriented x1, oriented to event only. Offered fluids and snacks. Safety precautions in placed. Bed in lowest position. Upper side rails up. Treaded socks on. Reinforced the use of call light when needing assistance.

## 2020-12-27 NOTE — PROGRESS NOTES
Assumed care of patient this shift. Patient is alert and oriented to self only. Denied any complaints of pain and no behavioral signs of pain noted throughout day,  Incontinent of bowel and bladder. Turned and repositioned every 2 hours. Fall prevention tactics in place, bed locked and in lowest position and bed alarm on for safety.

## 2020-12-28 NOTE — PROGRESS NOTES
Received report and assumed care of pt. Assessment complete on RA. Pt A&OX1. Denies any pain or discomfort at this time. Pt currently resting in bed. All pt needs met at this time. Safety precautions and hourly rounding in place.

## 2020-12-28 NOTE — PROGRESS NOTES
Assumed care of patient this shift. Patient is alert and oriented to self only. Denied any complaints of pain when asked. Incontinent of bladder. Turned and repositioned every 2 hours. Fall prevention tactics in place, bed locked and in lowest position and bed alarm on for safety.

## 2020-12-29 NOTE — PROGRESS NOTES
Assumed pt care at shift change.  Pt alert/oriented x1, self only.  Pt declines pain at this time.  Safety precautions in place, bed locked and in lowest position, call light and personal belongings within reach.  No further needs at this time.

## 2020-12-30 NOTE — PROGRESS NOTES
2 RN Skin Check      2 RN skin check complete with: CURT Landa  Devices in place: mepilex  Skin assess under devices: yes   Confirmed pressure ulcers found on: n/a  New potential pressure ulcers noted on: n/a  Wound consult placed: n/a    The following interventions in place: Q2 turns, 2RN skin check, barrier cream, incontinent care with appropriate linen changes.      Skin assessment:  General: intact, pink and blanching, fragile  Ears: bilateral intact, pink and blanching  Elbows: bilateral pink/red, blanching, intact, mepilex in place  Feet/heels:  Bilateral pink/red, blanching, intact, mepilex  Sacrum: red/pink blanching, intact, barrier cream

## 2020-12-30 NOTE — CARE PLAN
Problem: Pain Management  Goal: Pain level will decrease to patient's comfort goal  Outcome: PROGRESSING AS EXPECTED  Flowsheets (Taken 12/30/2020 0323)  Non Verbal Scale:   Calm   Unlabored Breathing   Sleeping  Note: Patient has not reported pain to this writer. Patient has been instructed on pain scale. Patient also has shown no signs of nonverbal pain.     Problem: Respiratory:  Goal: Respiratory status will improve  Description: Pt here with PNA. Pt was on IV/PO abx. Pt has been placed on room air. Pt not in any respiratory distress. Pt with clear lung sounds, but diminished to the bases. Will continue to monitor for changes.   Outcome: PROGRESSING AS EXPECTED  Note: Patient is on room air. Breathing is effortless. No cough noted and no reports of shortness of breath.

## 2020-12-30 NOTE — PROGRESS NOTES
Hospital Medicine Twice Weekly Progress Note    Date of Service  12/30/2020    Chief Complaint  Fevers, chills    Hospital Course  Ms. Bennett is a wheelchair bound 75-year-old female with a PMH of arthritis, DVT, Parkinson's, and dementia who presented 5/14/2020 with complaints of shortness of breath, nonproductive cough, fevers, and chills for 5 days. She is confused at baseline and was sent by her  who has been unable to care for her.      12-lead EKG was done in the ED and was unremarkable, though a chest xray showed bilateral interstitial infiltrates. COVID was ruled out.     She was evaluated by PT/OT who recommended 24/7 supervision. She is now pending placement to SNF vs group home, though medicaid must be obtained first as she has limited income.    Interval Problem Update  12/30: Patient lying in bed in no acute distress. VSS on room air. Oriented to self only.     Code Status  DNAR/DNI    Disposition  TBD    Review of Systems  Review of Systems   Unable to perform ROS: Dementia        Physical Exam  Temp:  [36.1 °C (97 °F)-36.8 °C (98.3 °F)] 36.7 °C (98 °F)  Pulse:  [76-82] 76  Resp:  [16-18] 16  BP: (100-124)/(65-81) 100/65  SpO2:  [89 %-96 %] 89 %    Physical Exam  Vitals signs and nursing note reviewed.   Constitutional:       General: She is not in acute distress.     Appearance: She is normal weight. She is not toxic-appearing.      Comments: Chronically ill appearing     HENT:      Head: Normocephalic and atraumatic.      Right Ear: Hearing normal.      Left Ear: Hearing normal.      Nose: Nose normal.      Mouth/Throat:      Mouth: Mucous membranes are moist.   Eyes:      General: No scleral icterus.  Cardiovascular:      Rate and Rhythm: Normal rate.      Pulses: Normal pulses.      Heart sounds: Murmur present.   Pulmonary:      Effort: Pulmonary effort is normal. No respiratory distress.      Breath sounds: Decreased breath sounds present.   Abdominal:      General: Bowel sounds are normal.       Palpations: Abdomen is soft.      Tenderness: There is no abdominal tenderness.   Musculoskeletal:      Comments: ROSAS  Generalized weakness.  Wheelchair and bed bound at her baseline   Skin:     General: Skin is warm and dry.      Coloration: Skin is pale.   Neurological:      Mental Status: She is alert. Mental status is at baseline. She is disoriented.   Psychiatric:         Attention and Perception: She is inattentive.         Mood and Affect: Affect is flat.         Cognition and Memory: Cognition is impaired. Memory is impaired.         Fluids    Intake/Output Summary (Last 24 hours) at 12/30/2020 1018  Last data filed at 12/29/2020 1720  Gross per 24 hour   Intake 320 ml   Output --   Net 320 ml       Laboratory                        Imaging  DX-CHEST-PORTABLE (1 VIEW)   Final Result      No acute cardiopulmonary abnormality.      US-EXTREMITY VENOUS LOWER UNILAT RIGHT   Final Result      DX-HIP-UNILATERAL-WITH PELVIS-1 VIEW RIGHT   Final Result      1.  Bony pelvis and the hip joint appear normal      2.  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  No acute intracranial findings.      2.  Diffuse atrophy and periventricular white matter changes, consistent with chronic small vessel disease.         DX-CHEST-PORTABLE (1 VIEW)   Final Result      Perihilar interstitial markings are increased and suggestive of mild pulmonary edema.           Assessment/Plan  Dementia (HCC)- (present on admission)  Assessment & Plan  -Chronic, stable  -yells out at times  -Frequent re-orientation, reestablish circadian rhythm, encourage familiar faces/family in room, avoid or minimize narcotics/sedatives.   -Continue seroquel and prozac   -Requires occasional haldol for agitation and aggressive behavior. Has not needed any in over a month    Lower extremity pain, bilateral- (present on  admission)  Assessment & Plan  -PRN tylenol available if needed  -PT/OT recommending SNF placement, but will eventually need group home placement with 24/7 care after SNF making it a difficult discharge  -Mobilize as tolerated    Discharge planning issues  Assessment & Plan  -Patient not eligible for medicaid due to income.  Social work has requested [facility] screen her for institutional medicaid.  Will need placement after that is obtained.  -Patient's daughter is assisting with discharge    Parkinson's disease (HCC)- (present on admission)  Assessment & Plan  -Chronic and stable  -Continue sinemet  -24/7 supervision at discharge  -Frequent reorientation       VTE prophylaxis: Enoxaparin      I have performed a physical exam and reviewed and updated ROS and Assessment/Plan today 12/30/2020. In review of the previous note there are no changes except as documented above    TESSY Weaver.

## 2020-12-30 NOTE — PROGRESS NOTES
Assumed pt care at shift change.  Safety precautions in place, bed locked and in lowest position, call light and personal belongings within reach.  No further needs at this time.

## 2020-12-30 NOTE — PROGRESS NOTES
Received bedside report from day nurse and assumed care at that time. Bed locked and in lowest position. Call light and belongings within reach. All patient needs addressed at that time.

## 2020-12-31 NOTE — PROGRESS NOTES
2 RN skin check complete.sacha   Devices in place mepilex  Skin assessed under devices yes  Confirmed pressure ulcers found on na  New potential pressure ulcers noted on na. Wound consult placed na.  The following interventions in place Q2 turns, 2RN skin check, barrier cream, incontinent care with appropriate linen changes.       Sacral area intact, barrier cream applied  bilat heels blanching; refuses moon boots  bilat elbows blanching; preventive mepilex in place  Ears blanching

## 2020-12-31 NOTE — PROGRESS NOTES
"Pt confused, yelling for \"help\" while this RN is standing next to her.  Pt refused to take medications after preparation/crushing, by stating that \"I can't take them because I don't know who I am.\"  Pt reoriented, education provided, pt still refused to take medications by spitting out the apple sauce.  Pt complains of pain, but when asked where pt refuses to answer.  Bed alarm on, pillow in place for support and positioning.    "

## 2020-12-31 NOTE — DIETARY
Nutrition Services: Pt seen per regular nutrition screening per dept policy    Pt is a 74 yo F admitted for pneumonia and on day 231 of admit on a Regular/Soft & Bite Sized/Thin Liquid diet.    Ht: 172.7 cm  Wt: 64.6 kg (11/7 - no recent weight recorded)   BMI: 21.65 kg/m^2    Upon routine nutrition screen noted pt refusal of meals being documented for past 2-3 days. Prior to this pt typically documented to consume > 50% of majority of meals. Spoke with RN who states that in addition to new onset of meal refusal pt has been refusing other cares as well in past few days. At this time will add Boost Plus in hopes pt will accept these and continue to monitor per dept policy for improvement in PO intake.     Plan/Recommend:  1. Boost Plus BID d/t new onset meal refusal  2. Please obtain an updated measured weight as able (last weighed 11/7)  3. Encourage PO intake of meals and supplements as able  4. Nutrition Representative will see daily for meal selections  5. Please record intake as % meals and supplements consumed in ADLs    RD available prn - and will monitor per dept policy

## 2020-12-31 NOTE — PROGRESS NOTES
Confused, bed alarm in place. Took her medications crushed without a problem . Cont plan of care, call light within reach

## 2020-12-31 NOTE — PROGRESS NOTES
Pt is refusing to eat her meals, less than 25% consumed for daily meals.  Pt refused 2 of the 3 evening medications.  Education provided.

## 2021-01-01 ENCOUNTER — APPOINTMENT (OUTPATIENT)
Dept: RADIOLOGY | Facility: MEDICAL CENTER | Age: 76
DRG: 202 | End: 2021-01-01
Attending: NURSE PRACTITIONER
Payer: MEDICARE

## 2021-01-01 VITALS
HEIGHT: 68 IN | BODY MASS INDEX: 19.91 KG/M2 | OXYGEN SATURATION: 96 % | TEMPERATURE: 97.9 F | WEIGHT: 131.39 LBS | DIASTOLIC BLOOD PRESSURE: 38 MMHG | SYSTOLIC BLOOD PRESSURE: 67 MMHG | RESPIRATION RATE: 20 BRPM | HEART RATE: 117 BPM

## 2021-01-01 DIAGNOSIS — Z23 NEED FOR VACCINATION: ICD-10-CM

## 2021-01-01 LAB
COVID ORDER STATUS COVID19: NORMAL
SARS-COV-2 RNA RESP QL NAA+PROBE: NOTDETECTED
SPECIMEN SOURCE: NORMAL

## 2021-01-01 PROCEDURE — 770001 HCHG ROOM/CARE - MED/SURG/GYN PRIV*

## 2021-01-01 PROCEDURE — 700102 HCHG RX REV CODE 250 W/ 637 OVERRIDE(OP): Performed by: NURSE PRACTITIONER

## 2021-01-01 PROCEDURE — A9270 NON-COVERED ITEM OR SERVICE: HCPCS | Performed by: NURSE PRACTITIONER

## 2021-01-01 PROCEDURE — 700111 HCHG RX REV CODE 636 W/ 250 OVERRIDE (IP): Performed by: NURSE PRACTITIONER

## 2021-01-01 PROCEDURE — 99231 SBSQ HOSP IP/OBS SF/LOW 25: CPT | Performed by: NURSE PRACTITIONER

## 2021-01-01 PROCEDURE — U0005 INFEC AGEN DETEC AMPLI PROBE: HCPCS

## 2021-01-01 PROCEDURE — C9803 HOPD COVID-19 SPEC COLLECT: HCPCS | Performed by: NURSE PRACTITIONER

## 2021-01-01 PROCEDURE — 74018 RADEX ABDOMEN 1 VIEW: CPT

## 2021-01-01 PROCEDURE — U0003 INFECTIOUS AGENT DETECTION BY NUCLEIC ACID (DNA OR RNA); SEVERE ACUTE RESPIRATORY SYNDROME CORONAVIRUS 2 (SARS-COV-2) (CORONAVIRUS DISEASE [COVID-19]), AMPLIFIED PROBE TECHNIQUE, MAKING USE OF HIGH THROUGHPUT TECHNOLOGIES AS DESCRIBED BY CMS-2020-01-R: HCPCS

## 2021-01-01 RX ORDER — DOCUSATE SODIUM 100 MG/1
100 CAPSULE, LIQUID FILLED ORAL EVERY 12 HOURS
Status: DISCONTINUED | OUTPATIENT
Start: 2021-01-01 | End: 2021-01-01 | Stop reason: HOSPADM

## 2021-01-01 RX ORDER — POLYVINYL ALCOHOL 14 MG/ML
2 SOLUTION/ DROPS OPHTHALMIC EVERY 6 HOURS PRN
Status: DISCONTINUED | OUTPATIENT
Start: 2021-01-01 | End: 2021-01-01 | Stop reason: HOSPADM

## 2021-01-01 RX ORDER — DRONABINOL 2.5 MG/1
2.5 CAPSULE ORAL
Status: DISCONTINUED | OUTPATIENT
Start: 2021-01-01 | End: 2021-01-01

## 2021-01-01 RX ORDER — LORAZEPAM 2 MG/ML
1 CONCENTRATE ORAL
Status: DISCONTINUED | OUTPATIENT
Start: 2021-01-01 | End: 2021-01-01 | Stop reason: HOSPADM

## 2021-01-01 RX ORDER — ATROPINE SULFATE 10 MG/ML
2 SOLUTION/ DROPS OPHTHALMIC EVERY 4 HOURS PRN
Status: DISCONTINUED | OUTPATIENT
Start: 2021-01-01 | End: 2021-01-01 | Stop reason: HOSPADM

## 2021-01-01 RX ORDER — ACETAMINOPHEN 325 MG/1
650 TABLET ORAL EVERY 4 HOURS PRN
Status: DISCONTINUED | OUTPATIENT
Start: 2021-01-01 | End: 2021-01-01 | Stop reason: HOSPADM

## 2021-01-01 RX ORDER — ACETAMINOPHEN 650 MG/1
650 SUPPOSITORY RECTAL EVERY 4 HOURS PRN
Status: DISCONTINUED | OUTPATIENT
Start: 2021-01-01 | End: 2021-01-01 | Stop reason: HOSPADM

## 2021-01-01 RX ORDER — SODIUM CHLORIDE 9 MG/ML
1000 INJECTION, SOLUTION INTRAVENOUS ONCE
Status: DISCONTINUED | OUTPATIENT
Start: 2021-01-01 | End: 2021-01-01

## 2021-01-01 RX ORDER — MORPHINE SULFATE 100 MG/5ML
10 SOLUTION ORAL
Status: DISCONTINUED | OUTPATIENT
Start: 2021-01-01 | End: 2021-01-01 | Stop reason: HOSPADM

## 2021-01-01 RX ORDER — LACTULOSE 20 G/30ML
10 SOLUTION ORAL
Status: DISCONTINUED | OUTPATIENT
Start: 2021-01-01 | End: 2021-01-01 | Stop reason: HOSPADM

## 2021-01-01 RX ORDER — MORPHINE SULFATE 100 MG/5ML
20 SOLUTION ORAL
Status: DISCONTINUED | OUTPATIENT
Start: 2021-01-01 | End: 2021-01-01 | Stop reason: HOSPADM

## 2021-01-01 RX ORDER — LORAZEPAM 2 MG/ML
1 INJECTION INTRAMUSCULAR
Status: DISCONTINUED | OUTPATIENT
Start: 2021-01-01 | End: 2021-01-01 | Stop reason: HOSPADM

## 2021-01-01 RX ORDER — BISACODYL 10 MG
10 SUPPOSITORY, RECTAL RECTAL
Status: DISCONTINUED | OUTPATIENT
Start: 2021-01-01 | End: 2021-01-01 | Stop reason: HOSPADM

## 2021-01-01 RX ADMIN — DRONABINOL 2.5 MG: 2.5 CAPSULE ORAL at 12:10

## 2021-01-01 RX ADMIN — QUETIAPINE FUMARATE 25 MG: 25 TABLET ORAL at 17:51

## 2021-01-01 RX ADMIN — CARBIDOPA AND LEVODOPA 1 TABLET: 10; 100 TABLET ORAL at 16:35

## 2021-01-01 RX ADMIN — FLUOXETINE HYDROCHLORIDE 40 MG: 20 CAPSULE ORAL at 09:25

## 2021-01-01 RX ADMIN — FLUOXETINE HYDROCHLORIDE 40 MG: 20 CAPSULE ORAL at 07:45

## 2021-01-01 RX ADMIN — CARBIDOPA AND LEVODOPA 1 TABLET: 10; 100 TABLET ORAL at 17:51

## 2021-01-01 RX ADMIN — CARBIDOPA AND LEVODOPA 1 TABLET: 10; 100 TABLET ORAL at 05:59

## 2021-01-01 RX ADMIN — ENOXAPARIN SODIUM 40 MG: 40 INJECTION SUBCUTANEOUS at 08:36

## 2021-01-01 RX ADMIN — CARBIDOPA AND LEVODOPA 1 TABLET: 10; 100 TABLET ORAL at 06:42

## 2021-01-01 RX ADMIN — QUETIAPINE FUMARATE 25 MG: 25 TABLET ORAL at 17:30

## 2021-01-01 RX ADMIN — MIDODRINE HYDROCHLORIDE 5 MG: 5 TABLET ORAL at 17:47

## 2021-01-01 RX ADMIN — CARBIDOPA AND LEVODOPA 1 TABLET: 10; 100 TABLET ORAL at 22:13

## 2021-01-01 RX ADMIN — CARBIDOPA AND LEVODOPA 1 TABLET: 10; 100 TABLET ORAL at 06:22

## 2021-01-01 RX ADMIN — DOCUSATE SODIUM 50 MG AND SENNOSIDES 8.6 MG 2 TABLET: 8.6; 5 TABLET, FILM COATED ORAL at 21:11

## 2021-01-01 RX ADMIN — CARBIDOPA AND LEVODOPA 1 TABLET: 10; 100 TABLET ORAL at 17:00

## 2021-01-01 RX ADMIN — QUETIAPINE FUMARATE 25 MG: 25 TABLET ORAL at 16:49

## 2021-01-01 RX ADMIN — MIDODRINE HYDROCHLORIDE 5 MG: 5 TABLET ORAL at 16:57

## 2021-01-01 RX ADMIN — CARBIDOPA AND LEVODOPA 1 TABLET: 10; 100 TABLET ORAL at 06:00

## 2021-01-01 RX ADMIN — MIDODRINE HYDROCHLORIDE 5 MG: 5 TABLET ORAL at 08:56

## 2021-01-01 RX ADMIN — QUETIAPINE FUMARATE 12.5 MG: 25 TABLET ORAL at 11:51

## 2021-01-01 RX ADMIN — MIDODRINE HYDROCHLORIDE 5 MG: 5 TABLET ORAL at 15:59

## 2021-01-01 RX ADMIN — MIDODRINE HYDROCHLORIDE 5 MG: 5 TABLET ORAL at 17:05

## 2021-01-01 RX ADMIN — FLUOXETINE HYDROCHLORIDE 40 MG: 20 CAPSULE ORAL at 09:19

## 2021-01-01 RX ADMIN — DOCUSATE SODIUM 50 MG AND SENNOSIDES 8.6 MG 2 TABLET: 8.6; 5 TABLET, FILM COATED ORAL at 21:18

## 2021-01-01 RX ADMIN — CARBIDOPA AND LEVODOPA 1 TABLET: 10; 100 TABLET ORAL at 17:29

## 2021-01-01 RX ADMIN — DOCUSATE SODIUM 50 MG AND SENNOSIDES 8.6 MG 2 TABLET: 8.6; 5 TABLET, FILM COATED ORAL at 22:22

## 2021-01-01 RX ADMIN — DOCUSATE SODIUM 50 MG AND SENNOSIDES 8.6 MG 2 TABLET: 8.6; 5 TABLET, FILM COATED ORAL at 10:05

## 2021-01-01 RX ADMIN — FLUOXETINE HYDROCHLORIDE 40 MG: 20 CAPSULE ORAL at 10:18

## 2021-01-01 RX ADMIN — DOCUSATE SODIUM 50 MG AND SENNOSIDES 8.6 MG 2 TABLET: 8.6; 5 TABLET, FILM COATED ORAL at 20:58

## 2021-01-01 RX ADMIN — ENOXAPARIN SODIUM 40 MG: 40 INJECTION SUBCUTANEOUS at 09:17

## 2021-01-01 RX ADMIN — FLUOXETINE HYDROCHLORIDE 40 MG: 20 CAPSULE ORAL at 08:32

## 2021-01-01 RX ADMIN — CARBIDOPA AND LEVODOPA 1 TABLET: 10; 100 TABLET ORAL at 05:48

## 2021-01-01 RX ADMIN — CARBIDOPA AND LEVODOPA 1 TABLET: 10; 100 TABLET ORAL at 22:26

## 2021-01-01 RX ADMIN — MIDODRINE HYDROCHLORIDE 5 MG: 5 TABLET ORAL at 11:55

## 2021-01-01 RX ADMIN — MIDODRINE HYDROCHLORIDE 5 MG: 5 TABLET ORAL at 11:24

## 2021-01-01 RX ADMIN — QUETIAPINE FUMARATE 25 MG: 25 TABLET ORAL at 17:16

## 2021-01-01 RX ADMIN — QUETIAPINE FUMARATE 25 MG: 25 TABLET ORAL at 18:04

## 2021-01-01 RX ADMIN — CARBIDOPA AND LEVODOPA 1 TABLET: 10; 100 TABLET ORAL at 05:31

## 2021-01-01 RX ADMIN — MIDODRINE HYDROCHLORIDE 5 MG: 5 TABLET ORAL at 17:42

## 2021-01-01 RX ADMIN — CARBIDOPA AND LEVODOPA 1 TABLET: 10; 100 TABLET ORAL at 12:24

## 2021-01-01 RX ADMIN — QUETIAPINE FUMARATE 12.5 MG: 25 TABLET ORAL at 09:51

## 2021-01-01 RX ADMIN — QUETIAPINE FUMARATE 12.5 MG: 25 TABLET ORAL at 08:36

## 2021-01-01 RX ADMIN — QUETIAPINE FUMARATE 25 MG: 25 TABLET ORAL at 17:25

## 2021-01-01 RX ADMIN — CARBIDOPA AND LEVODOPA 1 TABLET: 10; 100 TABLET ORAL at 17:17

## 2021-01-01 RX ADMIN — MIDODRINE HYDROCHLORIDE 5 MG: 5 TABLET ORAL at 09:17

## 2021-01-01 RX ADMIN — ENOXAPARIN SODIUM 40 MG: 40 INJECTION SUBCUTANEOUS at 09:26

## 2021-01-01 RX ADMIN — DRONABINOL 2.5 MG: 2.5 CAPSULE ORAL at 17:16

## 2021-01-01 RX ADMIN — DOCUSATE SODIUM 50 MG AND SENNOSIDES 8.6 MG 2 TABLET: 8.6; 5 TABLET, FILM COATED ORAL at 09:19

## 2021-01-01 RX ADMIN — MIDODRINE HYDROCHLORIDE 5 MG: 5 TABLET ORAL at 08:32

## 2021-01-01 RX ADMIN — MIDODRINE HYDROCHLORIDE 5 MG: 5 TABLET ORAL at 11:25

## 2021-01-01 RX ADMIN — CARBIDOPA AND LEVODOPA 1 TABLET: 10; 100 TABLET ORAL at 22:22

## 2021-01-01 RX ADMIN — FLUOXETINE HYDROCHLORIDE 40 MG: 20 CAPSULE ORAL at 09:51

## 2021-01-01 RX ADMIN — QUETIAPINE FUMARATE 12.5 MG: 25 TABLET ORAL at 07:46

## 2021-01-01 RX ADMIN — DOCUSATE SODIUM 50 MG AND SENNOSIDES 8.6 MG 2 TABLET: 8.6; 5 TABLET, FILM COATED ORAL at 10:03

## 2021-01-01 RX ADMIN — CARBIDOPA AND LEVODOPA 1 TABLET: 10; 100 TABLET ORAL at 18:15

## 2021-01-01 RX ADMIN — DOCUSATE SODIUM 50 MG AND SENNOSIDES 8.6 MG 2 TABLET: 8.6; 5 TABLET, FILM COATED ORAL at 22:12

## 2021-01-01 RX ADMIN — DOCUSATE SODIUM 50 MG AND SENNOSIDES 8.6 MG 2 TABLET: 8.6; 5 TABLET, FILM COATED ORAL at 11:50

## 2021-01-01 RX ADMIN — MIDODRINE HYDROCHLORIDE 5 MG: 5 TABLET ORAL at 09:10

## 2021-01-01 RX ADMIN — QUETIAPINE FUMARATE 25 MG: 25 TABLET ORAL at 18:00

## 2021-01-01 RX ADMIN — CARBIDOPA AND LEVODOPA 1 TABLET: 10; 100 TABLET ORAL at 04:00

## 2021-01-01 RX ADMIN — ENOXAPARIN SODIUM 40 MG: 40 INJECTION SUBCUTANEOUS at 08:56

## 2021-01-01 RX ADMIN — CARBIDOPA AND LEVODOPA 1 TABLET: 10; 100 TABLET ORAL at 05:30

## 2021-01-01 RX ADMIN — MIDODRINE HYDROCHLORIDE 5 MG: 5 TABLET ORAL at 10:25

## 2021-01-01 RX ADMIN — CARBIDOPA AND LEVODOPA 1 TABLET: 10; 100 TABLET ORAL at 05:47

## 2021-01-01 RX ADMIN — MIDODRINE HYDROCHLORIDE 5 MG: 5 TABLET ORAL at 09:51

## 2021-01-01 RX ADMIN — MIDODRINE HYDROCHLORIDE 5 MG: 5 TABLET ORAL at 12:54

## 2021-01-01 RX ADMIN — CARBIDOPA AND LEVODOPA 1 TABLET: 10; 100 TABLET ORAL at 06:31

## 2021-01-01 RX ADMIN — DOCUSATE SODIUM 50 MG AND SENNOSIDES 8.6 MG 2 TABLET: 8.6; 5 TABLET, FILM COATED ORAL at 08:22

## 2021-01-01 RX ADMIN — QUETIAPINE FUMARATE 12.5 MG: 25 TABLET ORAL at 07:48

## 2021-01-01 RX ADMIN — QUETIAPINE FUMARATE 12.5 MG: 25 TABLET ORAL at 10:03

## 2021-01-01 RX ADMIN — QUETIAPINE FUMARATE 25 MG: 25 TABLET ORAL at 21:00

## 2021-01-01 RX ADMIN — DOCUSATE SODIUM 50 MG AND SENNOSIDES 8.6 MG 2 TABLET: 8.6; 5 TABLET, FILM COATED ORAL at 22:10

## 2021-01-01 RX ADMIN — DOCUSATE SODIUM 50 MG AND SENNOSIDES 8.6 MG 2 TABLET: 8.6; 5 TABLET, FILM COATED ORAL at 22:04

## 2021-01-01 RX ADMIN — MIDODRINE HYDROCHLORIDE 5 MG: 5 TABLET ORAL at 10:00

## 2021-01-01 RX ADMIN — MIDODRINE HYDROCHLORIDE 5 MG: 5 TABLET ORAL at 09:19

## 2021-01-01 RX ADMIN — MIDODRINE HYDROCHLORIDE 5 MG: 5 TABLET ORAL at 07:48

## 2021-01-01 RX ADMIN — CARBIDOPA AND LEVODOPA 1 TABLET: 10; 100 TABLET ORAL at 17:48

## 2021-01-01 RX ADMIN — DRONABINOL 2.5 MG: 2.5 CAPSULE ORAL at 17:30

## 2021-01-01 RX ADMIN — MIDODRINE HYDROCHLORIDE 5 MG: 5 TABLET ORAL at 16:49

## 2021-01-01 RX ADMIN — DOCUSATE SODIUM 50 MG AND SENNOSIDES 8.6 MG 2 TABLET: 8.6; 5 TABLET, FILM COATED ORAL at 10:25

## 2021-01-01 RX ADMIN — QUETIAPINE FUMARATE 12.5 MG: 25 TABLET ORAL at 10:26

## 2021-01-01 RX ADMIN — ENOXAPARIN SODIUM 40 MG: 40 INJECTION SUBCUTANEOUS at 09:25

## 2021-01-01 RX ADMIN — ENOXAPARIN SODIUM 40 MG: 40 INJECTION SUBCUTANEOUS at 07:49

## 2021-01-01 RX ADMIN — CARBIDOPA AND LEVODOPA 1 TABLET: 10; 100 TABLET ORAL at 11:37

## 2021-01-01 RX ADMIN — FLUOXETINE HYDROCHLORIDE 40 MG: 20 CAPSULE ORAL at 09:17

## 2021-01-01 RX ADMIN — CARBIDOPA AND LEVODOPA 1 TABLET: 10; 100 TABLET ORAL at 17:05

## 2021-01-01 RX ADMIN — MIDODRINE HYDROCHLORIDE 5 MG: 5 TABLET ORAL at 17:59

## 2021-01-01 RX ADMIN — CARBIDOPA AND LEVODOPA 1 TABLET: 10; 100 TABLET ORAL at 06:06

## 2021-01-01 RX ADMIN — FLUOXETINE HYDROCHLORIDE 40 MG: 20 CAPSULE ORAL at 08:50

## 2021-01-01 RX ADMIN — MIDODRINE HYDROCHLORIDE 5 MG: 5 TABLET ORAL at 12:34

## 2021-01-01 RX ADMIN — DOCUSATE SODIUM 50 MG AND SENNOSIDES 8.6 MG 2 TABLET: 8.6; 5 TABLET, FILM COATED ORAL at 08:36

## 2021-01-01 RX ADMIN — CARBIDOPA AND LEVODOPA 1 TABLET: 10; 100 TABLET ORAL at 11:49

## 2021-01-01 RX ADMIN — MIDODRINE HYDROCHLORIDE 5 MG: 5 TABLET ORAL at 08:36

## 2021-01-01 RX ADMIN — CARBIDOPA AND LEVODOPA 1 TABLET: 10; 100 TABLET ORAL at 05:22

## 2021-01-01 RX ADMIN — CARBIDOPA AND LEVODOPA 1 TABLET: 10; 100 TABLET ORAL at 16:57

## 2021-01-01 RX ADMIN — MIDODRINE HYDROCHLORIDE 5 MG: 5 TABLET ORAL at 09:25

## 2021-01-01 RX ADMIN — CARBIDOPA AND LEVODOPA 1 TABLET: 10; 100 TABLET ORAL at 11:25

## 2021-01-01 RX ADMIN — CARBIDOPA AND LEVODOPA 1 TABLET: 10; 100 TABLET ORAL at 17:59

## 2021-01-01 RX ADMIN — MIDODRINE HYDROCHLORIDE 5 MG: 5 TABLET ORAL at 17:51

## 2021-01-01 RX ADMIN — MIDODRINE HYDROCHLORIDE 5 MG: 5 TABLET ORAL at 17:58

## 2021-01-01 RX ADMIN — CARBIDOPA AND LEVODOPA 1 TABLET: 10; 100 TABLET ORAL at 21:06

## 2021-01-01 RX ADMIN — FLUOXETINE HYDROCHLORIDE 40 MG: 20 CAPSULE ORAL at 08:57

## 2021-01-01 RX ADMIN — CARBIDOPA AND LEVODOPA 1 TABLET: 10; 100 TABLET ORAL at 12:34

## 2021-01-01 RX ADMIN — MIDODRINE HYDROCHLORIDE 5 MG: 5 TABLET ORAL at 17:12

## 2021-01-01 RX ADMIN — FLUOXETINE HYDROCHLORIDE 40 MG: 20 CAPSULE ORAL at 10:25

## 2021-01-01 RX ADMIN — QUETIAPINE FUMARATE 12.5 MG: 25 TABLET ORAL at 09:25

## 2021-01-01 RX ADMIN — QUETIAPINE FUMARATE 25 MG: 25 TABLET ORAL at 17:05

## 2021-01-01 RX ADMIN — CARBIDOPA AND LEVODOPA 1 TABLET: 10; 100 TABLET ORAL at 05:16

## 2021-01-01 RX ADMIN — CARBIDOPA AND LEVODOPA 1 TABLET: 10; 100 TABLET ORAL at 06:50

## 2021-01-01 RX ADMIN — FLUOXETINE HYDROCHLORIDE 40 MG: 20 CAPSULE ORAL at 10:05

## 2021-01-01 RX ADMIN — DOCUSATE SODIUM 50 MG AND SENNOSIDES 8.6 MG 2 TABLET: 8.6; 5 TABLET, FILM COATED ORAL at 21:12

## 2021-01-01 RX ADMIN — QUETIAPINE FUMARATE 25 MG: 25 TABLET ORAL at 17:48

## 2021-01-01 RX ADMIN — QUETIAPINE FUMARATE 25 MG: 25 TABLET ORAL at 16:57

## 2021-01-01 RX ADMIN — DOCUSATE SODIUM 50 MG AND SENNOSIDES 8.6 MG 2 TABLET: 8.6; 5 TABLET, FILM COATED ORAL at 22:29

## 2021-01-01 RX ADMIN — CARBIDOPA AND LEVODOPA 1 TABLET: 10; 100 TABLET ORAL at 12:10

## 2021-01-01 RX ADMIN — CARBIDOPA AND LEVODOPA 1 TABLET: 10; 100 TABLET ORAL at 20:59

## 2021-01-01 RX ADMIN — CARBIDOPA AND LEVODOPA 1 TABLET: 10; 100 TABLET ORAL at 17:42

## 2021-01-01 RX ADMIN — QUETIAPINE FUMARATE 12.5 MG: 25 TABLET ORAL at 09:22

## 2021-01-01 RX ADMIN — MIDODRINE HYDROCHLORIDE 5 MG: 5 TABLET ORAL at 10:03

## 2021-01-01 RX ADMIN — CARBIDOPA AND LEVODOPA 1 TABLET: 10; 100 TABLET ORAL at 11:24

## 2021-01-01 RX ADMIN — DOCUSATE SODIUM 50 MG AND SENNOSIDES 8.6 MG 2 TABLET: 8.6; 5 TABLET, FILM COATED ORAL at 10:18

## 2021-01-01 RX ADMIN — DRONABINOL 2.5 MG: 2.5 CAPSULE ORAL at 11:00

## 2021-01-01 RX ADMIN — FLUOXETINE HYDROCHLORIDE 40 MG: 20 CAPSULE ORAL at 09:12

## 2021-01-01 RX ADMIN — CARBIDOPA AND LEVODOPA 1 TABLET: 10; 100 TABLET ORAL at 21:11

## 2021-01-01 RX ADMIN — ENOXAPARIN SODIUM 40 MG: 40 INJECTION SUBCUTANEOUS at 09:19

## 2021-01-01 RX ADMIN — CARBIDOPA AND LEVODOPA 1 TABLET: 10; 100 TABLET ORAL at 18:02

## 2021-01-01 RX ADMIN — DRONABINOL 2.5 MG: 2.5 CAPSULE ORAL at 17:25

## 2021-01-01 RX ADMIN — QUETIAPINE FUMARATE 25 MG: 25 TABLET ORAL at 16:02

## 2021-01-01 RX ADMIN — CARBIDOPA AND LEVODOPA 1 TABLET: 10; 100 TABLET ORAL at 06:13

## 2021-01-01 RX ADMIN — QUETIAPINE FUMARATE 12.5 MG: 25 TABLET ORAL at 08:19

## 2021-01-01 RX ADMIN — MIDODRINE HYDROCHLORIDE 5 MG: 5 TABLET ORAL at 12:18

## 2021-01-01 RX ADMIN — CARBIDOPA AND LEVODOPA 1 TABLET: 10; 100 TABLET ORAL at 21:18

## 2021-01-01 RX ADMIN — DOCUSATE SODIUM 50 MG AND SENNOSIDES 8.6 MG 2 TABLET: 8.6; 5 TABLET, FILM COATED ORAL at 09:17

## 2021-01-01 RX ADMIN — MIDODRINE HYDROCHLORIDE 5 MG: 5 TABLET ORAL at 17:30

## 2021-01-01 RX ADMIN — MIDODRINE HYDROCHLORIDE 5 MG: 5 TABLET ORAL at 18:00

## 2021-01-01 RX ADMIN — CARBIDOPA AND LEVODOPA 1 TABLET: 10; 100 TABLET ORAL at 17:12

## 2021-01-01 RX ADMIN — QUETIAPINE FUMARATE 25 MG: 25 TABLET ORAL at 17:59

## 2021-01-01 RX ADMIN — DRONABINOL 2.5 MG: 2.5 CAPSULE ORAL at 18:00

## 2021-01-01 RX ADMIN — CARBIDOPA AND LEVODOPA 1 TABLET: 10; 100 TABLET ORAL at 22:04

## 2021-01-01 RX ADMIN — DOCUSATE SODIUM 50 MG AND SENNOSIDES 8.6 MG 2 TABLET: 8.6; 5 TABLET, FILM COATED ORAL at 08:57

## 2021-01-01 RX ADMIN — ENOXAPARIN SODIUM 40 MG: 40 INJECTION SUBCUTANEOUS at 08:04

## 2021-01-01 RX ADMIN — FLUOXETINE HYDROCHLORIDE 40 MG: 20 CAPSULE ORAL at 08:20

## 2021-01-01 RX ADMIN — CARBIDOPA AND LEVODOPA 1 TABLET: 10; 100 TABLET ORAL at 06:09

## 2021-01-01 RX ADMIN — MIDODRINE HYDROCHLORIDE 5 MG: 5 TABLET ORAL at 12:40

## 2021-01-01 RX ADMIN — QUETIAPINE FUMARATE 12.5 MG: 25 TABLET ORAL at 08:55

## 2021-01-01 RX ADMIN — CARBIDOPA AND LEVODOPA 1 TABLET: 10; 100 TABLET ORAL at 17:30

## 2021-01-01 RX ADMIN — DOCUSATE SODIUM 50 MG AND SENNOSIDES 8.6 MG 2 TABLET: 8.6; 5 TABLET, FILM COATED ORAL at 07:48

## 2021-01-01 RX ADMIN — QUETIAPINE FUMARATE 25 MG: 25 TABLET ORAL at 22:05

## 2021-01-01 RX ADMIN — ENOXAPARIN SODIUM 40 MG: 40 INJECTION SUBCUTANEOUS at 10:18

## 2021-01-01 RX ADMIN — MIDODRINE HYDROCHLORIDE 5 MG: 5 TABLET ORAL at 11:50

## 2021-01-01 RX ADMIN — MIDODRINE HYDROCHLORIDE 5 MG: 5 TABLET ORAL at 08:47

## 2021-01-01 RX ADMIN — MIDODRINE HYDROCHLORIDE 5 MG: 5 TABLET ORAL at 11:37

## 2021-01-01 RX ADMIN — FLUOXETINE HYDROCHLORIDE 40 MG: 20 CAPSULE ORAL at 09:23

## 2021-01-01 RX ADMIN — FLUOXETINE HYDROCHLORIDE 40 MG: 20 CAPSULE ORAL at 10:03

## 2021-01-01 RX ADMIN — MIDODRINE HYDROCHLORIDE 5 MG: 5 TABLET ORAL at 18:15

## 2021-01-01 RX ADMIN — DOCUSATE SODIUM 50 MG AND SENNOSIDES 8.6 MG 2 TABLET: 8.6; 5 TABLET, FILM COATED ORAL at 22:05

## 2021-01-01 RX ADMIN — QUETIAPINE FUMARATE 12.5 MG: 25 TABLET ORAL at 09:18

## 2021-01-01 RX ADMIN — DOCUSATE SODIUM 50 MG AND SENNOSIDES 8.6 MG 2 TABLET: 8.6; 5 TABLET, FILM COATED ORAL at 22:13

## 2021-01-01 RX ADMIN — CARBIDOPA AND LEVODOPA 1 TABLET: 10; 100 TABLET ORAL at 22:10

## 2021-01-01 RX ADMIN — FLUOXETINE HYDROCHLORIDE 40 MG: 20 CAPSULE ORAL at 09:58

## 2021-01-01 RX ADMIN — CARBIDOPA AND LEVODOPA 1 TABLET: 10; 100 TABLET ORAL at 16:49

## 2021-01-01 RX ADMIN — CARBIDOPA AND LEVODOPA 1 TABLET: 10; 100 TABLET ORAL at 05:21

## 2021-01-01 RX ADMIN — CARBIDOPA AND LEVODOPA 1 TABLET: 10; 100 TABLET ORAL at 12:54

## 2021-01-01 RX ADMIN — ENOXAPARIN SODIUM 40 MG: 40 INJECTION SUBCUTANEOUS at 09:58

## 2021-01-01 RX ADMIN — CARBIDOPA AND LEVODOPA 1 TABLET: 10; 100 TABLET ORAL at 17:16

## 2021-01-01 RX ADMIN — FLUOXETINE HYDROCHLORIDE 40 MG: 20 CAPSULE ORAL at 07:48

## 2021-01-01 RX ADMIN — DRONABINOL 2.5 MG: 2.5 CAPSULE ORAL at 12:25

## 2021-01-01 RX ADMIN — MIDODRINE HYDROCHLORIDE 5 MG: 5 TABLET ORAL at 11:56

## 2021-01-01 RX ADMIN — CARBIDOPA AND LEVODOPA 1 TABLET: 10; 100 TABLET ORAL at 22:19

## 2021-01-01 RX ADMIN — MIDODRINE HYDROCHLORIDE 5 MG: 5 TABLET ORAL at 10:18

## 2021-01-01 RX ADMIN — DOCUSATE SODIUM 50 MG AND SENNOSIDES 8.6 MG 2 TABLET: 8.6; 5 TABLET, FILM COATED ORAL at 20:59

## 2021-01-01 RX ADMIN — MIDODRINE HYDROCHLORIDE 5 MG: 5 TABLET ORAL at 12:10

## 2021-01-01 RX ADMIN — CARBIDOPA AND LEVODOPA 1 TABLET: 10; 100 TABLET ORAL at 07:00

## 2021-01-01 RX ADMIN — DRONABINOL 2.5 MG: 2.5 CAPSULE ORAL at 17:43

## 2021-01-01 RX ADMIN — QUETIAPINE FUMARATE 12.5 MG: 25 TABLET ORAL at 10:05

## 2021-01-01 RX ADMIN — MIDODRINE HYDROCHLORIDE 5 MG: 5 TABLET ORAL at 08:15

## 2021-01-01 RX ADMIN — MIDODRINE HYDROCHLORIDE 5 MG: 5 TABLET ORAL at 17:49

## 2021-01-01 RX ADMIN — DRONABINOL 2.5 MG: 2.5 CAPSULE ORAL at 16:35

## 2021-01-01 RX ADMIN — QUETIAPINE FUMARATE 12.5 MG: 25 TABLET ORAL at 08:56

## 2021-01-01 RX ADMIN — CARBIDOPA AND LEVODOPA 1 TABLET: 10; 100 TABLET ORAL at 20:57

## 2021-01-01 RX ADMIN — QUETIAPINE FUMARATE 25 MG: 25 TABLET ORAL at 17:49

## 2021-01-01 RX ADMIN — CARBIDOPA AND LEVODOPA 1 TABLET: 10; 100 TABLET ORAL at 22:05

## 2021-01-01 RX ADMIN — CARBIDOPA AND LEVODOPA 1 TABLET: 10; 100 TABLET ORAL at 22:29

## 2021-01-01 RX ADMIN — MIDODRINE HYDROCHLORIDE 5 MG: 5 TABLET ORAL at 12:25

## 2021-01-01 RX ADMIN — CARBIDOPA AND LEVODOPA 1 TABLET: 10; 100 TABLET ORAL at 21:45

## 2021-01-01 RX ADMIN — QUETIAPINE FUMARATE 12.5 MG: 25 TABLET ORAL at 09:58

## 2021-01-01 RX ADMIN — MIDODRINE HYDROCHLORIDE 5 MG: 5 TABLET ORAL at 17:17

## 2021-01-01 RX ADMIN — ENOXAPARIN SODIUM 40 MG: 40 INJECTION SUBCUTANEOUS at 09:51

## 2021-01-01 RX ADMIN — CARBIDOPA AND LEVODOPA 1 TABLET: 10; 100 TABLET ORAL at 16:02

## 2021-01-01 RX ADMIN — CARBIDOPA AND LEVODOPA 1 TABLET: 10; 100 TABLET ORAL at 13:19

## 2021-01-01 RX ADMIN — DOCUSATE SODIUM 50 MG AND SENNOSIDES 8.6 MG 2 TABLET: 8.6; 5 TABLET, FILM COATED ORAL at 21:06

## 2021-01-01 RX ADMIN — QUETIAPINE FUMARATE 25 MG: 25 TABLET ORAL at 17:12

## 2021-01-01 RX ADMIN — CARBIDOPA AND LEVODOPA 1 TABLET: 10; 100 TABLET ORAL at 06:21

## 2021-01-01 RX ADMIN — DOCUSATE SODIUM 50 MG AND SENNOSIDES 8.6 MG 2 TABLET: 8.6; 5 TABLET, FILM COATED ORAL at 22:26

## 2021-01-01 RX ADMIN — DRONABINOL 2.5 MG: 2.5 CAPSULE ORAL at 11:50

## 2021-01-01 RX ADMIN — MIDODRINE HYDROCHLORIDE 5 MG: 5 TABLET ORAL at 17:48

## 2021-01-01 RX ADMIN — DOCUSATE SODIUM 50 MG AND SENNOSIDES 8.6 MG 2 TABLET: 8.6; 5 TABLET, FILM COATED ORAL at 09:25

## 2021-01-01 RX ADMIN — CARBIDOPA AND LEVODOPA 1 TABLET: 10; 100 TABLET ORAL at 05:03

## 2021-01-01 RX ADMIN — CARBIDOPA AND LEVODOPA 1 TABLET: 10; 100 TABLET ORAL at 11:57

## 2021-01-01 RX ADMIN — CARBIDOPA AND LEVODOPA 1 TABLET: 10; 100 TABLET ORAL at 20:58

## 2021-01-01 RX ADMIN — QUETIAPINE FUMARATE 12.5 MG: 25 TABLET ORAL at 09:19

## 2021-01-01 RX ADMIN — QUETIAPINE FUMARATE 12.5 MG: 25 TABLET ORAL at 10:18

## 2021-01-01 RX ADMIN — DOCUSATE SODIUM 50 MG AND SENNOSIDES 8.6 MG 2 TABLET: 8.6; 5 TABLET, FILM COATED ORAL at 07:45

## 2021-01-01 RX ADMIN — MIDODRINE HYDROCHLORIDE 5 MG: 5 TABLET ORAL at 13:19

## 2021-01-01 RX ADMIN — CARBIDOPA AND LEVODOPA 1 TABLET: 10; 100 TABLET ORAL at 11:59

## 2021-01-01 RX ADMIN — CARBIDOPA AND LEVODOPA 1 TABLET: 10; 100 TABLET ORAL at 12:18

## 2021-01-01 RX ADMIN — MIDODRINE HYDROCHLORIDE 5 MG: 5 TABLET ORAL at 12:39

## 2021-01-01 RX ADMIN — MIDODRINE HYDROCHLORIDE 5 MG: 5 TABLET ORAL at 09:22

## 2021-01-01 RX ADMIN — DRONABINOL 2.5 MG: 2.5 CAPSULE ORAL at 12:18

## 2021-01-01 RX ADMIN — QUETIAPINE FUMARATE 25 MG: 25 TABLET ORAL at 16:56

## 2021-01-01 RX ADMIN — QUETIAPINE FUMARATE 12.5 MG: 25 TABLET ORAL at 09:20

## 2021-01-01 RX ADMIN — CARBIDOPA AND LEVODOPA 1 TABLET: 10; 100 TABLET ORAL at 11:51

## 2021-01-01 RX ADMIN — CARBIDOPA AND LEVODOPA 1 TABLET: 10; 100 TABLET ORAL at 10:25

## 2021-01-01 RX ADMIN — MIDODRINE HYDROCHLORIDE 5 MG: 5 TABLET ORAL at 16:35

## 2021-01-01 RX ADMIN — MIDODRINE HYDROCHLORIDE 5 MG: 5 TABLET ORAL at 07:45

## 2021-01-01 RX ADMIN — DOCUSATE SODIUM 50 MG AND SENNOSIDES 8.6 MG 2 TABLET: 8.6; 5 TABLET, FILM COATED ORAL at 20:57

## 2021-01-01 RX ADMIN — DOCUSATE SODIUM 50 MG AND SENNOSIDES 8.6 MG 2 TABLET: 8.6; 5 TABLET, FILM COATED ORAL at 09:57

## 2021-01-01 RX ADMIN — DOCUSATE SODIUM 50 MG AND SENNOSIDES 8.6 MG 2 TABLET: 8.6; 5 TABLET, FILM COATED ORAL at 08:32

## 2021-01-01 RX ADMIN — DOCUSATE SODIUM 50 MG AND SENNOSIDES 8.6 MG 2 TABLET: 8.6; 5 TABLET, FILM COATED ORAL at 22:19

## 2021-01-01 RX ADMIN — DOCUSATE SODIUM 50 MG AND SENNOSIDES 8.6 MG 2 TABLET: 8.6; 5 TABLET, FILM COATED ORAL at 09:23

## 2021-01-01 RX ADMIN — MIDODRINE HYDROCHLORIDE 5 MG: 5 TABLET ORAL at 17:25

## 2021-01-01 RX ADMIN — QUETIAPINE FUMARATE 12.5 MG: 25 TABLET ORAL at 08:33

## 2021-01-01 RX ADMIN — FLUOXETINE HYDROCHLORIDE 40 MG: 20 CAPSULE ORAL at 08:36

## 2021-01-01 RX ADMIN — MIDODRINE HYDROCHLORIDE 5 MG: 5 TABLET ORAL at 10:05

## 2021-01-01 RX ADMIN — ENOXAPARIN SODIUM 40 MG: 40 INJECTION SUBCUTANEOUS at 07:45

## 2021-01-01 RX ADMIN — MIDODRINE HYDROCHLORIDE 5 MG: 5 TABLET ORAL at 17:16

## 2021-01-01 RX ADMIN — MIDODRINE HYDROCHLORIDE 5 MG: 5 TABLET ORAL at 11:49

## 2021-01-01 RX ADMIN — QUETIAPINE FUMARATE 25 MG: 25 TABLET ORAL at 17:44

## 2021-01-01 ASSESSMENT — PAIN SCALES - PAIN ASSESSMENT IN ADVANCED DEMENTIA (PAINAD)
FACIALEXPRESSION: SMILING OR INEXPRESSIVE
CONSOLABILITY: NO NEED TO CONSOLE
TOTALSCORE: 0
BREATHING: NORMAL
BODYLANGUAGE: RELAXED
TOTALSCORE: 0
CONSOLABILITY: NO NEED TO CONSOLE
FACIALEXPRESSION: SMILING OR INEXPRESSIVE
TOTALSCORE: 0
BODYLANGUAGE: RELAXED
CONSOLABILITY: NO NEED TO CONSOLE
BREATHING: NORMAL
BODYLANGUAGE: TENSE, DISTRESSED PACING, FIDGETING
BREATHING: NORMAL
TOTALSCORE: 0
TOTALSCORE: 0
FACIALEXPRESSION: SMILING OR INEXPRESSIVE
BODYLANGUAGE: RELAXED
BODYLANGUAGE: RELAXED
BREATHING: NORMAL
BREATHING: NORMAL
CONSOLABILITY: NO NEED TO CONSOLE
TOTALSCORE: 0
BREATHING: NORMAL
FACIALEXPRESSION: SMILING OR INEXPRESSIVE
BODYLANGUAGE: RELAXED
FACIALEXPRESSION: SMILING OR INEXPRESSIVE
FACIALEXPRESSION: SMILING OR INEXPRESSIVE
BREATHING: NORMAL
CONSOLABILITY: NO NEED TO CONSOLE
CONSOLABILITY: NO NEED TO CONSOLE
BODYLANGUAGE: RELAXED
BODYLANGUAGE: RELAXED
FACIALEXPRESSION: SMILING OR INEXPRESSIVE
BREATHING: NORMAL
BREATHING: NORMAL
FACIALEXPRESSION: SMILING OR INEXPRESSIVE
TOTALSCORE: 0
FACIALEXPRESSION: SMILING OR INEXPRESSIVE
FACIALEXPRESSION: SMILING OR INEXPRESSIVE
CONSOLABILITY: NO NEED TO CONSOLE
BODYLANGUAGE: RELAXED
BREATHING: NORMAL
CONSOLABILITY: NO NEED TO CONSOLE
BODYLANGUAGE: RELAXED
TOTALSCORE: 0
CONSOLABILITY: NO NEED TO CONSOLE
FACIALEXPRESSION: SAD, FRIGHTENED, FROWN
BREATHING: NORMAL
TOTALSCORE: 0
FACIALEXPRESSION: SMILING OR INEXPRESSIVE
BREATHING: OCCASIONAL LABORED BREATHING, SHORT PERIOD OF HYPERVENTILATION
BREATHING: NORMAL
CONSOLABILITY: NO NEED TO CONSOLE
TOTALSCORE: 0
CONSOLABILITY: NO NEED TO CONSOLE
BODYLANGUAGE: RELAXED
BREATHING: NOISY LABORED BREATHING, LONG PERIODS OF HYPERVENTILATION, CHEYNE-STOKES RESPIRATIONS
CONSOLABILITY: NO NEED TO CONSOLE
TOTALSCORE: 0
CONSOLABILITY: NO NEED TO CONSOLE
FACIALEXPRESSION: SMILING OR INEXPRESSIVE
BREATHING: NORMAL
FACIALEXPRESSION: SMILING OR INEXPRESSIVE
BREATHING: NORMAL
BODYLANGUAGE: TENSE, DISTRESSED PACING, FIDGETING
TOTALSCORE: 2
TOTALSCORE: 1
CONSOLABILITY: NO NEED TO CONSOLE
BODYLANGUAGE: RELAXED
CONSOLABILITY: NO NEED TO CONSOLE
BREATHING: NORMAL
FACIALEXPRESSION: SMILING OR INEXPRESSIVE
BODYLANGUAGE: RELAXED
BREATHING: NORMAL
BREATHING: NORMAL
BODYLANGUAGE: RELAXED
CONSOLABILITY: NO NEED TO CONSOLE
TOTALSCORE: 1
CONSOLABILITY: NO NEED TO CONSOLE
CONSOLABILITY: NO NEED TO CONSOLE
TOTALSCORE: 1
TOTALSCORE: 2
BODYLANGUAGE: RELAXED
TOTALSCORE: 0
BODYLANGUAGE: RELAXED
FACIALEXPRESSION: SMILING OR INEXPRESSIVE
FACIALEXPRESSION: SMILING OR INEXPRESSIVE
TOTALSCORE: 0
FACIALEXPRESSION: SMILING OR INEXPRESSIVE
TOTALSCORE: 0
CONSOLABILITY: NO NEED TO CONSOLE
BODYLANGUAGE: RELAXED
BODYLANGUAGE: TENSE, DISTRESSED PACING, FIDGETING
FACIALEXPRESSION: SMILING OR INEXPRESSIVE

## 2021-01-01 ASSESSMENT — PAIN DESCRIPTION - PAIN TYPE
TYPE: ACUTE PAIN

## 2021-01-01 ASSESSMENT — FIBROSIS 4 INDEX
FIB4 SCORE: 2.9
FIB4 SCORE: 2.9

## 2021-01-01 NOTE — PROGRESS NOTES
Pt alert and appeared to be calm, no signs of distress. Pt refused to take medication last night as she refused to open her mouth. This morning pt took medication with water. Fall precautions in place. Prn incontinence checks in place.

## 2021-01-01 NOTE — PROGRESS NOTES
Pt is alert to self. Pt refused medications during the shift. Pt was a 2 assist to turn and reposition and change. Pt was incontinent throughout the shift. Fall prevention education reinforced. Call bell within reach. Hourly rounding completed. Will continue to monitor.

## 2021-01-02 NOTE — PROGRESS NOTES
Received bedside report from RN, pt care assumed, 0/10 pain at this time, VSS. Pt is diaphoretic, according to dayshift nurse it started around 1500 today, will continue to monitor. Bed in lowest position.

## 2021-01-02 NOTE — PROGRESS NOTES
Hospital Medicine Twice Weekly Progress Note    Date of Service  1/2/2021    Chief Complaint  Fevers, chills    Hospital Course  Ms. Bennett is a wheelchair bound 75-year-old female with a PMH of arthritis, DVT, Parkinson's, and dementia who presented 5/14/2020 with complaints of shortness of breath, nonproductive cough, fevers, and chills for 5 days. She is confused at baseline and was sent by her  who has been unable to care for her.      12-lead EKG was done in the ED and was unremarkable, though a chest xray showed bilateral interstitial infiltrates. COVID was ruled out.     She was evaluated by PT/OT who recommended 24/7 supervision. She is now pending placement to SNF vs group home, though medicaid must be obtained first as she has limited income.    Interval Problem Update  12/30: Patient lying in bed in no acute distress. VSS on room air. Oriented to self only.     1/2: Sleeping in bed in no acute distress. Opens eyes to voice. Per staff, she has been increasingly more fatigued and eating <25% of meals. Also refusing many of her medications. I called son 12/31 to discuss possibly transitioning to comfort care, but have no been able to get a hold of him. Will call again today.     Code Status  DNAR/DNI    Disposition  TBD    Review of Systems  Review of Systems   Unable to perform ROS: Dementia        Physical Exam  Temp:  [36.7 °C (98.1 °F)-36.8 °C (98.2 °F)] 36.7 °C (98.1 °F)  Pulse:  [] 73  Resp:  [17] 17  BP: ()/(61-69) 101/61  SpO2:  [92 %-93 %] 93 %    Physical Exam  Vitals signs and nursing note reviewed.   Constitutional:       General: She is not in acute distress.     Appearance: She is not toxic-appearing.      Comments: Chronically ill appearing     HENT:      Head: Normocephalic and atraumatic.      Right Ear: Hearing normal.      Left Ear: Hearing normal.      Nose: Nose normal.      Mouth/Throat:      Mouth: Mucous membranes are moist.      Pharynx: Oropharynx is clear.   Eyes:       General: No scleral icterus.  Cardiovascular:      Rate and Rhythm: Normal rate.      Pulses: Normal pulses.      Heart sounds: Murmur present.   Pulmonary:      Effort: Pulmonary effort is normal. No respiratory distress.   Abdominal:      General: Bowel sounds are normal.      Palpations: Abdomen is soft.      Tenderness: There is no abdominal tenderness.   Musculoskeletal:      Right lower leg: No edema.      Left lower leg: No edema.      Comments: ROSAS  Generalized weakness.  Wheelchair and bed bound at her baseline   Skin:     General: Skin is warm and dry.      Coloration: Skin is pale.   Neurological:      Mental Status: She is alert. She is disoriented.   Psychiatric:         Attention and Perception: She is inattentive.         Mood and Affect: Affect is flat.         Cognition and Memory: Cognition is impaired. Memory is impaired.         Fluids    Intake/Output Summary (Last 24 hours) at 1/2/2021 0836  Last data filed at 1/2/2021 0800  Gross per 24 hour   Intake 598 ml   Output --   Net 598 ml       Laboratory                        Imaging  DX-CHEST-PORTABLE (1 VIEW)   Final Result      No acute cardiopulmonary abnormality.      US-EXTREMITY VENOUS LOWER UNILAT RIGHT   Final Result      DX-HIP-UNILATERAL-WITH PELVIS-1 VIEW RIGHT   Final Result      1.  Bony pelvis and the hip joint appear normal      2.  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  No acute intracranial findings.      2.  Diffuse atrophy and periventricular white matter changes, consistent with chronic small vessel disease.         DX-CHEST-PORTABLE (1 VIEW)   Final Result      Perihilar interstitial markings are increased and suggestive of mild pulmonary edema.           Assessment/Plan  Dementia (HCC)- (present on admission)  Assessment & Plan  -Chronic, stable  -yells out at times  -Frequent re-orientation,  reestablish circadian rhythm, encourage familiar faces/family in room, avoid or minimize narcotics/sedatives.   -Continue seroquel and prozac   - Haldol PRN for agitation. Last administered on 12/25    Lower extremity pain, bilateral- (present on admission)  Assessment & Plan  -PRN tylenol available if needed  -PT/OT recommending SNF placement, but will eventually need group home placement with 24/7 care after SNF making it a difficult discharge  -Mobilize as tolerated    Discharge planning issues  Assessment & Plan  -Patient not eligible for medicaid due to income.  Social work has requested [facility] screen her for institutional medicaid.  Will need placement after that is obtained.      Parkinson's disease (HCC)- (present on admission)  Assessment & Plan  -Chronic and stable  -Continue sinemet  -24/7 supervision at discharge  -Frequent reorientation       VTE prophylaxis: Enoxaparin (intermittently refusing)      I have performed a physical exam and reviewed and updated ROS and Assessment/Plan today 1/2/2021. In review of the previous note there are no changes except as documented above    TESSY Weaver.

## 2021-01-02 NOTE — PROGRESS NOTES
Pt A&O 1. Pt refused medications during the shift. 2 assist to turn and reposition and change. Pt was incontinent .Fall prevention Call bell within reach. Hourly rounding completed. Will continue to monitor.

## 2021-01-02 NOTE — PROGRESS NOTES
2 RN skin check complete CURT Lan.    Devices in place: mepilex  Skin assessed under devices: yes  Confirmed pressure ulcers found on na  New potential pressure ulcers noted on na. Wound consult placed na.  The following interventions in place Q2 turns, 2RN skin check, barrier cream, incontinent care with appropriate linen changes, mepilex, waffle overlay.     Sacral area intact, barrier cream applied  bilat heels red, blanching;  bilat elbows pink, blanching; preventive mepilex in place  Ears: Intact and blanching

## 2021-01-03 NOTE — PROGRESS NOTES
Pt A&O 1. Pt refused medications during the shift. 2 assist to turn and reposition and change. Pt was incontinent to urine this shift .Fall prevention Call bell within reach. Hourly rounding completed. Will continue to monitor

## 2021-01-03 NOTE — CARE PLAN
Problem: Safety  Goal: Will remain free from injury  Outcome: PROGRESSING AS EXPECTED  Goal: Will remain free from falls  Outcome: PROGRESSING AS EXPECTED     Problem: Pain Management  Goal: Pain level will decrease to patient's comfort goal  Outcome: PROGRESSING AS EXPECTED     Problem: Skin Integrity  Goal: Risk for impaired skin integrity will decrease  Outcome: PROGRESSING AS EXPECTED  Note: [id number], 2RN skin check Q shift, incontinence care, waffle overlay, mepilex

## 2021-01-03 NOTE — PROGRESS NOTES
Pt is AAOx1 and denies having any pain at this time. No signs of acute distress. Bed is in lowest position. Call light within reach and hourly rounding initiated. BP is soft this evening at 98/59, encouraging fluid intake. Will continue to monitor.

## 2021-01-03 NOTE — PROGRESS NOTES
2 RN skin check complete CURT Miles.    Devices in place: mepilex  Skin assessed under devices: yes  Confirmed pressure ulcers found on na  New potential pressure ulcers noted on na.   Wound consult placed na.  The following interventions in place: Q2 turns, 2RN skin check, barrier cream, incontinent care with appropriate linen changes, mepilex, waffle overlay.     Sacral area intact, barrier cream applied  bilat heels red, blanching; preventative mepilex in place   bilat elbows pink, blanching; preventive mepilex in place  Ears: Intact and blanching

## 2021-01-04 NOTE — PROGRESS NOTES
2 RN skin check complete Melissa RN  Devices in place mepilex  Skin assessed under devices yes  Confirmed pressure ulcers found on na  New potential pressure ulcers noted on na. Wound consult placed na.  The following interventions in place Q2 turns, 2RN skin check, barrier cream, incontinent care with appropriate linen changes.       Sacral area intact, barrier cream applied  Bilateral heels blanching; refuses moon boots  Bilateral elbows blanching; preventive mepilex in place  Bilateral back of ear blnching

## 2021-01-04 NOTE — PROGRESS NOTES
Pt A&O 1.Able to give pt medication this shift. 2 assist to turn and reposition and change. Pt was incontinent to urine this shift .Fall prevention Call bell within reach. Hourly rounding completed. Will continue to monitor

## 2021-01-04 NOTE — PROGRESS NOTES
Patient alert to self,irritable,reused night med with education,v/s stable,jerald pain,dall light and personal belongings within reach,bed in low position and bed alarm on.

## 2021-01-05 NOTE — PROGRESS NOTES
2 RN skin check complete with Blanquita RN  Devices in place mepilex  Skin assessed under devices yes  Confirmed pressure ulcers found on na  New potential pressure ulcers noted on na. Wound consult placed na.  The following interventions in place Q2 turns, 2RN skin check, barrier cream, incontinent care with appropriate linen changes.       Sacral area intact, barrier cream applied  Bilateral heels blanching; refuses moon boots  Bilateral elbows blanching; preventive mepilex in place  Bilateral back of ear blanching

## 2021-01-05 NOTE — PROGRESS NOTES
pt awake and resting in bed.  Denies any pain or discomfort.  Call light kept within reach.  Will continue to monitor patient.

## 2021-01-05 NOTE — DISCHARGE PLANNING
Medical Social Work  PC to patient's son, Jasbir [phone]: message left to call   PC to patient's daughter, Coleen [phone]: aware that her brother has documentation that he is patient's power to /medical/financial.  Told GENO they recently talked and he is planning on getting information from the bank this week to see how much money is in their parent's account.  Coleen said that she does not believe they will have much, plus her parents have quite a few bills to be paid.  Coleen asked if her mother could be placed close to her in [place].  GENO stated that the patient's behaviors may prevent her from being placed in a group home.

## 2021-01-05 NOTE — CARE PLAN
Problem: Communication  Goal: The ability to communicate needs accurately and effectively will improve  Note: Encouraged to make needs known to staff and to use call light for staff assist.      Problem: Safety  Goal: Will remain free from falls  Note: Call light kept within reach and encouraged to use for assistance and with toileting needs. Encouraged to call staff and to wait for staff before transfers as needed.

## 2021-01-06 NOTE — CARE PLAN
Problem: Safety  Goal: Will remain free from injury  Outcome: PROGRESSING AS EXPECTED   Pt has not sustained an injury on shift, safety precautions in place    Problem: Pain Management  Goal: Pain level will decrease to patient's comfort goal  Outcome: PROGRESSING AS EXPECTED   Pt denied any pain, not displaying any body language that would signify that pt is in pain

## 2021-01-06 NOTE — PROGRESS NOTES
Pt is A&Ox1. Pt is resting in bed, no signs of labored breathing or pain. Pt on RA. Call light & personal belongings within reach, bed in lowest position & locked, and bed alarm is on. Fall precautions in place and education provided on how to use call light.

## 2021-01-06 NOTE — PROGRESS NOTES
2 RN skin check complete CURT Juares  Devices in place: mepilex  Skin assessed under devices: yes  Confirmed pressure ulcers found: n/a  New potential pressure ulcers noted: n/a.   Wound consult placed: n/a.    Skin Assessment:   Face/ears: intact, blanching  Sacrum: intact, blanching   Bilateral heels: pink, blanching  Bilateral elbows: pink, blanching  Trunk: intact     The following interventions in place:  q 2hr pt turns, 2RN skin check, barrier cream to sacrum, frequent incontinence checks, pillows for support/positioning, preventative mepilex applied to elbows and heels.

## 2021-01-06 NOTE — PROGRESS NOTES
Pt AxO x1 at time of assessment and was very lethargic.  Pt denied any pain.  Pt took meds crushed and in pudding.  Pt not displaying body language that would signify pain, verbally denied pain as well.  Pt on RA and not showing any s/s of distress.  All needs met at this time, frequent rounding in place.

## 2021-01-07 NOTE — PROGRESS NOTES
2 RN skin check complete CURT Kim  Devices in place: mepilex  Skin assessed under devices: yes  Confirmed pressure ulcers found: n/a  New potential pressure ulcers noted: n/a.   Wound consult placed: n/a.     Skin Assessment:   Face/ears: intact, blanching  Sacrum: intact, blanching   Bilateral heels: pink, blanching  Bilateral elbows: pink, blanching  Trunk: intact      The following interventions in place:  q 2hr pt turns, 2RN skin check, barrier cream to sacrum, frequent incontinence checks, pillows for support/positioning, preventative mepilex applied to elbows and heels.

## 2021-01-07 NOTE — PROGRESS NOTES
Pt AxO x1 at time of assessment and was very irritable.  Attempted to administer meds, crushed, in applesauce and pt proceeded to spit meds at staff and refused to take the remainder, despite education.  Pt not displaying body language that would signify pain, verbally denied pain as well.  Pt on RA and not showing any s/s of distress.  All needs met at this time, frequent rounding in place.

## 2021-01-07 NOTE — CARE PLAN
Problem: Venous Thromboembolism (VTW)/Deep Vein Thrombosis (DVT) Prevention:  Goal: Patient will participate in Venous Thrombosis (VTE)/Deep Vein Thrombosis (DVT)Prevention Measures  Outcome: PROGRESSING AS EXPECTED   Pt receiving scheduled enoxaparin     Problem: Knowledge Deficit  Goal: Knowledge of the prescribed therapeutic regimen will improve  Outcome: PROGRESSING SLOWER THAN EXPECTED   Pt refused medication regimen, despite education

## 2021-01-07 NOTE — DISCHARGE PLANNING
Anticipated Discharge Disposition: TBD    Action: Voice message from patient's daughter.  Stated her brother needs a letter from the hospital stating patient can not handle her financial affairs so he can access the joint account.  SW requested that [facility] write letter for this.     Barriers to Discharge: placement    Plan: follow up with [facility], once letter received forward to patient's son Jasbir

## 2021-01-08 NOTE — CARE PLAN
Problem: Discharge Barriers/Planning  Goal: Patient's continuum of care needs will be met  Outcome: PROGRESSING AS EXPECTED  Pt needs continually being met and reassessed.     Problem: Knowledge Deficit  Goal: Knowledge of the prescribed therapeutic regimen will improve  Outcome: PROGRESSING SLOWER THAN EXPECTED   Pt refused evening medication despite education

## 2021-01-08 NOTE — PROGRESS NOTES
2 RN skin check complete CURT Caldwell  Devices in place: mepilex  Skin assessed under devices: yes  Confirmed pressure ulcers found: n/a  New potential pressure ulcers noted: n/a.   Wound consult placed: n/a.     Skin Assessment:   Face/ears: intact, blanching  Sacrum: intact, blanching   Bilateral heels: pink, blanching  Bilateral elbows: pink, blanching  Trunk: intact      The following interventions in place:  q 2hr pt turns, 2RN skin check, barrier cream to sacrum, frequent incontinence checks, pillows for support/positioning, preventative mepilex applied to elbows and heels.

## 2021-01-08 NOTE — PROGRESS NOTES
Pt AxO x1 at time of assessment and was very irritable.  Pt refused medication again this evening, reinforced medication purpose, still refusing.  Pt not displaying body language that would signify pain, verbally denied pain as well.  Pt on RA and not showing any s/s of distress.  All needs met at this time, frequent rounding in place.

## 2021-01-08 NOTE — PROGRESS NOTES
Pt alert/oriented 1, self only.  Pt declined pain and does not appear to be in distress or discomfort.  Pt refused some of her AM medications by spitting out at this RN, pt irritable.  Safety precautions in place, bed locked and in lowest position, call light and personal belongings within reach.  No further needs at this time.

## 2021-01-08 NOTE — PALLIATIVE CARE
Palliative Care follow-up:  Pt has POLST on file. Unit CM Team working on long term placement with family. Pt currently on intermittent monitoring list, please call with any needs.     Thank you for allowing Palliative Care to support this patient and family. Contact [phone] for additional assistance, change in patient status, or with any questions/concerns.

## 2021-01-09 NOTE — PROGRESS NOTES
"Pt refused meds this evening again, despite education  Pt told CNA and this RN to \"shut up.\" Repositioned pt and tried to make her comfortable.  Pt not displaying body language that would signify pain, verbally denied pain as well.  Pt on RA and not showing any s/s of distress.  All needs met at this time, frequent rounding in place.   "

## 2021-01-09 NOTE — PROGRESS NOTES
2 RN Skin Check      2 RN skin check complete with: CURT Hatch  Devices in place: mpilex  Skin assess under devices: yes   Confirmed pressure ulcers found on: n/a  New potential pressure ulcers noted on: n/a  Wound consult placed: n/a    The following interventions in place:  [id number], 2RN skin check, barrier cream to sacrum, incontinent care with appropriate linen changes, preventative mepilex to bilateral elbows and heels.    Skin assessment:  General: fragile and intact  Ears: bilateral intact, blanching  Elbows: bilateral intact, pink/red, and blanching  Feet: bilateral intact, pink/red, blanching, slightly boggy  Sacrum: intact, pink/red, and blanching

## 2021-01-09 NOTE — PROGRESS NOTES
Received report from MARTHA Medina, that pt's BP was 87/58 this evening and she her oxygen saturation was barely 90%, conveyed vitals to BLU Whittaker who instructed to encourage pt to drink fluids and monitor pt for s/s of deterioration.  Will continue to monitor.

## 2021-01-09 NOTE — PROGRESS NOTES
Hospital Medicine Twice Weekly Progress Note    Date of Service  1/8/2021    Chief Complaint  Fevers, chills    Hospital Course  Ms. Bennett is a wheelchair bound 75-year-old female with a PMH of arthritis, DVT, Parkinson's, and dementia who presented 5/14/2020 with complaints of shortness of breath, nonproductive cough, fevers, and chills for 5 days. She is confused at baseline and was sent by her  who has been unable to care for her.      12-lead EKG was done in the ED and was unremarkable, though a chest xray showed bilateral interstitial infiltrates. COVID was ruled out.     She was evaluated by PT/OT who recommended 24/7 supervision. She is now pending placement to SNF vs group home, though medicaid must be obtained first as she has limited income.    Interval Problem Update  Sleeping in bed in no acute distress. Opens eyes to voice. She remains oriented only to self.  Per staff, she is eating <25% of meals. Also spitting out some medications.  -family has declined hospice and comfort care in the past. Palliative following.  -son is attempting to access funds    Code Status  DNAR/DNI    Disposition  TBD    Review of Systems  Review of Systems   Unable to perform ROS: Dementia        Physical Exam  Temp:  [36.7 °C (98 °F)-37.1 °C (98.8 °F)] 36.8 °C (98.2 °F)  Pulse:  [] 88  Resp:  [16-19] 19  BP: ()/(60-76) 96/76  SpO2:  [92 %-94 %] 92 %    Physical Exam  Vitals signs and nursing note reviewed.   Constitutional:       General: She is not in acute distress.     Appearance: She is not toxic-appearing.      Comments: Chronically ill appearing     HENT:      Head: Normocephalic and atraumatic.      Right Ear: Hearing normal.      Left Ear: Hearing normal.      Nose: Nose normal.      Mouth/Throat:      Mouth: Mucous membranes are moist.      Pharynx: Oropharynx is clear.   Eyes:      General: No scleral icterus.  Cardiovascular:      Rate and Rhythm: Normal rate.      Pulses: Normal pulses.       Heart sounds: Murmur present.   Pulmonary:      Effort: Pulmonary effort is normal. No respiratory distress.   Abdominal:      General: Bowel sounds are normal.      Palpations: Abdomen is soft.      Tenderness: There is no abdominal tenderness.   Musculoskeletal:      Right lower leg: No edema.      Left lower leg: No edema.      Comments: ALISON  Generalized weakness.  Wheelchair and bed bound at her baseline   Skin:     General: Skin is warm and dry.      Coloration: Skin is pale.   Neurological:      Mental Status: She is alert. She is disoriented.   Psychiatric:         Attention and Perception: She is inattentive.         Mood and Affect: Affect is flat.         Cognition and Memory: Cognition is impaired. Memory is impaired.         Fluids    Intake/Output Summary (Last 24 hours) at 1/8/2021 1628  Last data filed at 1/8/2021 1300  Gross per 24 hour   Intake 1200 ml   Output --   Net 1200 ml       Laboratory                        Imaging  DX-CHEST-PORTABLE (1 VIEW)   Final Result      No acute cardiopulmonary abnormality.      US-EXTREMITY VENOUS LOWER UNILAT RIGHT   Final Result      DX-HIP-UNILATERAL-WITH PELVIS-1 VIEW RIGHT   Final Result      1.  Bony pelvis and the hip joint appear normal      2.  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  No acute intracranial findings.      2.  Diffuse atrophy and periventricular white matter changes, consistent with chronic small vessel disease.         DX-CHEST-PORTABLE (1 VIEW)   Final Result      Perihilar interstitial markings are increased and suggestive of mild pulmonary edema.           Assessment/Plan  Dementia (HCC)- (present on admission)  Assessment & Plan  -Chronic, stable  -yells out at times  -Frequent re-orientation, reestablish circadian rhythm, encourage familiar faces/family in room, avoid or minimize narcotics/sedatives.    -Continue seroquel and prozac   - Haldol PRN for agitation. Last administered on 12/25    Lower extremity pain, bilateral- (present on admission)  Assessment & Plan  -PRN tylenol available if needed  -PT/OT recommending SNF placement, but will eventually need group home placement with 24/7 care after SNF making it a difficult discharge  -Mobilize as tolerated    Discharge planning issues  Assessment & Plan  -Patient not eligible for medicaid due to income.  Social work has requested PFA screen her for institutional medicaid.  Will need placement after that is obtained.  -Patient's son accessing finances    Parkinson's disease (HCC)- (present on admission)  Assessment & Plan  -Chronic and stable  -Continue sinemet  -24/7 supervision at discharge  -Frequent reorientation       VTE prophylaxis: Enoxaparin (intermittently refusing)      I have performed a physical exam and reviewed and updated ROS and Assessment/Plan today 1/8/2021. In review of the previous note there are no changes except as documented above    TESSY Kelley.

## 2021-01-09 NOTE — PROGRESS NOTES
2 RN Skin Check      2 RN skin check complete with: CURT Ulloa  Devices in place: mepilex  Skin assess under devices: yes   Confirmed pressure ulcers found on: n/   New potential pressure ulcers noted on: n/a  Wound consult placed: n/a    The following interventions in place: q2 turns, 2RN skin check, barrier cream to sacrum, incontinent care with appropriate linen changes, preventative mepilex bilateral elbows and heels.    Skin assessment:  General: intact, fragile  Ears: bilateral pink and blanching  Elbows: bilateral pink/red blanching  Feet: bilateral pink/red blanching  Sacrum: pink/red, blanching, intact

## 2021-01-09 NOTE — PROGRESS NOTES
Pt resting in bed enjoying her breakfast.  Pt pleasant with nursing staff and was agreeable with taking medications this morning.  Safety precautions in place, bed locked and in lowest position, call light within reach.  No further needs at this time.

## 2021-01-09 NOTE — CARE PLAN
Problem: Skin Integrity  Goal: Risk for impaired skin integrity will decrease  Outcome: PROGRESSING AS EXPECTED   No new s/s of deterioration, q 2hr pt turns, 2 RN skin check, pillows in use for support and positioning.     Problem: Mobility  Goal: Risk for activity intolerance will decrease  Outcome: PROGRESSING SLOWER THAN EXPECTED   Pt not moving well, refusing carbidopa, despite education, attempted to mobilize pt in bed

## 2021-01-10 NOTE — PROGRESS NOTES
Received bedside report and assumed care of pt at change of shift. Pt is resting in bed with no signs of distress. VSS on RA. Water and call light in reach. Bed is locked and in lowest position with bed alarm on

## 2021-01-10 NOTE — PROGRESS NOTES
Pt was already sleeping when this RN went to give night medications.  RN woke pt up.  Pt was irritable but she took medications.  However, she refused RN to turn light on to do skin assessment despite education.  Will try to coordinate with CNA and do 2 RN skin check later.

## 2021-01-10 NOTE — PROGRESS NOTES
2 RN skin check completed with CURT Caldwell.   Devices in place:  Mepilex.  Skin assessed under devices yes.  Confirmed pressure ulcers found on none.  New potential pressure ulcers noted on none. Wound consult placed none.  The following interventions in place:  Waffle cushion mattress overlay, Q2 turns, incontinence care with barrier paste and Mepilex to elbows and heels.         Elbows are pink and blanching.  Heels are red and blanching.  Perineal area is pink and blanching.

## 2021-01-10 NOTE — CARE PLAN
Problem: Safety  Goal: Will remain free from falls  Outcome: PROGRESSING AS EXPECTED  Note: Continues to have bed alarm.       Problem: Skin Integrity  Goal: Risk for impaired skin integrity will decrease  Outcome: PROGRESSING SLOWER THAN EXPECTED  Note: Refused skin assessment despite education.

## 2021-01-11 NOTE — PROGRESS NOTES
Received bedside report and assumed pt care. Pt is orientated to self only. VSS on RA. Denies any pain and shows no signs of distress. Currently resting in bed with all needs met. Able to take medications crushed in applesauce. Frequent incontinent checks and q2 turns in place. Fall and safety precautions in place. Hourly rounding in place.

## 2021-01-11 NOTE — CARE PLAN
Problem: Safety  Goal: Will remain free from falls  Outcome: PROGRESSING AS EXPECTED   Continues to have bed alarm.     Problem: Skin Integrity  Goal: Risk for impaired skin integrity will decrease  Outcome: PROGRESSING AS EXPECTED   Q2 turns, waffle mattress, incontinence care

## 2021-01-12 NOTE — PROGRESS NOTES
Pt is orientated to self only. VSS on RA. Pt shows no signs of distress or pain. Took medications with no issues before she fell asleep. [id number] in place with frequent incontinent checks. Fall and safety precautions in place. Hourly rounding in place.

## 2021-01-13 NOTE — PROGRESS NOTES
Pt. Received in bed, awake, refuses to answer orientation questions. Pt. Is calm and relax. Tolerated meds with multiple prompting and cuing. Skin preventive measures in place. Bed alarm on.

## 2021-01-13 NOTE — CARE PLAN
Problem: Skin Integrity  Goal: Risk for impaired skin integrity will decrease  Outcome: PROGRESSING AS EXPECTED  2 rn skin check done, skin preventive measures in place.      Problem: Safety  Goal: Will remain free from injury  Outcome: PROGRESSING AS EXPECTED  Goal: Will remain free from falls  Outcome: PROGRESSING AS EXPECTED  Bed alarm on, call light in reach. Pt. On hourly rounding.

## 2021-01-13 NOTE — PROGRESS NOTES
Pt remains A&Ox1 to self only. Shows no signs of pain or distress. Took medications well with multiple prompting and cuing. Currently resting in bed calm and relax. Skin preventive measures in place. Fall and safety precautions in place. All needs met at this time. Hourly rounding in place.

## 2021-01-13 NOTE — PROGRESS NOTES
2 RN skin check completed with CURT Newsome.   Devices in place: Mepilex.   Skin assessed under devices yes.   Confirmed pressure ulcers found on none.   New potential pressure ulcers noted on none. Wound consult placed none.     The following interventions in place: Waffle cushion mattress overlay, Q2 turns, incontinence care with barrier paste and Mepilex to elbows and heels.     -bilateral heels pink and blanching   -elbows pink and blanching   -perineal area is pink and blanching   -sacrum/buttocks is pink and blanching

## 2021-01-14 NOTE — PROGRESS NOTES
2 RN skin check completed with CURT Manriquez.   Devices in place: Mepilex.   Skin assessed under devices yes.   Confirmed pressure ulcers found on none.   New potential pressure ulcers noted on none. Wound consult placed N/A.     The following interventions in place: Waffle mattress overlay, 2 RN skin check, Q2 turns, incontinence care with barrier paste and Mepilex to elbows and heels.      Skin assessment  -bilateral heels pink, boggy and blanching   -elbows pink and blanching   -perineal area is pink, intact  -sacrum/buttocks is pink and blanching.

## 2021-01-14 NOTE — PROGRESS NOTES
Report received and assumed care at shift change. Patient A&O x 1, calm and cooperative with cares. No sign and symptoms of pain or distress. Took medications without difficulty. Fall precautions in place, bed locked and in lowest position. Needs attended to.

## 2021-01-14 NOTE — PROGRESS NOTES
2 RN skin check completed with CURT Griffin.   Devices in place: Mepilex.   Skin assessed under devices yes.   Confirmed pressure ulcers found on none.   New potential pressure ulcers noted on none. Wound consult placed N/A.     The following interventions in place: Waffle mattress overlay, Q2 turns, incontinence care with barrier paste and Mepilex to elbows and heels. Pillows in use for support and positioning.      Skin assessment  -bilateral heels pink, boggy and blanching   -elbows pink and blanching   -perineal area is pink, intact  -sacrum/buttocks is pink and blanching.

## 2021-01-14 NOTE — DIETARY
Nutrition Services Update: weekly nutrition screen per dept policy    Day 245 of admit. Pureed/Slightly Thick diet. ADL documentation shows intake has been < 25% of most meals, will occasionally consume ~50%. Boost Plus in place BID - however intake only occasionally documented, does not appear to be consuming regularly.     Recent weight trend:  11/7/20 = 64.6 kg  1/2/21 = 62.8 kg  1/9/21 = 59.6 kg  5 kg (8%) weight loss x 2 months and 3.2 kg (5%) loss over 1 week alone - severe weight loss.     RD has been routinely screening pt since admit and up until ~2 weeks ago pt was nutritionally stable with weight consistently 64-66 kg for several months and majority of meal intake > 50%.     RD added Boost supplements on 12/31 due to increase in meal refusal, however pt has still continue to lose weight. POLST on file however section on tube feeding not filled out. Spoke with APRN about starting an appetite stimulant who is agreeable - Marinol added to MAR BID (before lunch and dinner).     Plan/Recommend:   1. Marinol added to MAR to hopefully increase appetite   2. Will change Boost Plus BID to Boost Very High Calorie TID to optimize caloric intake and prevent further weight loss   3. Encourage PO intake of meals and supplements  4. Please continue recording intake of meals and supplements as % consumed in ADLs  5. RD will continue to screen nutritional status weekly - making additional recommendations as appropriate     RD available prn - and will monitor per dept policy

## 2021-01-14 NOTE — DISCHARGE PLANNING
Medical Social Work  GENO received letter from BLU Bogdan for patient's son  PC to patient's son Jasbir, [phone]; message left to call GENO about letter.

## 2021-01-15 PROBLEM — R63.0 DECREASED APPETITE: Status: ACTIVE | Noted: 2021-01-01

## 2021-01-15 NOTE — PROGRESS NOTES
Hospital Medicine Twice Weekly Progress Note    Date of Service  1/15/2021    Chief Complaint  Fevers, chills    Hospital Course  Ms. Bennett is a wheelchair bound 75-year-old female with a PMH of arthritis, DVT, Parkinson's, and dementia who presented 5/14/2020 with complaints of shortness of breath, nonproductive cough, fevers, and chills for 5 days. She is confused at baseline and was sent by her  who has been unable to care for her.      12-lead EKG was done in the ED and was unremarkable, though a chest xray showed bilateral interstitial infiltrates. COVID was ruled out.     She was evaluated by PT/OT who recommended 24/7 supervision. She is now pending placement to SNF vs group home, though medicaid must be obtained first as she has limited income.    Interval Problem Update  1/15- Patient sleeping in bed in no acute distress. Son attempting to access funds from bank to help with discharge plan. Marinol initiated to help stimulate appetite. Dietician following.       Code Status  DNAR/DNI    Disposition  TBD    Review of Systems  Review of Systems   Unable to perform ROS: Dementia        Physical Exam  Temp:  [36.1 °C (96.9 °F)-36.3 °C (97.3 °F)] 36.1 °C (96.9 °F)  Pulse:  [] 79  Resp:  [16-17] 17  BP: ()/(64-71) 101/64  SpO2:  [90 %-92 %] 91 %    Physical Exam  Vitals signs and nursing note reviewed.   Constitutional:       General: She is not in acute distress.     Appearance: She is not toxic-appearing.      Comments: Chronically ill appearing     HENT:      Head: Normocephalic and atraumatic.      Right Ear: Hearing normal.      Left Ear: Hearing normal.      Nose: Nose normal.      Mouth/Throat:      Mouth: Mucous membranes are moist.      Pharynx: Oropharynx is clear.   Eyes:      General: No scleral icterus.  Cardiovascular:      Rate and Rhythm: Normal rate.      Pulses: Normal pulses.      Heart sounds: Murmur present.   Pulmonary:      Effort: Pulmonary effort is normal. No  respiratory distress.   Abdominal:      General: Bowel sounds are normal.      Palpations: Abdomen is soft.      Tenderness: There is no abdominal tenderness.   Musculoskeletal:      Right lower leg: No edema.      Left lower leg: No edema.      Comments: ALISON  Generalized weakness.  Wheelchair and bed bound at her baseline   Skin:     General: Skin is warm and dry.      Coloration: Skin is pale.   Neurological:      Mental Status: She is alert. She is disoriented.   Psychiatric:         Attention and Perception: She is inattentive.         Mood and Affect: Affect is flat.         Cognition and Memory: Cognition is impaired. Memory is impaired.         Fluids    Intake/Output Summary (Last 24 hours) at 1/15/2021 1229  Last data filed at 1/14/2021 2204  Gross per 24 hour   Intake 150 ml   Output --   Net 150 ml       Laboratory                        Imaging  DX-CHEST-PORTABLE (1 VIEW)   Final Result      No acute cardiopulmonary abnormality.      US-EXTREMITY VENOUS LOWER UNILAT RIGHT   Final Result      DX-HIP-UNILATERAL-WITH PELVIS-1 VIEW RIGHT   Final Result      1.  Bony pelvis and the hip joint appear normal      2.  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  No acute intracranial findings.      2.  Diffuse atrophy and periventricular white matter changes, consistent with chronic small vessel disease.         DX-CHEST-PORTABLE (1 VIEW)   Final Result      Perihilar interstitial markings are increased and suggestive of mild pulmonary edema.           Assessment/Plan  Dementia (HCC)- (present on admission)  Assessment & Plan  -Chronic, stable  -yells out at times  -Frequent re-orientation, reestablish circadian rhythm, encourage familiar faces/family in room, avoid or minimize narcotics/sedatives.   -Continue seroquel and prozac   - Haldol PRN for agitation. Last administered on 12/25    Lower  extremity pain, bilateral- (present on admission)  Assessment & Plan  -PRN tylenol available if needed  -PT/OT recommending SNF placement, but will eventually need group home placement with 24/7 care after SNF making it a difficult discharge  -Mobilize as tolerated    Decreased appetite  Assessment & Plan  Patient consuming less than 25% meals  Dietician following  Supplements  Marinol added to help with appetite stimulation     Discharge planning issues  Assessment & Plan  -Patient not eligible for medicaid due to income.  Social work has requested PFA screen her for institutional medicaid.  Will need placement after that is obtained.  -Patient's son accessing finances    Parkinson's disease (HCC)- (present on admission)  Assessment & Plan  -Chronic and stable  -Continue sinemet  -24/7 supervision at discharge  -Frequent reorientation       VTE prophylaxis: Enoxaparin (intermittently refusing)      I have performed a physical exam and reviewed and updated ROS and Assessment/Plan today 1/15/2021. In review of the previous note there are no changes except as documented above    TESSY Patton.

## 2021-01-15 NOTE — PROGRESS NOTES
Received report and assumed care at shift change. Patient is A&O x 1, cooperative with cares. Took medications without difficulty.No complain or pain or distress. Fall precautions in place, bed locked and in lowest position.

## 2021-01-15 NOTE — PROGRESS NOTES
Received report from night shift and assumed care. Pt is A&OX 1, to self only. No indication of distress or discomfort.  Medication given with pudding per MAR without issue. 2 RN skin checks and incontinence care in place.  All needs met. Safety precautions and hourly rounding in place.

## 2021-01-15 NOTE — PROGRESS NOTES
2 RN skin check completed with CURT Manriquez.   Devices in place: Mepilex.   Skin assessed under devices yes.   Confirmed pressure ulcers found on none.   New potential pressure ulcers noted on none. Wound consult placed N/A.     The following interventions in place: Waffle mattress overlay, 2 RN skin check, Q2 turns, incontinence care with barrier paste and Mepilex to elbows and heels.      Skin assessment  -bilateral heels pink, boggy and blanching   -elbows pink and blanching   -perineal area is pink, intact  -sacrum/buttocks is pink and blanching.  -edema to bilateral hands.

## 2021-01-15 NOTE — PROGRESS NOTES
2 RN skin check completed with CURT Griffin.   Devices in place: Mepilex.   Skin assessed under devices yes.   Confirmed pressure ulcers found on none.   New potential pressure ulcers noted on none. Wound consult placed N/A.     The following interventions in place: Waffle mattress overlay, 2 RN skin check, Q2 turns, incontinence care, Mepilex      Skin assessment  -bilateral heels pink, boggy and blanching, mepilex replaced  -elbows pink and blanching, mepilex replaced  -perineal area is pink, intact  -sacrum/buttocks is pink and blanching.

## 2021-01-16 NOTE — PROGRESS NOTES
I spoke with son, Jasbir, and his sister regarding transitioning patient to comfort care. I explained how she is having poor PO intake, intermittently refusing PO medications and not mobilizing. She has a poor prognosis overall and cognitively declining since she's been here. His sister said she wanted to visit Ivette tomorrow before she made her final decision to go comfort care. She will let me know their decision tomorrow or Monday.

## 2021-01-16 NOTE — PROGRESS NOTES
Received report and assumed care at shift change. Patient on bed, A&O x 1. No complain of pain or distress. Repositioned every 2 hours. Fall precautions in place, bed locked and in lowest position. Needs attended to.

## 2021-01-16 NOTE — DISCHARGE PLANNING
Medical Social Work  PC from patient's son Jasbir, provided email address to send letter, [email or web address]    GENO emailed letter to Jasbir

## 2021-01-16 NOTE — PROGRESS NOTES
Received report from night shift and assumed care. Pt is A&OX 1, to self only. No indication of distress or discomfort.  Pt agreeable to one small spoonful of medication with puree, but refused subsequent attempt to give medications. Education and therapeutic communication employed, at which point pt began to yell.  2 RN skin checks and incontinence care in place.  All needs met. Safety precautions and hourly rounding in place. Bed alarm in use.

## 2021-01-17 NOTE — PROGRESS NOTES
2 RN skin check complete Phyllis RN  Devices in place Mepilex  Skin assessed under devices yes.  Confirmed pressure ulcers found on none.  New potential pressure ulcers noted on none. Wound consult placed N/A  The following interventions in place,Every 2 hours turning,waffle mattress cushion,incontinent care with barrier paste,mepilex to both elbows and heels.    Bilateral heels pink and  blanching  Bilateral elbows pink with mepilex in placed for skin protection  Sacrum and buttocks pink and blanching  Bilateral hand edematous but intact  Perineal area and groin pink and intact

## 2021-01-17 NOTE — PROGRESS NOTES
Received patient at shift changed,patient is sleeping and non verbal,no s/s of pain,held scheduled meds crushed with chocolate milk d/t patient won't open her mouth and nonverbal,call light and personal belongings within reach,bed in low position and bed alarm on.Repositioned patient every 2 hours and skin checked done.

## 2021-01-17 NOTE — PROGRESS NOTES
2 RN skin check completed with CURT Burnett.   Devices in place: Mepilex.   Skin assessed under devices yes.   Confirmed pressure ulcers found on none.   New potential pressure ulcers noted on none.   Wound consult placed N/A.     The following interventions in place: Waffle mattress overlay, 2 RN skin check, Q2 turns, incontinence care, Mepilex, Boost nutrition supplementation      Skin assessment  -bilateral heels pink, boggy and blanching, mepilex   -elbows pink and slow to obed, mepilex   -perineal area is pink, intact, powder applied  -sacrum/buttocks is pink and blanching, barrier paste applied

## 2021-01-17 NOTE — PROGRESS NOTES
Hospital Medicine Twice Weekly Progress Note    Date of Service  1/17/2021    Chief Complaint  Fevers, chills    Hospital Course  Ms. Bennett is a wheelchair bound 75-year-old female with a PMH of arthritis, DVT, Parkinson's, and dementia who presented 5/14/2020 with complaints of shortness of breath, nonproductive cough, fevers, and chills for 5 days. She is confused at baseline and was sent by her  who has been unable to care for her.      12-lead EKG was done in the ED and was unremarkable, though a chest xray showed bilateral interstitial infiltrates. COVID was ruled out.     She was evaluated by PT/OT who recommended 24/7 supervision. She is now pending placement to SNF vs group home, though medicaid must be obtained first as she has limited income.    Interval Problem Update  1/15- Patient sleeping in bed in no acute distress. Son attempting to access funds from bank to help with discharge plan. Marinol initiated to help stimulate appetite. Dietician following.     1/17- Spoke with patient this am as she was lying in bed, she states that she is miserable and lousy. Patient continues to barely eat despite marinol addition. Provider spoke with family yesterday regarding possible transition to comfort care status as patient continues to decline. Per discussion, family will be visiting today and will discuss after visitation.        Code Status  DNAR/DNI    Disposition  TBD    Review of Systems  Review of Systems   Unable to perform ROS: Dementia        Physical Exam  Temp:  [36.1 °C (96.9 °F)-36.7 °C (98 °F)] 36.4 °C (97.6 °F)  Pulse:  [79-85] 85  Resp:  [14-18] 16  BP: ()/(67-73) 116/73  SpO2:  [93 %-94 %] 94 %    Physical Exam  Vitals signs and nursing note reviewed.   Constitutional:       General: She is not in acute distress.     Appearance: She is not toxic-appearing.      Comments: Chronically ill appearing     HENT:      Head: Normocephalic and atraumatic.      Right Ear: Hearing normal.       Left Ear: Hearing normal.      Nose: Nose normal.      Mouth/Throat:      Mouth: Mucous membranes are moist.      Pharynx: Oropharynx is clear.   Eyes:      General: No scleral icterus.  Cardiovascular:      Rate and Rhythm: Normal rate.      Pulses: Normal pulses.      Heart sounds: Murmur present.   Pulmonary:      Effort: Pulmonary effort is normal. No respiratory distress.   Abdominal:      General: Bowel sounds are normal.      Palpations: Abdomen is soft.      Tenderness: There is no abdominal tenderness.   Musculoskeletal:      Right lower leg: No edema.      Left lower leg: No edema.      Comments: ROSAS  Generalized weakness.  Wheelchair and bed bound at her baseline   Skin:     General: Skin is warm and dry.      Coloration: Skin is pale.   Neurological:      Mental Status: She is alert. She is disoriented.   Psychiatric:         Attention and Perception: She is inattentive.         Mood and Affect: Affect is flat.         Cognition and Memory: Cognition is impaired. Memory is impaired.         Fluids    Intake/Output Summary (Last 24 hours) at 1/17/2021 0801  Last data filed at 1/16/2021 1530  Gross per 24 hour   Intake 480 ml   Output --   Net 480 ml       Laboratory                        Imaging  DX-CHEST-PORTABLE (1 VIEW)   Final Result      No acute cardiopulmonary abnormality.      US-EXTREMITY VENOUS LOWER UNILAT RIGHT   Final Result      DX-HIP-UNILATERAL-WITH PELVIS-1 VIEW RIGHT   Final Result      1.  Bony pelvis and the hip joint appear normal      2.  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  No acute intracranial findings.      2.  Diffuse atrophy and periventricular white matter changes, consistent with chronic small vessel disease.         DX-CHEST-PORTABLE (1 VIEW)   Final Result      Perihilar interstitial markings are increased and suggestive of mild pulmonary edema.            Assessment/Plan  Dementia (HCC)- (present on admission)  Assessment & Plan  -Chronic, stable  -yells out at times  -Frequent re-orientation, reestablish circadian rhythm, encourage familiar faces/family in room, avoid or minimize narcotics/sedatives.   -Continue seroquel and prozac   - Haldol PRN for agitation. Last administered on 12/25    Lower extremity pain, bilateral- (present on admission)  Assessment & Plan  -PRN tylenol available if needed  -PT/OT recommending SNF placement, but will eventually need group home placement with 24/7 care after SNF making it a difficult discharge  -Mobilize as tolerated    Decreased appetite  Assessment & Plan  Patient consuming less than 25% meals  Dietician following  Supplements  Marinol added to help with appetite stimulation     Discharge planning issues  Assessment & Plan  -Patient not eligible for medicaid due to income.  Social work has requested [facility] screen her for institutional medicaid.  Will need placement after that is obtained.  -Patient's son accessing finances    Parkinson's disease (HCC)- (present on admission)  Assessment & Plan  -Chronic and stable  -Continue sinemet  -24/7 supervision at discharge  -Frequent reorientation       VTE prophylaxis: Enoxaparin (intermittently refusing)      I have performed a physical exam and reviewed and updated ROS and Assessment/Plan today 1/17/2021. In review of the previous note there are no changes except as documented above    TESSY Patton.

## 2021-01-17 NOTE — PROGRESS NOTES
Received report from night shift and assumed care. Pt is A&OX 1, to self only. No indication of distress or discomfort.  Pt took medication mixed with some boost.  She drank approximately 50% of her boost and 25% of her cream of wheat but refused rest of meal.2 RN skin checks, q 2hr turns and incontinence care in place. Communicated with CNA regarding bed bath planned for today.  All needs met. Safety precautions and hourly rounding in place. Bed alarm in use.

## 2021-01-17 NOTE — PROGRESS NOTES
2 RN skin check completed with CURT Hale.   Devices in place: Mepilex.   Skin assessed under devices yes.   Confirmed pressure ulcers found on none.   New potential pressure ulcers noted on none.   Wound consult placed N/A.     The following interventions in place: Waffle mattress overlay, 2 RN skin check, Q2 turns, incontinence care, Mepilex, Boost nutrition supplementation      Skin assessment  -bilateral heels pink, boggy and blanching, mepilex   -elbows pink and blanching, mepilex   -perineal area is pink, intact, powder applied  -sacrum/buttocks is pink and blanching, barrier paste applied

## 2021-01-18 NOTE — PROGRESS NOTES
Patient A/Ox1, to self, BB, tachy at beginning of shift w/  and BP 91/61, RA sating 98%  but is demonstrating noisy breathing w/ some use of accessory muscles. Pt extremely fatigued and slept during entire shift only waking slightly to take medications. Pt is safe with needs met. Bed low/locked. All precautions in place except for bed near nursing station, currently unavailable. Hourly rounding in place. No signs of acute distress.

## 2021-01-19 NOTE — PROGRESS NOTES
No change in patients condition.  Performed a 2 RN skin check, please see skin check note.  No skin issues found.  Patient having difficulty taking her meds crushed.  She barely opens her mouth to even get spoonfuls of water and meds in.  Patient safe as of shift change.

## 2021-01-19 NOTE — PROGRESS NOTES
2 RN skin check complete with CURT Espana.  No devices in place.  No skin issues under devices: N/A  No confirmed pressure ulcers found.  The following interventions are in place:  Incontinence care, pillows for support, waffle mattress    Notes:  Generalized skin looks very good, no skin issues found, bottom pink and blanchable, breasts have no issues underneath, orlin area pink and blanchable.

## 2021-01-19 NOTE — CARE PLAN
Problem: Safety  Goal: Will remain free from falls  Outcome: PROGRESSING AS EXPECTED  Note: Patient will have no falls during shift. She is a high fall risk and has all appropriate precautions in place except a desk bed. Frequent rounding in place.      Problem: Communication  Goal: The ability to communicate needs accurately and effectively will improve  Outcome: PROGRESSING SLOWER THAN EXPECTED  Intervention: Educate patient and significant other/support system about the plan of care, procedures, treatments, medications and allow for questions  Note: Discussed plan of care for duration of shift with pt but she is mostly non-verbal at this point and is unable to let her needs be known. Frequent checks done on pt. Everything is explained to pt prior to doing any procedure.

## 2021-01-20 NOTE — PROGRESS NOTES
Received report from day RN. Patient is in bed lying with eyes closed, then open when this writer speaks. Bed is in lowest and locked position. Safety measures in place. All patient's needs addressed at this time.

## 2021-01-20 NOTE — CARE PLAN
Problem: Pain Management  Goal: Pain level will decrease to patient's comfort goal  Outcome: PROGRESSING AS EXPECTED  Flowsheets (Taken 1/19/2021 214)  Non Verbal Scale: Calm  Note: No signs of non-verbal pain noted.     Problem: Communication  Goal: The ability to communicate needs accurately and effectively will improve  Outcome: PROGRESSING SLOWER THAN EXPECTED  Note: Patient did not communicate with this writer. Staff to anticipate needs and provide care.

## 2021-01-20 NOTE — PROGRESS NOTES
Applied new mepiplex to foot.  Patient not taking medications very well, difficult to get her to open her mouth, she tends to bit on the straw.  She is refusing the Lovenox shot.  Ordered TEDS and applied them to patients lower extremity.  Patient safe as of shift change.

## 2021-01-20 NOTE — PROGRESS NOTES
I certify the patient requires continued medically necessary hospital services for the treatment of Dementia.  The patient will remain in the hospital for the foreseeable future.  Discharge may or may not occur in the next 20 days due to ongoing discharge delays due to having no medically acceptable discharge options.    TESSY Najera.

## 2021-01-20 NOTE — DISCHARGE PLANNING
Medical Social Work  PC to patient's son, Jasbir [phone]; message left to call GENO requesting an update on letter sent. Is it sufficient to access patient's bank accounts.  PC to patient's daughter, Coleen [phone]; message left to call GENO

## 2021-01-20 NOTE — PROGRESS NOTES
2 RN Skin Check    2 RN skin check complete CURT Bonilla  Devices in place: none.  Skin assessed under devices: N\A.  No confirmed pressure ulcers found on.  The following interventions are in place: Incontinence care, pillows for support, mepilexes to bilateral heels and elbows, waffle mattress.    Skin is warm, pink, perfusing well, and blanching, no skin concerns under breasts or axilla, or sacrum.

## 2021-01-21 NOTE — PROGRESS NOTES
Patient does not want to eat most of the time.  It is very difficult to get her to open her mouth wide enough to get a utensil in and that affects how much medication she gets and her nutrition is compromised.  Patient does not like people moving her or asking her to open her mouth.  She yelled out that she is tired of people asking her to open her mouth.  Patients skin was checked by 2 RNs today and her skin is in excellent shape.  Patient is safe as of shift change.

## 2021-01-21 NOTE — PROGRESS NOTES
Patient continue to decreased food intake, refused night time medications. No complain of pain or distress. Fall precautions in place, bed locked and in lowest position, hourly rounding in place.

## 2021-01-21 NOTE — PROGRESS NOTES
2 RN skin check completed with CURT Hatch  No devices in place  Skin assessment under devices: N/A  Confirmed no pressure ulcers found  The following interventions are in place:  Incontinent care, pillows for support, waffle mattress, barrier paste    Generalized, skin in great condition  Sacrum pink and blanching  Clean and dry under breasts  Kristy area clean and dry, no redness

## 2021-01-22 NOTE — PROGRESS NOTES
2 RN skin check complete with CURT Hatch  Devices in place: mepilex  Skin assessed under devices: yes  Confirmed pressure ulcers found: n/a  New potential pressure ulcers noted: n/a  The following interventions in place: Q2 turns, waffle overlay, incontinence checks, mepilex to heels and elbows    Notes:   Bilateral ears pink and blanching  Bilateral elbows pink and blanching  Perineum is red and blanching  Sacrum is red and blanching  Bilateral heels boggy and blanching

## 2021-01-22 NOTE — PROGRESS NOTES
Hospital Medicine Daily Progress Note    Date of Service  1/24/2021    Chief Complaint  Fever and chills    Hospital Course  Ms. Bennett is a wheelchair bound 75-year-old female with a PMH of arthritis, DVT, Parkinson's, and dementia who presented 5/14/2020 with complaints of shortness of breath, nonproductive cough, fevers, and chills for 5 days. She is confused at baseline and was sent by her  who has been unable to care for her.      12-lead EKG was done in the ED and was unremarkable, though a chest xray showed bilateral interstitial infiltrates. COVID was ruled out.    She was evaluated by PT/OT who recommended 24/7 supervision. She is now pending placement to SNF vs group home, though medicaid must be obtained first as she has limited income.      Interval Problem Update  1/20 -patient continues to have poor p.o. intake and inability to take medications at times due to lethargy.  Per most recent note, APRN discussed possible comfort care status with family on 1/17 and they were going to come in for a visit and let us know of a decision.  As of today, family still has not been at bedside to visit.  -Patient is lethargic on my exam today, but arouses easily and appears in no acute distress.  He is oriented only to herself and unable to have meaningful CODE STATUS discussion.  Will continue to attempt to contact family.    Consultants/Specialty  None    Code Status  DNAR/DNI    Disposition  TBD    Review of Systems  Review of Systems   Unable to perform ROS: Dementia        Physical Exam  Temp:  [37 °C (98.6 °F)-37.3 °C (99.2 °F)] 37.3 °C (99.2 °F)  Pulse:  [100-116] 112  Resp:  [16-18] 17  BP: ()/(66-80) 129/80  SpO2:  [91 %-95 %] 95 %    Physical Exam  Vitals signs and nursing note reviewed.   Constitutional:       General: She is not in acute distress.     Appearance: She is normal weight. She is ill-appearing.   HENT:      Head: Normocephalic and atraumatic.      Right Ear: Hearing normal.       Left Ear: Hearing normal.      Nose: Nose normal.      Mouth/Throat:      Mouth: Mucous membranes are dry.   Cardiovascular:      Rate and Rhythm: Normal rate.      Pulses: Normal pulses.      Heart sounds: Normal heart sounds.   Pulmonary:      Effort: Pulmonary effort is normal. No respiratory distress.      Breath sounds: Decreased breath sounds present.   Abdominal:      General: Bowel sounds are normal.      Palpations: Abdomen is soft.      Tenderness: There is no abdominal tenderness.   Musculoskeletal:      Comments: ROSAS  Generalized weakness.  Wheelchair and bed bound at her baseline   Skin:     General: Skin is warm and dry.   Neurological:      Mental Status: She is easily aroused. Mental status is at baseline. She is lethargic and disoriented.      Motor: Weakness, atrophy and abnormal muscle tone present.   Psychiatric:         Mood and Affect: Affect is flat.         Cognition and Memory: Cognition is impaired. Memory is impaired.         Fluids    Intake/Output Summary (Last 24 hours) at 1/24/2021 0922  Last data filed at 1/24/2021 0900  Gross per 24 hour   Intake 240 ml   Output --   Net 240 ml       Laboratory                        Imaging  DX-CHEST-PORTABLE (1 VIEW)   Final Result      No acute cardiopulmonary abnormality.      US-EXTREMITY VENOUS LOWER UNILAT RIGHT   Final Result      DX-HIP-UNILATERAL-WITH PELVIS-1 VIEW RIGHT   Final Result      1.  Bony pelvis and the hip joint appear normal      2.  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  No acute intracranial findings.      2.  Diffuse atrophy and periventricular white matter changes, consistent with chronic small vessel disease.         DX-CHEST-PORTABLE (1 VIEW)   Final Result      Perihilar interstitial markings are increased and suggestive of mild pulmonary edema.           Assessment/Plan  Dementia (HCC)- (present  on admission)  Assessment & Plan  -Chronic, stable  -yells out at times  -Frequent re-orientation, reestablish circadian rhythm, encourage familiar faces/family in room, avoid or minimize narcotics/sedatives.   -Continue seroquel and prozac   -Haldol PRN for agitation. Last administered on 12/25    Lower extremity pain, bilateral- (present on admission)  Assessment & Plan  -PRN tylenol available if needed  -PT/OT recommending SNF placement, but will eventually need group home placement with 24/7 care after SNF making it a difficult discharge  -Mobilize as tolerated    Decreased appetite  Assessment & Plan  Patient consuming less than 25% meals  Dietician following  Supplements  Marinol added to help with appetite stimulation     Discharge planning issues  Assessment & Plan  -Patient not eligible for medicaid due to income.  Social work has requested PFA screen her for institutional medicaid.  Will need placement after that is obtained.  -Patient's son accessing finances    Parkinson's disease (HCC)- (present on admission)  Assessment & Plan  -Chronic and stable  -Continue sinemet  -24/7 supervision at discharge  -Frequent reorientation       VTE prophylaxis: SCD - refusing Lovenox      I have performed a physical exam and reviewed and updated ROS and Assessment/Plan today (01/20/21). In review of the previous note there are no changes except as documented above    I certify the patient requires continued medically necessary hospital services for the treatment of cognitive disorder.  The patient will remain in the hospital for the foreseeable future.  Discharge may or may not occur in the next 20 days due to ongoing discharge delays due to having no medically acceptable discharge options.    Please note that this dictation was created using voice recognition software. I have made every reasonable attempt to correct obvious errors, but there may be errors of grammar and possibly content that I did not discover before  finalizing the note.      TESSY Najera.

## 2021-01-22 NOTE — PROGRESS NOTES
Received report from night shift and assumed care. Assessment completed, POC discussed. Pt is A&OX1, oriented to self only. Pt occasionally answers questions, has difficulty taking some PO medications today and poor PO intake. APRN aware. All other needs met. Safety precautions and hourly rounding in place.

## 2021-01-22 NOTE — HOSPITAL COURSE
Ms. Bennett is a wheelchair bound 75-year-old female with a PMH of arthritis, DVT, Parkinson's, and dementia who presented 5/14/2020 with complaints of shortness of breath, nonproductive cough, fevers, and chills for 5 days. She is confused at baseline and was sent by her  who has been unable to care for her.      12-lead EKG was done in the ED and was unremarkable, though a chest xray showed bilateral interstitial infiltrates. COVID was ruled out.    She was evaluated by PT/OT who recommended 24/7 supervision. She is now pending placement to SNF vs group home, though medicaid must be obtained first as she has limited income.

## 2021-01-22 NOTE — PROGRESS NOTES
Received report and assumed care at shift change. Patient A&O x 1, lethargic and confused. Patient was able to take bedtime meds with a lot of encouragement. No signs or symptom of pain or distress. Fall precautions in place, bed locked and in lowest position. Continue on Q 2 hour turns. Needs attended to.

## 2021-01-22 NOTE — PROGRESS NOTES
RN skin check completed with CURT Manriquez.   Devices in place: Mepilex.   Skin assessed under devices yes.   Confirmed pressure ulcers found on none.   New potential pressure ulcers noted on none. Wound consult placed N/A.     The following interventions in place: Waffle mattress overlay, 2 RN skin check, Q2 turns, incontinence care with barrier paste and Mepilex to elbows and heels.      Skin assessment  -bilateral heels pink, boggy and blanching   -elbows pink and blanching   -perineal area is pink, intact  -sacrum/buttocks is pink and blanching.  -edema to bilateral hands.

## 2021-01-23 NOTE — PROGRESS NOTES
Received report and assumed care at shift change. Patient A&O x 1, disoriented to place, time and event. No complain of pain or distress. Patient sleepy, but is easy to awaken. Took medication and drank thickened water with spoon, needs a lot of encouragement and prompting. Fall precautions in place, bed locked and in lowest position. Continue with Q 2 hour turns and hourly rounding. Needs attended to.

## 2021-01-23 NOTE — PROGRESS NOTES
Received bedside report from night shift RN. Assumed care of patient at change of shift. Assessment complete and POC discussed. Patient is A&Ox1, VSS, on RA. Patient denies pain, no apparent signs of distress or discomfort. Patient is sitting up in bed with CNA at bedside for breakfast. Patient took medications with vanilla pudding but refused lovenox injection. Patient states she does not want to take anymore medications today. Patient educated on importance of taking medications daily. Bed is in lowest/locked position. Call light and belongings are within reach. No further needs at this time.

## 2021-01-23 NOTE — PROGRESS NOTES
RN skin check completed with CURT Manriquez.   Devices in place: Mepilex.   Skin assessed under devices yes.   Confirmed pressure ulcers found on none.   New potential pressure ulcers noted on none. Wound consult placed N/A.     The following interventions in place: Waffle mattress overlay, 2 RN skin check, Q2 turns, incontinence care with barrier paste and Mepilex to elbows and heels.      Skin assessment  -bilateral heels pink, boggy and blanching   -elbows pink and blanching   -perineal area is pink, intact  -sacrum/buttocks is pink and blanching.  -edema to bilateral hands.  Toes to bilateral feet, dry flaky, dusky discoloration.

## 2021-01-23 NOTE — PROGRESS NOTES
2 RN skin check complete with CURT Torres  Devices in place: mepilex  Skin assessed under devices: yes  Confirmed pressure ulcers found: n/a  New potential pressure ulcers noted: n/a  The following interventions in place: Q2 turns, waffle overlay, incontinence checks, mepilex to heels and elbows     Notes:   Bilateral ears pink and blanching  Bilateral elbows pink and blanching  Perineum is red and blanching  Sacrum is red and blanching  Bilateral heels boggy and blanching

## 2021-01-23 NOTE — PROGRESS NOTES
Received report from night shift and assumed care. Pt is A&OX1, oriented to self only. Does not appear to be in distress or discomfort. Patient is much more lethargic today, unable to give PO medications. VSS stable. APRN aware. All other needs met. Safety precautions and hourly rounding in place.

## 2021-01-24 NOTE — PROGRESS NOTES
2 RN skin check completed with CURT Torres.   Devices in place: Mepilex.  Skin assessed under devices: Yes.  Confirmed pressure ulcers found: N/A.  New potential pressure ulcers noted: N/A.  Wound consult placed:  N/A.      Skin assessment:   Elbows: pink, blanching  Sacrum: pink, blanching  Perineum: red, blanching  Bilateral heels: dry, flaky,  boggy, blanching    The following interventions in place: Q2hr turns, frequent incontinence checks and linen changes, prophylactic mepilex, waffle overlay, 2 RN skin check, barrier paste.

## 2021-01-24 NOTE — CARE PLAN
Problem: Communication  Goal: The ability to communicate needs accurately and effectively will improve  Outcome: PROGRESSING SLOWER THAN EXPECTED       Problem: Safety  Goal: Will remain free from injury  Outcome: PROGRESSING AS EXPECTED

## 2021-01-24 NOTE — PROGRESS NOTES
Received bedside report from night shift RN. Assumed care of patient at change of shift. Patient is lethargic and not answering any questions during assessment Patient refusing all medications today. When offered boost, patient clenches jaw shut and is tearful. Patient educated on importance of taking medications daily; patient refuses despite education. Patient has not had bowel movement since 1/14/2021. APRN notified - new orders to follow. Bed is in lowest/locked position. Call light and belongings are within reach. No further needs at this time. Hourly rounding in place.    1530: Pt continues to refuse meds.    1800: Dig stim and decompacted patient. Administered saline enema.

## 2021-01-24 NOTE — PROGRESS NOTES
Hospital Medicine Daily Progress Note    Date of Service  1/25/2021    Chief Complaint  Fever and chills    Hospital Course  Ms. Bennett is a wheelchair bound 75-year-old female with a PMH of arthritis, DVT, Parkinson's, and dementia who presented 5/14/2020 with complaints of shortness of breath, nonproductive cough, fevers, and chills for 5 days. She is confused at baseline and was sent by her  who has been unable to care for her.      12-lead EKG was done in the ED and was unremarkable, though a chest xray showed bilateral interstitial infiltrates. COVID was ruled out.    She was evaluated by PT/OT who recommended 24/7 supervision. She is now pending placement to SNF vs group home, though medicaid must be obtained first as she has limited income.      Interval Problem Update  1/25 - no BM since 1/14. Xray consistent with constipation. Hard stool on digital exam. Enema given with disimpaction.  -Able to get a hold of son, Jasbir, who was able to discuss with his sister and they have decided to transition patient to comfort care.  -Patient lethargic and flushed, temp 99.8..  Tachycardia 120s.  SBP 80s.  Appears in no acute distress    1/24 -I have attempted to contact the son multiple times the past 2 days, however no answer.  Patient's vital signs are stable today and nursing was able to get her to drink a boost.  If I still am unable to get a hold of patient's son tomorrow, will discuss with my attending to transition to comfort care.    1/20 -patient continues to have poor p.o. intake and inability to take medications at times due to lethargy.  Per most recent note, APRN discussed possible comfort care status with family on 1/17 and they were going to come in for a visit and let us know of a decision.  As of today, family still has not been at bedside to visit.  -Patient is lethargic on my exam today, but arouses easily and appears in no acute distress.  He is oriented only to herself and unable to have  meaningful CODE STATUS discussion.  Will continue to attempt to contact family.    Consultants/Specialty  None    Code Status  Comfort Care/DNR    Disposition  TBD    Review of Systems  Review of Systems   Unable to perform ROS: Dementia        Physical Exam  Temp:  [36 °C (96.8 °F)-37.6 °C (99.6 °F)] 37.4 °C (99.4 °F)  Pulse:  [111-123] 123  Resp:  [15-20] 19  BP: ()/(48-73) 85/48  SpO2:  [91 %-94 %] 92 %    Physical Exam  Vitals signs (Limited due to Comfort Care status) and nursing note reviewed.   Constitutional:       General: She is not in acute distress.     Appearance: She is ill-appearing.   Neurological:      Mental Status: She is lethargic.         Fluids    Intake/Output Summary (Last 24 hours) at 1/25/2021 1235  Last data filed at 1/24/2021 1800  Gross per 24 hour   Intake 0 ml   Output --   Net 0 ml       Laboratory                        Imaging  [id number] VIEW   Final Result      Marked constipation pattern of the colon.      DX-CHEST-PORTABLE (1 VIEW)   Final Result      No acute cardiopulmonary abnormality.      US-EXTREMITY VENOUS LOWER UNILAT RIGHT   Final Result      DX-HIP-UNILATERAL-WITH PELVIS-1 VIEW RIGHT   Final Result      1.  Bony pelvis and the hip joint appear normal      2.  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  No acute intracranial findings.      2.  Diffuse atrophy and periventricular white matter changes, consistent with chronic small vessel disease.         DX-CHEST-PORTABLE (1 VIEW)   Final Result      Perihilar interstitial markings are increased and suggestive of mild pulmonary edema.           Assessment/Plan  Dementia (HCC)- (present on admission)  Assessment & Plan  -Comfort care    Lower extremity pain, bilateral- (present on admission)  Assessment & Plan  -Comfort care    Comfort measures only status  Assessment & Plan  -Patient has been  declining over the past few days.  She has had minimal p.o. intake.  She is also less responsive.  -No bowel movement since 1/14.  Imaging consistent with constipation.  Required enema and disimpaction.  -I was able to get hold her son, Jasbir, today who reports he had a discussion with his sister and both are in agreement to proceed to transition to patient to comfort care.  -Comfort care order set utilized  -Hospice referral placed    Decreased appetite  Assessment & Plan  -Comfort care    Discharge planning issues  Assessment & Plan  -Comfort care  -Hospice referral placed    Parkinson's disease (HCC)- (present on admission)  Assessment & Plan  -Comfort care       VTE prophylaxis: SCD - refusing Lovenox      I have performed a physical exam and reviewed and updated ROS and Assessment/Plan today (01/25/21). In review of the previous note there are no changes except as documented above    I certify the patient requires continued medically necessary hospital services for the treatment of cognitive disorder.  The patient will remain in the hospital for the foreseeable future.  Discharge may or may not occur in the next 20 days due to ongoing discharge delays due to having no medically acceptable discharge options.    Please note that this dictation was created using voice recognition software. I have made every reasonable attempt to correct obvious errors, but there may be errors of grammar and possibly content that I did not discover before finalizing the note.      TESSY Najera.

## 2021-01-24 NOTE — PROGRESS NOTES
2 RN skin check completed with CURT Ferguson.   Devices in place: Mepilex.  Skin assessed under devices: Yes.  Confirmed pressure ulcers found: N/A.  New potential pressure ulcers noted: N/A.  Wound consult placed:  N/A.        Skin assessment:   Elbows: pink, blanching  Sacrum: pink, blanching  Perineum: red, blanching, moist  Bilateral heels: dry, flaky,  boggy, blanching     The following interventions in place: Q2hr turns, frequent incontinence checks and linen changes, prophylactic mepilex, waffle overlay, 2 RN skin check, and barrier paste.

## 2021-01-24 NOTE — PROGRESS NOTES
Pt is lethargic, did not answer orientation/assessment questions. Arousable, but pt closes eyes after. Refused to take medication; does not open mouth and clenches teeth shut. Education provided; needs reinforcement. Fall precautions in place. Prn incontinence checks and q2 turns.

## 2021-01-25 PROBLEM — Z51.5 COMFORT MEASURES ONLY STATUS: Status: ACTIVE | Noted: 2021-01-01

## 2021-01-25 NOTE — CARE PLAN
Problem: Fluid Volume:  Goal: Will maintain balanced intake and output  Outcome: PROGRESSING SLOWER THAN EXPECTED   Poor oral intake. Pt have been refusing meals.    Problem: Skin Integrity  Goal: Risk for impaired skin integrity will decrease  Outcome: PROGRESSING AS EXPECTED   Q2 turns, 2 RN skin checks, prn incontinence checks.

## 2021-01-25 NOTE — PROGRESS NOTES
Paged on-call Hospitalist regarding pt's poor oral intake, low BP, increased HR and breathing. Order for 1 L NS bolus received. Day shift RN had relayed in report that pt's code status will be changed to comfort care in morning. Pt will have to be moved to acute 62 side for IV and fluids. Pt currently appears to be comfortable, no visible signs of distress. Hospitalist agreeable to pt staying on 61 side, unless pt's condition changes.

## 2021-01-25 NOTE — ASSESSMENT & PLAN NOTE
-Patient has been declining over the past few days.  She has had minimal p.o. intake.  She is also less responsive.  -No bowel movement since 1/14.  Imaging consistent with constipation.  Required enema and disimpaction.  -I was able to get hold her son, Jasbir, today who reports he had a discussion with his sister and both are in agreement to proceed to transition to patient to comfort care.  -Comfort care order set utilized  -Hospice referral placed

## 2021-01-25 NOTE — PROGRESS NOTES
2 RN skin check completed with CURT Ferguson.   Devices in place: Mepilex on heels and elbows and nasal cannula  Skin assessed under devices: Yes.  Confirmed pressure ulcers found: N/A.  New potential pressure ulcers noted: N/A.  Wound consult placed:  N/A.        Skin assessment:   Elbows: pink, blanching  Sacrum: pink, blanching  Perineum: red, blanching, moist  Bilateral heels: dry, flaky,  boggy, blanching     The following interventions in place: Q2hr turns, frequent incontinence checks and linen changes, prophylactic mepilex, waffle overlay, 2 RN skin check, and barrier paste.

## 2021-01-25 NOTE — PROGRESS NOTES
Assumed care of patient this shift. Patient has been somnolent throughout day and is now on comfort focused cares. No behavioral signs of pain noted this shift. All medications held due to patient not being able to swallow safely at this time. Turned and repositioned every 2 hours.

## 2021-01-26 NOTE — PROGRESS NOTES
2 RN Skin Check    2 RN skin check completed with CURT Torres   Devices in place: N/A  Skin assessed under devices: N\A.  Confirmed pressure ulcers found on: N/A.  New potential pressure ulcers noted on N/A. Wound consult placed N/A.    Bilateral Elbows: Pink and blanching  Bilateral heels: Pin and blanching, dry, flaky  Sacrum: Pink, blanching  Perineum: Red, blanching    The following interventions in place: 2 RN skin check completed, turning and repositioning every 2 hours, Incontinence care provided as needed along with barrier paste. Mepilex in place to bilateral heels and elbows, waffle overlay in place on bed.

## 2021-01-26 NOTE — DISCHARGE SUMMARY
Death Summary    Cause of Death  Cardiopulmonary arrest due to failure to thrive due to end-stage dementia.    Comorbid Conditions at the Time of Death  Principal Problem (Resolved):    Viral bronchitis POA: Yes  Active Problems:    Dementia (HCC) POA: Yes    Lower extremity pain, bilateral POA: Yes    Decreased appetite POA: No    Comfort measures only status POA: No    Parkinson's disease (HCC) POA: Yes    Discharge planning issues POA: No  Resolved Problems:    UTI (urinary tract infection) POA: Yes    Acute respiratory failure with hypoxia (HCC) POA: Yes    Hypokalemia POA: Yes    Hypocalcemia POA: Yes    Hypomagnesemia POA: Yes    Acute cystitis with hematuria POA: Unknown      History of Presenting Illness and Hospital Course  Ms. Bennett is a wheelchair bound 75-year-old female with a PMH of arthritis, DVT, Parkinson's, and dementia who presented 5/14/2020 with complaints of shortness of breath, nonproductive cough, fevers, and chills for 5 days. She is confused at baseline and was sent by her  who has been unable to care for her.    She remained in the long-term unit of the hospital for nearly 9 months due to requirements for 24/7 care no excepting facility for long-term care needs.  Her family was unable to provide for her and dropped her at the hospital.  Eventually her oral intake declined and she became less and less mobile and motivated to take on oral intake.  Her family eventually elected to transition her to comfort care status and she passed away from natural causes.    Death Date: 01/26/21   Death Time: 0500         Pronounced By (RN1): Sarah Porras  Pronounced By (RN2): Phyllis Clark

## 2021-01-26 NOTE — PROGRESS NOTES
Pt on comfort care, obtunded, unable to assess orientation. No s/sx pain/discomfort at this time. Continue q2h turns and incontinence care. Pt resting quietly in bed. Call light within reach, personal belongings available, bed in lowest position, treaded socks on, and bed alarm on. Hourly rounding in place.

## 2021-01-26 NOTE — CARE PLAN
Problem: Pain Management  Goal: Pain level will decrease to patient's comfort goal  Note: No s/sx pain/discomfort observed. Pt resting quietly in bed.      Problem: Skin Integrity  Goal: Risk for impaired skin integrity will decrease  Note: Pt is comfort care, q2h turns, 2RN skin checks qshift, waffle overlay mattress, barrier cream, frequent incontinence checks.

## 2021-01-26 NOTE — PROGRESS NOTES
Pt  @ 0500. 2 RN pronounced: Sarah Porras, RN and Phyllis Clark, RN. Dr. MARCO Gastelum notified @0516. Son Jasbir Dorsey notified @ 0524.  office notified @ 0526 and spoke to Carli Oshea. Pt is not a  case. [facility] contacted and spoke to [employer] [place] @ 0504. Case #[id number]. Pt is not candidate for tissue donation. Traction called. Pt off the floor @ 0656.

## 2021-01-26 NOTE — PROGRESS NOTES
2 RN skin check completed with CURT Ferguson.   Devices in place: none.  Skin assessed under devices: n/a.  Confirmed pressure ulcers found: none.  New potential pressure ulcers noted: none.  Wound consult placed:  n/a.      Skin assessment: heels/slightly boggy, toes cyanotic/cold. Sacrum pink/red/blanching/intact. Elbows pink/blanching.      The following interventions in place: pt on q2h turns, 2RN skin checks qshift, waffle overlay mattress, barrier cream, frequent incontinence checks. Bilateral heels/elbows with mepilex.

## 2021-08-15 NOTE — PROGRESS NOTES
2 RN Skin Check    2 RN skin check complete.   Devices in place: None   Skin assessed under devices: N/A.  Confirmed pressure ulcers found on: N/A.  New potential pressure ulcers noted on N/A Wound consult placed N/A.    Skin Assessment:   Heels: Blanchable redness   Elbows: Blanchable redness  Sacrum: Blanchable redness, small fissure.     The following interventions in place: turning and repositioning every 2 hours, Barrier cream applied. Waffle cushion in place while up in chair.    patient nonverbal; unable to assess

## 2022-03-23 NOTE — CARE PLAN
Problem: Communication  Goal: The ability to communicate needs accurately and effectively will improve  Outcome: PROGRESSING AS EXPECTED  Note: Patient encouraged to express feelings, voice concerns and ask questions regarding plan of care.      Problem: Safety  Goal: Will remain free from injury  Outcome: PROGRESSING AS EXPECTED  Note: Fall precautions in place. Bed in lowest position. Non-skid socks in place. Personal possessions within reach. Mobility sign on door. Bed-alarm on. Call light within reach. Patient educated regarding fall prevention and states understanding.    Goal: Will remain free from falls  Outcome: PROGRESSING AS EXPECTED     Problem: Infection  Goal: Will remain free from infection  Outcome: PROGRESSING AS EXPECTED      Bcc Micronodular Histology Text: Multiple small aggregates of basaloid cells are present within the dermis. These small nodules display variable subtle peripheral palisading and retraction artifact.

## 2023-10-20 NOTE — PROGRESS NOTES
-Has had GERD for years   -Worsening GERD within the last couple of weeks  -Mainly at night, especially if she lays on her stomach  -Acid reflux coming into her mouth 4/7 nights of the week, burns  -Over the last couple of weeks, also having dysphagia. Having issues swallowing mainly red meat. Cold liquids also hard to swallow. Feels it getting stuck in chest  -No epigastric pain, No vomiting, No voice changes   -Had endoscopy 10 years ago, unsure of findings   -Pantoprazole 40 mg taking daily, but re-started over the last month. Had stopped prior for 1 year.   -No weight loss or night sweats  Plan:  -Increase Pantoprazole to 40 mg BID  -Referral to GI for endoscopy/colonoscopy       Pt is A&Ox1 to self only. Pt is resting in bed most of the day. Q 2 turns in place.

## 2023-11-10 NOTE — CARE PLAN
Problem: Safety  Goal: Will remain free from falls  Outcome: PROGRESSING AS EXPECTED  Note: Desk bed. Bed alarm on, call light within reach & hourly rounding in place. Treaded slipper socks placed. Bed in a locked and low position.      Problem: Skin Integrity  Goal: Risk for impaired skin integrity will decrease  Outcome: PROGRESSING AS EXPECTED  Note: Q2 turns. 2RN skin check to be completed this afternoon. Checking frequently for incontinence. Mepilex in place to heels and elbows bilaterally. Waffle mattress overlay in place.       None known

## 2024-05-04 NOTE — PROGRESS NOTES
2 RN skin check complete froilan  Devices in place mepilex to heel  Skin assessed under devices yes  Confirmed pressure ulcers found on na  New potential pressure ulcers noted on na. Wound consult placed na.  The following interventions in place waffle cushion, barrier cream, turning, incont checks, mepilex for prevention, 2 rn skin checks    Skin intact and blanching  Right heel mepilex for prevention for red/pink/boggy   No

## 2025-05-28 NOTE — PROGRESS NOTES
"Pt calm and resting, calling out \"hello, hello\" occasionally, however easily redirected and calmed when staff enters room. Denies pain or n/v with adequate appetite. VSS with needs met at this time.  " 15